# Patient Record
Sex: MALE | Race: WHITE | Employment: OTHER
[De-identification: names, ages, dates, MRNs, and addresses within clinical notes are randomized per-mention and may not be internally consistent; named-entity substitution may affect disease eponyms.]

---

## 2017-02-15 ENCOUNTER — OFFICE VISIT (OUTPATIENT)
Dept: FAMILY MEDICINE CLINIC | Facility: CLINIC | Age: 77
End: 2017-02-15

## 2017-02-15 VITALS — BODY MASS INDEX: 28.37 KG/M2 | DIASTOLIC BLOOD PRESSURE: 86 MMHG | WEIGHT: 215 LBS | SYSTOLIC BLOOD PRESSURE: 130 MMHG

## 2017-02-15 DIAGNOSIS — R07.81 RIB PAIN ON LEFT SIDE: Primary | ICD-10-CM

## 2017-02-15 PROCEDURE — 99213 OFFICE O/P EST LOW 20 MIN: CPT | Performed by: FAMILY MEDICINE

## 2017-02-15 RX ORDER — ISOSORBIDE MONONITRATE 30 MG/1
TABLET, EXTENDED RELEASE ORAL
Refills: 3 | COMMUNITY
Start: 2016-12-23 | End: 2017-09-21 | Stop reason: SDUPTHER

## 2017-02-15 ASSESSMENT — ENCOUNTER SYMPTOMS
EYE DISCHARGE: 0
NAUSEA: 1
VOMITING: 0
EYE REDNESS: 0
SHORTNESS OF BREATH: 0
COUGH: 0

## 2017-02-16 RX ORDER — TAMSULOSIN HYDROCHLORIDE 0.4 MG/1
0.4 CAPSULE ORAL DAILY
COMMUNITY

## 2017-02-22 ENCOUNTER — OFFICE VISIT (OUTPATIENT)
Dept: FAMILY MEDICINE CLINIC | Facility: CLINIC | Age: 77
End: 2017-02-22

## 2017-02-22 VITALS
BODY MASS INDEX: 28.76 KG/M2 | OXYGEN SATURATION: 92 % | HEIGHT: 73 IN | SYSTOLIC BLOOD PRESSURE: 136 MMHG | TEMPERATURE: 97.5 F | DIASTOLIC BLOOD PRESSURE: 80 MMHG | HEART RATE: 56 BPM | WEIGHT: 217 LBS

## 2017-02-22 DIAGNOSIS — B02.9 HERPES ZOSTER WITHOUT COMPLICATION: Primary | ICD-10-CM

## 2017-02-22 PROCEDURE — 99213 OFFICE O/P EST LOW 20 MIN: CPT | Performed by: NURSE PRACTITIONER

## 2017-02-22 RX ORDER — ACYCLOVIR 800 MG/1
800 TABLET ORAL
Qty: 35 TABLET | Refills: 0 | Status: SHIPPED | OUTPATIENT
Start: 2017-02-22 | End: 2017-03-01

## 2017-02-22 ASSESSMENT — ENCOUNTER SYMPTOMS
RHINORRHEA: 0
SHORTNESS OF BREATH: 0
COUGH: 0
SORE THROAT: 0
BACK PAIN: 1

## 2017-03-03 ENCOUNTER — TELEPHONE (OUTPATIENT)
Dept: FAMILY MEDICINE CLINIC | Facility: CLINIC | Age: 77
End: 2017-03-03

## 2017-03-03 RX ORDER — TRAZODONE HYDROCHLORIDE 150 MG/1
150 TABLET ORAL NIGHTLY
Qty: 90 TABLET | Refills: 1 | Status: SHIPPED | OUTPATIENT
Start: 2017-03-03 | End: 2017-07-06 | Stop reason: SDUPTHER

## 2017-05-20 ENCOUNTER — APPOINTMENT (OUTPATIENT)
Dept: CT IMAGING | Age: 77
End: 2017-05-20
Payer: MEDICARE

## 2017-05-20 ENCOUNTER — HOSPITAL ENCOUNTER (EMERGENCY)
Age: 77
Discharge: HOME | End: 2017-05-20
Attending: FAMILY MEDICINE
Payer: MEDICARE

## 2017-05-20 ENCOUNTER — APPOINTMENT (OUTPATIENT)
Dept: GENERAL RADIOLOGY | Age: 77
End: 2017-05-20
Payer: MEDICARE

## 2017-05-20 VITALS
WEIGHT: 213.3 LBS | TEMPERATURE: 98.1 F | SYSTOLIC BLOOD PRESSURE: 132 MMHG | RESPIRATION RATE: 20 BRPM | BODY MASS INDEX: 28.27 KG/M2 | OXYGEN SATURATION: 94 % | HEIGHT: 73 IN | DIASTOLIC BLOOD PRESSURE: 82 MMHG | HEART RATE: 82 BPM

## 2017-05-20 DIAGNOSIS — N39.0 URINARY TRACT INFECTION WITHOUT HEMATURIA, SITE UNSPECIFIED: ICD-10-CM

## 2017-05-20 DIAGNOSIS — N20.1 URETEROLITHIASIS: Primary | ICD-10-CM

## 2017-05-20 DIAGNOSIS — S22.31XA CLOSED FRACTURE OF RIB OF RIGHT SIDE, INITIAL ENCOUNTER: ICD-10-CM

## 2017-05-20 DIAGNOSIS — E87.6 HYPOKALEMIA: ICD-10-CM

## 2017-05-20 LAB
-: ABNORMAL
ABSOLUTE EOS #: 0 K/UL (ref 0–0.4)
ABSOLUTE LYMPH #: 0.4 K/UL (ref 0.9–2.5)
ABSOLUTE MONO #: 0.7 K/UL (ref 0–1)
ALBUMIN SERPL-MCNC: 4.1 G/DL (ref 3.5–5.2)
ALP BLD-CCNC: 92 U/L (ref 40–129)
ALT SERPL-CCNC: 11 U/L (ref 5–41)
AMORPHOUS: ABNORMAL
AMYLASE: 37 U/L (ref 28–100)
ANION GAP SERPL CALCULATED.3IONS-SCNC: 18 MMOL/L (ref 9–17)
AST SERPL-CCNC: 16 U/L
BACTERIA: ABNORMAL
BASOPHILS # BLD: 0 %
BASOPHILS ABSOLUTE: 0 K/UL (ref 0–0.2)
BILIRUB SERPL-MCNC: 0.8 MG/DL (ref 0.3–1.2)
BILIRUBIN URINE: NEGATIVE
BUN BLDV-MCNC: 13 MG/DL (ref 8–23)
BUN/CREAT BLD: 15 (ref 9–20)
CALCIUM SERPL-MCNC: 9.6 MG/DL (ref 8.6–10.4)
CASTS UA: ABNORMAL /LPF
CHLORIDE BLD-SCNC: 99 MMOL/L (ref 98–107)
CO2: 21 MMOL/L (ref 20–31)
COLOR: YELLOW
COMMENT UA: ABNORMAL
CREAT SERPL-MCNC: 0.87 MG/DL (ref 0.7–1.2)
CRYSTALS, UA: ABNORMAL /HPF
DIFFERENTIAL TYPE: YES
EOSINOPHILS RELATIVE PERCENT: 1 %
EPITHELIAL CELLS UA: ABNORMAL /HPF
GFR AFRICAN AMERICAN: >60 ML/MIN
GFR NON-AFRICAN AMERICAN: >60 ML/MIN
GFR SERPL CREATININE-BSD FRML MDRD: ABNORMAL ML/MIN/{1.73_M2}
GFR SERPL CREATININE-BSD FRML MDRD: ABNORMAL ML/MIN/{1.73_M2}
GLUCOSE BLD-MCNC: 140 MG/DL (ref 70–99)
GLUCOSE URINE: NEGATIVE
HCT VFR BLD CALC: 38.6 % (ref 41–53)
HEMOGLOBIN: 13 G/DL (ref 13.5–17.5)
KETONES, URINE: NEGATIVE
LEUKOCYTE ESTERASE, URINE: ABNORMAL
LIPASE: 12 U/L (ref 13–60)
LYMPHOCYTES # BLD: 6 %
MCH RBC QN AUTO: 32.1 PG (ref 26–34)
MCHC RBC AUTO-ENTMCNC: 33.7 G/DL (ref 31–37)
MCV RBC AUTO: 95.2 FL (ref 80–100)
MONOCYTES # BLD: 9 %
MUCUS: ABNORMAL
NITRITE, URINE: POSITIVE
OTHER OBSERVATIONS UA: ABNORMAL
PDW BLD-RTO: 14.9 % (ref 12.1–15.2)
PH UA: 6 (ref 5–8)
PLATELET # BLD: 243 K/UL (ref 140–450)
PLATELET ESTIMATE: ABNORMAL
PMV BLD AUTO: ABNORMAL FL (ref 6–12)
POTASSIUM SERPL-SCNC: 3.3 MMOL/L (ref 3.7–5.3)
PROTEIN UA: ABNORMAL
RBC # BLD: 4.06 M/UL (ref 4.5–5.9)
RBC # BLD: ABNORMAL 10*6/UL
RBC UA: ABNORMAL /HPF (ref 0–2)
RENAL EPITHELIAL, UA: ABNORMAL /HPF
SEG NEUTROPHILS: 84 %
SEGMENTED NEUTROPHILS ABSOLUTE COUNT: 6.4 K/UL (ref 2.1–6.5)
SODIUM BLD-SCNC: 138 MMOL/L (ref 135–144)
SPECIFIC GRAVITY UA: 1.02 (ref 1–1.03)
TOTAL PROTEIN: 7.1 G/DL (ref 6.4–8.3)
TRICHOMONAS: ABNORMAL
TURBIDITY: ABNORMAL
URINE HGB: ABNORMAL
UROBILINOGEN, URINE: NORMAL
WBC # BLD: 7.6 K/UL (ref 3.5–11)
WBC # BLD: ABNORMAL 10*3/UL
WBC UA: ABNORMAL /HPF
YEAST: ABNORMAL

## 2017-05-20 PROCEDURE — 99285 EMERGENCY DEPT VISIT HI MDM: CPT

## 2017-05-20 PROCEDURE — 94664 DEMO&/EVAL PT USE INHALER: CPT

## 2017-05-20 PROCEDURE — 87086 URINE CULTURE/COLONY COUNT: CPT

## 2017-05-20 PROCEDURE — 81001 URINALYSIS AUTO W/SCOPE: CPT

## 2017-05-20 PROCEDURE — 71101 X-RAY EXAM UNILAT RIBS/CHEST: CPT

## 2017-05-20 PROCEDURE — 80053 COMPREHEN METABOLIC PANEL: CPT

## 2017-05-20 PROCEDURE — 6370000000 HC RX 637 (ALT 250 FOR IP): Performed by: FAMILY MEDICINE

## 2017-05-20 PROCEDURE — 82150 ASSAY OF AMYLASE: CPT

## 2017-05-20 PROCEDURE — 85025 COMPLETE CBC W/AUTO DIFF WBC: CPT

## 2017-05-20 PROCEDURE — 87077 CULTURE AEROBIC IDENTIFY: CPT

## 2017-05-20 PROCEDURE — 83690 ASSAY OF LIPASE: CPT

## 2017-05-20 PROCEDURE — 87186 SC STD MICRODIL/AGAR DIL: CPT

## 2017-05-20 PROCEDURE — 74176 CT ABD & PELVIS W/O CONTRAST: CPT

## 2017-05-20 PROCEDURE — 80048 BASIC METABOLIC PNL TOTAL CA: CPT

## 2017-05-20 RX ORDER — CIPROFLOXACIN 500 MG/1
500 TABLET, FILM COATED ORAL ONCE
Status: COMPLETED | OUTPATIENT
Start: 2017-05-20 | End: 2017-05-20

## 2017-05-20 RX ORDER — CIPROFLOXACIN 500 MG/1
500 TABLET, FILM COATED ORAL 2 TIMES DAILY
Qty: 20 TABLET | Refills: 0 | Status: SHIPPED | OUTPATIENT
Start: 2017-05-20 | End: 2017-05-30

## 2017-05-20 RX ORDER — POTASSIUM CHLORIDE 750 MG/1
10 TABLET, FILM COATED, EXTENDED RELEASE ORAL ONCE
Status: COMPLETED | OUTPATIENT
Start: 2017-05-20 | End: 2017-05-20

## 2017-05-20 RX ORDER — IPRATROPIUM BROMIDE AND ALBUTEROL SULFATE 2.5; .5 MG/3ML; MG/3ML
1 SOLUTION RESPIRATORY (INHALATION) ONCE
Status: COMPLETED | OUTPATIENT
Start: 2017-05-20 | End: 2017-05-20

## 2017-05-20 RX ORDER — POTASSIUM CHLORIDE 750 MG/1
10 TABLET, EXTENDED RELEASE ORAL DAILY
Qty: 15 TABLET | Refills: 0 | Status: SHIPPED | OUTPATIENT
Start: 2017-05-20 | End: 2017-12-22 | Stop reason: ALTCHOICE

## 2017-05-20 RX ADMIN — CIPROFLOXACIN HYDROCHLORIDE 500 MG: 500 TABLET, FILM COATED ORAL at 18:17

## 2017-05-20 RX ADMIN — IPRATROPIUM BROMIDE AND ALBUTEROL SULFATE 1 AMPULE: .5; 3 SOLUTION RESPIRATORY (INHALATION) at 16:57

## 2017-05-20 RX ADMIN — POTASSIUM CHLORIDE 10 MEQ: 750 TABLET, FILM COATED, EXTENDED RELEASE ORAL at 18:17

## 2017-05-20 ASSESSMENT — PAIN DESCRIPTION - LOCATION: LOCATION: ABDOMEN

## 2017-05-20 ASSESSMENT — PAIN DESCRIPTION - PROGRESSION: CLINICAL_PROGRESSION: NOT CHANGED

## 2017-05-20 ASSESSMENT — PAIN DESCRIPTION - ORIENTATION: ORIENTATION: RIGHT;LEFT;MID

## 2017-05-20 ASSESSMENT — PAIN DESCRIPTION - FREQUENCY: FREQUENCY: INTERMITTENT

## 2017-05-20 ASSESSMENT — PAIN DESCRIPTION - ONSET: ONSET: GRADUAL

## 2017-05-20 ASSESSMENT — PAIN DESCRIPTION - PAIN TYPE: TYPE: ACUTE PAIN

## 2017-05-20 ASSESSMENT — PAIN SCALES - GENERAL: PAINLEVEL_OUTOF10: 2

## 2017-05-20 ASSESSMENT — PAIN DESCRIPTION - DESCRIPTORS: DESCRIPTORS: ACHING

## 2017-05-21 LAB
CULTURE: ABNORMAL
CULTURE: ABNORMAL
Lab: ABNORMAL
ORGANISM: ABNORMAL
SPECIMEN DESCRIPTION: ABNORMAL
SPECIMEN DESCRIPTION: ABNORMAL
STATUS: ABNORMAL

## 2017-05-25 ENCOUNTER — OFFICE VISIT (OUTPATIENT)
Dept: UROLOGY | Age: 77
End: 2017-05-25
Payer: MEDICARE

## 2017-05-25 ENCOUNTER — HOSPITAL ENCOUNTER (OUTPATIENT)
Age: 77
Setting detail: SPECIMEN
Discharge: HOME | End: 2017-05-25
Payer: MEDICARE

## 2017-05-25 VITALS
DIASTOLIC BLOOD PRESSURE: 82 MMHG | WEIGHT: 217 LBS | SYSTOLIC BLOOD PRESSURE: 142 MMHG | BODY MASS INDEX: 28.63 KG/M2 | TEMPERATURE: 97.8 F

## 2017-05-25 DIAGNOSIS — N13.2 URETERAL STONE WITH HYDRONEPHROSIS: Primary | ICD-10-CM

## 2017-05-25 DIAGNOSIS — N30.00 ACUTE CYSTITIS WITHOUT HEMATURIA: ICD-10-CM

## 2017-05-25 DIAGNOSIS — N13.2 URETERAL STONE WITH HYDRONEPHROSIS: ICD-10-CM

## 2017-05-25 LAB
-: ABNORMAL
AMORPHOUS: ABNORMAL
BACTERIA: ABNORMAL
BILIRUBIN URINE: NEGATIVE
CASTS UA: ABNORMAL /LPF
COLOR: ABNORMAL
COMMENT UA: ABNORMAL
CRYSTALS, UA: ABNORMAL /HPF
EPITHELIAL CELLS UA: ABNORMAL /HPF
GLUCOSE URINE: NEGATIVE
KETONES, URINE: NEGATIVE
LEUKOCYTE ESTERASE, URINE: ABNORMAL
MUCUS: ABNORMAL
NITRITE, URINE: NEGATIVE
OTHER OBSERVATIONS UA: ABNORMAL
PH UA: 6 (ref 5–8)
PROTEIN UA: ABNORMAL
RBC UA: ABNORMAL /HPF (ref 0–2)
RENAL EPITHELIAL, UA: ABNORMAL /HPF
SPECIFIC GRAVITY UA: 1.02 (ref 1–1.03)
TRICHOMONAS: ABNORMAL
TURBIDITY: CLEAR
URINE HGB: NEGATIVE
UROBILINOGEN, URINE: NORMAL
WBC UA: ABNORMAL /HPF
YEAST: ABNORMAL

## 2017-05-25 PROCEDURE — 99214 OFFICE O/P EST MOD 30 MIN: CPT | Performed by: PHYSICIAN ASSISTANT

## 2017-05-25 PROCEDURE — 87086 URINE CULTURE/COLONY COUNT: CPT

## 2017-05-25 PROCEDURE — 81001 URINALYSIS AUTO W/SCOPE: CPT

## 2017-05-25 ASSESSMENT — ENCOUNTER SYMPTOMS
DIARRHEA: 0
ABDOMINAL PAIN: 0
COLOR CHANGE: 0
NAUSEA: 0
VOMITING: 0
ABDOMINAL DISTENTION: 0
SHORTNESS OF BREATH: 0
CONSTIPATION: 0

## 2017-05-26 LAB
CULTURE: NO GROWTH
CULTURE: NORMAL
Lab: NORMAL
SPECIMEN DESCRIPTION: NORMAL
SPECIMEN DESCRIPTION: NORMAL
STATUS: NORMAL

## 2017-05-30 ENCOUNTER — TELEPHONE (OUTPATIENT)
Dept: UROLOGY | Age: 77
End: 2017-05-30

## 2017-05-30 ENCOUNTER — ANESTHESIA EVENT (OUTPATIENT)
Dept: OPERATING ROOM | Age: 77
End: 2017-05-30
Payer: MEDICARE

## 2017-05-30 ENCOUNTER — HOSPITAL ENCOUNTER (OUTPATIENT)
Dept: CT IMAGING | Age: 77
Discharge: HOME | End: 2017-05-30
Payer: MEDICARE

## 2017-05-30 DIAGNOSIS — N13.2 URETERAL STONE WITH HYDRONEPHROSIS: Primary | ICD-10-CM

## 2017-05-30 DIAGNOSIS — N13.2 URETERAL STONE WITH HYDRONEPHROSIS: ICD-10-CM

## 2017-05-30 PROCEDURE — 74176 CT ABD & PELVIS W/O CONTRAST: CPT

## 2017-05-31 ENCOUNTER — HOSPITAL ENCOUNTER (OUTPATIENT)
Age: 77
Setting detail: OBSERVATION
Discharge: ANOTHER HOSPITAL | End: 2017-06-01
Attending: EMERGENCY MEDICINE | Admitting: INTERNAL MEDICINE
Payer: MEDICARE

## 2017-05-31 ENCOUNTER — HOSPITAL ENCOUNTER (OUTPATIENT)
Age: 77
Setting detail: OUTPATIENT SURGERY
Discharge: HOME | End: 2017-05-31
Attending: UROLOGY | Admitting: UROLOGY
Payer: MEDICARE

## 2017-05-31 ENCOUNTER — HOSPITAL ENCOUNTER (EMERGENCY)
Age: 77
Discharge: HOME | End: 2017-05-31
Attending: EMERGENCY MEDICINE
Payer: MEDICARE

## 2017-05-31 ENCOUNTER — ANESTHESIA (OUTPATIENT)
Dept: OPERATING ROOM | Age: 77
End: 2017-05-31
Payer: MEDICARE

## 2017-05-31 ENCOUNTER — APPOINTMENT (OUTPATIENT)
Dept: GENERAL RADIOLOGY | Age: 77
End: 2017-05-31
Payer: MEDICARE

## 2017-05-31 ENCOUNTER — APPOINTMENT (OUTPATIENT)
Dept: GENERAL RADIOLOGY | Age: 77
End: 2017-05-31
Attending: UROLOGY
Payer: MEDICARE

## 2017-05-31 VITALS
HEART RATE: 68 BPM | DIASTOLIC BLOOD PRESSURE: 83 MMHG | OXYGEN SATURATION: 96 % | RESPIRATION RATE: 18 BRPM | TEMPERATURE: 97.7 F | HEIGHT: 73 IN | WEIGHT: 205 LBS | BODY MASS INDEX: 27.17 KG/M2 | SYSTOLIC BLOOD PRESSURE: 156 MMHG

## 2017-05-31 VITALS — OXYGEN SATURATION: 97 % | SYSTOLIC BLOOD PRESSURE: 152 MMHG | TEMPERATURE: 98.6 F | DIASTOLIC BLOOD PRESSURE: 89 MMHG

## 2017-05-31 VITALS
HEART RATE: 84 BPM | RESPIRATION RATE: 16 BRPM | SYSTOLIC BLOOD PRESSURE: 127 MMHG | WEIGHT: 205 LBS | TEMPERATURE: 97.7 F | HEIGHT: 73 IN | DIASTOLIC BLOOD PRESSURE: 88 MMHG | BODY MASS INDEX: 27.17 KG/M2 | OXYGEN SATURATION: 95 %

## 2017-05-31 DIAGNOSIS — Z98.890 HISTORY OF LITHOTRIPSY: ICD-10-CM

## 2017-05-31 DIAGNOSIS — T83.122A URETERAL STENT DISPLACEMENT, INITIAL ENCOUNTER (HCC): Primary | ICD-10-CM

## 2017-05-31 DIAGNOSIS — R07.9 CHEST PAIN, UNSPECIFIED TYPE: Primary | ICD-10-CM

## 2017-05-31 DIAGNOSIS — Z86.79 HISTORY OF CORONARY ARTERY DISEASE: ICD-10-CM

## 2017-05-31 LAB
ABSOLUTE EOS #: ABNORMAL K/UL (ref 0–0.4)
ABSOLUTE LYMPH #: 0.4 K/UL (ref 0.9–2.5)
ABSOLUTE MONO #: 0.2 K/UL (ref 0–1)
ANION GAP SERPL CALCULATED.3IONS-SCNC: 19 MMOL/L (ref 9–17)
BASOPHILS # BLD: ABNORMAL %
BASOPHILS ABSOLUTE: ABNORMAL K/UL (ref 0–0.2)
BUN BLDV-MCNC: 16 MG/DL (ref 8–23)
BUN/CREAT BLD: 18 (ref 9–20)
CALCIUM SERPL-MCNC: 9.2 MG/DL (ref 8.6–10.4)
CHLORIDE BLD-SCNC: 99 MMOL/L (ref 98–107)
CO2: 22 MMOL/L (ref 20–31)
CREAT SERPL-MCNC: 0.87 MG/DL (ref 0.7–1.2)
DIFFERENTIAL TYPE: ABNORMAL
EOSINOPHILS RELATIVE PERCENT: ABNORMAL %
GFR AFRICAN AMERICAN: >60 ML/MIN
GFR NON-AFRICAN AMERICAN: >60 ML/MIN
GFR SERPL CREATININE-BSD FRML MDRD: ABNORMAL ML/MIN/{1.73_M2}
GFR SERPL CREATININE-BSD FRML MDRD: ABNORMAL ML/MIN/{1.73_M2}
GLUCOSE BLD-MCNC: 112 MG/DL (ref 74–100)
GLUCOSE BLD-MCNC: 143 MG/DL (ref 70–99)
HCT VFR BLD CALC: 36.2 % (ref 41–53)
HEMOGLOBIN: 12.6 G/DL (ref 13.5–17.5)
INR BLD: 1
LYMPHOCYTES # BLD: 4 %
MCH RBC QN AUTO: 33.3 PG (ref 26–34)
MCHC RBC AUTO-ENTMCNC: 34.8 G/DL (ref 31–37)
MCV RBC AUTO: 95.8 FL (ref 80–100)
MONOCYTES # BLD: 2 %
MORPHOLOGY: ABNORMAL
PARTIAL THROMBOPLASTIN TIME: 22.9 SEC (ref 21–33)
PDW BLD-RTO: 13.7 % (ref 12.1–15.2)
PLATELET # BLD: 262 K/UL (ref 140–450)
PLATELET ESTIMATE: ABNORMAL
PMV BLD AUTO: ABNORMAL FL (ref 6–12)
POTASSIUM SERPL-SCNC: 3.5 MMOL/L (ref 3.7–5.3)
PROTHROMBIN TIME: 10.6 SEC (ref 9–11.6)
RBC # BLD: 3.78 M/UL (ref 4.5–5.9)
RBC # BLD: ABNORMAL 10*6/UL
SEG NEUTROPHILS: 94 %
SEGMENTED NEUTROPHILS ABSOLUTE COUNT: 9.5 K/UL (ref 2.1–6.5)
SODIUM BLD-SCNC: 140 MMOL/L (ref 135–144)
TROPONIN INTERP: ABNORMAL
TROPONIN INTERP: NORMAL
TROPONIN T: 0.04 NG/ML
TROPONIN T: <0.03 NG/ML
WBC # BLD: 10.1 K/UL (ref 3.5–11)
WBC # BLD: ABNORMAL 10*3/UL

## 2017-05-31 PROCEDURE — 84484 ASSAY OF TROPONIN QUANT: CPT

## 2017-05-31 PROCEDURE — 82365 CALCULUS SPECTROSCOPY: CPT

## 2017-05-31 PROCEDURE — 2580000003 HC RX 258: Performed by: EMERGENCY MEDICINE

## 2017-05-31 PROCEDURE — 82947 ASSAY GLUCOSE BLOOD QUANT: CPT

## 2017-05-31 PROCEDURE — 99285 EMERGENCY DEPT VISIT HI MDM: CPT

## 2017-05-31 PROCEDURE — 6370000000 HC RX 637 (ALT 250 FOR IP): Performed by: EMERGENCY MEDICINE

## 2017-05-31 PROCEDURE — 85025 COMPLETE CBC W/AUTO DIFF WBC: CPT

## 2017-05-31 PROCEDURE — 3600000004 HC SURGERY LEVEL 4 BASE: Performed by: UROLOGY

## 2017-05-31 PROCEDURE — 36415 COLL VENOUS BLD VENIPUNCTURE: CPT

## 2017-05-31 PROCEDURE — 71010 XR CHEST PORTABLE: CPT

## 2017-05-31 PROCEDURE — 99283 EMERGENCY DEPT VISIT LOW MDM: CPT

## 2017-05-31 PROCEDURE — 2580000003 HC RX 258: Performed by: UROLOGY

## 2017-05-31 PROCEDURE — G0378 HOSPITAL OBSERVATION PER HR: HCPCS

## 2017-05-31 PROCEDURE — 6370000000 HC RX 637 (ALT 250 FOR IP): Performed by: INTERNAL MEDICINE

## 2017-05-31 PROCEDURE — 85730 THROMBOPLASTIN TIME PARTIAL: CPT

## 2017-05-31 PROCEDURE — 6360000002 HC RX W HCPCS: Performed by: UROLOGY

## 2017-05-31 PROCEDURE — 7100000001 HC PACU RECOVERY - ADDTL 15 MIN: Performed by: UROLOGY

## 2017-05-31 PROCEDURE — 7100000000 HC PACU RECOVERY - FIRST 15 MIN: Performed by: UROLOGY

## 2017-05-31 PROCEDURE — 6370000000 HC RX 637 (ALT 250 FOR IP): Performed by: UROLOGY

## 2017-05-31 PROCEDURE — 74000 XR ABDOMEN LIMITED (KUB): CPT

## 2017-05-31 PROCEDURE — 93005 ELECTROCARDIOGRAM TRACING: CPT

## 2017-05-31 PROCEDURE — C2617 STENT, NON-COR, TEM W/O DEL: HCPCS | Performed by: UROLOGY

## 2017-05-31 PROCEDURE — 6360000002 HC RX W HCPCS: Performed by: EMERGENCY MEDICINE

## 2017-05-31 PROCEDURE — 3600000014 HC SURGERY LEVEL 4 ADDTL 15MIN: Performed by: UROLOGY

## 2017-05-31 PROCEDURE — 7100000010 HC PHASE II RECOVERY - FIRST 15 MIN: Performed by: UROLOGY

## 2017-05-31 PROCEDURE — 6360000002 HC RX W HCPCS: Performed by: NURSE ANESTHETIST, CERTIFIED REGISTERED

## 2017-05-31 PROCEDURE — 85610 PROTHROMBIN TIME: CPT

## 2017-05-31 PROCEDURE — 2500000003 HC RX 250 WO HCPCS: Performed by: NURSE ANESTHETIST, CERTIFIED REGISTERED

## 2017-05-31 PROCEDURE — C1769 GUIDE WIRE: HCPCS | Performed by: UROLOGY

## 2017-05-31 PROCEDURE — 3700000001 HC ADD 15 MINUTES (ANESTHESIA): Performed by: UROLOGY

## 2017-05-31 PROCEDURE — 80048 BASIC METABOLIC PNL TOTAL CA: CPT

## 2017-05-31 PROCEDURE — 7100000011 HC PHASE II RECOVERY - ADDTL 15 MIN: Performed by: UROLOGY

## 2017-05-31 PROCEDURE — 96374 THER/PROPH/DIAG INJ IV PUSH: CPT

## 2017-05-31 PROCEDURE — 3700000000 HC ANESTHESIA ATTENDED CARE: Performed by: UROLOGY

## 2017-05-31 DEVICE — URETERAL STENT
Type: IMPLANTABLE DEVICE | Site: URETER | Status: FUNCTIONAL
Brand: PERCUFLEX™ PLUS

## 2017-05-31 RX ORDER — CIPROFLOXACIN 2 MG/ML
400 INJECTION, SOLUTION INTRAVENOUS
Status: COMPLETED | OUTPATIENT
Start: 2017-05-31 | End: 2017-05-31

## 2017-05-31 RX ORDER — ONDANSETRON 2 MG/ML
4 INJECTION INTRAMUSCULAR; INTRAVENOUS EVERY 6 HOURS PRN
Status: DISCONTINUED | OUTPATIENT
Start: 2017-05-31 | End: 2017-06-01 | Stop reason: HOSPADM

## 2017-05-31 RX ORDER — ONDANSETRON 2 MG/ML
INJECTION INTRAMUSCULAR; INTRAVENOUS PRN
Status: DISCONTINUED | OUTPATIENT
Start: 2017-05-31 | End: 2017-05-31 | Stop reason: SDUPTHER

## 2017-05-31 RX ORDER — FENTANYL CITRATE 50 UG/ML
50 INJECTION, SOLUTION INTRAMUSCULAR; INTRAVENOUS EVERY 5 MIN PRN
Status: DISCONTINUED | OUTPATIENT
Start: 2017-05-31 | End: 2017-05-31 | Stop reason: HOSPADM

## 2017-05-31 RX ORDER — SODIUM CHLORIDE, SODIUM LACTATE, POTASSIUM CHLORIDE, CALCIUM CHLORIDE 600; 310; 30; 20 MG/100ML; MG/100ML; MG/100ML; MG/100ML
INJECTION, SOLUTION INTRAVENOUS CONTINUOUS
Status: DISCONTINUED | OUTPATIENT
Start: 2017-05-31 | End: 2017-05-31 | Stop reason: HOSPADM

## 2017-05-31 RX ORDER — TAMSULOSIN HYDROCHLORIDE 0.4 MG/1
0.4 CAPSULE ORAL ONCE
Status: COMPLETED | OUTPATIENT
Start: 2017-05-31 | End: 2017-05-31

## 2017-05-31 RX ORDER — LANOLIN ALCOHOL/MO/W.PET/CERES
1 CREAM (GRAM) TOPICAL DAILY
Status: DISCONTINUED | OUTPATIENT
Start: 2017-06-01 | End: 2017-06-01 | Stop reason: HOSPADM

## 2017-05-31 RX ORDER — HYDROCODONE BITARTRATE AND ACETAMINOPHEN 5; 325 MG/1; MG/1
1 TABLET ORAL PRN
Status: COMPLETED | OUTPATIENT
Start: 2017-05-31 | End: 2017-05-31

## 2017-05-31 RX ORDER — SODIUM CHLORIDE 0.9 % (FLUSH) 0.9 %
10 SYRINGE (ML) INJECTION EVERY 12 HOURS SCHEDULED
Status: DISCONTINUED | OUTPATIENT
Start: 2017-05-31 | End: 2017-06-01 | Stop reason: HOSPADM

## 2017-05-31 RX ORDER — ASPIRIN 81 MG/1
81 TABLET, CHEWABLE ORAL DAILY
Status: DISCONTINUED | OUTPATIENT
Start: 2017-06-01 | End: 2017-06-01 | Stop reason: HOSPADM

## 2017-05-31 RX ORDER — ROSUVASTATIN CALCIUM 20 MG/1
20 TABLET, COATED ORAL DAILY
Status: DISCONTINUED | OUTPATIENT
Start: 2017-06-01 | End: 2017-06-01 | Stop reason: HOSPADM

## 2017-05-31 RX ORDER — TRAZODONE HYDROCHLORIDE 50 MG/1
150 TABLET ORAL NIGHTLY
Status: DISCONTINUED | OUTPATIENT
Start: 2017-05-31 | End: 2017-06-01 | Stop reason: HOSPADM

## 2017-05-31 RX ORDER — ISOSORBIDE MONONITRATE 30 MG/1
30 TABLET, EXTENDED RELEASE ORAL DAILY
Status: DISCONTINUED | OUTPATIENT
Start: 2017-06-01 | End: 2017-06-01 | Stop reason: HOSPADM

## 2017-05-31 RX ORDER — HYDROCODONE BITARTRATE AND ACETAMINOPHEN 5; 325 MG/1; MG/1
2 TABLET ORAL PRN
Status: COMPLETED | OUTPATIENT
Start: 2017-05-31 | End: 2017-05-31

## 2017-05-31 RX ORDER — METOCLOPRAMIDE HYDROCHLORIDE 5 MG/ML
10 INJECTION INTRAMUSCULAR; INTRAVENOUS
Status: DISCONTINUED | OUTPATIENT
Start: 2017-05-31 | End: 2017-05-31 | Stop reason: HOSPADM

## 2017-05-31 RX ORDER — KETOROLAC TROMETHAMINE 30 MG/ML
15 INJECTION, SOLUTION INTRAMUSCULAR; INTRAVENOUS ONCE
Status: COMPLETED | OUTPATIENT
Start: 2017-05-31 | End: 2017-05-31

## 2017-05-31 RX ORDER — EPHEDRINE SULFATE 50 MG/ML
INJECTION, SOLUTION INTRAVENOUS PRN
Status: DISCONTINUED | OUTPATIENT
Start: 2017-05-31 | End: 2017-05-31 | Stop reason: SDUPTHER

## 2017-05-31 RX ORDER — FAMOTIDINE 20 MG/1
20 TABLET, FILM COATED ORAL DAILY
Status: DISCONTINUED | OUTPATIENT
Start: 2017-06-01 | End: 2017-06-01 | Stop reason: HOSPADM

## 2017-05-31 RX ORDER — ACETAMINOPHEN 325 MG/1
650 TABLET ORAL EVERY 4 HOURS PRN
Status: DISCONTINUED | OUTPATIENT
Start: 2017-05-31 | End: 2017-06-01 | Stop reason: HOSPADM

## 2017-05-31 RX ORDER — TAMSULOSIN HYDROCHLORIDE 0.4 MG/1
0.4 CAPSULE ORAL DAILY
Status: DISCONTINUED | OUTPATIENT
Start: 2017-06-01 | End: 2017-05-31

## 2017-05-31 RX ORDER — NITROGLYCERIN 0.4 MG/1
0.4 TABLET SUBLINGUAL ONCE
Status: COMPLETED | OUTPATIENT
Start: 2017-05-31 | End: 2017-05-31

## 2017-05-31 RX ORDER — PROMETHAZINE HYDROCHLORIDE 25 MG/ML
6.25 INJECTION, SOLUTION INTRAMUSCULAR; INTRAVENOUS
Status: DISCONTINUED | OUTPATIENT
Start: 2017-05-31 | End: 2017-05-31 | Stop reason: HOSPADM

## 2017-05-31 RX ORDER — SODIUM CHLORIDE 0.9 % (FLUSH) 0.9 %
10 SYRINGE (ML) INJECTION PRN
Status: DISCONTINUED | OUTPATIENT
Start: 2017-05-31 | End: 2017-06-01 | Stop reason: HOSPADM

## 2017-05-31 RX ORDER — TAMSULOSIN HYDROCHLORIDE 0.4 MG/1
0.4 CAPSULE ORAL DAILY
Status: DISCONTINUED | OUTPATIENT
Start: 2017-06-01 | End: 2017-06-01 | Stop reason: HOSPADM

## 2017-05-31 RX ORDER — ASPIRIN 81 MG/1
243 TABLET, CHEWABLE ORAL ONCE
Status: COMPLETED | OUTPATIENT
Start: 2017-05-31 | End: 2017-05-31

## 2017-05-31 RX ORDER — PROPOFOL 10 MG/ML
INJECTION, EMULSION INTRAVENOUS PRN
Status: DISCONTINUED | OUTPATIENT
Start: 2017-05-31 | End: 2017-05-31 | Stop reason: SDUPTHER

## 2017-05-31 RX ORDER — FENTANYL CITRATE 50 UG/ML
25 INJECTION, SOLUTION INTRAMUSCULAR; INTRAVENOUS EVERY 5 MIN PRN
Status: DISCONTINUED | OUTPATIENT
Start: 2017-05-31 | End: 2017-05-31 | Stop reason: HOSPADM

## 2017-05-31 RX ORDER — SODIUM CHLORIDE 9 MG/ML
INJECTION, SOLUTION INTRAVENOUS CONTINUOUS
Status: DISCONTINUED | OUTPATIENT
Start: 2017-05-31 | End: 2017-06-01 | Stop reason: HOSPADM

## 2017-05-31 RX ORDER — POTASSIUM CHLORIDE 750 MG/1
10 TABLET, FILM COATED, EXTENDED RELEASE ORAL DAILY
Status: DISCONTINUED | OUTPATIENT
Start: 2017-06-01 | End: 2017-06-01 | Stop reason: HOSPADM

## 2017-05-31 RX ORDER — KETOROLAC TROMETHAMINE 10 MG/1
10 TABLET, FILM COATED ORAL 3 TIMES DAILY
Qty: 20 TABLET | Refills: 0 | Status: ON HOLD | OUTPATIENT
Start: 2017-05-31 | End: 2017-06-01

## 2017-05-31 RX ORDER — ALBUTEROL SULFATE 90 UG/1
2 AEROSOL, METERED RESPIRATORY (INHALATION) 4 TIMES DAILY PRN
Status: DISCONTINUED | OUTPATIENT
Start: 2017-05-31 | End: 2017-06-01 | Stop reason: HOSPADM

## 2017-05-31 RX ORDER — PANTOPRAZOLE SODIUM 40 MG/1
40 TABLET, DELAYED RELEASE ORAL
Status: DISCONTINUED | OUTPATIENT
Start: 2017-06-01 | End: 2017-06-01 | Stop reason: HOSPADM

## 2017-05-31 RX ORDER — HYDROCODONE BITARTRATE AND ACETAMINOPHEN 5; 325 MG/1; MG/1
1 TABLET ORAL EVERY 6 HOURS PRN
Status: DISCONTINUED | OUTPATIENT
Start: 2017-05-31 | End: 2017-06-01 | Stop reason: HOSPADM

## 2017-05-31 RX ORDER — GEMFIBROZIL 600 MG/1
600 TABLET, FILM COATED ORAL
Status: DISCONTINUED | OUTPATIENT
Start: 2017-06-01 | End: 2017-06-01 | Stop reason: HOSPADM

## 2017-05-31 RX ORDER — CILOSTAZOL 50 MG/1
100 TABLET ORAL 2 TIMES DAILY
Status: DISCONTINUED | OUTPATIENT
Start: 2017-05-31 | End: 2017-06-01 | Stop reason: HOSPADM

## 2017-05-31 RX ADMIN — PROPOFOL 150 MG: 10 INJECTION, EMULSION INTRAVENOUS at 12:34

## 2017-05-31 RX ADMIN — ONDANSETRON 4 MG: 2 INJECTION INTRAMUSCULAR; INTRAVENOUS at 12:34

## 2017-05-31 RX ADMIN — HYDROCODONE BITARTRATE AND ACETAMINOPHEN 1 TABLET: 5; 325 TABLET ORAL at 14:19

## 2017-05-31 RX ADMIN — SODIUM CHLORIDE, POTASSIUM CHLORIDE, SODIUM LACTATE AND CALCIUM CHLORIDE: 600; 310; 30; 20 INJECTION, SOLUTION INTRAVENOUS at 13:05

## 2017-05-31 RX ADMIN — FENTANYL CITRATE 50 MCG: 50 INJECTION INTRAMUSCULAR; INTRAVENOUS at 12:55

## 2017-05-31 RX ADMIN — SODIUM CHLORIDE: 9 INJECTION, SOLUTION INTRAVENOUS at 20:33

## 2017-05-31 RX ADMIN — TRAZODONE HYDROCHLORIDE 150 MG: 50 TABLET ORAL at 23:39

## 2017-05-31 RX ADMIN — EPHEDRINE SULFATE 10 MG: 50 INJECTION INTRAMUSCULAR; INTRAVENOUS; SUBCUTANEOUS at 12:35

## 2017-05-31 RX ADMIN — Medication 0.4 MG: at 21:07

## 2017-05-31 RX ADMIN — EPHEDRINE SULFATE 20 MG: 50 INJECTION INTRAMUSCULAR; INTRAVENOUS; SUBCUTANEOUS at 12:48

## 2017-05-31 RX ADMIN — TAMSULOSIN HYDROCHLORIDE 0.4 MG: 0.4 CAPSULE ORAL at 23:39

## 2017-05-31 RX ADMIN — CIPROFLOXACIN 400 MG: 2 INJECTION, SOLUTION INTRAVENOUS at 12:25

## 2017-05-31 RX ADMIN — NITROGLYCERIN 1 INCH: 20 OINTMENT TOPICAL at 23:39

## 2017-05-31 RX ADMIN — FENTANYL CITRATE 50 MCG: 50 INJECTION INTRAMUSCULAR; INTRAVENOUS at 12:34

## 2017-05-31 RX ADMIN — KETOROLAC TROMETHAMINE 15 MG: 30 INJECTION, SOLUTION INTRAMUSCULAR at 22:18

## 2017-05-31 RX ADMIN — CILOSTAZOL 100 MG: 50 TABLET ORAL at 23:37

## 2017-05-31 RX ADMIN — METOPROLOL TARTRATE 25 MG: 25 TABLET, FILM COATED ORAL at 23:38

## 2017-05-31 RX ADMIN — SODIUM CHLORIDE, POTASSIUM CHLORIDE, SODIUM LACTATE AND CALCIUM CHLORIDE: 600; 310; 30; 20 INJECTION, SOLUTION INTRAVENOUS at 10:54

## 2017-05-31 RX ADMIN — EPHEDRINE SULFATE 20 MG: 50 INJECTION INTRAMUSCULAR; INTRAVENOUS; SUBCUTANEOUS at 12:40

## 2017-05-31 RX ADMIN — Medication 243 MG: at 20:32

## 2017-05-31 RX ADMIN — LIDOCAINE HYDROCHLORIDE 100 MG: 20 INJECTION, SOLUTION INTRAVENOUS at 12:34

## 2017-05-31 ASSESSMENT — PAIN DESCRIPTION - FREQUENCY: FREQUENCY: CONTINUOUS

## 2017-05-31 ASSESSMENT — PAIN SCALES - GENERAL
PAINLEVEL_OUTOF10: 6
PAINLEVEL_OUTOF10: 0
PAINLEVEL_OUTOF10: 5
PAINLEVEL_OUTOF10: 0
PAINLEVEL_OUTOF10: 3
PAINLEVEL_OUTOF10: 0

## 2017-05-31 ASSESSMENT — PAIN DESCRIPTION - LOCATION: LOCATION: BREAST

## 2017-05-31 ASSESSMENT — PAIN DESCRIPTION - DESCRIPTORS: DESCRIPTORS: CONSTANT;PATIENT UNABLE TO DESCRIBE

## 2017-05-31 ASSESSMENT — PAIN - FUNCTIONAL ASSESSMENT: PAIN_FUNCTIONAL_ASSESSMENT: 0-10

## 2017-05-31 ASSESSMENT — PAIN DESCRIPTION - PAIN TYPE: TYPE: ACUTE PAIN

## 2017-05-31 ASSESSMENT — PAIN DESCRIPTION - ORIENTATION: ORIENTATION: LEFT

## 2017-05-31 ASSESSMENT — PAIN DESCRIPTION - ONSET: ONSET: SUDDEN

## 2017-06-01 ENCOUNTER — HOSPITAL ENCOUNTER (INPATIENT)
Age: 77
LOS: 2 days | Discharge: HOME HEALTH | DRG: 281 | End: 2017-06-03
Attending: FAMILY MEDICINE | Admitting: FAMILY MEDICINE
Payer: MEDICARE

## 2017-06-01 ENCOUNTER — APPOINTMENT (OUTPATIENT)
Dept: CARDIAC CATH/INVASIVE PROCEDURES | Age: 77
DRG: 281 | End: 2017-06-01
Attending: FAMILY MEDICINE
Payer: MEDICARE

## 2017-06-01 VITALS
HEIGHT: 73 IN | SYSTOLIC BLOOD PRESSURE: 133 MMHG | WEIGHT: 205.03 LBS | RESPIRATION RATE: 16 BRPM | DIASTOLIC BLOOD PRESSURE: 75 MMHG | OXYGEN SATURATION: 95 % | HEART RATE: 63 BPM | TEMPERATURE: 97.7 F | BODY MASS INDEX: 27.17 KG/M2

## 2017-06-01 PROBLEM — I25.810 CORONARY ARTERY DISEASE INVOLVING CORONARY BYPASS GRAFT: Status: ACTIVE | Noted: 2017-06-01

## 2017-06-01 PROBLEM — R31.9 HEMATURIA: Status: ACTIVE | Noted: 2017-06-01

## 2017-06-01 PROBLEM — I21.4 NSTEMI (NON-ST ELEVATED MYOCARDIAL INFARCTION) (HCC): Status: ACTIVE | Noted: 2017-06-01

## 2017-06-01 PROBLEM — I25.700 CORONARY ARTERY DISEASE INVOLVING CORONARY BYPASS GRAFT OF NATIVE HEART WITH UNSTABLE ANGINA PECTORIS (HCC): Status: ACTIVE | Noted: 2017-06-01

## 2017-06-01 PROBLEM — I20.9 ISCHEMIC CHEST PAIN (HCC): Status: ACTIVE | Noted: 2017-05-31

## 2017-06-01 LAB
ALBUMIN SERPL-MCNC: 3.3 G/DL (ref 3.5–5.2)
ALBUMIN/GLOBULIN RATIO: ABNORMAL (ref 1–2.5)
ALP BLD-CCNC: 74 U/L (ref 40–129)
ALT SERPL-CCNC: 14 U/L (ref 5–41)
ANION GAP SERPL CALCULATED.3IONS-SCNC: 12 MMOL/L (ref 9–17)
ANION GAP SERPL CALCULATED.3IONS-SCNC: 15 MMOL/L (ref 9–17)
AST SERPL-CCNC: 24 U/L
BILIRUB SERPL-MCNC: 0.53 MG/DL (ref 0.3–1.2)
BNP INTERPRETATION: ABNORMAL
BUN BLDV-MCNC: 13 MG/DL (ref 8–23)
BUN BLDV-MCNC: 17 MG/DL (ref 8–23)
BUN/CREAT BLD: 15 (ref 9–20)
BUN/CREAT BLD: ABNORMAL (ref 9–20)
CALCIUM SERPL-MCNC: 8.6 MG/DL (ref 8.6–10.4)
CALCIUM SERPL-MCNC: 8.7 MG/DL (ref 8.6–10.4)
CHLORIDE BLD-SCNC: 102 MMOL/L (ref 98–107)
CHLORIDE BLD-SCNC: 106 MMOL/L (ref 98–107)
CO2: 21 MMOL/L (ref 20–31)
CO2: 22 MMOL/L (ref 20–31)
CREAT SERPL-MCNC: 0.75 MG/DL (ref 0.7–1.2)
CREAT SERPL-MCNC: 1.1 MG/DL (ref 0.7–1.2)
EKG ATRIAL RATE: 64 BPM
EKG ATRIAL RATE: 71 BPM
EKG ATRIAL RATE: 75 BPM
EKG P AXIS: 14 DEGREES
EKG P AXIS: 29 DEGREES
EKG P AXIS: 31 DEGREES
EKG P-R INTERVAL: 174 MS
EKG P-R INTERVAL: 180 MS
EKG P-R INTERVAL: 214 MS
EKG Q-T INTERVAL: 434 MS
EKG Q-T INTERVAL: 454 MS
EKG Q-T INTERVAL: 476 MS
EKG QRS DURATION: 88 MS
EKG QRS DURATION: 90 MS
EKG QRS DURATION: 96 MS
EKG QTC CALCULATION (BAZETT): 484 MS
EKG QTC CALCULATION (BAZETT): 491 MS
EKG QTC CALCULATION (BAZETT): 493 MS
EKG R AXIS: -15 DEGREES
EKG R AXIS: -17 DEGREES
EKG R AXIS: -9 DEGREES
EKG T AXIS: 13 DEGREES
EKG T AXIS: 16 DEGREES
EKG T AXIS: 5 DEGREES
EKG VENTRICULAR RATE: 64 BPM
EKG VENTRICULAR RATE: 71 BPM
EKG VENTRICULAR RATE: 75 BPM
GFR AFRICAN AMERICAN: >60 ML/MIN
GFR AFRICAN AMERICAN: >60 ML/MIN
GFR NON-AFRICAN AMERICAN: >60 ML/MIN
GFR NON-AFRICAN AMERICAN: >60 ML/MIN
GFR SERPL CREATININE-BSD FRML MDRD: ABNORMAL ML/MIN/{1.73_M2}
GLUCOSE BLD-MCNC: 111 MG/DL (ref 70–99)
GLUCOSE BLD-MCNC: 125 MG/DL (ref 70–99)
HCT VFR BLD CALC: 32.8 % (ref 41–53)
HCT VFR BLD CALC: 33.9 % (ref 41–53)
HEMOGLOBIN: 11.2 G/DL (ref 13.5–17.5)
HEMOGLOBIN: 11.3 G/DL (ref 13.5–17.5)
MCH RBC QN AUTO: 31.6 PG (ref 26–34)
MCH RBC QN AUTO: 32.8 PG (ref 26–34)
MCHC RBC AUTO-ENTMCNC: 33.3 G/DL (ref 31–37)
MCHC RBC AUTO-ENTMCNC: 34.1 G/DL (ref 31–37)
MCV RBC AUTO: 94.8 FL (ref 80–100)
MCV RBC AUTO: 96.2 FL (ref 80–100)
PARTIAL THROMBOPLASTIN TIME: 47.6 SEC (ref 21–33)
PDW BLD-RTO: 13.8 % (ref 12.1–15.2)
PDW BLD-RTO: 15.5 % (ref 12.5–15.4)
PLATELET # BLD: 202 K/UL (ref 140–450)
PLATELET # BLD: 221 K/UL (ref 140–450)
PMV BLD AUTO: 7.2 FL (ref 6–12)
PMV BLD AUTO: ABNORMAL FL (ref 6–12)
POTASSIUM SERPL-SCNC: 3.7 MMOL/L (ref 3.7–5.3)
POTASSIUM SERPL-SCNC: 3.9 MMOL/L (ref 3.7–5.3)
PRO-BNP: 580 PG/ML
RBC # BLD: 3.41 M/UL (ref 4.5–5.9)
RBC # BLD: 3.57 M/UL (ref 4.5–5.9)
SODIUM BLD-SCNC: 139 MMOL/L (ref 135–144)
SODIUM BLD-SCNC: 139 MMOL/L (ref 135–144)
TOTAL PROTEIN: 5.9 G/DL (ref 6.4–8.3)
TROPONIN INTERP: ABNORMAL
TROPONIN T: 0.18 NG/ML
TROPONIN T: 0.2 NG/ML
TROPONIN T: 0.24 NG/ML
WBC # BLD: 7.2 K/UL (ref 3.5–11)
WBC # BLD: 8.7 K/UL (ref 3.5–11)

## 2017-06-01 PROCEDURE — 2580000003 HC RX 258: Performed by: EMERGENCY MEDICINE

## 2017-06-01 PROCEDURE — 2060000000 HC ICU INTERMEDIATE R&B

## 2017-06-01 PROCEDURE — G0378 HOSPITAL OBSERVATION PER HR: HCPCS

## 2017-06-01 PROCEDURE — 93005 ELECTROCARDIOGRAM TRACING: CPT

## 2017-06-01 PROCEDURE — 6370000000 HC RX 637 (ALT 250 FOR IP): Performed by: INTERNAL MEDICINE

## 2017-06-01 PROCEDURE — C1760 CLOSURE DEV, VASC: HCPCS

## 2017-06-01 PROCEDURE — 84484 ASSAY OF TROPONIN QUANT: CPT

## 2017-06-01 PROCEDURE — B215YZZ FLUOROSCOPY OF LEFT HEART USING OTHER CONTRAST: ICD-10-PCS | Performed by: INTERNAL MEDICINE

## 2017-06-01 PROCEDURE — 36415 COLL VENOUS BLD VENIPUNCTURE: CPT

## 2017-06-01 PROCEDURE — B210YZZ FLUOROSCOPY OF SINGLE CORONARY ARTERY USING OTHER CONTRAST: ICD-10-PCS | Performed by: INTERNAL MEDICINE

## 2017-06-01 PROCEDURE — 2580000003 HC RX 258: Performed by: INTERNAL MEDICINE

## 2017-06-01 PROCEDURE — 80048 BASIC METABOLIC PNL TOTAL CA: CPT

## 2017-06-01 PROCEDURE — C1725 CATH, TRANSLUMIN NON-LASER: HCPCS

## 2017-06-01 PROCEDURE — 85027 COMPLETE CBC AUTOMATED: CPT

## 2017-06-01 PROCEDURE — C1894 INTRO/SHEATH, NON-LASER: HCPCS

## 2017-06-01 PROCEDURE — 6360000002 HC RX W HCPCS: Performed by: INTERNAL MEDICINE

## 2017-06-01 PROCEDURE — 80053 COMPREHEN METABOLIC PANEL: CPT

## 2017-06-01 PROCEDURE — 99223 1ST HOSP IP/OBS HIGH 75: CPT | Performed by: INTERNAL MEDICINE

## 2017-06-01 PROCEDURE — 4A023N7 MEASUREMENT OF CARDIAC SAMPLING AND PRESSURE, LEFT HEART, PERCUTANEOUS APPROACH: ICD-10-PCS | Performed by: INTERNAL MEDICINE

## 2017-06-01 PROCEDURE — 93459 L HRT ART/GRFT ANGIO: CPT | Performed by: INTERNAL MEDICINE

## 2017-06-01 PROCEDURE — C1769 GUIDE WIRE: HCPCS

## 2017-06-01 PROCEDURE — 6370000000 HC RX 637 (ALT 250 FOR IP): Performed by: FAMILY MEDICINE

## 2017-06-01 PROCEDURE — 83880 ASSAY OF NATRIURETIC PEPTIDE: CPT

## 2017-06-01 PROCEDURE — 6360000002 HC RX W HCPCS

## 2017-06-01 PROCEDURE — 94760 N-INVAS EAR/PLS OXIMETRY 1: CPT

## 2017-06-01 PROCEDURE — 85730 THROMBOPLASTIN TIME PARTIAL: CPT

## 2017-06-01 RX ORDER — HEPARIN SODIUM 1000 [USP'U]/ML
2000 INJECTION, SOLUTION INTRAVENOUS; SUBCUTANEOUS PRN
Status: DISCONTINUED | OUTPATIENT
Start: 2017-06-01 | End: 2017-06-01 | Stop reason: HOSPADM

## 2017-06-01 RX ORDER — ALBUTEROL SULFATE 90 UG/1
2 AEROSOL, METERED RESPIRATORY (INHALATION) 4 TIMES DAILY PRN
Status: DISCONTINUED | OUTPATIENT
Start: 2017-06-01 | End: 2017-06-03 | Stop reason: HOSPADM

## 2017-06-01 RX ORDER — PANTOPRAZOLE SODIUM 40 MG/1
40 TABLET, DELAYED RELEASE ORAL
Status: DISCONTINUED | OUTPATIENT
Start: 2017-06-01 | End: 2017-06-03 | Stop reason: HOSPADM

## 2017-06-01 RX ORDER — HEPARIN SODIUM 10000 [USP'U]/100ML
1000 INJECTION, SOLUTION INTRAVENOUS CONTINUOUS
Status: DISCONTINUED | OUTPATIENT
Start: 2017-06-01 | End: 2017-06-03 | Stop reason: HOSPADM

## 2017-06-01 RX ORDER — SODIUM CHLORIDE 0.9 % (FLUSH) 0.9 %
10 SYRINGE (ML) INJECTION EVERY 12 HOURS SCHEDULED
Status: DISCONTINUED | OUTPATIENT
Start: 2017-06-01 | End: 2017-06-01 | Stop reason: SDUPTHER

## 2017-06-01 RX ORDER — SODIUM CHLORIDE 0.9 % (FLUSH) 0.9 %
10 SYRINGE (ML) INJECTION PRN
Status: DISCONTINUED | OUTPATIENT
Start: 2017-06-01 | End: 2017-06-03 | Stop reason: HOSPADM

## 2017-06-01 RX ORDER — POTASSIUM CHLORIDE 20 MEQ/1
10 TABLET, EXTENDED RELEASE ORAL DAILY
Status: DISCONTINUED | OUTPATIENT
Start: 2017-06-01 | End: 2017-06-03 | Stop reason: HOSPADM

## 2017-06-01 RX ORDER — NITROGLYCERIN 0.4 MG/1
TABLET SUBLINGUAL
Status: DISPENSED
Start: 2017-06-01 | End: 2017-06-02

## 2017-06-01 RX ORDER — ACETAMINOPHEN 325 MG/1
650 TABLET ORAL EVERY 4 HOURS PRN
Status: DISCONTINUED | OUTPATIENT
Start: 2017-06-01 | End: 2017-06-03 | Stop reason: HOSPADM

## 2017-06-01 RX ORDER — HEPARIN SODIUM 1000 [USP'U]/ML
4000 INJECTION, SOLUTION INTRAVENOUS; SUBCUTANEOUS ONCE
Status: COMPLETED | OUTPATIENT
Start: 2017-06-01 | End: 2017-06-01

## 2017-06-01 RX ORDER — ASPIRIN 81 MG/1
81 TABLET, CHEWABLE ORAL DAILY
Status: DISCONTINUED | OUTPATIENT
Start: 2017-06-02 | End: 2017-06-03 | Stop reason: HOSPADM

## 2017-06-01 RX ORDER — HEPARIN SODIUM 1000 [USP'U]/ML
2000 INJECTION, SOLUTION INTRAVENOUS; SUBCUTANEOUS PRN
Status: DISCONTINUED | OUTPATIENT
Start: 2017-06-01 | End: 2017-06-03 | Stop reason: HOSPADM

## 2017-06-01 RX ORDER — SODIUM CHLORIDE 0.9 % (FLUSH) 0.9 %
10 SYRINGE (ML) INJECTION EVERY 12 HOURS SCHEDULED
Status: DISCONTINUED | OUTPATIENT
Start: 2017-06-01 | End: 2017-06-03 | Stop reason: HOSPADM

## 2017-06-01 RX ORDER — CLOPIDOGREL BISULFATE 75 MG/1
75 TABLET ORAL DAILY
Status: DISCONTINUED | OUTPATIENT
Start: 2017-06-01 | End: 2017-06-01 | Stop reason: HOSPADM

## 2017-06-01 RX ORDER — MORPHINE SULFATE 2 MG/ML
2 INJECTION, SOLUTION INTRAMUSCULAR; INTRAVENOUS EVERY 4 HOURS PRN
Status: DISCONTINUED | OUTPATIENT
Start: 2017-06-01 | End: 2017-06-01 | Stop reason: HOSPADM

## 2017-06-01 RX ORDER — ISOSORBIDE MONONITRATE 30 MG/1
30 TABLET, EXTENDED RELEASE ORAL DAILY
Status: DISCONTINUED | OUTPATIENT
Start: 2017-06-01 | End: 2017-06-03 | Stop reason: HOSPADM

## 2017-06-01 RX ORDER — FOLIC ACID 1 MG/1
1 TABLET ORAL DAILY
Status: DISCONTINUED | OUTPATIENT
Start: 2017-06-01 | End: 2017-06-03 | Stop reason: HOSPADM

## 2017-06-01 RX ORDER — HEPARIN SODIUM 1000 [USP'U]/ML
4000 INJECTION, SOLUTION INTRAVENOUS; SUBCUTANEOUS ONCE
Status: DISCONTINUED | OUTPATIENT
Start: 2017-06-01 | End: 2017-06-03 | Stop reason: HOSPADM

## 2017-06-01 RX ORDER — SODIUM CHLORIDE 0.9 % (FLUSH) 0.9 %
10 SYRINGE (ML) INJECTION PRN
Status: DISCONTINUED | OUTPATIENT
Start: 2017-06-01 | End: 2017-06-01 | Stop reason: SDUPTHER

## 2017-06-01 RX ORDER — HEPARIN SODIUM 1000 [USP'U]/ML
4000 INJECTION, SOLUTION INTRAVENOUS; SUBCUTANEOUS PRN
Status: DISCONTINUED | OUTPATIENT
Start: 2017-06-01 | End: 2017-06-01 | Stop reason: HOSPADM

## 2017-06-01 RX ORDER — GEMFIBROZIL 600 MG/1
600 TABLET, FILM COATED ORAL
Status: DISCONTINUED | OUTPATIENT
Start: 2017-06-01 | End: 2017-06-03 | Stop reason: HOSPADM

## 2017-06-01 RX ORDER — CLOPIDOGREL BISULFATE 75 MG/1
75 TABLET ORAL DAILY
Status: DISCONTINUED | OUTPATIENT
Start: 2017-06-02 | End: 2017-06-03 | Stop reason: HOSPADM

## 2017-06-01 RX ORDER — HEPARIN SODIUM 1000 [USP'U]/ML
4000 INJECTION, SOLUTION INTRAVENOUS; SUBCUTANEOUS PRN
Status: DISCONTINUED | OUTPATIENT
Start: 2017-06-01 | End: 2017-06-03 | Stop reason: HOSPADM

## 2017-06-01 RX ORDER — CARVEDILOL 3.12 MG/1
3.12 TABLET ORAL 2 TIMES DAILY WITH MEALS
Status: DISCONTINUED | OUTPATIENT
Start: 2017-06-01 | End: 2017-06-03 | Stop reason: HOSPADM

## 2017-06-01 RX ORDER — SODIUM CHLORIDE 9 MG/ML
INJECTION, SOLUTION INTRAVENOUS CONTINUOUS
Status: DISCONTINUED | OUTPATIENT
Start: 2017-06-01 | End: 2017-06-03 | Stop reason: HOSPADM

## 2017-06-01 RX ORDER — ATORVASTATIN CALCIUM 20 MG/1
20 TABLET, FILM COATED ORAL NIGHTLY
Status: DISCONTINUED | OUTPATIENT
Start: 2017-06-01 | End: 2017-06-03 | Stop reason: HOSPADM

## 2017-06-01 RX ORDER — ATORVASTATIN CALCIUM 80 MG/1
80 TABLET, FILM COATED ORAL NIGHTLY
Status: DISCONTINUED | OUTPATIENT
Start: 2017-06-01 | End: 2017-06-01 | Stop reason: SDUPTHER

## 2017-06-01 RX ORDER — HYDROCODONE BITARTRATE AND ACETAMINOPHEN 5; 325 MG/1; MG/1
1 TABLET ORAL EVERY 6 HOURS PRN
Status: DISCONTINUED | OUTPATIENT
Start: 2017-06-01 | End: 2017-06-03 | Stop reason: HOSPADM

## 2017-06-01 RX ORDER — NITROGLYCERIN 0.4 MG/1
0.4 TABLET SUBLINGUAL EVERY 5 MIN PRN
Status: DISCONTINUED | OUTPATIENT
Start: 2017-06-01 | End: 2017-06-01 | Stop reason: SDUPTHER

## 2017-06-01 RX ORDER — ONDANSETRON 2 MG/ML
4 INJECTION INTRAMUSCULAR; INTRAVENOUS EVERY 6 HOURS PRN
Status: DISCONTINUED | OUTPATIENT
Start: 2017-06-01 | End: 2017-06-03 | Stop reason: HOSPADM

## 2017-06-01 RX ORDER — ACETAMINOPHEN 325 MG/1
650 TABLET ORAL EVERY 4 HOURS PRN
Status: DISCONTINUED | OUTPATIENT
Start: 2017-06-01 | End: 2017-06-01 | Stop reason: SDUPTHER

## 2017-06-01 RX ORDER — HEPARIN SODIUM 10000 [USP'U]/100ML
1000 INJECTION, SOLUTION INTRAVENOUS CONTINUOUS
Status: DISCONTINUED | OUTPATIENT
Start: 2017-06-01 | End: 2017-06-01 | Stop reason: HOSPADM

## 2017-06-01 RX ORDER — NITROGLYCERIN 0.4 MG/1
0.4 TABLET SUBLINGUAL EVERY 5 MIN PRN
Status: DISCONTINUED | OUTPATIENT
Start: 2017-06-01 | End: 2017-06-03 | Stop reason: HOSPADM

## 2017-06-01 RX ORDER — HEPARIN SODIUM 10000 [USP'U]/100ML
12 INJECTION, SOLUTION INTRAVENOUS CONTINUOUS
Status: DISCONTINUED | OUTPATIENT
Start: 2017-06-01 | End: 2017-06-01 | Stop reason: SDUPTHER

## 2017-06-01 RX ORDER — TAMSULOSIN HYDROCHLORIDE 0.4 MG/1
0.4 CAPSULE ORAL DAILY
Status: DISCONTINUED | OUTPATIENT
Start: 2017-06-01 | End: 2017-06-03 | Stop reason: HOSPADM

## 2017-06-01 RX ORDER — CILOSTAZOL 100 MG/1
100 TABLET ORAL 2 TIMES DAILY
Status: DISCONTINUED | OUTPATIENT
Start: 2017-06-01 | End: 2017-06-03 | Stop reason: HOSPADM

## 2017-06-01 RX ORDER — ATORVASTATIN CALCIUM 20 MG/1
20 TABLET, FILM COATED ORAL DAILY
COMMUNITY
End: 2017-06-19 | Stop reason: ALTCHOICE

## 2017-06-01 RX ADMIN — HEPARIN SODIUM 4000 UNITS: 1000 INJECTION, SOLUTION INTRAVENOUS; SUBCUTANEOUS at 08:24

## 2017-06-01 RX ADMIN — ISOSORBIDE MONONITRATE 30 MG: 30 TABLET ORAL at 18:21

## 2017-06-01 RX ADMIN — SODIUM CHLORIDE: 9 INJECTION, SOLUTION INTRAVENOUS at 04:08

## 2017-06-01 RX ADMIN — FOLIC ACID 1 MG: 1 TABLET ORAL at 18:21

## 2017-06-01 RX ADMIN — HYDROCODONE BITARTRATE AND ACETAMINOPHEN 1 TABLET: 5; 325 TABLET ORAL at 19:09

## 2017-06-01 RX ADMIN — TAMSULOSIN HYDROCHLORIDE 0.4 MG: 0.4 CAPSULE ORAL at 18:21

## 2017-06-01 RX ADMIN — SERTRALINE 50 MG: 50 TABLET, FILM COATED ORAL at 18:21

## 2017-06-01 RX ADMIN — CLOPIDOGREL BISULFATE 75 MG: 75 TABLET ORAL at 08:25

## 2017-06-01 RX ADMIN — CILOSTAZOL 100 MG: 100 TABLET ORAL at 20:21

## 2017-06-01 RX ADMIN — POTASSIUM CHLORIDE 10 MEQ: 1500 TABLET, EXTENDED RELEASE ORAL at 18:21

## 2017-06-01 RX ADMIN — CARVEDILOL 3.12 MG: 3.12 TABLET, FILM COATED ORAL at 18:22

## 2017-06-01 RX ADMIN — SODIUM CHLORIDE: 9 INJECTION, SOLUTION INTRAVENOUS at 22:57

## 2017-06-01 RX ADMIN — ATORVASTATIN CALCIUM 20 MG: 20 TABLET, FILM COATED ORAL at 20:21

## 2017-06-01 RX ADMIN — TRAZODONE HYDROCHLORIDE 150 MG: 100 TABLET ORAL at 20:21

## 2017-06-01 RX ADMIN — PANTOPRAZOLE SODIUM 40 MG: 40 TABLET, DELAYED RELEASE ORAL at 18:21

## 2017-06-01 RX ADMIN — HEPARIN SODIUM AND DEXTROSE 1000 UNITS/HR: 10000; 5 INJECTION INTRAVENOUS at 08:23

## 2017-06-01 RX ADMIN — SODIUM CHLORIDE: 9 INJECTION, SOLUTION INTRAVENOUS at 18:23

## 2017-06-01 RX ADMIN — NITROGLYCERIN 1 INCH: 20 OINTMENT TOPICAL at 06:52

## 2017-06-01 RX ADMIN — Medication 10 ML: at 22:57

## 2017-06-01 ASSESSMENT — PAIN SCALES - GENERAL
PAINLEVEL_OUTOF10: 3
PAINLEVEL_OUTOF10: 0

## 2017-06-02 LAB
ANION GAP SERPL CALCULATED.3IONS-SCNC: 15 MMOL/L (ref 9–17)
BUN BLDV-MCNC: 14 MG/DL (ref 8–23)
BUN/CREAT BLD: ABNORMAL (ref 9–20)
CALCIUM SERPL-MCNC: 8.5 MG/DL (ref 8.6–10.4)
CHLORIDE BLD-SCNC: 103 MMOL/L (ref 98–107)
CO2: 22 MMOL/L (ref 20–31)
CREAT SERPL-MCNC: 0.84 MG/DL (ref 0.7–1.2)
EKG ATRIAL RATE: 66 BPM
EKG ATRIAL RATE: 66 BPM
EKG P AXIS: 23 DEGREES
EKG P AXIS: 32 DEGREES
EKG P-R INTERVAL: 192 MS
EKG P-R INTERVAL: 196 MS
EKG Q-T INTERVAL: 444 MS
EKG Q-T INTERVAL: 458 MS
EKG QRS DURATION: 90 MS
EKG QRS DURATION: 96 MS
EKG QTC CALCULATION (BAZETT): 465 MS
EKG QTC CALCULATION (BAZETT): 480 MS
EKG R AXIS: -11 DEGREES
EKG R AXIS: -8 DEGREES
EKG T AXIS: 3 DEGREES
EKG T AXIS: 9 DEGREES
EKG VENTRICULAR RATE: 66 BPM
EKG VENTRICULAR RATE: 66 BPM
GFR AFRICAN AMERICAN: >60 ML/MIN
GFR NON-AFRICAN AMERICAN: >60 ML/MIN
GFR SERPL CREATININE-BSD FRML MDRD: ABNORMAL ML/MIN/{1.73_M2}
GFR SERPL CREATININE-BSD FRML MDRD: ABNORMAL ML/MIN/{1.73_M2}
GLUCOSE BLD-MCNC: 140 MG/DL (ref 70–99)
LV EF: 55 %
LVEF MODALITY: NORMAL
POTASSIUM SERPL-SCNC: 3.9 MMOL/L (ref 3.7–5.3)
SODIUM BLD-SCNC: 140 MMOL/L (ref 135–144)

## 2017-06-02 PROCEDURE — 6370000000 HC RX 637 (ALT 250 FOR IP): Performed by: INTERNAL MEDICINE

## 2017-06-02 PROCEDURE — 99232 SBSQ HOSP IP/OBS MODERATE 35: CPT | Performed by: INTERNAL MEDICINE

## 2017-06-02 PROCEDURE — 6370000000 HC RX 637 (ALT 250 FOR IP): Performed by: FAMILY MEDICINE

## 2017-06-02 PROCEDURE — 36415 COLL VENOUS BLD VENIPUNCTURE: CPT

## 2017-06-02 PROCEDURE — 93306 TTE W/DOPPLER COMPLETE: CPT

## 2017-06-02 PROCEDURE — 2580000003 HC RX 258: Performed by: INTERNAL MEDICINE

## 2017-06-02 PROCEDURE — 80048 BASIC METABOLIC PNL TOTAL CA: CPT

## 2017-06-02 PROCEDURE — 2060000000 HC ICU INTERMEDIATE R&B

## 2017-06-02 RX ORDER — NITROGLYCERIN 400 UG/1
1 SPRAY ORAL EVERY 5 MIN PRN
Qty: 1 BOTTLE | Refills: 3 | Status: SHIPPED | OUTPATIENT
Start: 2017-06-02 | End: 2021-06-03

## 2017-06-02 RX ADMIN — CLOPIDOGREL 75 MG: 75 TABLET, FILM COATED ORAL at 09:27

## 2017-06-02 RX ADMIN — CILOSTAZOL 100 MG: 100 TABLET ORAL at 09:26

## 2017-06-02 RX ADMIN — CILOSTAZOL 100 MG: 100 TABLET ORAL at 20:41

## 2017-06-02 RX ADMIN — TAMSULOSIN HYDROCHLORIDE 0.4 MG: 0.4 CAPSULE ORAL at 09:26

## 2017-06-02 RX ADMIN — GEMFIBROZIL 600 MG: 600 TABLET ORAL at 18:28

## 2017-06-02 RX ADMIN — SODIUM CHLORIDE: 9 INJECTION, SOLUTION INTRAVENOUS at 12:02

## 2017-06-02 RX ADMIN — POTASSIUM CHLORIDE 10 MEQ: 1500 TABLET, EXTENDED RELEASE ORAL at 09:27

## 2017-06-02 RX ADMIN — PANTOPRAZOLE SODIUM 40 MG: 40 TABLET, DELAYED RELEASE ORAL at 06:53

## 2017-06-02 RX ADMIN — ATORVASTATIN CALCIUM 20 MG: 20 TABLET, FILM COATED ORAL at 20:41

## 2017-06-02 RX ADMIN — CARVEDILOL 3.12 MG: 3.12 TABLET, FILM COATED ORAL at 09:27

## 2017-06-02 RX ADMIN — ASPIRIN 81 MG: 81 TABLET, CHEWABLE ORAL at 09:26

## 2017-06-02 RX ADMIN — CARVEDILOL 3.12 MG: 3.12 TABLET, FILM COATED ORAL at 18:29

## 2017-06-02 RX ADMIN — GEMFIBROZIL 600 MG: 600 TABLET ORAL at 06:37

## 2017-06-02 RX ADMIN — ISOSORBIDE MONONITRATE 30 MG: 30 TABLET ORAL at 09:27

## 2017-06-02 RX ADMIN — SERTRALINE 50 MG: 50 TABLET, FILM COATED ORAL at 09:27

## 2017-06-02 RX ADMIN — FOLIC ACID 1 MG: 1 TABLET ORAL at 09:27

## 2017-06-02 RX ADMIN — TRAZODONE HYDROCHLORIDE 150 MG: 100 TABLET ORAL at 20:41

## 2017-06-03 VITALS
RESPIRATION RATE: 26 BRPM | SYSTOLIC BLOOD PRESSURE: 142 MMHG | TEMPERATURE: 98.6 F | BODY MASS INDEX: 27.17 KG/M2 | HEIGHT: 73 IN | HEART RATE: 80 BPM | OXYGEN SATURATION: 96 % | WEIGHT: 205.03 LBS | DIASTOLIC BLOOD PRESSURE: 69 MMHG

## 2017-06-03 LAB
ANION GAP SERPL CALCULATED.3IONS-SCNC: 14 MMOL/L (ref 9–17)
BUN BLDV-MCNC: 9 MG/DL (ref 8–23)
BUN/CREAT BLD: ABNORMAL (ref 9–20)
CALCIUM SERPL-MCNC: 8.3 MG/DL (ref 8.6–10.4)
CHLORIDE BLD-SCNC: 105 MMOL/L (ref 98–107)
CO2: 22 MMOL/L (ref 20–31)
CREAT SERPL-MCNC: 0.67 MG/DL (ref 0.7–1.2)
GFR AFRICAN AMERICAN: >60 ML/MIN
GFR NON-AFRICAN AMERICAN: >60 ML/MIN
GFR SERPL CREATININE-BSD FRML MDRD: ABNORMAL ML/MIN/{1.73_M2}
GFR SERPL CREATININE-BSD FRML MDRD: ABNORMAL ML/MIN/{1.73_M2}
GLUCOSE BLD-MCNC: 132 MG/DL (ref 70–99)
PLATELET # BLD: 207 K/UL (ref 140–450)
POTASSIUM SERPL-SCNC: 3.7 MMOL/L (ref 3.7–5.3)
SODIUM BLD-SCNC: 141 MMOL/L (ref 135–144)

## 2017-06-03 PROCEDURE — 99232 SBSQ HOSP IP/OBS MODERATE 35: CPT | Performed by: NURSE PRACTITIONER

## 2017-06-03 PROCEDURE — 80048 BASIC METABOLIC PNL TOTAL CA: CPT

## 2017-06-03 PROCEDURE — 6370000000 HC RX 637 (ALT 250 FOR IP): Performed by: FAMILY MEDICINE

## 2017-06-03 PROCEDURE — 6370000000 HC RX 637 (ALT 250 FOR IP): Performed by: INTERNAL MEDICINE

## 2017-06-03 PROCEDURE — 85049 AUTOMATED PLATELET COUNT: CPT

## 2017-06-03 PROCEDURE — 2580000003 HC RX 258: Performed by: INTERNAL MEDICINE

## 2017-06-03 PROCEDURE — 36415 COLL VENOUS BLD VENIPUNCTURE: CPT

## 2017-06-03 RX ORDER — CARVEDILOL 3.12 MG/1
3.12 TABLET ORAL 2 TIMES DAILY WITH MEALS
Qty: 60 TABLET | Refills: 1 | Status: SHIPPED | OUTPATIENT
Start: 2017-06-03 | End: 2017-07-26 | Stop reason: SDUPTHER

## 2017-06-03 RX ORDER — CLOPIDOGREL BISULFATE 75 MG/1
75 TABLET ORAL DAILY
Qty: 30 TABLET | Refills: 3 | Status: SHIPPED | OUTPATIENT
Start: 2017-06-03 | End: 2017-09-19 | Stop reason: SDUPTHER

## 2017-06-03 RX ORDER — ASPIRIN 81 MG/1
81 TABLET, CHEWABLE ORAL DAILY
Qty: 30 TABLET | Refills: 3 | COMMUNITY
Start: 2017-06-03 | End: 2017-12-22 | Stop reason: ALTCHOICE

## 2017-06-03 RX ADMIN — Medication 10 ML: at 08:58

## 2017-06-03 RX ADMIN — POTASSIUM CHLORIDE 10 MEQ: 1500 TABLET, EXTENDED RELEASE ORAL at 08:58

## 2017-06-03 RX ADMIN — CILOSTAZOL 100 MG: 100 TABLET ORAL at 08:58

## 2017-06-03 RX ADMIN — CARVEDILOL 3.12 MG: 3.12 TABLET, FILM COATED ORAL at 08:58

## 2017-06-03 RX ADMIN — FOLIC ACID 1 MG: 1 TABLET ORAL at 08:58

## 2017-06-03 RX ADMIN — ASPIRIN 81 MG: 81 TABLET, CHEWABLE ORAL at 08:58

## 2017-06-03 RX ADMIN — GEMFIBROZIL 600 MG: 600 TABLET ORAL at 05:35

## 2017-06-03 RX ADMIN — PANTOPRAZOLE SODIUM 40 MG: 40 TABLET, DELAYED RELEASE ORAL at 05:35

## 2017-06-03 RX ADMIN — SERTRALINE 50 MG: 50 TABLET, FILM COATED ORAL at 08:58

## 2017-06-03 RX ADMIN — ISOSORBIDE MONONITRATE 30 MG: 30 TABLET ORAL at 08:58

## 2017-06-03 RX ADMIN — TAMSULOSIN HYDROCHLORIDE 0.4 MG: 0.4 CAPSULE ORAL at 08:57

## 2017-06-03 RX ADMIN — CLOPIDOGREL 75 MG: 75 TABLET, FILM COATED ORAL at 08:58

## 2017-06-04 LAB
STONE COMPOSITION: NORMAL
STONE DESCRIPTION: NORMAL
STONE MASS: 33 MG
STONE NUMBER: 2
STONE SIZE: NORMAL MM

## 2017-06-05 ENCOUNTER — TELEPHONE (OUTPATIENT)
Dept: UROLOGY | Age: 77
End: 2017-06-05

## 2017-06-05 DIAGNOSIS — N20.1 LEFT URETERAL CALCULUS: Primary | ICD-10-CM

## 2017-06-06 ENCOUNTER — TELEPHONE (OUTPATIENT)
Dept: FAMILY MEDICINE CLINIC | Age: 77
End: 2017-06-06

## 2017-06-07 LAB
EKG ATRIAL RATE: 66 BPM
EKG P AXIS: 32 DEGREES
EKG P-R INTERVAL: 192 MS
EKG Q-T INTERVAL: 444 MS
EKG QRS DURATION: 90 MS
EKG QTC CALCULATION (BAZETT): 465 MS
EKG R AXIS: -11 DEGREES
EKG T AXIS: 9 DEGREES
EKG VENTRICULAR RATE: 66 BPM

## 2017-06-13 ENCOUNTER — TELEPHONE (OUTPATIENT)
Dept: CARDIOLOGY CLINIC | Age: 77
End: 2017-06-13

## 2017-06-13 ENCOUNTER — OFFICE VISIT (OUTPATIENT)
Dept: FAMILY MEDICINE CLINIC | Age: 77
End: 2017-06-13
Payer: MEDICARE

## 2017-06-13 VITALS
WEIGHT: 204 LBS | TEMPERATURE: 98.2 F | DIASTOLIC BLOOD PRESSURE: 82 MMHG | SYSTOLIC BLOOD PRESSURE: 118 MMHG | OXYGEN SATURATION: 97 % | HEIGHT: 73 IN | HEART RATE: 82 BPM | BODY MASS INDEX: 27.04 KG/M2

## 2017-06-13 DIAGNOSIS — T83.122D: ICD-10-CM

## 2017-06-13 DIAGNOSIS — E78.00 PURE HYPERCHOLESTEROLEMIA: Primary | ICD-10-CM

## 2017-06-13 DIAGNOSIS — E55.9 UNSPECIFIED VITAMIN D DEFICIENCY: ICD-10-CM

## 2017-06-13 DIAGNOSIS — I21.4 NSTEMI (NON-ST ELEVATED MYOCARDIAL INFARCTION) (HCC): Primary | ICD-10-CM

## 2017-06-13 DIAGNOSIS — I25.700 CORONARY ARTERY DISEASE INVOLVING CORONARY BYPASS GRAFT OF NATIVE HEART WITH UNSTABLE ANGINA PECTORIS (HCC): ICD-10-CM

## 2017-06-13 DIAGNOSIS — J44.9 CHRONIC OBSTRUCTIVE PULMONARY DISEASE, UNSPECIFIED COPD TYPE (HCC): ICD-10-CM

## 2017-06-13 DIAGNOSIS — E11.8 DM TYPE 2, CONTROLLED, WITH COMPLICATION (HCC): ICD-10-CM

## 2017-06-13 DIAGNOSIS — K22.710 BARRETT'S ESOPHAGUS WITH LOW GRADE DYSPLASIA: ICD-10-CM

## 2017-06-13 DIAGNOSIS — I25.10 ATHEROSCLEROSIS OF NATIVE CORONARY ARTERY OF NATIVE HEART WITHOUT ANGINA PECTORIS: ICD-10-CM

## 2017-06-13 DIAGNOSIS — G25.89 OTHER EXTRAPYRAMIDAL DISEASE AND ABNORMAL MOVEMENT DISORDER: ICD-10-CM

## 2017-06-13 PROCEDURE — 99495 TRANSJ CARE MGMT MOD F2F 14D: CPT | Performed by: NURSE PRACTITIONER

## 2017-06-13 ASSESSMENT — PATIENT HEALTH QUESTIONNAIRE - PHQ9
SUM OF ALL RESPONSES TO PHQ9 QUESTIONS 1 & 2: 0
1. LITTLE INTEREST OR PLEASURE IN DOING THINGS: 0
SUM OF ALL RESPONSES TO PHQ QUESTIONS 1-9: 0
2. FEELING DOWN, DEPRESSED OR HOPELESS: 0

## 2017-06-14 ENCOUNTER — HOSPITAL ENCOUNTER (OUTPATIENT)
Age: 77
Discharge: HOME | End: 2017-06-14
Payer: MEDICARE

## 2017-06-14 DIAGNOSIS — I25.10 ATHEROSCLEROSIS OF NATIVE CORONARY ARTERY OF NATIVE HEART WITHOUT ANGINA PECTORIS: ICD-10-CM

## 2017-06-14 DIAGNOSIS — K22.710 BARRETT'S ESOPHAGUS WITH LOW GRADE DYSPLASIA: ICD-10-CM

## 2017-06-14 DIAGNOSIS — E55.9 UNSPECIFIED VITAMIN D DEFICIENCY: ICD-10-CM

## 2017-06-14 DIAGNOSIS — I25.700 CORONARY ARTERY DISEASE INVOLVING CORONARY BYPASS GRAFT OF NATIVE HEART WITH UNSTABLE ANGINA PECTORIS (HCC): ICD-10-CM

## 2017-06-14 DIAGNOSIS — E11.8 DM TYPE 2, CONTROLLED, WITH COMPLICATION (HCC): ICD-10-CM

## 2017-06-14 DIAGNOSIS — J44.9 CHRONIC OBSTRUCTIVE PULMONARY DISEASE, UNSPECIFIED COPD TYPE (HCC): ICD-10-CM

## 2017-06-14 DIAGNOSIS — G25.89 OTHER EXTRAPYRAMIDAL DISEASE AND ABNORMAL MOVEMENT DISORDER: ICD-10-CM

## 2017-06-14 DIAGNOSIS — E78.00 PURE HYPERCHOLESTEROLEMIA: ICD-10-CM

## 2017-06-14 LAB
ABSOLUTE EOS #: 0.1 K/UL (ref 0–0.4)
ABSOLUTE LYMPH #: 0.7 K/UL (ref 0.9–2.5)
ABSOLUTE MONO #: 0.3 K/UL (ref 0–1)
BASOPHILS # BLD: 0 %
BASOPHILS ABSOLUTE: 0 K/UL (ref 0–0.2)
CHOLESTEROL/HDL RATIO: 3.2
CHOLESTEROL: 178 MG/DL
DIFFERENTIAL TYPE: YES
EOSINOPHILS RELATIVE PERCENT: 3 %
HCT VFR BLD CALC: 37.9 % (ref 41–53)
HDLC SERPL-MCNC: 55 MG/DL
HEMOGLOBIN: 12.7 G/DL (ref 13.5–17.5)
LDL CHOLESTEROL: 103 MG/DL (ref 0–130)
LYMPHOCYTES # BLD: 18 %
MCH RBC QN AUTO: 31.4 PG (ref 26–34)
MCHC RBC AUTO-ENTMCNC: 33.4 G/DL (ref 31–37)
MCV RBC AUTO: 94 FL (ref 80–100)
MONOCYTES # BLD: 8 %
PATIENT FASTING?: YES
PDW BLD-RTO: 14.5 % (ref 12.1–15.2)
PLATELET # BLD: 307 K/UL (ref 140–450)
PLATELET ESTIMATE: ABNORMAL
PMV BLD AUTO: ABNORMAL FL (ref 6–12)
RBC # BLD: 4.04 M/UL (ref 4.5–5.9)
RBC # BLD: ABNORMAL 10*6/UL
SEG NEUTROPHILS: 71 %
SEGMENTED NEUTROPHILS ABSOLUTE COUNT: 2.9 K/UL (ref 2.1–6.5)
TRIGL SERPL-MCNC: 98 MG/DL
TSH SERPL DL<=0.05 MIU/L-ACNC: 1.12 MIU/L (ref 0.3–5)
VITAMIN D 25-HYDROXY: 26.3 NG/ML (ref 30–100)
VLDLC SERPL CALC-MCNC: NORMAL MG/DL (ref 1–30)
WBC # BLD: 4.1 K/UL (ref 3.5–11)
WBC # BLD: ABNORMAL 10*3/UL

## 2017-06-14 PROCEDURE — 82306 VITAMIN D 25 HYDROXY: CPT

## 2017-06-14 PROCEDURE — 85025 COMPLETE CBC W/AUTO DIFF WBC: CPT

## 2017-06-14 PROCEDURE — 36415 COLL VENOUS BLD VENIPUNCTURE: CPT

## 2017-06-14 PROCEDURE — 84443 ASSAY THYROID STIM HORMONE: CPT

## 2017-06-14 PROCEDURE — 80061 LIPID PANEL: CPT

## 2017-06-19 ENCOUNTER — OFFICE VISIT (OUTPATIENT)
Dept: CARDIOLOGY CLINIC | Age: 77
End: 2017-06-19
Payer: MEDICARE

## 2017-06-19 VITALS
SYSTOLIC BLOOD PRESSURE: 120 MMHG | WEIGHT: 210 LBS | HEART RATE: 82 BPM | OXYGEN SATURATION: 94 % | BODY MASS INDEX: 27.71 KG/M2 | DIASTOLIC BLOOD PRESSURE: 60 MMHG

## 2017-06-19 DIAGNOSIS — I21.4 NSTEMI (NON-ST ELEVATED MYOCARDIAL INFARCTION) (HCC): Primary | ICD-10-CM

## 2017-06-19 DIAGNOSIS — I25.700 CORONARY ARTERY DISEASE INVOLVING CORONARY BYPASS GRAFT OF NATIVE HEART WITH UNSTABLE ANGINA PECTORIS (HCC): ICD-10-CM

## 2017-06-19 DIAGNOSIS — E55.9 VITAMIN D DEFICIENCY DISEASE: ICD-10-CM

## 2017-06-19 DIAGNOSIS — I25.10 ATHEROSCLEROSIS OF NATIVE CORONARY ARTERY OF NATIVE HEART WITHOUT ANGINA PECTORIS: ICD-10-CM

## 2017-06-19 DIAGNOSIS — E11.8 DM TYPE 2, CONTROLLED, WITH COMPLICATION (HCC): ICD-10-CM

## 2017-06-19 PROCEDURE — 99214 OFFICE O/P EST MOD 30 MIN: CPT | Performed by: INTERNAL MEDICINE

## 2017-06-19 RX ORDER — ROSUVASTATIN CALCIUM 40 MG/1
20 TABLET, COATED ORAL DAILY
COMMUNITY
End: 2021-05-30

## 2017-06-21 ENCOUNTER — HOSPITAL ENCOUNTER (OUTPATIENT)
Dept: CARDIAC REHAB | Age: 77
Setting detail: THERAPIES SERIES
Discharge: HOME | End: 2017-06-21
Payer: MEDICARE

## 2017-06-23 ENCOUNTER — HOSPITAL ENCOUNTER (OUTPATIENT)
Dept: CARDIAC REHAB | Age: 77
Setting detail: THERAPIES SERIES
Discharge: HOME | End: 2017-06-23
Payer: MEDICARE

## 2017-06-23 PROCEDURE — 93798 PHYS/QHP OP CAR RHAB W/ECG: CPT

## 2017-06-25 VITALS — BODY MASS INDEX: 27.92 KG/M2 | HEIGHT: 73 IN | WEIGHT: 210.7 LBS

## 2017-06-26 ENCOUNTER — HOSPITAL ENCOUNTER (OUTPATIENT)
Dept: CARDIAC REHAB | Age: 77
Setting detail: THERAPIES SERIES
Discharge: HOME | End: 2017-06-26
Payer: MEDICARE

## 2017-06-26 PROCEDURE — 93798 PHYS/QHP OP CAR RHAB W/ECG: CPT

## 2017-06-28 ENCOUNTER — HOSPITAL ENCOUNTER (OUTPATIENT)
Dept: CARDIAC REHAB | Age: 77
Setting detail: THERAPIES SERIES
Discharge: HOME | End: 2017-06-28
Payer: MEDICARE

## 2017-06-28 PROCEDURE — 93798 PHYS/QHP OP CAR RHAB W/ECG: CPT

## 2017-06-30 ENCOUNTER — HOSPITAL ENCOUNTER (OUTPATIENT)
Dept: CARDIAC REHAB | Age: 77
Setting detail: THERAPIES SERIES
Discharge: HOME | End: 2017-06-30
Payer: MEDICARE

## 2017-06-30 PROCEDURE — 93798 PHYS/QHP OP CAR RHAB W/ECG: CPT

## 2017-07-03 ENCOUNTER — HOSPITAL ENCOUNTER (OUTPATIENT)
Dept: CARDIAC REHAB | Age: 77
Setting detail: THERAPIES SERIES
Discharge: HOME | End: 2017-07-03
Payer: MEDICARE

## 2017-07-03 PROCEDURE — 93798 PHYS/QHP OP CAR RHAB W/ECG: CPT

## 2017-07-05 ENCOUNTER — HOSPITAL ENCOUNTER (OUTPATIENT)
Dept: CARDIAC REHAB | Age: 77
Setting detail: THERAPIES SERIES
Discharge: HOME | End: 2017-07-05
Payer: MEDICARE

## 2017-07-05 PROCEDURE — 93798 PHYS/QHP OP CAR RHAB W/ECG: CPT

## 2017-07-06 RX ORDER — TRAZODONE HYDROCHLORIDE 150 MG/1
TABLET ORAL
Qty: 90 TABLET | Refills: 1 | Status: SHIPPED | OUTPATIENT
Start: 2017-07-06 | End: 2018-01-18 | Stop reason: CLARIF

## 2017-07-07 ENCOUNTER — HOSPITAL ENCOUNTER (OUTPATIENT)
Dept: CARDIAC REHAB | Age: 77
Setting detail: THERAPIES SERIES
Discharge: HOME | End: 2017-07-07
Payer: MEDICARE

## 2017-07-07 PROCEDURE — 93798 PHYS/QHP OP CAR RHAB W/ECG: CPT

## 2017-07-10 ENCOUNTER — HOSPITAL ENCOUNTER (OUTPATIENT)
Dept: CARDIAC REHAB | Age: 77
Setting detail: THERAPIES SERIES
Discharge: HOME | End: 2017-07-10
Payer: MEDICARE

## 2017-07-10 PROCEDURE — 93798 PHYS/QHP OP CAR RHAB W/ECG: CPT

## 2017-07-11 ENCOUNTER — TELEPHONE (OUTPATIENT)
Dept: CARDIOLOGY CLINIC | Age: 77
End: 2017-07-11

## 2017-07-12 ENCOUNTER — HOSPITAL ENCOUNTER (OUTPATIENT)
Dept: CARDIAC REHAB | Age: 77
Setting detail: THERAPIES SERIES
Discharge: HOME | End: 2017-07-12
Payer: MEDICARE

## 2017-07-12 PROCEDURE — 93798 PHYS/QHP OP CAR RHAB W/ECG: CPT

## 2017-07-14 ENCOUNTER — HOSPITAL ENCOUNTER (OUTPATIENT)
Dept: CARDIAC REHAB | Age: 77
Setting detail: THERAPIES SERIES
Discharge: HOME | End: 2017-07-14
Payer: MEDICARE

## 2017-07-14 PROCEDURE — 93798 PHYS/QHP OP CAR RHAB W/ECG: CPT

## 2017-07-17 ENCOUNTER — HOSPITAL ENCOUNTER (OUTPATIENT)
Dept: CARDIAC REHAB | Age: 77
Setting detail: THERAPIES SERIES
Discharge: HOME | End: 2017-07-17
Payer: MEDICARE

## 2017-07-17 PROCEDURE — 93798 PHYS/QHP OP CAR RHAB W/ECG: CPT

## 2017-07-19 ENCOUNTER — HOSPITAL ENCOUNTER (OUTPATIENT)
Dept: GENERAL RADIOLOGY | Age: 77
Discharge: HOME | End: 2017-07-19
Payer: MEDICARE

## 2017-07-19 ENCOUNTER — HOSPITAL ENCOUNTER (OUTPATIENT)
Age: 77
Discharge: HOME | End: 2017-07-19
Payer: MEDICARE

## 2017-07-19 ENCOUNTER — HOSPITAL ENCOUNTER (OUTPATIENT)
Dept: CARDIAC REHAB | Age: 77
Setting detail: THERAPIES SERIES
Discharge: HOME | End: 2017-07-19
Payer: MEDICARE

## 2017-07-19 DIAGNOSIS — N20.1 LEFT URETERAL CALCULUS: ICD-10-CM

## 2017-07-19 PROCEDURE — 74000 XR ABDOMEN LIMITED (KUB): CPT

## 2017-07-19 PROCEDURE — 93798 PHYS/QHP OP CAR RHAB W/ECG: CPT

## 2017-07-20 ENCOUNTER — OFFICE VISIT (OUTPATIENT)
Dept: UROLOGY | Age: 77
End: 2017-07-20
Payer: MEDICARE

## 2017-07-20 VITALS
SYSTOLIC BLOOD PRESSURE: 130 MMHG | WEIGHT: 216 LBS | BODY MASS INDEX: 28.63 KG/M2 | HEIGHT: 73 IN | DIASTOLIC BLOOD PRESSURE: 68 MMHG

## 2017-07-20 DIAGNOSIS — N13.2 URETERAL STONE WITH HYDRONEPHROSIS: Primary | ICD-10-CM

## 2017-07-20 PROCEDURE — 99213 OFFICE O/P EST LOW 20 MIN: CPT | Performed by: NURSE PRACTITIONER

## 2017-07-20 ASSESSMENT — ENCOUNTER SYMPTOMS
NAUSEA: 0
COLOR CHANGE: 0
ABDOMINAL PAIN: 0
VOMITING: 0
CONSTIPATION: 0
WHEEZING: 0
BACK PAIN: 0
EYE PAIN: 0
SHORTNESS OF BREATH: 0
COUGH: 0
EYE REDNESS: 0

## 2017-07-21 ENCOUNTER — HOSPITAL ENCOUNTER (OUTPATIENT)
Dept: CARDIAC REHAB | Age: 77
Setting detail: THERAPIES SERIES
Discharge: HOME | End: 2017-07-21
Payer: MEDICARE

## 2017-07-21 PROCEDURE — 93798 PHYS/QHP OP CAR RHAB W/ECG: CPT

## 2017-07-24 ENCOUNTER — HOSPITAL ENCOUNTER (OUTPATIENT)
Dept: CARDIAC REHAB | Age: 77
Setting detail: THERAPIES SERIES
Discharge: HOME | End: 2017-07-24
Payer: MEDICARE

## 2017-07-24 PROCEDURE — 93798 PHYS/QHP OP CAR RHAB W/ECG: CPT

## 2017-07-26 ENCOUNTER — HOSPITAL ENCOUNTER (OUTPATIENT)
Dept: CARDIAC REHAB | Age: 77
Setting detail: THERAPIES SERIES
Discharge: HOME | End: 2017-07-26
Payer: MEDICARE

## 2017-07-26 PROCEDURE — 93798 PHYS/QHP OP CAR RHAB W/ECG: CPT

## 2017-07-26 RX ORDER — CARVEDILOL 3.12 MG/1
3.12 TABLET ORAL 2 TIMES DAILY WITH MEALS
Qty: 180 TABLET | Refills: 3 | Status: SHIPPED | OUTPATIENT
Start: 2017-07-26 | End: 2017-12-22 | Stop reason: ALTCHOICE

## 2017-07-28 ENCOUNTER — HOSPITAL ENCOUNTER (OUTPATIENT)
Dept: CARDIAC REHAB | Age: 77
Setting detail: THERAPIES SERIES
Discharge: HOME | End: 2017-07-28
Payer: MEDICARE

## 2017-07-28 ENCOUNTER — HOSPITAL ENCOUNTER (OUTPATIENT)
Age: 77
Discharge: HOME | End: 2017-07-28
Payer: MEDICARE

## 2017-07-28 DIAGNOSIS — R07.9 CHEST PAIN, UNSPECIFIED TYPE: Primary | ICD-10-CM

## 2017-07-28 PROCEDURE — 93005 ELECTROCARDIOGRAM TRACING: CPT

## 2017-07-28 PROCEDURE — 93798 PHYS/QHP OP CAR RHAB W/ECG: CPT

## 2017-07-31 ENCOUNTER — HOSPITAL ENCOUNTER (OUTPATIENT)
Dept: CARDIAC REHAB | Age: 77
Setting detail: THERAPIES SERIES
Discharge: HOME | End: 2017-07-31
Payer: MEDICARE

## 2017-07-31 PROCEDURE — 93798 PHYS/QHP OP CAR RHAB W/ECG: CPT

## 2017-08-02 ENCOUNTER — HOSPITAL ENCOUNTER (OUTPATIENT)
Dept: CARDIAC REHAB | Age: 77
Setting detail: THERAPIES SERIES
Discharge: HOME | End: 2017-08-02
Payer: MEDICARE

## 2017-08-02 PROCEDURE — 93798 PHYS/QHP OP CAR RHAB W/ECG: CPT

## 2017-08-03 LAB
EKG ATRIAL RATE: 83 BPM
EKG P AXIS: 40 DEGREES
EKG P-R INTERVAL: 180 MS
EKG Q-T INTERVAL: 390 MS
EKG QRS DURATION: 86 MS
EKG QTC CALCULATION (BAZETT): 458 MS
EKG R AXIS: -14 DEGREES
EKG T AXIS: 33 DEGREES
EKG VENTRICULAR RATE: 83 BPM

## 2017-08-04 ENCOUNTER — HOSPITAL ENCOUNTER (OUTPATIENT)
Dept: CARDIAC REHAB | Age: 77
Setting detail: THERAPIES SERIES
Discharge: HOME | End: 2017-08-04
Payer: MEDICARE

## 2017-08-04 PROCEDURE — 93798 PHYS/QHP OP CAR RHAB W/ECG: CPT

## 2017-08-07 ENCOUNTER — HOSPITAL ENCOUNTER (OUTPATIENT)
Dept: CARDIAC REHAB | Age: 77
Setting detail: THERAPIES SERIES
Discharge: HOME | End: 2017-08-07
Payer: MEDICARE

## 2017-08-09 ENCOUNTER — HOSPITAL ENCOUNTER (OUTPATIENT)
Dept: CARDIAC REHAB | Age: 77
Setting detail: THERAPIES SERIES
Discharge: HOME | End: 2017-08-09
Payer: MEDICARE

## 2017-08-09 PROCEDURE — 93798 PHYS/QHP OP CAR RHAB W/ECG: CPT

## 2017-08-11 ENCOUNTER — HOSPITAL ENCOUNTER (OUTPATIENT)
Dept: CARDIAC REHAB | Age: 77
Setting detail: THERAPIES SERIES
Discharge: HOME | End: 2017-08-11
Payer: MEDICARE

## 2017-08-11 PROCEDURE — 93798 PHYS/QHP OP CAR RHAB W/ECG: CPT

## 2017-08-14 ENCOUNTER — HOSPITAL ENCOUNTER (OUTPATIENT)
Dept: CARDIAC REHAB | Age: 77
Setting detail: THERAPIES SERIES
Discharge: HOME | End: 2017-08-14
Payer: MEDICARE

## 2017-08-14 PROCEDURE — 93798 PHYS/QHP OP CAR RHAB W/ECG: CPT

## 2017-08-16 ENCOUNTER — HOSPITAL ENCOUNTER (OUTPATIENT)
Dept: CARDIAC REHAB | Age: 77
Setting detail: THERAPIES SERIES
Discharge: HOME | End: 2017-08-16
Payer: MEDICARE

## 2017-08-16 PROCEDURE — 93798 PHYS/QHP OP CAR RHAB W/ECG: CPT

## 2017-08-18 ENCOUNTER — HOSPITAL ENCOUNTER (OUTPATIENT)
Dept: CARDIAC REHAB | Age: 77
Setting detail: THERAPIES SERIES
Discharge: HOME | End: 2017-08-18
Payer: MEDICARE

## 2017-08-18 PROCEDURE — 93798 PHYS/QHP OP CAR RHAB W/ECG: CPT

## 2017-08-21 ENCOUNTER — HOSPITAL ENCOUNTER (OUTPATIENT)
Dept: CARDIAC REHAB | Age: 77
Setting detail: THERAPIES SERIES
Discharge: HOME | End: 2017-08-21
Payer: MEDICARE

## 2017-08-21 PROCEDURE — 93798 PHYS/QHP OP CAR RHAB W/ECG: CPT

## 2017-08-23 ENCOUNTER — HOSPITAL ENCOUNTER (OUTPATIENT)
Dept: CARDIAC REHAB | Age: 77
Setting detail: THERAPIES SERIES
Discharge: HOME | End: 2017-08-23
Payer: MEDICARE

## 2017-08-23 PROCEDURE — 93798 PHYS/QHP OP CAR RHAB W/ECG: CPT

## 2017-08-23 RX ORDER — ISOSORBIDE MONONITRATE 30 MG/1
TABLET, EXTENDED RELEASE ORAL
Qty: 90 TABLET | Refills: 3 | Status: SHIPPED | OUTPATIENT
Start: 2017-08-23 | End: 2017-12-22 | Stop reason: SDUPTHER

## 2017-08-24 ENCOUNTER — TELEPHONE (OUTPATIENT)
Dept: CARDIOLOGY CLINIC | Age: 77
End: 2017-08-24

## 2017-08-25 ENCOUNTER — TELEPHONE (OUTPATIENT)
Dept: CARDIOLOGY CLINIC | Age: 77
End: 2017-08-25

## 2017-08-25 RX ORDER — CELECOXIB 200 MG/1
200 CAPSULE ORAL 2 TIMES DAILY
COMMUNITY
End: 2017-09-21 | Stop reason: ALTCHOICE

## 2017-08-28 ENCOUNTER — HOSPITAL ENCOUNTER (OUTPATIENT)
Dept: CARDIAC REHAB | Age: 77
Setting detail: THERAPIES SERIES
Discharge: HOME | End: 2017-08-28
Payer: MEDICARE

## 2017-08-28 PROCEDURE — 93798 PHYS/QHP OP CAR RHAB W/ECG: CPT

## 2017-08-30 ENCOUNTER — HOSPITAL ENCOUNTER (OUTPATIENT)
Dept: CARDIAC REHAB | Age: 77
Setting detail: THERAPIES SERIES
Discharge: HOME | End: 2017-08-30
Payer: MEDICARE

## 2017-08-30 PROCEDURE — 93798 PHYS/QHP OP CAR RHAB W/ECG: CPT

## 2017-09-01 ENCOUNTER — HOSPITAL ENCOUNTER (OUTPATIENT)
Dept: CARDIAC REHAB | Age: 77
Setting detail: THERAPIES SERIES
Discharge: HOME | End: 2017-09-01
Payer: MEDICARE

## 2017-09-01 PROCEDURE — 93798 PHYS/QHP OP CAR RHAB W/ECG: CPT

## 2017-09-05 ENCOUNTER — HOSPITAL ENCOUNTER (OUTPATIENT)
Dept: CARDIAC REHAB | Age: 77
Setting detail: THERAPIES SERIES
Discharge: HOME | End: 2017-09-05
Payer: MEDICARE

## 2017-09-06 ENCOUNTER — HOSPITAL ENCOUNTER (OUTPATIENT)
Dept: CARDIAC REHAB | Age: 77
Setting detail: THERAPIES SERIES
Discharge: HOME | End: 2017-09-06
Payer: MEDICARE

## 2017-09-06 PROCEDURE — 93798 PHYS/QHP OP CAR RHAB W/ECG: CPT

## 2017-09-07 ENCOUNTER — HOSPITAL ENCOUNTER (OUTPATIENT)
Dept: CARDIAC REHAB | Age: 77
Setting detail: THERAPIES SERIES
Discharge: HOME | End: 2017-09-07
Payer: MEDICARE

## 2017-09-07 PROCEDURE — 93798 PHYS/QHP OP CAR RHAB W/ECG: CPT

## 2017-09-08 ENCOUNTER — HOSPITAL ENCOUNTER (OUTPATIENT)
Dept: CARDIAC REHAB | Age: 77
Setting detail: THERAPIES SERIES
Discharge: HOME | End: 2017-09-08
Payer: MEDICARE

## 2017-09-08 PROCEDURE — 93798 PHYS/QHP OP CAR RHAB W/ECG: CPT

## 2017-09-11 ENCOUNTER — HOSPITAL ENCOUNTER (OUTPATIENT)
Dept: CARDIAC REHAB | Age: 77
Setting detail: THERAPIES SERIES
Discharge: HOME | End: 2017-09-11
Payer: MEDICARE

## 2017-09-11 PROCEDURE — 93798 PHYS/QHP OP CAR RHAB W/ECG: CPT

## 2017-09-12 ENCOUNTER — HOSPITAL ENCOUNTER (OUTPATIENT)
Dept: CARDIAC REHAB | Age: 77
Discharge: HOME | End: 2017-09-12

## 2017-09-14 ENCOUNTER — HOSPITAL ENCOUNTER (OUTPATIENT)
Dept: CARDIAC REHAB | Age: 77
Setting detail: THERAPIES SERIES
Discharge: HOME | End: 2017-09-14
Payer: MEDICARE

## 2017-09-14 PROCEDURE — 93798 PHYS/QHP OP CAR RHAB W/ECG: CPT

## 2017-09-18 ENCOUNTER — HOSPITAL ENCOUNTER (OUTPATIENT)
Dept: CARDIAC REHAB | Age: 77
Setting detail: THERAPIES SERIES
Discharge: HOME | End: 2017-09-18
Payer: MEDICARE

## 2017-09-18 ENCOUNTER — HOSPITAL ENCOUNTER (OUTPATIENT)
Age: 77
Discharge: HOME | End: 2017-09-18
Payer: MEDICARE

## 2017-09-18 DIAGNOSIS — I25.10 ATHEROSCLEROSIS OF NATIVE CORONARY ARTERY OF NATIVE HEART WITHOUT ANGINA PECTORIS: ICD-10-CM

## 2017-09-18 DIAGNOSIS — E55.9 VITAMIN D DEFICIENCY DISEASE: ICD-10-CM

## 2017-09-18 DIAGNOSIS — I25.700 CORONARY ARTERY DISEASE INVOLVING CORONARY BYPASS GRAFT OF NATIVE HEART WITH UNSTABLE ANGINA PECTORIS (HCC): ICD-10-CM

## 2017-09-18 DIAGNOSIS — I21.4 NSTEMI (NON-ST ELEVATED MYOCARDIAL INFARCTION) (HCC): ICD-10-CM

## 2017-09-18 DIAGNOSIS — E11.8 DM TYPE 2, CONTROLLED, WITH COMPLICATION (HCC): ICD-10-CM

## 2017-09-18 LAB
ABSOLUTE EOS #: 0.1 K/UL (ref 0–0.4)
ABSOLUTE LYMPH #: 0.6 K/UL (ref 1–4.8)
ABSOLUTE MONO #: 0.3 K/UL (ref 0–1)
ALBUMIN SERPL-MCNC: 4.1 G/DL (ref 3.5–5.2)
ALBUMIN/GLOBULIN RATIO: ABNORMAL (ref 1–2.5)
ALP BLD-CCNC: 90 U/L (ref 40–129)
ALT SERPL-CCNC: 21 U/L (ref 5–41)
ANION GAP SERPL CALCULATED.3IONS-SCNC: 12 MMOL/L (ref 9–17)
AST SERPL-CCNC: 28 U/L
BASOPHILS # BLD: 0 %
BASOPHILS ABSOLUTE: 0 K/UL (ref 0–0.2)
BILIRUB SERPL-MCNC: 0.47 MG/DL (ref 0.3–1.2)
BUN BLDV-MCNC: 15 MG/DL (ref 8–23)
BUN/CREAT BLD: 21 (ref 9–20)
CALCIUM SERPL-MCNC: 9.5 MG/DL (ref 8.6–10.4)
CHLORIDE BLD-SCNC: 104 MMOL/L (ref 98–107)
CHOLESTEROL/HDL RATIO: 2.5
CHOLESTEROL: 168 MG/DL
CO2: 24 MMOL/L (ref 20–31)
CREAT SERPL-MCNC: 0.72 MG/DL (ref 0.7–1.2)
DIFFERENTIAL TYPE: YES
EOSINOPHILS RELATIVE PERCENT: 3 %
GFR AFRICAN AMERICAN: >60 ML/MIN
GFR NON-AFRICAN AMERICAN: >60 ML/MIN
GFR SERPL CREATININE-BSD FRML MDRD: ABNORMAL ML/MIN/{1.73_M2}
GFR SERPL CREATININE-BSD FRML MDRD: ABNORMAL ML/MIN/{1.73_M2}
GLUCOSE BLD-MCNC: 148 MG/DL (ref 70–99)
HCT VFR BLD CALC: 38.5 % (ref 41–53)
HDLC SERPL-MCNC: 66 MG/DL
HEMOGLOBIN: 13.1 G/DL (ref 13.5–17.5)
LDL CHOLESTEROL: 85 MG/DL (ref 0–130)
LYMPHOCYTES # BLD: 16 %
MAGNESIUM: 2 MG/DL (ref 1.6–2.6)
MCH RBC QN AUTO: 32.5 PG (ref 26–34)
MCHC RBC AUTO-ENTMCNC: 34 G/DL (ref 31–37)
MCV RBC AUTO: 95.5 FL (ref 80–100)
MONOCYTES # BLD: 9 %
PATIENT FASTING?: YES
PDW BLD-RTO: 16.1 % (ref 12.1–15.2)
PLATELET # BLD: 282 K/UL (ref 140–450)
PLATELET ESTIMATE: ABNORMAL
PMV BLD AUTO: ABNORMAL FL (ref 6–12)
POTASSIUM SERPL-SCNC: 4.4 MMOL/L (ref 3.7–5.3)
RBC # BLD: 4.04 M/UL (ref 4.5–5.9)
RBC # BLD: ABNORMAL 10*6/UL
SEG NEUTROPHILS: 72 %
SEGMENTED NEUTROPHILS ABSOLUTE COUNT: 2.8 K/UL (ref 2.1–6.5)
SODIUM BLD-SCNC: 140 MMOL/L (ref 135–144)
TOTAL PROTEIN: 7 G/DL (ref 6.4–8.3)
TRIGL SERPL-MCNC: 86 MG/DL
TSH SERPL DL<=0.05 MIU/L-ACNC: 1.51 MIU/L (ref 0.3–5)
VITAMIN D 25-HYDROXY: 30.7 NG/ML (ref 30–100)
VLDLC SERPL CALC-MCNC: NORMAL MG/DL (ref 1–30)
WBC # BLD: 3.9 K/UL (ref 3.5–11)
WBC # BLD: ABNORMAL 10*3/UL

## 2017-09-18 PROCEDURE — 83036 HEMOGLOBIN GLYCOSYLATED A1C: CPT

## 2017-09-18 PROCEDURE — 93798 PHYS/QHP OP CAR RHAB W/ECG: CPT

## 2017-09-18 PROCEDURE — 82306 VITAMIN D 25 HYDROXY: CPT

## 2017-09-18 PROCEDURE — 83735 ASSAY OF MAGNESIUM: CPT

## 2017-09-18 PROCEDURE — 80053 COMPREHEN METABOLIC PANEL: CPT

## 2017-09-18 PROCEDURE — 93005 ELECTROCARDIOGRAM TRACING: CPT

## 2017-09-18 PROCEDURE — 80061 LIPID PANEL: CPT

## 2017-09-18 PROCEDURE — 36415 COLL VENOUS BLD VENIPUNCTURE: CPT

## 2017-09-18 PROCEDURE — 85025 COMPLETE CBC W/AUTO DIFF WBC: CPT

## 2017-09-18 PROCEDURE — 84443 ASSAY THYROID STIM HORMONE: CPT

## 2017-09-19 RX ORDER — CLOPIDOGREL BISULFATE 75 MG/1
75 TABLET ORAL DAILY
Qty: 30 TABLET | Refills: 5 | Status: SHIPPED | OUTPATIENT
Start: 2017-09-19 | End: 2018-08-29 | Stop reason: SDUPTHER

## 2017-09-21 ENCOUNTER — OFFICE VISIT (OUTPATIENT)
Dept: CARDIOLOGY CLINIC | Age: 77
End: 2017-09-21
Payer: MEDICARE

## 2017-09-21 VITALS
DIASTOLIC BLOOD PRESSURE: 70 MMHG | SYSTOLIC BLOOD PRESSURE: 110 MMHG | WEIGHT: 211 LBS | BODY MASS INDEX: 27.84 KG/M2 | HEART RATE: 88 BPM | OXYGEN SATURATION: 98 %

## 2017-09-21 DIAGNOSIS — E11.8 DM TYPE 2, CONTROLLED, WITH COMPLICATION (HCC): ICD-10-CM

## 2017-09-21 DIAGNOSIS — I21.4 NSTEMI (NON-ST ELEVATED MYOCARDIAL INFARCTION) (HCC): ICD-10-CM

## 2017-09-21 DIAGNOSIS — I25.10 ATHEROSCLEROSIS OF NATIVE CORONARY ARTERY OF NATIVE HEART WITHOUT ANGINA PECTORIS: ICD-10-CM

## 2017-09-21 DIAGNOSIS — E55.9 VITAMIN D DEFICIENCY DISEASE: ICD-10-CM

## 2017-09-21 DIAGNOSIS — E78.00 PURE HYPERCHOLESTEROLEMIA: ICD-10-CM

## 2017-09-21 DIAGNOSIS — R73.09 ELEVATED GLUCOSE: Primary | ICD-10-CM

## 2017-09-21 LAB
ESTIMATED AVERAGE GLUCOSE: 151 MG/DL
HBA1C MFR BLD: 6.9 % (ref 4.8–5.9)

## 2017-09-21 PROCEDURE — 99214 OFFICE O/P EST MOD 30 MIN: CPT | Performed by: INTERNAL MEDICINE

## 2017-09-21 RX ORDER — CELECOXIB 200 MG/1
200 CAPSULE ORAL DAILY
COMMUNITY
End: 2017-12-22 | Stop reason: ALTCHOICE

## 2017-09-26 LAB
EKG ATRIAL RATE: 74 BPM
EKG P AXIS: 34 DEGREES
EKG P-R INTERVAL: 178 MS
EKG Q-T INTERVAL: 438 MS
EKG QRS DURATION: 90 MS
EKG QTC CALCULATION (BAZETT): 486 MS
EKG R AXIS: -3 DEGREES
EKG T AXIS: 33 DEGREES
EKG VENTRICULAR RATE: 74 BPM

## 2017-10-04 RX ORDER — GEMFIBROZIL 600 MG/1
TABLET, FILM COATED ORAL
Qty: 180 TABLET | Refills: 3 | Status: SHIPPED | OUTPATIENT
Start: 2017-10-04 | End: 2017-10-24 | Stop reason: ALTCHOICE

## 2017-10-24 RX ORDER — CILOSTAZOL 50 MG/1
50 TABLET ORAL 2 TIMES DAILY
COMMUNITY
End: 2018-01-29 | Stop reason: SDUPTHER

## 2017-10-24 RX ORDER — NITROGLYCERIN 0.4 MG/1
TABLET SUBLINGUAL
Qty: 25 TABLET | Refills: 3 | Status: SHIPPED | OUTPATIENT
Start: 2017-10-24

## 2017-10-24 NOTE — PROGRESS NOTES
Subjective:      Patient ID: Nirmal Ramos is a 78 y.o. male. HPI    Review of Systems    Objective:   Physical Exam    Assessment:            Plan:    pt stopped in needing med adjustment due to [place] wanting changes pt to stop gemfibrozil, change dose platel to 75DU bid feels dose was to high. Given script ntg tabs.

## 2017-11-06 NOTE — PROGRESS NOTES
Phase II Cardiac Rehab Individualized Treatment Plan-Discharge    Patient Name: Bertin Hankins  Date of Initial Assessment:  6/21/17  ACCOUNT #:  [de-identified]  Diagnosis: myocardial infarction  Onset Date: 6/1/17  Referring Physician: Maximiliano La MD  Risk Stratification: High  Session Number:  35  EXERCISE    Stages of Change:   [] pre-contemplation   [x] Action   [] Contemplate   [] Maintainence   [] Prep   [] Relapse          Exercise Prescription:  Mode: [x] TM [] B [x] STP [x] UBE [] R  Frequency: 3 days per week  Duration: 31-60 min  Intensity: RPE 11-15  Progression: Increase 1-2 levels/week or 1-2 min/week to achieve target HR and RPE 11-13.      [] Angina with Exertion THR:    [x] Resistance Training  Weight (% of 1 RM): 60%                   Reps: 8-15    Hypertension:  [x] Yes  [] No  Resting BP: 108/62  Peak Exercise BP: 128/70  [] Med Change? NO    Intervention:  Home Exercise:  Type: Stationary cycle, Indoor and outdoor walking  Duration: 20-60 minutes  Frequency: 2-4 days per week   [x] Resistance Training    Depression Screening:  [] Have you been feeling sad. ..down in the dumps? [] Have you lost interest in your job, sports, hobbies, friends? [] Do you often feel tired? [] Do you have trouble sleeping or do you sleep too much? [] Have you been gaining or losing weight? [] Do you often feel down on yourself, that everything is your fault? [] Do you have troubled making decisions or concentrating on your work? [] Do you often feel agitated or like you can barely move? [] Do you ever feel that life isn't worth living? SCORE= 0 OF 9    *If greater than 5 symptoms listed,  notified.     Education:   [x] Education Goals met        Target Goal:   -Individual Exercise Plan  -BP<140/90 or <130/80 if DM   -Aerobic active 30 + minutes 5-7 days per week    Nutrition    Stages of Change:   [] pre-contemplation   [x] Action   [] Contemplate   [] Maintainenc   [] Prep   [] Relapse    Lipids: 9/18/17  Total Cholesterol: 168  Triglycerides: 86  HDL: 66  LDL:85   PANEL IMPROVED  [] Med Change? NO    Diabetes:  [x] Yes  [] No  FBS:  124  HbA1c: 6.9 ON 9/18  [] Med Change? NO  [x] BS in range    Weight Management:  Weight: 211.8 LB  Height: 73 IN  BMI: 28.0  Wt Goal: MAINTAIN CURRENT WEIGHT  Special Diet: AHA STEP II AND ADA GUIDELINES FOLLOWED MOSTLY PER EVALUATIOIN    Intervention:   [] Dietitian Consult       [x] Nurse/Patient Discussion     [x] Diet Class           [] Referred to Diabetes Education     Education:  [x] Education Goals met    Target Goal:  -LDL-C<100 if triglycerides are > 200  -LDL-C < 70 for high risk patients  -HbA1c < 7%  -BMI < 25   Education    Stages of Change:    [] pre-contemplation   [x] Action   [] Contemplate   [] Maintainence   [] Prep   [] Relapse    Knowledge test score: 100      Family support: [x] Yes  [] No    Tobacco use: [] Yes  [x] No    Intervention:  [] Referred to smoking cessation counselor     [] Individual education and counseling  [] Tobacco adjunct  [] Informed of education class schedule     Education:   [x] Education goals met    Target Goal:  -Complete cessation of tobacco use (if applicable)  -Continued risk factor modifications  -Recognizing signs/symptoms to report  -Proper use of meds    Psychosocial  Stages of Change:    [] pre-contemplation   [x] Action   [] Contemplate   [] Maintainence   [] Prep   [] Relapse    Psychosocial Test:  Tool Used: SF 36  Score: TO BE DETERMINED ONCE SCORING AVAILABLE  Depression screening score: 0  PHQ-9 SCORE:  0  []Medication change? NO    Education:    [x] Education Goals met    Target Goal:  -Assess presence or absence of depression using a valid screening tool. -Maximize coping skills.  -Positive support system.     Preventative Medication:   [x] Aspirin       [x] Beta Blockade      [x] Statin or other lipid lowering agent     [x] Clopidogrel   [] ACE Inhibitor   [] Other anticoagulation medications

## 2017-11-07 ENCOUNTER — HOSPITAL ENCOUNTER (OUTPATIENT)
Dept: CARDIAC REHAB | Age: 77
Discharge: HOME | End: 2017-11-07

## 2017-12-19 ENCOUNTER — HOSPITAL ENCOUNTER (OUTPATIENT)
Age: 77
Discharge: HOME | End: 2017-12-19
Payer: MEDICARE

## 2017-12-19 ENCOUNTER — HOSPITAL ENCOUNTER (OUTPATIENT)
Dept: GENERAL RADIOLOGY | Age: 77
Discharge: HOME | End: 2017-12-19
Payer: MEDICARE

## 2017-12-19 DIAGNOSIS — I25.700 CORONARY ARTERY DISEASE INVOLVING CORONARY BYPASS GRAFT OF NATIVE HEART WITH UNSTABLE ANGINA PECTORIS (HCC): ICD-10-CM

## 2017-12-19 DIAGNOSIS — I25.10 ATHEROSCLEROSIS OF NATIVE CORONARY ARTERY OF NATIVE HEART WITHOUT ANGINA PECTORIS: ICD-10-CM

## 2017-12-19 DIAGNOSIS — R73.09 ELEVATED GLUCOSE: ICD-10-CM

## 2017-12-19 DIAGNOSIS — E11.8 DM TYPE 2, CONTROLLED, WITH COMPLICATION (HCC): ICD-10-CM

## 2017-12-19 DIAGNOSIS — E55.9 VITAMIN D DEFICIENCY DISEASE: ICD-10-CM

## 2017-12-19 DIAGNOSIS — I21.4 NSTEMI (NON-ST ELEVATED MYOCARDIAL INFARCTION) (HCC): ICD-10-CM

## 2017-12-19 DIAGNOSIS — E78.00 PURE HYPERCHOLESTEROLEMIA: ICD-10-CM

## 2017-12-19 LAB
ABSOLUTE EOS #: 0.1 K/UL (ref 0–0.4)
ABSOLUTE IMMATURE GRANULOCYTE: ABNORMAL K/UL (ref 0–0.3)
ABSOLUTE LYMPH #: 0.7 K/UL (ref 1–4.8)
ABSOLUTE MONO #: 0.5 K/UL (ref 0–1)
ALBUMIN SERPL-MCNC: 4.1 G/DL (ref 3.5–5.2)
ALBUMIN/GLOBULIN RATIO: ABNORMAL (ref 1–2.5)
ALP BLD-CCNC: 93 U/L (ref 40–129)
ALT SERPL-CCNC: 18 U/L (ref 5–41)
ANION GAP SERPL CALCULATED.3IONS-SCNC: 13 MMOL/L (ref 9–17)
AST SERPL-CCNC: 19 U/L
BASOPHILS # BLD: 0 % (ref 0–2)
BASOPHILS ABSOLUTE: 0 K/UL (ref 0–0.2)
BILIRUB SERPL-MCNC: 0.55 MG/DL (ref 0.3–1.2)
BUN BLDV-MCNC: 13 MG/DL (ref 8–23)
BUN/CREAT BLD: 20 (ref 9–20)
CALCIUM SERPL-MCNC: 9.3 MG/DL (ref 8.6–10.4)
CHLORIDE BLD-SCNC: 103 MMOL/L (ref 98–107)
CHOLESTEROL/HDL RATIO: 2.8
CHOLESTEROL: 169 MG/DL
CO2: 25 MMOL/L (ref 20–31)
CREAT SERPL-MCNC: 0.65 MG/DL (ref 0.7–1.2)
DIFFERENTIAL TYPE: YES
EKG ATRIAL RATE: 65 BPM
EKG P AXIS: 38 DEGREES
EKG P-R INTERVAL: 204 MS
EKG Q-T INTERVAL: 448 MS
EKG QRS DURATION: 88 MS
EKG QTC CALCULATION (BAZETT): 465 MS
EKG R AXIS: -8 DEGREES
EKG T AXIS: 22 DEGREES
EKG VENTRICULAR RATE: 65 BPM
EOSINOPHILS RELATIVE PERCENT: 3 % (ref 0–5)
GFR AFRICAN AMERICAN: >60 ML/MIN
GFR NON-AFRICAN AMERICAN: >60 ML/MIN
GFR SERPL CREATININE-BSD FRML MDRD: ABNORMAL ML/MIN/{1.73_M2}
GFR SERPL CREATININE-BSD FRML MDRD: ABNORMAL ML/MIN/{1.73_M2}
GLUCOSE BLD-MCNC: 150 MG/DL (ref 70–99)
HCT VFR BLD CALC: 40.4 % (ref 41–53)
HDLC SERPL-MCNC: 61 MG/DL
HEMOGLOBIN: 13.3 G/DL (ref 13.5–17.5)
IMMATURE GRANULOCYTES: ABNORMAL %
LDL CHOLESTEROL: 85 MG/DL (ref 0–130)
LYMPHOCYTES # BLD: 15 % (ref 13–44)
MAGNESIUM: 2 MG/DL (ref 1.6–2.6)
MCH RBC QN AUTO: 31.8 PG (ref 26–34)
MCHC RBC AUTO-ENTMCNC: 33 G/DL (ref 31–37)
MCV RBC AUTO: 96.3 FL (ref 80–100)
MONOCYTES # BLD: 10 % (ref 5–9)
PATIENT FASTING?: YES
PDW BLD-RTO: 15 % (ref 12.1–15.2)
PLATELET # BLD: 229 K/UL (ref 140–450)
PLATELET ESTIMATE: ABNORMAL
PMV BLD AUTO: ABNORMAL FL (ref 6–12)
POTASSIUM SERPL-SCNC: 4.1 MMOL/L (ref 3.7–5.3)
RBC # BLD: 4.2 M/UL (ref 4.5–5.9)
RBC # BLD: ABNORMAL 10*6/UL
SEG NEUTROPHILS: 72 % (ref 39–75)
SEGMENTED NEUTROPHILS ABSOLUTE COUNT: 3.2 K/UL (ref 2.1–6.5)
SODIUM BLD-SCNC: 141 MMOL/L (ref 135–144)
TOTAL PROTEIN: 6.7 G/DL (ref 6.4–8.3)
TRIGL SERPL-MCNC: 113 MG/DL
TSH SERPL DL<=0.05 MIU/L-ACNC: 2.36 MIU/L (ref 0.3–5)
VITAMIN D 25-HYDROXY: 26.6 NG/ML (ref 30–100)
VLDLC SERPL CALC-MCNC: NORMAL MG/DL (ref 1–30)
WBC # BLD: 4.5 K/UL (ref 3.5–11)
WBC # BLD: ABNORMAL 10*3/UL

## 2017-12-19 PROCEDURE — 82306 VITAMIN D 25 HYDROXY: CPT

## 2017-12-19 PROCEDURE — 36415 COLL VENOUS BLD VENIPUNCTURE: CPT

## 2017-12-19 PROCEDURE — 80061 LIPID PANEL: CPT

## 2017-12-19 PROCEDURE — 80053 COMPREHEN METABOLIC PANEL: CPT

## 2017-12-19 PROCEDURE — 71020 XR CHEST STANDARD TWO VW: CPT

## 2017-12-19 PROCEDURE — 93005 ELECTROCARDIOGRAM TRACING: CPT

## 2017-12-19 PROCEDURE — 85025 COMPLETE CBC W/AUTO DIFF WBC: CPT

## 2017-12-19 PROCEDURE — 84443 ASSAY THYROID STIM HORMONE: CPT

## 2017-12-19 PROCEDURE — 83735 ASSAY OF MAGNESIUM: CPT

## 2017-12-22 ENCOUNTER — OFFICE VISIT (OUTPATIENT)
Dept: CARDIOLOGY CLINIC | Age: 77
End: 2017-12-22
Payer: MEDICARE

## 2017-12-22 VITALS
OXYGEN SATURATION: 98 % | WEIGHT: 218 LBS | SYSTOLIC BLOOD PRESSURE: 120 MMHG | HEART RATE: 91 BPM | DIASTOLIC BLOOD PRESSURE: 80 MMHG | BODY MASS INDEX: 28.76 KG/M2

## 2017-12-22 DIAGNOSIS — E78.00 PURE HYPERCHOLESTEROLEMIA: ICD-10-CM

## 2017-12-22 DIAGNOSIS — R06.02 SOB (SHORTNESS OF BREATH): Primary | ICD-10-CM

## 2017-12-22 DIAGNOSIS — I25.10 ATHEROSCLEROSIS OF NATIVE CORONARY ARTERY OF NATIVE HEART WITHOUT ANGINA PECTORIS: ICD-10-CM

## 2017-12-22 PROCEDURE — 3017F COLORECTAL CA SCREEN DOC REV: CPT | Performed by: INTERNAL MEDICINE

## 2017-12-22 PROCEDURE — G8427 DOCREV CUR MEDS BY ELIG CLIN: HCPCS | Performed by: INTERNAL MEDICINE

## 2017-12-22 PROCEDURE — G8598 ASA/ANTIPLAT THER USED: HCPCS | Performed by: INTERNAL MEDICINE

## 2017-12-22 PROCEDURE — 4040F PNEUMOC VAC/ADMIN/RCVD: CPT | Performed by: INTERNAL MEDICINE

## 2017-12-22 PROCEDURE — 1123F ACP DISCUSS/DSCN MKR DOCD: CPT | Performed by: INTERNAL MEDICINE

## 2017-12-22 PROCEDURE — 99214 OFFICE O/P EST MOD 30 MIN: CPT | Performed by: INTERNAL MEDICINE

## 2017-12-22 PROCEDURE — G8484 FLU IMMUNIZE NO ADMIN: HCPCS | Performed by: INTERNAL MEDICINE

## 2017-12-22 PROCEDURE — 1036F TOBACCO NON-USER: CPT | Performed by: INTERNAL MEDICINE

## 2017-12-22 PROCEDURE — G8417 CALC BMI ABV UP PARAM F/U: HCPCS | Performed by: INTERNAL MEDICINE

## 2017-12-22 RX ORDER — OMEPRAZOLE 20 MG/1
20 CAPSULE, DELAYED RELEASE ORAL 2 TIMES DAILY
COMMUNITY
End: 2018-04-10 | Stop reason: SDUPTHER

## 2017-12-22 RX ORDER — VENLAFAXINE 37.5 MG/1
37.5 TABLET ORAL DAILY
COMMUNITY
End: 2019-02-13 | Stop reason: ALTCHOICE

## 2017-12-22 RX ORDER — NAPROXEN 500 MG/1
500 TABLET ORAL 2 TIMES DAILY PRN
COMMUNITY
End: 2019-07-22 | Stop reason: SINTOL

## 2017-12-22 RX ORDER — ISOSORBIDE MONONITRATE 30 MG/1
TABLET, EXTENDED RELEASE ORAL
Qty: 180 TABLET | Refills: 3 | Status: SHIPPED | OUTPATIENT
Start: 2017-12-22 | End: 2019-02-28 | Stop reason: SDUPTHER

## 2017-12-22 NOTE — PROGRESS NOTES
Ov Dr. Marlo Padilla for follow up  C/o chest discomfort x one month  When bp is high will have headaches  And chest pain-daily- lasting 20 min  Not taking nitro  (+) sob but has hx of copd  C/o dizziness and lightheadedness  No syncope  No edema  C/o unsteady gait  Copd is acting up   When walking worse   occ will exercise on treadmill  Energy had dropped x 2 months  A lot of sob with activity   No lung    Bedside echo done    Will set up for echo, pft, ct chest  Will stop coreg and go back on Lopressor   25mg twice a day   Will increase Imdur 30mg to twice daily   Will restart his Effexor  Will follow up in mid [place]
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
## 2018-01-04 ENCOUNTER — HOSPITAL ENCOUNTER (OUTPATIENT)
Dept: NON INVASIVE DIAGNOSTICS | Age: 78
Discharge: HOME | End: 2018-01-04
Payer: MEDICARE

## 2018-01-04 ENCOUNTER — HOSPITAL ENCOUNTER (OUTPATIENT)
Dept: CT IMAGING | Age: 78
Discharge: HOME | End: 2018-01-04
Payer: MEDICARE

## 2018-01-04 ENCOUNTER — HOSPITAL ENCOUNTER (OUTPATIENT)
Dept: PULMONOLOGY | Age: 78
Discharge: HOME | End: 2018-01-04
Payer: MEDICARE

## 2018-01-04 VITALS — OXYGEN SATURATION: 95 %

## 2018-01-04 DIAGNOSIS — I25.10 ATHEROSCLEROSIS OF NATIVE CORONARY ARTERY OF NATIVE HEART WITHOUT ANGINA PECTORIS: ICD-10-CM

## 2018-01-04 DIAGNOSIS — E78.00 PURE HYPERCHOLESTEROLEMIA: ICD-10-CM

## 2018-01-04 DIAGNOSIS — R06.02 SOB (SHORTNESS OF BREATH): ICD-10-CM

## 2018-01-04 LAB
LV EF: 53 %
LVEF MODALITY: NORMAL

## 2018-01-04 PROCEDURE — 71275 CT ANGIOGRAPHY CHEST: CPT

## 2018-01-04 PROCEDURE — 6360000002 HC RX W HCPCS: Performed by: INTERNAL MEDICINE

## 2018-01-04 PROCEDURE — 94060 EVALUATION OF WHEEZING: CPT | Performed by: INTERNAL MEDICINE

## 2018-01-04 PROCEDURE — 94729 DIFFUSING CAPACITY: CPT

## 2018-01-04 PROCEDURE — 94726 PLETHYSMOGRAPHY LUNG VOLUMES: CPT

## 2018-01-04 PROCEDURE — 93306 TTE W/DOPPLER COMPLETE: CPT

## 2018-01-04 PROCEDURE — 94729 DIFFUSING CAPACITY: CPT | Performed by: INTERNAL MEDICINE

## 2018-01-04 PROCEDURE — 94060 EVALUATION OF WHEEZING: CPT

## 2018-01-04 PROCEDURE — 6360000004 HC RX CONTRAST MEDICATION: Performed by: INTERNAL MEDICINE

## 2018-01-04 RX ORDER — ALBUTEROL SULFATE 2.5 MG/3ML
2.5 SOLUTION RESPIRATORY (INHALATION) ONCE
Status: COMPLETED | OUTPATIENT
Start: 2018-01-04 | End: 2018-01-04

## 2018-01-04 RX ADMIN — IOVERSOL 100 ML: 741 INJECTION INTRA-ARTERIAL; INTRAVENOUS at 14:15

## 2018-01-04 RX ADMIN — ALBUTEROL SULFATE 2.5 MG: 2.5 SOLUTION RESPIRATORY (INHALATION) at 13:23

## 2018-01-18 ENCOUNTER — OFFICE VISIT (OUTPATIENT)
Dept: CARDIOLOGY CLINIC | Age: 78
End: 2018-01-18
Payer: MEDICARE

## 2018-01-18 VITALS
BODY MASS INDEX: 28.63 KG/M2 | DIASTOLIC BLOOD PRESSURE: 70 MMHG | SYSTOLIC BLOOD PRESSURE: 132 MMHG | WEIGHT: 217 LBS | HEART RATE: 82 BPM | OXYGEN SATURATION: 99 %

## 2018-01-18 DIAGNOSIS — I25.700 CORONARY ARTERY DISEASE INVOLVING CORONARY BYPASS GRAFT OF NATIVE HEART WITH UNSTABLE ANGINA PECTORIS (HCC): ICD-10-CM

## 2018-01-18 DIAGNOSIS — E11.8 DM TYPE 2, CONTROLLED, WITH COMPLICATION (HCC): ICD-10-CM

## 2018-01-18 DIAGNOSIS — E78.00 PURE HYPERCHOLESTEROLEMIA: Primary | ICD-10-CM

## 2018-01-18 DIAGNOSIS — J44.9 CHRONIC OBSTRUCTIVE PULMONARY DISEASE, UNSPECIFIED COPD TYPE (HCC): ICD-10-CM

## 2018-01-18 DIAGNOSIS — E55.9 VITAMIN D DEFICIENCY DISEASE: ICD-10-CM

## 2018-01-18 DIAGNOSIS — R06.02 SOB (SHORTNESS OF BREATH): ICD-10-CM

## 2018-01-18 PROCEDURE — G8926 SPIRO NO PERF OR DOC: HCPCS | Performed by: INTERNAL MEDICINE

## 2018-01-18 PROCEDURE — 99213 OFFICE O/P EST LOW 20 MIN: CPT | Performed by: INTERNAL MEDICINE

## 2018-01-18 PROCEDURE — G8417 CALC BMI ABV UP PARAM F/U: HCPCS | Performed by: INTERNAL MEDICINE

## 2018-01-18 PROCEDURE — G8598 ASA/ANTIPLAT THER USED: HCPCS | Performed by: INTERNAL MEDICINE

## 2018-01-18 PROCEDURE — 3023F SPIROM DOC REV: CPT | Performed by: INTERNAL MEDICINE

## 2018-01-18 PROCEDURE — G8427 DOCREV CUR MEDS BY ELIG CLIN: HCPCS | Performed by: INTERNAL MEDICINE

## 2018-01-18 PROCEDURE — 3017F COLORECTAL CA SCREEN DOC REV: CPT | Performed by: INTERNAL MEDICINE

## 2018-01-18 PROCEDURE — 3046F HEMOGLOBIN A1C LEVEL >9.0%: CPT | Performed by: INTERNAL MEDICINE

## 2018-01-18 PROCEDURE — 1123F ACP DISCUSS/DSCN MKR DOCD: CPT | Performed by: INTERNAL MEDICINE

## 2018-01-18 PROCEDURE — 4040F PNEUMOC VAC/ADMIN/RCVD: CPT | Performed by: INTERNAL MEDICINE

## 2018-01-18 PROCEDURE — G8484 FLU IMMUNIZE NO ADMIN: HCPCS | Performed by: INTERNAL MEDICINE

## 2018-01-18 PROCEDURE — 1036F TOBACCO NON-USER: CPT | Performed by: INTERNAL MEDICINE

## 2018-01-18 RX ORDER — TRAZODONE HYDROCHLORIDE 100 MG/1
100 TABLET ORAL NIGHTLY
COMMUNITY
End: 2019-02-13 | Stop reason: ALTCHOICE

## 2018-01-18 NOTE — LETTER
is planning on taking him to [facility] for the month of March and she will take a blood pressure cuff with her to monitor his pressure. His chest pain, I think, is noncardiac. I have asked him to start a walking program, especially when the weather is nicer to monitor his chest pain and his shortness of breath. I will place him on a long-acting bronchodilator as a trial to see if he has less shortness of breath. If he does feel better on the long-acting bronchodilator, then we will continue; if not, then he can stop it and use his albuterol p.r.n. Again, I was very delighted to see his improvement in his overall countenance. I look forward to seeing him again in 4 months. Thank you very much.     Sincerely,        Varsha Carrillo    D: 01/18/2018 8:50:02       T: 01/18/2018 10:55:54     [email or web address]  Job#: [id number]     Doc#: [id number]

## 2018-01-19 NOTE — PROGRESS NOTES
pressure cuff with her to monitor his pressure. His chest pain, I think, is noncardiac. I have asked him to start a walking program, especially when the weather is nicer to monitor his chest pain and his shortness of breath. I will place him on a long-acting bronchodilator as a trial to see if he has less shortness of breath. If he does feel better on the long-acting bronchodilator, then we will continue; if not, then he can stop it and use his albuterol p.r.n. Again, I was very delighted to see his improvement in his overall countenance. I look forward to seeing him again in 4 months. Thank you very much.     Sincerely,        Zara Williamson    D: 01/18/2018 8:50:02       T: 01/18/2018 10:55:54     [email or web address]  Job#: [id number]     Doc#: [id number]

## 2018-01-29 RX ORDER — CILOSTAZOL 50 MG/1
50 TABLET ORAL 2 TIMES DAILY
Qty: 180 TABLET | Refills: 3 | Status: SHIPPED | OUTPATIENT
Start: 2018-01-29 | End: 2019-03-14 | Stop reason: SDUPTHER

## 2018-02-26 NOTE — TELEPHONE ENCOUNTER
rx and letter given to pt and   He is going to hand carry rx and  [facility]

## 2018-02-26 NOTE — TELEPHONE ENCOUNTER
Wants inhaler Dr Luis Armando Roy prescribed to [place] @ [facility]. Also needs note as to why he needs inhaler. If unable to send script direct to [place], contact patient with written RX and he will take to [place].

## 2018-04-10 ENCOUNTER — OFFICE VISIT (OUTPATIENT)
Dept: FAMILY MEDICINE CLINIC | Age: 78
End: 2018-04-10
Payer: MEDICARE

## 2018-04-10 VITALS
HEART RATE: 60 BPM | WEIGHT: 217 LBS | DIASTOLIC BLOOD PRESSURE: 70 MMHG | BODY MASS INDEX: 29.39 KG/M2 | SYSTOLIC BLOOD PRESSURE: 134 MMHG | OXYGEN SATURATION: 94 % | HEIGHT: 72 IN

## 2018-04-10 DIAGNOSIS — Z00.00 ENCOUNTER FOR MEDICARE ANNUAL WELLNESS EXAM: Primary | ICD-10-CM

## 2018-04-10 PROCEDURE — G0438 PPPS, INITIAL VISIT: HCPCS | Performed by: FAMILY MEDICINE

## 2018-04-10 PROCEDURE — G8598 ASA/ANTIPLAT THER USED: HCPCS | Performed by: FAMILY MEDICINE

## 2018-04-10 PROCEDURE — 4040F PNEUMOC VAC/ADMIN/RCVD: CPT | Performed by: FAMILY MEDICINE

## 2018-04-10 ASSESSMENT — PATIENT HEALTH QUESTIONNAIRE - PHQ9
2. FEELING DOWN, DEPRESSED OR HOPELESS: 0
SUM OF ALL RESPONSES TO PHQ QUESTIONS 1-9: 0
1. LITTLE INTEREST OR PLEASURE IN DOING THINGS: 0
SUM OF ALL RESPONSES TO PHQ9 QUESTIONS 1 & 2: 0
SUM OF ALL RESPONSES TO PHQ QUESTIONS 1-9: 0

## 2018-04-10 ASSESSMENT — LIFESTYLE VARIABLES: HOW OFTEN DO YOU HAVE A DRINK CONTAINING ALCOHOL: 0

## 2018-04-10 ASSESSMENT — ANXIETY QUESTIONNAIRES: GAD7 TOTAL SCORE: 0

## 2018-05-14 ENCOUNTER — HOSPITAL ENCOUNTER (OUTPATIENT)
Age: 78
Discharge: HOME | End: 2018-05-14
Payer: MEDICARE

## 2018-05-14 DIAGNOSIS — R06.02 SOB (SHORTNESS OF BREATH): ICD-10-CM

## 2018-05-14 DIAGNOSIS — R07.9 CHEST PAIN, UNSPECIFIED TYPE: ICD-10-CM

## 2018-05-14 DIAGNOSIS — I25.700 CORONARY ARTERY DISEASE INVOLVING CORONARY BYPASS GRAFT OF NATIVE HEART WITH UNSTABLE ANGINA PECTORIS (HCC): ICD-10-CM

## 2018-05-14 DIAGNOSIS — E55.9 VITAMIN D DEFICIENCY DISEASE: ICD-10-CM

## 2018-05-14 DIAGNOSIS — J44.9 CHRONIC OBSTRUCTIVE PULMONARY DISEASE, UNSPECIFIED COPD TYPE (HCC): ICD-10-CM

## 2018-05-14 DIAGNOSIS — E78.00 PURE HYPERCHOLESTEROLEMIA: ICD-10-CM

## 2018-05-14 DIAGNOSIS — E11.8 DM TYPE 2, CONTROLLED, WITH COMPLICATION (HCC): ICD-10-CM

## 2018-05-14 LAB
ABSOLUTE EOS #: 0.2 K/UL (ref 0–0.4)
ABSOLUTE IMMATURE GRANULOCYTE: ABNORMAL K/UL (ref 0–0.3)
ABSOLUTE LYMPH #: 0.9 K/UL (ref 1–4.8)
ABSOLUTE MONO #: 0.4 K/UL (ref 0–1)
ALBUMIN SERPL-MCNC: 3.7 G/DL (ref 3.5–5.2)
ALBUMIN/GLOBULIN RATIO: ABNORMAL (ref 1–2.5)
ALP BLD-CCNC: 79 U/L (ref 40–129)
ALT SERPL-CCNC: 18 U/L (ref 5–41)
ANION GAP SERPL CALCULATED.3IONS-SCNC: 12 MMOL/L (ref 9–17)
AST SERPL-CCNC: 20 U/L
BASOPHILS # BLD: 0 % (ref 0–2)
BASOPHILS ABSOLUTE: 0 K/UL (ref 0–0.2)
BILIRUB SERPL-MCNC: 0.44 MG/DL (ref 0.3–1.2)
BUN BLDV-MCNC: 13 MG/DL (ref 8–23)
BUN/CREAT BLD: 21 (ref 9–20)
CALCIUM SERPL-MCNC: 8.7 MG/DL (ref 8.6–10.4)
CHLORIDE BLD-SCNC: 104 MMOL/L (ref 98–107)
CHOLESTEROL/HDL RATIO: 2.8
CHOLESTEROL: 146 MG/DL
CO2: 25 MMOL/L (ref 20–31)
CREAT SERPL-MCNC: 0.62 MG/DL (ref 0.7–1.2)
DIFFERENTIAL TYPE: YES
EKG ATRIAL RATE: 66 BPM
EKG P AXIS: 65 DEGREES
EKG P-R INTERVAL: 190 MS
EKG Q-T INTERVAL: 450 MS
EKG QRS DURATION: 90 MS
EKG QTC CALCULATION (BAZETT): 471 MS
EKG R AXIS: 4 DEGREES
EKG T AXIS: 37 DEGREES
EKG VENTRICULAR RATE: 66 BPM
EOSINOPHILS RELATIVE PERCENT: 4 % (ref 0–5)
GFR AFRICAN AMERICAN: >60 ML/MIN
GFR NON-AFRICAN AMERICAN: >60 ML/MIN
GFR SERPL CREATININE-BSD FRML MDRD: ABNORMAL ML/MIN/{1.73_M2}
GFR SERPL CREATININE-BSD FRML MDRD: ABNORMAL ML/MIN/{1.73_M2}
GLUCOSE BLD-MCNC: 143 MG/DL (ref 70–99)
HCT VFR BLD CALC: 39.6 % (ref 41–53)
HDLC SERPL-MCNC: 52 MG/DL
HEMOGLOBIN: 13.4 G/DL (ref 13.5–17.5)
IMMATURE GRANULOCYTES: ABNORMAL %
LDL CHOLESTEROL: 72 MG/DL (ref 0–130)
LYMPHOCYTES # BLD: 21 % (ref 13–44)
MAGNESIUM: 1.9 MG/DL (ref 1.6–2.6)
MCH RBC QN AUTO: 32.1 PG (ref 26–34)
MCHC RBC AUTO-ENTMCNC: 33.9 G/DL (ref 31–37)
MCV RBC AUTO: 94.7 FL (ref 80–100)
MONOCYTES # BLD: 10 % (ref 5–9)
NRBC AUTOMATED: ABNORMAL PER 100 WBC
PATIENT FASTING?: YES
PDW BLD-RTO: 15.2 % (ref 12.1–15.2)
PLATELET # BLD: 255 K/UL (ref 140–450)
PLATELET ESTIMATE: ABNORMAL
PMV BLD AUTO: ABNORMAL FL (ref 6–12)
POTASSIUM SERPL-SCNC: 3.8 MMOL/L (ref 3.7–5.3)
RBC # BLD: 4.18 M/UL (ref 4.5–5.9)
RBC # BLD: ABNORMAL 10*6/UL
SEG NEUTROPHILS: 65 % (ref 39–75)
SEGMENTED NEUTROPHILS ABSOLUTE COUNT: 3 K/UL (ref 2.1–6.5)
SODIUM BLD-SCNC: 141 MMOL/L (ref 135–144)
TOTAL PROTEIN: 6.6 G/DL (ref 6.4–8.3)
TRIGL SERPL-MCNC: 110 MG/DL
TSH SERPL DL<=0.05 MIU/L-ACNC: 2.07 MIU/L (ref 0.3–5)
VITAMIN D 25-HYDROXY: 26.8 NG/ML (ref 30–100)
VLDLC SERPL CALC-MCNC: NORMAL MG/DL (ref 1–30)
WBC # BLD: 4.6 K/UL (ref 3.5–11)
WBC # BLD: ABNORMAL 10*3/UL

## 2018-05-14 PROCEDURE — 85025 COMPLETE CBC W/AUTO DIFF WBC: CPT

## 2018-05-14 PROCEDURE — 80053 COMPREHEN METABOLIC PANEL: CPT

## 2018-05-14 PROCEDURE — 80061 LIPID PANEL: CPT

## 2018-05-14 PROCEDURE — 82306 VITAMIN D 25 HYDROXY: CPT

## 2018-05-14 PROCEDURE — 36415 COLL VENOUS BLD VENIPUNCTURE: CPT

## 2018-05-14 PROCEDURE — 84443 ASSAY THYROID STIM HORMONE: CPT

## 2018-05-14 PROCEDURE — 83735 ASSAY OF MAGNESIUM: CPT

## 2018-05-14 PROCEDURE — 93005 ELECTROCARDIOGRAM TRACING: CPT

## 2018-05-18 ENCOUNTER — OFFICE VISIT (OUTPATIENT)
Dept: CARDIOLOGY CLINIC | Age: 78
End: 2018-05-18
Payer: MEDICARE

## 2018-05-18 VITALS
WEIGHT: 219 LBS | HEART RATE: 78 BPM | DIASTOLIC BLOOD PRESSURE: 80 MMHG | BODY MASS INDEX: 29.7 KG/M2 | SYSTOLIC BLOOD PRESSURE: 130 MMHG | OXYGEN SATURATION: 95 %

## 2018-05-18 DIAGNOSIS — I25.700 CORONARY ARTERY DISEASE INVOLVING CORONARY BYPASS GRAFT OF NATIVE HEART WITH UNSTABLE ANGINA PECTORIS (HCC): ICD-10-CM

## 2018-05-18 DIAGNOSIS — I25.10 ATHEROSCLEROSIS OF NATIVE CORONARY ARTERY OF NATIVE HEART WITHOUT ANGINA PECTORIS: Primary | ICD-10-CM

## 2018-05-18 DIAGNOSIS — E11.8 DM TYPE 2, CONTROLLED, WITH COMPLICATION (HCC): ICD-10-CM

## 2018-05-18 DIAGNOSIS — E55.9 VITAMIN D DEFICIENCY DISEASE: ICD-10-CM

## 2018-05-18 DIAGNOSIS — R06.02 SOB (SHORTNESS OF BREATH): ICD-10-CM

## 2018-05-18 PROCEDURE — 4040F PNEUMOC VAC/ADMIN/RCVD: CPT | Performed by: INTERNAL MEDICINE

## 2018-05-18 PROCEDURE — 3017F COLORECTAL CA SCREEN DOC REV: CPT | Performed by: INTERNAL MEDICINE

## 2018-05-18 PROCEDURE — 1036F TOBACCO NON-USER: CPT | Performed by: INTERNAL MEDICINE

## 2018-05-18 PROCEDURE — G8598 ASA/ANTIPLAT THER USED: HCPCS | Performed by: INTERNAL MEDICINE

## 2018-05-18 PROCEDURE — G8427 DOCREV CUR MEDS BY ELIG CLIN: HCPCS | Performed by: INTERNAL MEDICINE

## 2018-05-18 PROCEDURE — 3046F HEMOGLOBIN A1C LEVEL >9.0%: CPT | Performed by: INTERNAL MEDICINE

## 2018-05-18 PROCEDURE — 1123F ACP DISCUSS/DSCN MKR DOCD: CPT | Performed by: INTERNAL MEDICINE

## 2018-05-18 PROCEDURE — 99214 OFFICE O/P EST MOD 30 MIN: CPT | Performed by: INTERNAL MEDICINE

## 2018-05-18 PROCEDURE — 2022F DILAT RTA XM EVC RTNOPTHY: CPT | Performed by: INTERNAL MEDICINE

## 2018-05-18 PROCEDURE — G8417 CALC BMI ABV UP PARAM F/U: HCPCS | Performed by: INTERNAL MEDICINE

## 2018-07-24 ENCOUNTER — TELEPHONE (OUTPATIENT)
Dept: UROLOGY | Age: 78
End: 2018-07-24

## 2018-07-24 DIAGNOSIS — N20.0 RENAL STONE: Primary | ICD-10-CM

## 2018-07-25 ENCOUNTER — HOSPITAL ENCOUNTER (OUTPATIENT)
Dept: GENERAL RADIOLOGY | Age: 78
Discharge: HOME | End: 2018-07-27
Payer: MEDICARE

## 2018-07-25 ENCOUNTER — HOSPITAL ENCOUNTER (OUTPATIENT)
Age: 78
Discharge: HOME | End: 2018-07-27
Payer: MEDICARE

## 2018-07-25 DIAGNOSIS — N20.0 RENAL STONE: ICD-10-CM

## 2018-07-25 PROCEDURE — 74018 RADEX ABDOMEN 1 VIEW: CPT

## 2018-07-26 ENCOUNTER — OFFICE VISIT (OUTPATIENT)
Dept: UROLOGY | Age: 78
End: 2018-07-26
Payer: MEDICARE

## 2018-07-26 ENCOUNTER — HOSPITAL ENCOUNTER (OUTPATIENT)
Age: 78
Setting detail: SPECIMEN
Discharge: HOME | End: 2018-07-26
Payer: MEDICARE

## 2018-07-26 VITALS
HEIGHT: 73 IN | SYSTOLIC BLOOD PRESSURE: 128 MMHG | WEIGHT: 210 LBS | BODY MASS INDEX: 27.83 KG/M2 | DIASTOLIC BLOOD PRESSURE: 68 MMHG

## 2018-07-26 DIAGNOSIS — R35.1 NOCTURIA: ICD-10-CM

## 2018-07-26 DIAGNOSIS — R39.14 FEELING OF INCOMPLETE BLADDER EMPTYING: ICD-10-CM

## 2018-07-26 DIAGNOSIS — N40.1 BPH WITH OBSTRUCTION/LOWER URINARY TRACT SYMPTOMS: ICD-10-CM

## 2018-07-26 DIAGNOSIS — N13.8 BPH WITH OBSTRUCTION/LOWER URINARY TRACT SYMPTOMS: ICD-10-CM

## 2018-07-26 DIAGNOSIS — N20.0 RENAL STONE: Primary | ICD-10-CM

## 2018-07-26 LAB
-: ABNORMAL
AMORPHOUS: ABNORMAL
BACTERIA: ABNORMAL
BILIRUBIN URINE: NEGATIVE
CASTS UA: ABNORMAL /LPF
COLOR: YELLOW
COMMENT UA: ABNORMAL
CRYSTALS, UA: ABNORMAL /HPF
EPITHELIAL CELLS UA: ABNORMAL /HPF
GLUCOSE URINE: NEGATIVE
KETONES, URINE: NEGATIVE
LEUKOCYTE ESTERASE, URINE: ABNORMAL
MUCUS: ABNORMAL
NITRITE, URINE: NEGATIVE
OTHER OBSERVATIONS UA: ABNORMAL
PH UA: 6 (ref 5–8)
PROTEIN UA: ABNORMAL
RBC UA: ABNORMAL /HPF (ref 0–2)
RENAL EPITHELIAL, UA: ABNORMAL /HPF
SPECIFIC GRAVITY UA: 1.02 (ref 1–1.03)
TRICHOMONAS: ABNORMAL
TURBIDITY: ABNORMAL
URINE HGB: NEGATIVE
UROBILINOGEN, URINE: NORMAL
WBC UA: ABNORMAL /HPF
YEAST: ABNORMAL

## 2018-07-26 PROCEDURE — 3017F COLORECTAL CA SCREEN DOC REV: CPT | Performed by: NURSE PRACTITIONER

## 2018-07-26 PROCEDURE — 87077 CULTURE AEROBIC IDENTIFY: CPT

## 2018-07-26 PROCEDURE — 1123F ACP DISCUSS/DSCN MKR DOCD: CPT | Performed by: NURSE PRACTITIONER

## 2018-07-26 PROCEDURE — G8427 DOCREV CUR MEDS BY ELIG CLIN: HCPCS | Performed by: NURSE PRACTITIONER

## 2018-07-26 PROCEDURE — 4040F PNEUMOC VAC/ADMIN/RCVD: CPT | Performed by: NURSE PRACTITIONER

## 2018-07-26 PROCEDURE — 51798 US URINE CAPACITY MEASURE: CPT | Performed by: NURSE PRACTITIONER

## 2018-07-26 PROCEDURE — 99214 OFFICE O/P EST MOD 30 MIN: CPT | Performed by: NURSE PRACTITIONER

## 2018-07-26 PROCEDURE — G8417 CALC BMI ABV UP PARAM F/U: HCPCS | Performed by: NURSE PRACTITIONER

## 2018-07-26 PROCEDURE — 87186 SC STD MICRODIL/AGAR DIL: CPT

## 2018-07-26 PROCEDURE — 81001 URINALYSIS AUTO W/SCOPE: CPT

## 2018-07-26 PROCEDURE — 87086 URINE CULTURE/COLONY COUNT: CPT

## 2018-07-26 PROCEDURE — 1036F TOBACCO NON-USER: CPT | Performed by: NURSE PRACTITIONER

## 2018-07-26 PROCEDURE — 1101F PT FALLS ASSESS-DOCD LE1/YR: CPT | Performed by: NURSE PRACTITIONER

## 2018-07-26 PROCEDURE — G8598 ASA/ANTIPLAT THER USED: HCPCS | Performed by: NURSE PRACTITIONER

## 2018-07-26 RX ORDER — FINASTERIDE 5 MG/1
5 TABLET, FILM COATED ORAL DAILY
Qty: 90 TABLET | Refills: 3 | Status: SHIPPED | OUTPATIENT
Start: 2018-07-26 | End: 2019-03-14 | Stop reason: SDUPTHER

## 2018-07-26 ASSESSMENT — ENCOUNTER SYMPTOMS
ABDOMINAL PAIN: 0
VOMITING: 0
EYE REDNESS: 0
CONSTIPATION: 0
NAUSEA: 0
EYE PAIN: 0
BACK PAIN: 0
SHORTNESS OF BREATH: 0
COLOR CHANGE: 0
WHEEZING: 0
COUGH: 0

## 2018-07-26 NOTE — PROGRESS NOTES
mononitrate (IMDUR) 30 MG extended release tablet one tablet twice a day 180 tablet 3    metoprolol tartrate (LOPRESSOR) 25 MG tablet Take 1 tablet by mouth 2 times daily 180 tablet 3    nitroGLYCERIN (NITROSTAT) 0.4 MG SL tablet up to max of 3 total doses. If no relief after 1 dose, call 911. 25 tablet 3    aspirin 81 MG tablet Take 81 mg by mouth daily       clopidogrel (PLAVIX) 75 MG tablet Take 1 tablet by mouth daily 30 tablet 5    rosuvastatin (CRESTOR) 40 MG tablet Take 20 mg by mouth daily      nitroGLYCERIN (NITROLINGUAL) 0.4 MG/SPRAY 0.4 mg spray Place 1 spray under the tongue every 5 minutes as needed for Chest pain 1 Bottle 3    tamsulosin (FLOMAX) 0.4 MG capsule Take 0.4 mg by mouth daily      folic acid (FOLVITE) 1 MG tablet Take 1 tablet by mouth daily Daily except on day of taking Methotrexate 90 tablet 1    ranitidine (ZANTAC) 150 MG tablet Take 150 mg by mouth nightly       Glucose Blood (BRIE BREEZE 2 TEST) DISK 1 Device by In Vitro route daily. 1 each 12    omeprazole (PRILOSEC) 20 MG capsule Take 20 mg by mouth 2 times daily.  Cholecalciferol (VITAMIN D) 2000 UNITS CAPS capsule Take by mouth 2 times daily       albuterol (PROVENTIL HFA;VENTOLIN HFA) 108 (90 BASE) MCG/ACT inhaler Inhale 2 puffs into the lungs 4 times daily as needed for Shortness of Breath       methotrexate (RHEUMATREX) 2.5 MG chemo tablet Take 2.5 mg by mouth once a week Take 4 tabs once a week on fridays      glucose blood VI test strips (BL TEST STRIP PACK) strip Tests BS daily and as needed.         Current Facility-Administered Medications on File Prior to Visit   Medication Dose Route Frequency Provider Last Rate Last Dose    triamcinolone acetonide (KENALOG-40) injection 60 mg  60 mg Intramuscular Once Maeve Conner MD           Outpatient Encounter Prescriptions as of 7/26/2018   Medication Sig Dispense Refill    finasteride (PROSCAR) 5 MG tablet Take 1 tablet by mouth daily 90 tablet 3    tiotropium (SPIRIVA HANDIHALER) 18 MCG inhalation capsule Inhale 1 capsule into the lungs daily 90 capsule 3    cilostazol (PLETAL) 50 MG tablet Take 1 tablet by mouth 2 times daily 180 tablet 3    traZODone (DESYREL) 100 MG tablet Take 100 mg by mouth nightly      naproxen (NAPROSYN) 500 MG tablet Take 500 mg by mouth 2 times daily as needed for Pain      diclofenac sodium 1 % GEL Apply 2 g topically 4 times daily as needed for Pain      venlafaxine (EFFEXOR) 37.5 MG tablet Take 37.5 mg by mouth Daily      isosorbide mononitrate (IMDUR) 30 MG extended release tablet one tablet twice a day 180 tablet 3    metoprolol tartrate (LOPRESSOR) 25 MG tablet Take 1 tablet by mouth 2 times daily 180 tablet 3    nitroGLYCERIN (NITROSTAT) 0.4 MG SL tablet up to max of 3 total doses. If no relief after 1 dose, call 911. 25 tablet 3    aspirin 81 MG tablet Take 81 mg by mouth daily       clopidogrel (PLAVIX) 75 MG tablet Take 1 tablet by mouth daily 30 tablet 5    rosuvastatin (CRESTOR) 40 MG tablet Take 20 mg by mouth daily      nitroGLYCERIN (NITROLINGUAL) 0.4 MG/SPRAY 0.4 mg spray Place 1 spray under the tongue every 5 minutes as needed for Chest pain 1 Bottle 3    tamsulosin (FLOMAX) 0.4 MG capsule Take 0.4 mg by mouth daily      folic acid (FOLVITE) 1 MG tablet Take 1 tablet by mouth daily Daily except on day of taking Methotrexate 90 tablet 1    ranitidine (ZANTAC) 150 MG tablet Take 150 mg by mouth nightly       Glucose Blood (BRIE BREEZE 2 TEST) DISK 1 Device by In Vitro route daily. 1 each 12    omeprazole (PRILOSEC) 20 MG capsule Take 20 mg by mouth 2 times daily.       Cholecalciferol (VITAMIN D) 2000 UNITS CAPS capsule Take by mouth 2 times daily       albuterol (PROVENTIL HFA;VENTOLIN HFA) 108 (90 BASE) MCG/ACT inhaler Inhale 2 puffs into the lungs 4 times daily as needed for Shortness of Breath       methotrexate (RHEUMATREX) 2.5 MG chemo tablet Take 2.5 mg by mouth once a week Take 4 tabs once a week on fridays      glucose blood VI test strips (BL TEST STRIP PACK) strip Tests BS daily and as needed. Facility-Administered Encounter Medications as of 7/26/2018   Medication Dose Route Frequency Provider Last Rate Last Dose    triamcinolone acetonide (KENALOG-40) injection 60 mg  60 mg Intramuscular Once Carlos Enrique Cartagena MD         Patient has no known allergies. History   Smoking Status    Former Smoker    Types: Cigarettes    Quit date: 9/14/1980   Smokeless Tobacco    Never Used       History   Alcohol Use No       Vitals:    07/26/18 1320   BP: 128/68       Constitutional: Patient in no acute distress; Neuro: alert and oriented to person place and time. Psych: Mood and affect normal.  Skin: Normal  Lungs: Respiratory effort normal  Cardiovascular:  Normal peripheral pulses  Abdomen: Soft, non-tender, non-distended with no CVA, flank pain, hepatosplenomegaly or hernia. Kidneys normal.  Bladder non-tender and not distended. Lymphatics: no palpable lymphadenopathy  Penis normal  Urethral meatus normal  Scrotal exam normal  Testicles normal bilaterally       No results found for: PSA  Lab Results   Component Value Date    BUN 13 05/14/2018     Lab Results   Component Value Date    CREATININE 0.62 (L) 05/14/2018       No results found for this visit on 07/26/18. Review of Systems   Constitutional: Negative for appetite change, chills and fever. Eyes: Negative for pain, redness and visual disturbance. Respiratory: Negative for cough, shortness of breath and wheezing. Cardiovascular: Negative for chest pain and leg swelling. Gastrointestinal: Negative for abdominal pain, constipation, nausea and vomiting. Genitourinary: Positive for difficulty urinating and enuresis (nocturia). Negative for decreased urine volume, discharge, dysuria, flank pain, frequency, hematuria, penile pain, scrotal swelling, testicular pain and urgency.    Musculoskeletal: Negative for back pain, joint

## 2018-07-27 LAB
CULTURE: ABNORMAL
Lab: ABNORMAL
ORGANISM: ABNORMAL
SPECIMEN DESCRIPTION: ABNORMAL
STATUS: ABNORMAL

## 2018-07-30 ENCOUNTER — TELEPHONE (OUTPATIENT)
Dept: UROLOGY | Age: 78
End: 2018-07-30

## 2018-07-30 RX ORDER — CEPHALEXIN 500 MG/1
500 CAPSULE ORAL 3 TIMES DAILY
Qty: 30 CAPSULE | Refills: 0 | Status: SHIPPED | OUTPATIENT
Start: 2018-07-30 | End: 2018-08-09

## 2018-07-31 NOTE — TELEPHONE ENCOUNTER
Patient made aware of positive urine culture results and to start new antibiotics. Patient voiced understanding.

## 2018-07-31 NOTE — TELEPHONE ENCOUNTER
Urine culture is positive for infection. I sent in culture specific Keflex. Take this antibiotic until gone. Take this with food and eat yogurt once per day to prevent GI upset. If you develop nausea, vomiting, or fevers call the office or go to the ER. If your urinary symptoms do not improve once completing the antibiotics call our office.

## 2018-08-29 RX ORDER — CLOPIDOGREL BISULFATE 75 MG/1
75 TABLET ORAL DAILY
Qty: 30 TABLET | Refills: 5 | Status: SHIPPED | OUTPATIENT
Start: 2018-08-29 | End: 2019-02-28 | Stop reason: SDUPTHER

## 2018-08-29 NOTE — TELEPHONE ENCOUNTER
plavix 75 mg    DM-indio    Health Maintenance   Topic Date Due    DTaP/Tdap/Td vaccine (1 - Tdap) 09/19/1961    Shingles Vaccine (1 of 2 - 2 Dose Series) 09/19/1992    Diabetic retinal exam  07/01/2016    Diabetic foot exam  12/09/2016    Pneumococcal low/med risk (2 of 2 - PPSV23) 12/09/2016    Flu vaccine (1) 09/01/2018    A1C test (Diabetic or Prediabetic)  09/18/2018    Lipid screen  05/14/2019    Colon cancer screen colonoscopy  03/05/2025    AAA screen  Completed             (applicable per patient's age: Cancer Screenings, Depression Screening, Fall Risk Screening, Immunizations)    Hemoglobin A1C (%)   Date Value   09/18/2017 6.9 (H)   12/20/2016 6.5   06/24/2016 7.0 (H)     LDL Cholesterol (mg/dL)   Date Value   05/14/2018 72     LDL Calculated (mg/dL)   Date Value   05/06/2016 78     AST (U/L)   Date Value   05/14/2018 20     ALT (U/L)   Date Value   05/14/2018 18     BUN (mg/dL)   Date Value   05/14/2018 13      (goal A1C is < 7)   (goal LDL is <100) need 30-50% reduction from baseline     BP Readings from Last 3 Encounters:   07/26/18 128/68   05/18/18 130/80   04/10/18 134/70    (goal /80)      All Future Testing planned in [facility]:  Lab Frequency Next Occurrence   Hemoglobin A1C Once 09/21/2018   EKG 12 Lead Once 10/21/2018   EKG 12 Lead Once 01/18/2019   CBC Auto Differential Once 01/18/2019   Comprehensive Metabolic Panel Once 14/29/2049   Vitamin D 25 Hydroxy Once 01/18/2019   Lipid Panel Once 01/18/2019   TSH with Reflex Once 01/18/2019   Magnesium Once 01/18/2019   EKG 12 Lead Once 01/18/2019   Hemoglobin A1C Once 01/18/2019   XR ABDOMEN (KUB) (SINGLE AP VIEW) Once 07/26/2019       Next Visit Date:  Future Appointments  Date Time Provider Esvin Mejia   11/16/2018 10:00 AM MD Alvarez Monteiro [facility]   2/7/2019 1:30 PM Jovita Quiroga MD [facility]            Patient Active Problem List:     Pure hypercholesterolemia     Coronary atherosclerosis of native

## 2018-11-13 ENCOUNTER — HOSPITAL ENCOUNTER (OUTPATIENT)
Age: 78
Discharge: HOME | End: 2018-11-13
Payer: MEDICARE

## 2018-11-13 DIAGNOSIS — R06.02 SOB (SHORTNESS OF BREATH): ICD-10-CM

## 2018-11-13 DIAGNOSIS — E78.00 PURE HYPERCHOLESTEROLEMIA: ICD-10-CM

## 2018-11-13 DIAGNOSIS — J44.9 CHRONIC OBSTRUCTIVE PULMONARY DISEASE, UNSPECIFIED COPD TYPE (HCC): ICD-10-CM

## 2018-11-13 DIAGNOSIS — E55.9 VITAMIN D DEFICIENCY DISEASE: ICD-10-CM

## 2018-11-13 DIAGNOSIS — I25.700 CORONARY ARTERY DISEASE INVOLVING CORONARY BYPASS GRAFT OF NATIVE HEART WITH UNSTABLE ANGINA PECTORIS (HCC): ICD-10-CM

## 2018-11-13 DIAGNOSIS — E11.8 DM TYPE 2, CONTROLLED, WITH COMPLICATION (HCC): ICD-10-CM

## 2018-11-13 DIAGNOSIS — I25.10 ATHEROSCLEROSIS OF NATIVE CORONARY ARTERY OF NATIVE HEART WITHOUT ANGINA PECTORIS: ICD-10-CM

## 2018-11-13 LAB
ABSOLUTE EOS #: 0.2 K/UL (ref 0–0.4)
ABSOLUTE IMMATURE GRANULOCYTE: ABNORMAL K/UL (ref 0–0.3)
ABSOLUTE LYMPH #: 0.8 K/UL (ref 1–4.8)
ABSOLUTE MONO #: 0.5 K/UL (ref 0–1)
ALBUMIN SERPL-MCNC: 4 G/DL (ref 3.5–5.2)
ALBUMIN/GLOBULIN RATIO: ABNORMAL (ref 1–2.5)
ALP BLD-CCNC: 81 U/L (ref 40–129)
ALT SERPL-CCNC: 22 U/L (ref 5–41)
ANION GAP SERPL CALCULATED.3IONS-SCNC: 10 MMOL/L (ref 9–17)
AST SERPL-CCNC: 21 U/L
BASOPHILS # BLD: 0 % (ref 0–2)
BASOPHILS ABSOLUTE: 0 K/UL (ref 0–0.2)
BILIRUB SERPL-MCNC: 0.35 MG/DL (ref 0.3–1.2)
BUN BLDV-MCNC: 14 MG/DL (ref 8–23)
BUN/CREAT BLD: 18 (ref 9–20)
CALCIUM SERPL-MCNC: 8.9 MG/DL (ref 8.6–10.4)
CHLORIDE BLD-SCNC: 103 MMOL/L (ref 98–107)
CHOLESTEROL/HDL RATIO: 2.3
CHOLESTEROL: 164 MG/DL
CO2: 27 MMOL/L (ref 20–31)
CREAT SERPL-MCNC: 0.76 MG/DL (ref 0.7–1.2)
DIFFERENTIAL TYPE: YES
EKG ATRIAL RATE: 57 BPM
EKG P AXIS: 25 DEGREES
EKG P-R INTERVAL: 204 MS
EKG Q-T INTERVAL: 476 MS
EKG QRS DURATION: 94 MS
EKG QTC CALCULATION (BAZETT): 463 MS
EKG R AXIS: -8 DEGREES
EKG T AXIS: 33 DEGREES
EKG VENTRICULAR RATE: 57 BPM
EOSINOPHILS RELATIVE PERCENT: 3 % (ref 0–5)
ESTIMATED AVERAGE GLUCOSE: 140 MG/DL
GFR AFRICAN AMERICAN: >60 ML/MIN
GFR NON-AFRICAN AMERICAN: >60 ML/MIN
GFR SERPL CREATININE-BSD FRML MDRD: ABNORMAL ML/MIN/{1.73_M2}
GFR SERPL CREATININE-BSD FRML MDRD: ABNORMAL ML/MIN/{1.73_M2}
GLUCOSE BLD-MCNC: 163 MG/DL (ref 70–99)
HBA1C MFR BLD: 6.5 % (ref 4.8–5.9)
HCT VFR BLD CALC: 39.7 % (ref 41–53)
HDLC SERPL-MCNC: 70 MG/DL
HEMOGLOBIN: 13.3 G/DL (ref 13.5–17.5)
IMMATURE GRANULOCYTES: ABNORMAL %
LDL CHOLESTEROL: 73 MG/DL (ref 0–130)
LYMPHOCYTES # BLD: 14 % (ref 13–44)
MAGNESIUM: 2 MG/DL (ref 1.6–2.6)
MCH RBC QN AUTO: 32.1 PG (ref 26–34)
MCHC RBC AUTO-ENTMCNC: 33.5 G/DL (ref 31–37)
MCV RBC AUTO: 95.7 FL (ref 80–100)
MONOCYTES # BLD: 9 % (ref 5–9)
NRBC AUTOMATED: ABNORMAL PER 100 WBC
PATIENT FASTING?: YES
PDW BLD-RTO: 15.6 % (ref 12.1–15.2)
PLATELET # BLD: 208 K/UL (ref 140–450)
PLATELET ESTIMATE: ABNORMAL
PMV BLD AUTO: ABNORMAL FL (ref 6–12)
POTASSIUM SERPL-SCNC: 4.4 MMOL/L (ref 3.7–5.3)
RBC # BLD: 4.15 M/UL (ref 4.5–5.9)
RBC # BLD: ABNORMAL 10*6/UL
SEG NEUTROPHILS: 74 % (ref 39–75)
SEGMENTED NEUTROPHILS ABSOLUTE COUNT: 4.3 K/UL (ref 2.1–6.5)
SODIUM BLD-SCNC: 140 MMOL/L (ref 135–144)
TOTAL PROTEIN: 6.9 G/DL (ref 6.4–8.3)
TRIGL SERPL-MCNC: 103 MG/DL
TSH SERPL DL<=0.05 MIU/L-ACNC: 3.29 MIU/L (ref 0.3–5)
VITAMIN D 25-HYDROXY: 26.2 NG/ML (ref 30–100)
VLDLC SERPL CALC-MCNC: NORMAL MG/DL (ref 1–30)
WBC # BLD: 5.8 K/UL (ref 3.5–11)
WBC # BLD: ABNORMAL 10*3/UL

## 2018-11-13 PROCEDURE — 84443 ASSAY THYROID STIM HORMONE: CPT

## 2018-11-13 PROCEDURE — 93005 ELECTROCARDIOGRAM TRACING: CPT

## 2018-11-13 PROCEDURE — 80061 LIPID PANEL: CPT

## 2018-11-13 PROCEDURE — 82306 VITAMIN D 25 HYDROXY: CPT

## 2018-11-13 PROCEDURE — 83735 ASSAY OF MAGNESIUM: CPT

## 2018-11-13 PROCEDURE — 36415 COLL VENOUS BLD VENIPUNCTURE: CPT

## 2018-11-13 PROCEDURE — 83036 HEMOGLOBIN GLYCOSYLATED A1C: CPT

## 2018-11-13 PROCEDURE — 85025 COMPLETE CBC W/AUTO DIFF WBC: CPT

## 2018-11-13 PROCEDURE — 80053 COMPREHEN METABOLIC PANEL: CPT

## 2018-11-16 ENCOUNTER — OFFICE VISIT (OUTPATIENT)
Dept: CARDIOLOGY CLINIC | Age: 78
End: 2018-11-16
Payer: MEDICARE

## 2018-11-16 VITALS — DIASTOLIC BLOOD PRESSURE: 70 MMHG | SYSTOLIC BLOOD PRESSURE: 120 MMHG

## 2018-11-16 DIAGNOSIS — E11.8 DM TYPE 2, CONTROLLED, WITH COMPLICATION (HCC): ICD-10-CM

## 2018-11-16 DIAGNOSIS — E78.00 PURE HYPERCHOLESTEROLEMIA: Primary | ICD-10-CM

## 2018-11-16 DIAGNOSIS — I25.700 CORONARY ARTERY DISEASE INVOLVING CORONARY BYPASS GRAFT OF NATIVE HEART WITH UNSTABLE ANGINA PECTORIS (HCC): ICD-10-CM

## 2018-11-16 PROCEDURE — G8417 CALC BMI ABV UP PARAM F/U: HCPCS | Performed by: INTERNAL MEDICINE

## 2018-11-16 PROCEDURE — 4040F PNEUMOC VAC/ADMIN/RCVD: CPT | Performed by: INTERNAL MEDICINE

## 2018-11-16 PROCEDURE — G8427 DOCREV CUR MEDS BY ELIG CLIN: HCPCS | Performed by: INTERNAL MEDICINE

## 2018-11-16 PROCEDURE — 99214 OFFICE O/P EST MOD 30 MIN: CPT | Performed by: INTERNAL MEDICINE

## 2018-11-16 PROCEDURE — 1123F ACP DISCUSS/DSCN MKR DOCD: CPT | Performed by: INTERNAL MEDICINE

## 2018-11-16 PROCEDURE — G8484 FLU IMMUNIZE NO ADMIN: HCPCS | Performed by: INTERNAL MEDICINE

## 2018-11-16 PROCEDURE — 1101F PT FALLS ASSESS-DOCD LE1/YR: CPT | Performed by: INTERNAL MEDICINE

## 2018-11-16 PROCEDURE — 1036F TOBACCO NON-USER: CPT | Performed by: INTERNAL MEDICINE

## 2018-11-16 PROCEDURE — G8598 ASA/ANTIPLAT THER USED: HCPCS | Performed by: INTERNAL MEDICINE

## 2018-11-16 RX ORDER — PROPRANOLOL HYDROCHLORIDE 20 MG/1
20 TABLET ORAL 2 TIMES DAILY
Qty: 60 TABLET | Refills: 11 | Status: SHIPPED | OUTPATIENT
Start: 2018-11-16 | End: 2018-12-07 | Stop reason: SDUPTHER

## 2018-11-16 RX ORDER — PROPRANOLOL HYDROCHLORIDE 20 MG/1
20 TABLET ORAL 2 TIMES DAILY
COMMUNITY
End: 2018-11-16 | Stop reason: SDUPTHER

## 2018-11-26 NOTE — PROGRESS NOTES
.  4.  Three-vessel bypass surgery in .  5.  Colonoscopy in . 6.  Cholecystectomy. 7.  Hernia repair. 8.  Hyperlipidemia. 9.  Borderline diabetes. 10.  COPD. 11.  Rheumatoid arthritis. 12.  Non-ST-elevation myocardial infarction on 2017, as stated previously. 13.  Depression, which is slightly worse on Zoloft than it was on Effexor. FAMILY HISTORY:  Father  of MI at 39. Brother had an MI at 39. SOCIAL HISTORY:  He is 70years old. Wife has severe Alzheimer's and he is taking care of her at home. She is very contentious and recognizes him about every other day. His daughter, Gilberto Dodd, is his only child and lives across the road. She worries about her father and would like him to put her mother in a nursing home. Celsa's 's name is Rosalind Closs and they have one son, Deonna Hong, who is 25years old. He does not smoke or drink alcohol. He was walking, he has curtailed this. His wife continues to deteriorate. He is not sure how much longer he would be able to take care of her at home, but would still like to try. He feels pressured by both his wife and by Gilberto Dodd, who wants his wife in a nursing home. REVIEW OF SYSTEMS:  Cardiac as above. Other systems reviewed including constitutional, eyes, ears, nose and throat, cardiovascular, respiratory, GI, , musculoskeletal, integumentary, neurologic, psychiatric, endocrine, hematologic and allergic/immunologic and are negative except for what is described above. He has developed marked tremor in his right hand both at rest and with intention. It does get worse when he attempts to do meticulous activities with his right hand. His left hand and head is not involved. He was told that it is essential tremor. He has had no weight loss or weight gain. No change in bowel habits, no blood in stools. No fevers, sweats or chills.     PHYSICAL EXAMINATION:   VITAL SIGNS:  His blood pressure was 120/70, with a heart rate of 70 and

## 2018-12-07 ENCOUNTER — OFFICE VISIT (OUTPATIENT)
Dept: CARDIOLOGY CLINIC | Age: 78
End: 2018-12-07
Payer: MEDICARE

## 2018-12-07 VITALS
BODY MASS INDEX: 27.84 KG/M2 | SYSTOLIC BLOOD PRESSURE: 140 MMHG | WEIGHT: 211 LBS | HEART RATE: 81 BPM | OXYGEN SATURATION: 95 % | DIASTOLIC BLOOD PRESSURE: 80 MMHG

## 2018-12-07 DIAGNOSIS — I10 HYPERTENSION, UNSPECIFIED TYPE: Primary | ICD-10-CM

## 2018-12-07 PROCEDURE — 1036F TOBACCO NON-USER: CPT | Performed by: INTERNAL MEDICINE

## 2018-12-07 PROCEDURE — 4040F PNEUMOC VAC/ADMIN/RCVD: CPT | Performed by: INTERNAL MEDICINE

## 2018-12-07 PROCEDURE — G8417 CALC BMI ABV UP PARAM F/U: HCPCS | Performed by: INTERNAL MEDICINE

## 2018-12-07 PROCEDURE — G8598 ASA/ANTIPLAT THER USED: HCPCS | Performed by: INTERNAL MEDICINE

## 2018-12-07 PROCEDURE — G8428 CUR MEDS NOT DOCUMENT: HCPCS | Performed by: INTERNAL MEDICINE

## 2018-12-07 PROCEDURE — G8484 FLU IMMUNIZE NO ADMIN: HCPCS | Performed by: INTERNAL MEDICINE

## 2018-12-07 PROCEDURE — 1101F PT FALLS ASSESS-DOCD LE1/YR: CPT | Performed by: INTERNAL MEDICINE

## 2018-12-07 PROCEDURE — 1123F ACP DISCUSS/DSCN MKR DOCD: CPT | Performed by: INTERNAL MEDICINE

## 2018-12-07 PROCEDURE — 99212 OFFICE O/P EST SF 10 MIN: CPT | Performed by: INTERNAL MEDICINE

## 2018-12-07 RX ORDER — PROPRANOLOL HYDROCHLORIDE 40 MG/1
40 TABLET ORAL 2 TIMES DAILY
Qty: 60 TABLET | Refills: 11 | Status: SHIPPED | OUTPATIENT
Start: 2018-12-07 | End: 2019-10-17 | Stop reason: SDUPTHER

## 2018-12-21 ENCOUNTER — OFFICE VISIT (OUTPATIENT)
Dept: CARDIOLOGY CLINIC | Age: 78
End: 2018-12-21
Payer: MEDICARE

## 2018-12-21 VITALS
BODY MASS INDEX: 28.23 KG/M2 | WEIGHT: 214 LBS | DIASTOLIC BLOOD PRESSURE: 80 MMHG | HEART RATE: 72 BPM | SYSTOLIC BLOOD PRESSURE: 130 MMHG | OXYGEN SATURATION: 96 %

## 2018-12-21 DIAGNOSIS — I10 HYPERTENSION, UNSPECIFIED TYPE: Primary | ICD-10-CM

## 2018-12-21 PROCEDURE — 99212 OFFICE O/P EST SF 10 MIN: CPT | Performed by: INTERNAL MEDICINE

## 2018-12-21 PROCEDURE — 1101F PT FALLS ASSESS-DOCD LE1/YR: CPT | Performed by: INTERNAL MEDICINE

## 2018-12-21 PROCEDURE — G8484 FLU IMMUNIZE NO ADMIN: HCPCS | Performed by: INTERNAL MEDICINE

## 2018-12-21 PROCEDURE — 1036F TOBACCO NON-USER: CPT | Performed by: INTERNAL MEDICINE

## 2018-12-21 PROCEDURE — G8417 CALC BMI ABV UP PARAM F/U: HCPCS | Performed by: INTERNAL MEDICINE

## 2018-12-21 PROCEDURE — G8428 CUR MEDS NOT DOCUMENT: HCPCS | Performed by: INTERNAL MEDICINE

## 2018-12-21 PROCEDURE — 1123F ACP DISCUSS/DSCN MKR DOCD: CPT | Performed by: INTERNAL MEDICINE

## 2018-12-21 PROCEDURE — G8598 ASA/ANTIPLAT THER USED: HCPCS | Performed by: INTERNAL MEDICINE

## 2018-12-21 PROCEDURE — 4040F PNEUMOC VAC/ADMIN/RCVD: CPT | Performed by: INTERNAL MEDICINE

## 2018-12-27 NOTE — PROGRESS NOTES
Cardiology    Doing well with acceptable bp's at home. No change in medication  See in March.     Nikolas Adams MD

## 2019-01-03 ENCOUNTER — HOSPITAL ENCOUNTER (EMERGENCY)
Age: 79
Discharge: HOME | End: 2019-01-03
Attending: EMERGENCY MEDICINE
Payer: MEDICARE

## 2019-01-03 ENCOUNTER — APPOINTMENT (OUTPATIENT)
Dept: GENERAL RADIOLOGY | Age: 79
End: 2019-01-03
Payer: MEDICARE

## 2019-01-03 VITALS
SYSTOLIC BLOOD PRESSURE: 103 MMHG | BODY MASS INDEX: 28.27 KG/M2 | WEIGHT: 213.3 LBS | HEIGHT: 73 IN | DIASTOLIC BLOOD PRESSURE: 63 MMHG | TEMPERATURE: 97.8 F | OXYGEN SATURATION: 97 % | HEART RATE: 59 BPM | RESPIRATION RATE: 20 BRPM

## 2019-01-03 DIAGNOSIS — J44.1 COPD EXACERBATION (HCC): Primary | ICD-10-CM

## 2019-01-03 PROCEDURE — 71046 X-RAY EXAM CHEST 2 VIEWS: CPT

## 2019-01-03 PROCEDURE — 99283 EMERGENCY DEPT VISIT LOW MDM: CPT

## 2019-01-03 RX ORDER — PREDNISONE 20 MG/1
40 TABLET ORAL DAILY
Qty: 10 TABLET | Refills: 0 | Status: SHIPPED | OUTPATIENT
Start: 2019-01-03 | End: 2019-01-08

## 2019-01-03 RX ORDER — DOXYCYCLINE 100 MG/1
100 CAPSULE ORAL 2 TIMES DAILY
Qty: 20 CAPSULE | Refills: 0 | Status: SHIPPED | OUTPATIENT
Start: 2019-01-03 | End: 2019-02-02 | Stop reason: ALTCHOICE

## 2019-01-03 ASSESSMENT — PAIN SCALES - GENERAL: PAINLEVEL_OUTOF10: 5

## 2019-01-03 ASSESSMENT — PAIN DESCRIPTION - LOCATION: LOCATION: HEAD

## 2019-02-02 ENCOUNTER — APPOINTMENT (OUTPATIENT)
Dept: CT IMAGING | Age: 79
DRG: 690 | End: 2019-02-02
Payer: MEDICARE

## 2019-02-02 ENCOUNTER — HOSPITAL ENCOUNTER (INPATIENT)
Age: 79
LOS: 2 days | Discharge: HOME | DRG: 690 | End: 2019-02-04
Attending: INTERNAL MEDICINE | Admitting: INTERNAL MEDICINE
Payer: MEDICARE

## 2019-02-02 DIAGNOSIS — N43.3 HYDROCELE, ACQUIRED: ICD-10-CM

## 2019-02-02 DIAGNOSIS — R73.9 HYPERGLYCEMIA: ICD-10-CM

## 2019-02-02 DIAGNOSIS — R31.9 URINARY TRACT INFECTION WITH HEMATURIA, SITE UNSPECIFIED: Primary | ICD-10-CM

## 2019-02-02 DIAGNOSIS — N13.8 BPH WITH OBSTRUCTION/LOWER URINARY TRACT SYMPTOMS: ICD-10-CM

## 2019-02-02 DIAGNOSIS — N30.90 CYSTITIS: ICD-10-CM

## 2019-02-02 DIAGNOSIS — N40.1 BPH WITH OBSTRUCTION/LOWER URINARY TRACT SYMPTOMS: ICD-10-CM

## 2019-02-02 DIAGNOSIS — E87.20 LACTIC ACIDEMIA: ICD-10-CM

## 2019-02-02 DIAGNOSIS — N39.0 URINARY TRACT INFECTION WITH HEMATURIA, SITE UNSPECIFIED: Primary | ICD-10-CM

## 2019-02-02 DIAGNOSIS — E87.6 HYPOKALEMIA: ICD-10-CM

## 2019-02-02 DIAGNOSIS — N43.3 HYDROCELE, UNSPECIFIED HYDROCELE TYPE: ICD-10-CM

## 2019-02-02 LAB
-: ABNORMAL
-: ABNORMAL
ABSOLUTE EOS #: 0.1 K/UL (ref 0–0.4)
ABSOLUTE IMMATURE GRANULOCYTE: ABNORMAL K/UL (ref 0–0.3)
ABSOLUTE LYMPH #: 0.4 K/UL (ref 1–4.8)
ABSOLUTE MONO #: 0.8 K/UL (ref 0–1)
AMORPHOUS: ABNORMAL
AMORPHOUS: ABNORMAL
ANION GAP SERPL CALCULATED.3IONS-SCNC: 11 MMOL/L (ref 9–17)
BACTERIA: ABNORMAL
BACTERIA: ABNORMAL
BASOPHILS # BLD: 1 % (ref 0–2)
BASOPHILS ABSOLUTE: 0.1 K/UL (ref 0–0.2)
BILIRUBIN URINE: ABNORMAL
BILIRUBIN URINE: NEGATIVE
BNP INTERPRETATION: ABNORMAL
BUN BLDV-MCNC: 17 MG/DL (ref 8–23)
BUN/CREAT BLD: 21 (ref 9–20)
CALCIUM SERPL-MCNC: 9.5 MG/DL (ref 8.6–10.4)
CASTS UA: ABNORMAL /LPF
CASTS UA: ABNORMAL /LPF
CHLORIDE BLD-SCNC: 102 MMOL/L (ref 98–107)
CO2: 26 MMOL/L (ref 20–31)
COLOR: ABNORMAL
COLOR: YELLOW
COMMENT UA: ABNORMAL
COMMENT UA: ABNORMAL
CREAT SERPL-MCNC: 0.8 MG/DL (ref 0.7–1.2)
CRYSTALS, UA: ABNORMAL /HPF
CRYSTALS, UA: ABNORMAL /HPF
DIFFERENTIAL TYPE: YES
EOSINOPHILS RELATIVE PERCENT: 1 % (ref 0–5)
EPITHELIAL CELLS UA: ABNORMAL /HPF
EPITHELIAL CELLS UA: ABNORMAL /HPF
ESTIMATED AVERAGE GLUCOSE: 160 MG/DL
GFR AFRICAN AMERICAN: >60 ML/MIN
GFR NON-AFRICAN AMERICAN: >60 ML/MIN
GFR SERPL CREATININE-BSD FRML MDRD: ABNORMAL ML/MIN/{1.73_M2}
GFR SERPL CREATININE-BSD FRML MDRD: ABNORMAL ML/MIN/{1.73_M2}
GLUCOSE BLD-MCNC: 145 MG/DL (ref 65–99)
GLUCOSE BLD-MCNC: 178 MG/DL (ref 65–99)
GLUCOSE BLD-MCNC: 189 MG/DL (ref 70–99)
GLUCOSE URINE: NEGATIVE
GLUCOSE URINE: NEGATIVE
HBA1C MFR BLD: 7.2 % (ref 4.8–5.9)
HCT VFR BLD CALC: 38.7 % (ref 41–53)
HEMOGLOBIN: 12.9 G/DL (ref 13.5–17.5)
IMMATURE GRANULOCYTES: ABNORMAL %
KETONES, URINE: NEGATIVE
KETONES, URINE: NEGATIVE
LACTIC ACID: 2.5 MMOL/L (ref 0.5–2.2)
LEUKOCYTE ESTERASE, URINE: ABNORMAL
LEUKOCYTE ESTERASE, URINE: ABNORMAL
LYMPHOCYTES # BLD: 4 % (ref 13–44)
MCH RBC QN AUTO: 31.6 PG (ref 26–34)
MCHC RBC AUTO-ENTMCNC: 33.3 G/DL (ref 31–37)
MCV RBC AUTO: 95 FL (ref 80–100)
MONOCYTES # BLD: 7 % (ref 5–9)
MUCUS: ABNORMAL
MUCUS: ABNORMAL
NITRITE, URINE: POSITIVE
NITRITE, URINE: POSITIVE
NRBC AUTOMATED: ABNORMAL PER 100 WBC
OTHER OBSERVATIONS UA: ABNORMAL
OTHER OBSERVATIONS UA: ABNORMAL
PDW BLD-RTO: 15.3 % (ref 12.1–15.2)
PH UA: 6 (ref 5–8)
PH UA: 7 (ref 5–8)
PLATELET # BLD: 224 K/UL (ref 140–450)
PLATELET ESTIMATE: ABNORMAL
PMV BLD AUTO: ABNORMAL FL (ref 6–12)
POTASSIUM SERPL-SCNC: 3.6 MMOL/L (ref 3.7–5.3)
PRO-BNP: 322 PG/ML
PROTEIN UA: ABNORMAL
PROTEIN UA: ABNORMAL
RBC # BLD: 4.07 M/UL (ref 4.5–5.9)
RBC # BLD: ABNORMAL 10*6/UL
RBC UA: ABNORMAL /HPF (ref 0–2)
RBC UA: ABNORMAL /HPF (ref 0–2)
RENAL EPITHELIAL, UA: ABNORMAL /HPF
RENAL EPITHELIAL, UA: ABNORMAL /HPF
SEG NEUTROPHILS: 87 % (ref 39–75)
SEGMENTED NEUTROPHILS ABSOLUTE COUNT: 9.8 K/UL (ref 2.1–6.5)
SODIUM BLD-SCNC: 139 MMOL/L (ref 135–144)
SPECIFIC GRAVITY UA: 1.01 (ref 1–1.03)
SPECIFIC GRAVITY UA: 1.01 (ref 1–1.03)
TRICHOMONAS: ABNORMAL
TRICHOMONAS: ABNORMAL
TURBIDITY: ABNORMAL
TURBIDITY: ABNORMAL
URINE HGB: ABNORMAL
URINE HGB: ABNORMAL
UROBILINOGEN, URINE: ABNORMAL
UROBILINOGEN, URINE: NORMAL
WBC # BLD: 11.2 K/UL (ref 3.5–11)
WBC # BLD: ABNORMAL 10*3/UL
WBC UA: ABNORMAL /HPF
WBC UA: ABNORMAL /HPF
YEAST: ABNORMAL
YEAST: ABNORMAL

## 2019-02-02 PROCEDURE — 83605 ASSAY OF LACTIC ACID: CPT

## 2019-02-02 PROCEDURE — 87040 BLOOD CULTURE FOR BACTERIA: CPT

## 2019-02-02 PROCEDURE — 85025 COMPLETE CBC W/AUTO DIFF WBC: CPT

## 2019-02-02 PROCEDURE — 94664 DEMO&/EVAL PT USE INHALER: CPT

## 2019-02-02 PROCEDURE — 1200000000 HC SEMI PRIVATE

## 2019-02-02 PROCEDURE — 96375 TX/PRO/DX INJ NEW DRUG ADDON: CPT

## 2019-02-02 PROCEDURE — G0378 HOSPITAL OBSERVATION PER HR: HCPCS

## 2019-02-02 PROCEDURE — 96372 THER/PROPH/DIAG INJ SC/IM: CPT

## 2019-02-02 PROCEDURE — 2580000003 HC RX 258: Performed by: INTERNAL MEDICINE

## 2019-02-02 PROCEDURE — 81001 URINALYSIS AUTO W/SCOPE: CPT

## 2019-02-02 PROCEDURE — 96365 THER/PROPH/DIAG IV INF INIT: CPT

## 2019-02-02 PROCEDURE — 87086 URINE CULTURE/COLONY COUNT: CPT

## 2019-02-02 PROCEDURE — 6360000002 HC RX W HCPCS: Performed by: INTERNAL MEDICINE

## 2019-02-02 PROCEDURE — 6370000000 HC RX 637 (ALT 250 FOR IP)

## 2019-02-02 PROCEDURE — 82947 ASSAY GLUCOSE BLOOD QUANT: CPT

## 2019-02-02 PROCEDURE — 87088 URINE BACTERIA CULTURE: CPT

## 2019-02-02 PROCEDURE — 6370000000 HC RX 637 (ALT 250 FOR IP): Performed by: INTERNAL MEDICINE

## 2019-02-02 PROCEDURE — 83880 ASSAY OF NATRIURETIC PEPTIDE: CPT

## 2019-02-02 PROCEDURE — 94761 N-INVAS EAR/PLS OXIMETRY MLT: CPT

## 2019-02-02 PROCEDURE — 99284 EMERGENCY DEPT VISIT MOD MDM: CPT

## 2019-02-02 PROCEDURE — 83036 HEMOGLOBIN GLYCOSYLATED A1C: CPT

## 2019-02-02 PROCEDURE — 86403 PARTICLE AGGLUT ANTBDY SCRN: CPT

## 2019-02-02 PROCEDURE — 36415 COLL VENOUS BLD VENIPUNCTURE: CPT

## 2019-02-02 PROCEDURE — 74176 CT ABD & PELVIS W/O CONTRAST: CPT

## 2019-02-02 PROCEDURE — 87186 SC STD MICRODIL/AGAR DIL: CPT

## 2019-02-02 PROCEDURE — 80048 BASIC METABOLIC PNL TOTAL CA: CPT

## 2019-02-02 RX ORDER — SODIUM CHLORIDE 0.9 % (FLUSH) 0.9 %
10 SYRINGE (ML) INJECTION EVERY 12 HOURS SCHEDULED
Status: DISCONTINUED | OUTPATIENT
Start: 2019-02-02 | End: 2019-02-04 | Stop reason: HOSPADM

## 2019-02-02 RX ORDER — SODIUM CHLORIDE 0.9 % (FLUSH) 0.9 %
10 SYRINGE (ML) INJECTION PRN
Status: DISCONTINUED | OUTPATIENT
Start: 2019-02-02 | End: 2019-02-04 | Stop reason: HOSPADM

## 2019-02-02 RX ORDER — PROPRANOLOL HYDROCHLORIDE 10 MG/1
40 TABLET ORAL 2 TIMES DAILY
Status: DISCONTINUED | OUTPATIENT
Start: 2019-02-02 | End: 2019-02-04 | Stop reason: HOSPADM

## 2019-02-02 RX ORDER — POTASSIUM CHLORIDE 750 MG/1
10 TABLET, FILM COATED, EXTENDED RELEASE ORAL
Status: DISCONTINUED | OUTPATIENT
Start: 2019-02-02 | End: 2019-02-03

## 2019-02-02 RX ORDER — FINASTERIDE 5 MG/1
5 TABLET, FILM COATED ORAL DAILY
Status: DISCONTINUED | OUTPATIENT
Start: 2019-02-02 | End: 2019-02-04 | Stop reason: HOSPADM

## 2019-02-02 RX ORDER — ASPIRIN 81 MG/1
81 TABLET ORAL DAILY
Status: DISCONTINUED | OUTPATIENT
Start: 2019-02-02 | End: 2019-02-04 | Stop reason: HOSPADM

## 2019-02-02 RX ORDER — DEXTROSE MONOHYDRATE 25 G/50ML
12.5 INJECTION, SOLUTION INTRAVENOUS PRN
Status: DISCONTINUED | OUTPATIENT
Start: 2019-02-02 | End: 2019-02-04 | Stop reason: HOSPADM

## 2019-02-02 RX ORDER — TAMSULOSIN HYDROCHLORIDE 0.4 MG/1
0.4 CAPSULE ORAL DAILY
Status: DISCONTINUED | OUTPATIENT
Start: 2019-02-02 | End: 2019-02-04 | Stop reason: HOSPADM

## 2019-02-02 RX ORDER — POTASSIUM CHLORIDE 750 MG/1
TABLET, FILM COATED, EXTENDED RELEASE ORAL
Status: COMPLETED
Start: 2019-02-02 | End: 2019-02-02

## 2019-02-02 RX ORDER — ACETAMINOPHEN 500 MG
500 TABLET ORAL EVERY 4 HOURS PRN
Status: DISCONTINUED | OUTPATIENT
Start: 2019-02-02 | End: 2019-02-04 | Stop reason: HOSPADM

## 2019-02-02 RX ORDER — ISOSORBIDE MONONITRATE 30 MG/1
30 TABLET, EXTENDED RELEASE ORAL DAILY
Status: DISCONTINUED | OUTPATIENT
Start: 2019-02-02 | End: 2019-02-04 | Stop reason: HOSPADM

## 2019-02-02 RX ORDER — CLOPIDOGREL BISULFATE 75 MG/1
75 TABLET ORAL DAILY
Status: DISCONTINUED | OUTPATIENT
Start: 2019-02-02 | End: 2019-02-04 | Stop reason: HOSPADM

## 2019-02-02 RX ORDER — FAMOTIDINE 20 MG/1
20 TABLET, FILM COATED ORAL DAILY
Status: DISCONTINUED | OUTPATIENT
Start: 2019-02-02 | End: 2019-02-04 | Stop reason: HOSPADM

## 2019-02-02 RX ORDER — CILOSTAZOL 50 MG/1
50 TABLET ORAL 2 TIMES DAILY
Status: DISCONTINUED | OUTPATIENT
Start: 2019-02-02 | End: 2019-02-04 | Stop reason: HOSPADM

## 2019-02-02 RX ORDER — NAPROXEN 250 MG/1
500 TABLET ORAL 2 TIMES DAILY PRN
Status: DISCONTINUED | OUTPATIENT
Start: 2019-02-02 | End: 2019-02-04 | Stop reason: HOSPADM

## 2019-02-02 RX ORDER — ATORVASTATIN CALCIUM 20 MG/1
40 TABLET, FILM COATED ORAL DAILY
Status: DISCONTINUED | OUTPATIENT
Start: 2019-02-02 | End: 2019-02-04 | Stop reason: HOSPADM

## 2019-02-02 RX ORDER — NITROGLYCERIN 0.4 MG/1
0.4 TABLET SUBLINGUAL EVERY 5 MIN PRN
Status: DISCONTINUED | OUTPATIENT
Start: 2019-02-02 | End: 2019-02-04 | Stop reason: HOSPADM

## 2019-02-02 RX ORDER — HYDRALAZINE HYDROCHLORIDE 20 MG/ML
5 INJECTION INTRAMUSCULAR; INTRAVENOUS EVERY 6 HOURS PRN
Status: DISCONTINUED | OUTPATIENT
Start: 2019-02-02 | End: 2019-02-04 | Stop reason: HOSPADM

## 2019-02-02 RX ORDER — CIPROFLOXACIN 500 MG/1
500 TABLET, FILM COATED ORAL ONCE
Status: DISCONTINUED | OUTPATIENT
Start: 2019-02-02 | End: 2019-02-02

## 2019-02-02 RX ORDER — NICOTINE POLACRILEX 4 MG
15 LOZENGE BUCCAL PRN
Status: DISCONTINUED | OUTPATIENT
Start: 2019-02-02 | End: 2019-02-04 | Stop reason: HOSPADM

## 2019-02-02 RX ORDER — ALBUTEROL SULFATE 90 UG/1
2 AEROSOL, METERED RESPIRATORY (INHALATION) 4 TIMES DAILY PRN
Status: DISCONTINUED | OUTPATIENT
Start: 2019-02-02 | End: 2019-02-04 | Stop reason: HOSPADM

## 2019-02-02 RX ORDER — POTASSIUM CHLORIDE 750 MG/1
20 TABLET, FILM COATED, EXTENDED RELEASE ORAL ONCE
Status: DISCONTINUED | OUTPATIENT
Start: 2019-02-02 | End: 2019-02-04 | Stop reason: HOSPADM

## 2019-02-02 RX ORDER — ONDANSETRON 2 MG/ML
4 INJECTION INTRAMUSCULAR; INTRAVENOUS EVERY 6 HOURS PRN
Status: DISCONTINUED | OUTPATIENT
Start: 2019-02-02 | End: 2019-02-04 | Stop reason: HOSPADM

## 2019-02-02 RX ORDER — LANOLIN ALCOHOL/MO/W.PET/CERES
1 CREAM (GRAM) TOPICAL DAILY
Status: DISCONTINUED | OUTPATIENT
Start: 2019-02-02 | End: 2019-02-04 | Stop reason: HOSPADM

## 2019-02-02 RX ORDER — TRAZODONE HYDROCHLORIDE 50 MG/1
100 TABLET ORAL NIGHTLY
Status: DISCONTINUED | OUTPATIENT
Start: 2019-02-02 | End: 2019-02-04 | Stop reason: HOSPADM

## 2019-02-02 RX ORDER — DEXTROSE MONOHYDRATE 50 MG/ML
100 INJECTION, SOLUTION INTRAVENOUS PRN
Status: DISCONTINUED | OUTPATIENT
Start: 2019-02-02 | End: 2019-02-04 | Stop reason: HOSPADM

## 2019-02-02 RX ORDER — SODIUM CHLORIDE 9 MG/ML
INJECTION, SOLUTION INTRAVENOUS CONTINUOUS
Status: DISCONTINUED | OUTPATIENT
Start: 2019-02-02 | End: 2019-02-02

## 2019-02-02 RX ORDER — SODIUM CHLORIDE 9 MG/ML
INJECTION, SOLUTION INTRAVENOUS CONTINUOUS
Status: DISCONTINUED | OUTPATIENT
Start: 2019-02-02 | End: 2019-02-04 | Stop reason: HOSPADM

## 2019-02-02 RX ORDER — VENLAFAXINE 25 MG/1
37.5 TABLET ORAL DAILY
Status: DISCONTINUED | OUTPATIENT
Start: 2019-02-02 | End: 2019-02-04 | Stop reason: HOSPADM

## 2019-02-02 RX ORDER — PANTOPRAZOLE SODIUM 40 MG/1
40 TABLET, DELAYED RELEASE ORAL
Status: DISCONTINUED | OUTPATIENT
Start: 2019-02-03 | End: 2019-02-04 | Stop reason: HOSPADM

## 2019-02-02 RX ADMIN — CEFTRIAXONE SODIUM 1 G: 1 INJECTION, POWDER, FOR SOLUTION INTRAMUSCULAR; INTRAVENOUS at 16:48

## 2019-02-02 RX ADMIN — SODIUM CHLORIDE: 9 INJECTION, SOLUTION INTRAVENOUS at 13:04

## 2019-02-02 RX ADMIN — CILOSTAZOL 50 MG: 50 TABLET ORAL at 21:17

## 2019-02-02 RX ADMIN — INSULIN LISPRO 1 UNITS: 100 INJECTION, SOLUTION INTRAVENOUS; SUBCUTANEOUS at 21:21

## 2019-02-02 RX ADMIN — SODIUM CHLORIDE: 9 INJECTION, SOLUTION INTRAVENOUS at 16:47

## 2019-02-02 RX ADMIN — PIPERACILLIN SODIUM,TAZOBACTAM SODIUM 3.38 G: 3; .375 INJECTION, POWDER, FOR SOLUTION INTRAVENOUS at 13:03

## 2019-02-02 RX ADMIN — PROPRANOLOL HYDROCHLORIDE 40 MG: 10 TABLET ORAL at 21:18

## 2019-02-02 RX ADMIN — TRAZODONE HYDROCHLORIDE 100 MG: 50 TABLET ORAL at 21:17

## 2019-02-02 RX ADMIN — POTASSIUM CHLORIDE 10 MEQ: 750 TABLET, FILM COATED, EXTENDED RELEASE ORAL at 13:05

## 2019-02-02 RX ADMIN — INSULIN LISPRO 1 UNITS: 100 INJECTION, SOLUTION INTRAVENOUS; SUBCUTANEOUS at 17:09

## 2019-02-02 RX ADMIN — ALBUTEROL SULFATE 2 PUFF: 90 AEROSOL, METERED RESPIRATORY (INHALATION) at 17:50

## 2019-02-02 RX ADMIN — ENOXAPARIN SODIUM 40 MG: 40 INJECTION SUBCUTANEOUS at 16:47

## 2019-02-02 ASSESSMENT — PAIN SCALES - GENERAL
PAINLEVEL_OUTOF10: 2

## 2019-02-02 ASSESSMENT — PAIN DESCRIPTION - LOCATION
LOCATION: HEAD
LOCATION: GROIN
LOCATION: HEAD

## 2019-02-02 ASSESSMENT — PAIN DESCRIPTION - ORIENTATION: ORIENTATION: LEFT

## 2019-02-02 ASSESSMENT — PAIN DESCRIPTION - PAIN TYPE
TYPE: ACUTE PAIN

## 2019-02-03 LAB
ABSOLUTE EOS #: 0.1 K/UL (ref 0–0.4)
ABSOLUTE IMMATURE GRANULOCYTE: ABNORMAL K/UL (ref 0–0.3)
ABSOLUTE LYMPH #: 0.6 K/UL (ref 1–4.8)
ABSOLUTE MONO #: 1.1 K/UL (ref 0–1)
ALBUMIN SERPL-MCNC: 3.1 G/DL (ref 3.5–5.2)
ALBUMIN/GLOBULIN RATIO: ABNORMAL (ref 1–2.5)
ALP BLD-CCNC: 63 U/L (ref 40–129)
ALT SERPL-CCNC: 19 U/L (ref 5–41)
ANION GAP SERPL CALCULATED.3IONS-SCNC: 10 MMOL/L (ref 9–17)
AST SERPL-CCNC: 21 U/L
BASOPHILS # BLD: 0 % (ref 0–2)
BASOPHILS ABSOLUTE: 0 K/UL (ref 0–0.2)
BILIRUB SERPL-MCNC: 0.85 MG/DL (ref 0.3–1.2)
BUN BLDV-MCNC: 13 MG/DL (ref 8–23)
BUN/CREAT BLD: 21 (ref 9–20)
CALCIUM SERPL-MCNC: 9 MG/DL (ref 8.6–10.4)
CHLORIDE BLD-SCNC: 107 MMOL/L (ref 98–107)
CO2: 24 MMOL/L (ref 20–31)
CREAT SERPL-MCNC: 0.63 MG/DL (ref 0.7–1.2)
CULTURE: ABNORMAL
CULTURE: ABNORMAL
CULTURE: NO GROWTH
DIFFERENTIAL TYPE: YES
EOSINOPHILS RELATIVE PERCENT: 1 % (ref 0–5)
GFR AFRICAN AMERICAN: >60 ML/MIN
GFR NON-AFRICAN AMERICAN: >60 ML/MIN
GFR SERPL CREATININE-BSD FRML MDRD: ABNORMAL ML/MIN/{1.73_M2}
GFR SERPL CREATININE-BSD FRML MDRD: ABNORMAL ML/MIN/{1.73_M2}
GLUCOSE BLD-MCNC: 138 MG/DL (ref 65–99)
GLUCOSE BLD-MCNC: 144 MG/DL (ref 65–99)
GLUCOSE BLD-MCNC: 155 MG/DL (ref 65–99)
GLUCOSE BLD-MCNC: 162 MG/DL (ref 70–99)
GLUCOSE BLD-MCNC: 182 MG/DL (ref 65–99)
HCT VFR BLD CALC: 35.1 % (ref 41–53)
HEMOGLOBIN: 11.8 G/DL (ref 13.5–17.5)
IMMATURE GRANULOCYTES: ABNORMAL %
LACTIC ACID, WHOLE BLOOD: NORMAL MMOL/L (ref 0.7–2.1)
LACTIC ACID: 1.2 MMOL/L (ref 0.5–2.2)
LYMPHOCYTES # BLD: 4 % (ref 13–44)
Lab: ABNORMAL
Lab: NORMAL
MAGNESIUM: 1.8 MG/DL (ref 1.6–2.6)
MCH RBC QN AUTO: 31.6 PG (ref 26–34)
MCHC RBC AUTO-ENTMCNC: 33.5 G/DL (ref 31–37)
MCV RBC AUTO: 94.3 FL (ref 80–100)
MONOCYTES # BLD: 8 % (ref 5–9)
NRBC AUTOMATED: ABNORMAL PER 100 WBC
ORGANISM: ABNORMAL
PDW BLD-RTO: 15.4 % (ref 12.1–15.2)
PLATELET # BLD: 188 K/UL (ref 140–450)
PLATELET ESTIMATE: ABNORMAL
PMV BLD AUTO: ABNORMAL FL (ref 6–12)
POTASSIUM SERPL-SCNC: 3.1 MMOL/L (ref 3.7–5.3)
RBC # BLD: 3.73 M/UL (ref 4.5–5.9)
RBC # BLD: ABNORMAL 10*6/UL
SEG NEUTROPHILS: 87 % (ref 39–75)
SEGMENTED NEUTROPHILS ABSOLUTE COUNT: 12.2 K/UL (ref 2.1–6.5)
SODIUM BLD-SCNC: 141 MMOL/L (ref 135–144)
SPECIMEN DESCRIPTION: ABNORMAL
SPECIMEN DESCRIPTION: NORMAL
STATUS: ABNORMAL
STATUS: NORMAL
TOTAL PROTEIN: 6.2 G/DL (ref 6.4–8.3)
WBC # BLD: 13.9 K/UL (ref 3.5–11)
WBC # BLD: ABNORMAL 10*3/UL

## 2019-02-03 PROCEDURE — 6360000002 HC RX W HCPCS: Performed by: INTERNAL MEDICINE

## 2019-02-03 PROCEDURE — 36415 COLL VENOUS BLD VENIPUNCTURE: CPT

## 2019-02-03 PROCEDURE — 2580000003 HC RX 258: Performed by: INTERNAL MEDICINE

## 2019-02-03 PROCEDURE — 85025 COMPLETE CBC W/AUTO DIFF WBC: CPT

## 2019-02-03 PROCEDURE — 80053 COMPREHEN METABOLIC PANEL: CPT

## 2019-02-03 PROCEDURE — 94664 DEMO&/EVAL PT USE INHALER: CPT

## 2019-02-03 PROCEDURE — 1200000000 HC SEMI PRIVATE

## 2019-02-03 PROCEDURE — 96376 TX/PRO/DX INJ SAME DRUG ADON: CPT

## 2019-02-03 PROCEDURE — 6370000000 HC RX 637 (ALT 250 FOR IP): Performed by: INTERNAL MEDICINE

## 2019-02-03 PROCEDURE — 83605 ASSAY OF LACTIC ACID: CPT

## 2019-02-03 PROCEDURE — G0378 HOSPITAL OBSERVATION PER HR: HCPCS

## 2019-02-03 PROCEDURE — 82947 ASSAY GLUCOSE BLOOD QUANT: CPT

## 2019-02-03 PROCEDURE — 96372 THER/PROPH/DIAG INJ SC/IM: CPT

## 2019-02-03 PROCEDURE — 94761 N-INVAS EAR/PLS OXIMETRY MLT: CPT

## 2019-02-03 PROCEDURE — 83735 ASSAY OF MAGNESIUM: CPT

## 2019-02-03 PROCEDURE — 94640 AIRWAY INHALATION TREATMENT: CPT

## 2019-02-03 RX ORDER — NITROFURANTOIN 25; 75 MG/1; MG/1
100 CAPSULE ORAL EVERY 12 HOURS SCHEDULED
Status: DISCONTINUED | OUTPATIENT
Start: 2019-02-03 | End: 2019-02-03

## 2019-02-03 RX ORDER — AMOXICILLIN 500 MG/1
500 CAPSULE ORAL EVERY 8 HOURS SCHEDULED
Status: DISCONTINUED | OUTPATIENT
Start: 2019-02-03 | End: 2019-02-04 | Stop reason: HOSPADM

## 2019-02-03 RX ORDER — LACTOBACILLUS RHAMNOSUS GG 10B CELL
1 CAPSULE ORAL 2 TIMES DAILY WITH MEALS
Status: DISCONTINUED | OUTPATIENT
Start: 2019-02-03 | End: 2019-02-04 | Stop reason: HOSPADM

## 2019-02-03 RX ORDER — POTASSIUM CHLORIDE 750 MG/1
40 TABLET, FILM COATED, EXTENDED RELEASE ORAL ONCE
Status: COMPLETED | OUTPATIENT
Start: 2019-02-03 | End: 2019-02-03

## 2019-02-03 RX ADMIN — ASPIRIN 81 MG: 81 TABLET, COATED ORAL at 08:14

## 2019-02-03 RX ADMIN — PROPRANOLOL HYDROCHLORIDE 40 MG: 10 TABLET ORAL at 20:16

## 2019-02-03 RX ADMIN — POTASSIUM CHLORIDE 40 MEQ: 750 TABLET, FILM COATED, EXTENDED RELEASE ORAL at 10:32

## 2019-02-03 RX ADMIN — AMOXICILLIN 500 MG: 500 CAPSULE ORAL at 20:16

## 2019-02-03 RX ADMIN — VENLAFAXINE 37.5 MG: 25 TABLET ORAL at 05:32

## 2019-02-03 RX ADMIN — VITAMIN D, TAB 1000IU (100/BT) 4000 UNITS: 25 TAB at 08:13

## 2019-02-03 RX ADMIN — INSULIN LISPRO 1 UNITS: 100 INJECTION, SOLUTION INTRAVENOUS; SUBCUTANEOUS at 08:15

## 2019-02-03 RX ADMIN — ATORVASTATIN CALCIUM 40 MG: 20 TABLET, FILM COATED ORAL at 08:13

## 2019-02-03 RX ADMIN — PANTOPRAZOLE SODIUM 40 MG: 40 TABLET, DELAYED RELEASE ORAL at 05:32

## 2019-02-03 RX ADMIN — PROPRANOLOL HYDROCHLORIDE 40 MG: 10 TABLET ORAL at 08:13

## 2019-02-03 RX ADMIN — ALBUTEROL SULFATE 2 PUFF: 90 AEROSOL, METERED RESPIRATORY (INHALATION) at 21:12

## 2019-02-03 RX ADMIN — INSULIN LISPRO 1 UNITS: 100 INJECTION, SOLUTION INTRAVENOUS; SUBCUTANEOUS at 21:04

## 2019-02-03 RX ADMIN — SODIUM CHLORIDE: 9 INJECTION, SOLUTION INTRAVENOUS at 03:02

## 2019-02-03 RX ADMIN — CLOPIDOGREL BISULFATE 75 MG: 75 TABLET ORAL at 08:13

## 2019-02-03 RX ADMIN — INSULIN LISPRO 1 UNITS: 100 INJECTION, SOLUTION INTRAVENOUS; SUBCUTANEOUS at 16:52

## 2019-02-03 RX ADMIN — ENOXAPARIN SODIUM 40 MG: 40 INJECTION SUBCUTANEOUS at 08:14

## 2019-02-03 RX ADMIN — TAMSULOSIN HYDROCHLORIDE 0.4 MG: 0.4 CAPSULE ORAL at 08:14

## 2019-02-03 RX ADMIN — CEFTRIAXONE SODIUM 1 G: 1 INJECTION, POWDER, FOR SOLUTION INTRAMUSCULAR; INTRAVENOUS at 16:38

## 2019-02-03 RX ADMIN — SODIUM CHLORIDE: 9 INJECTION, SOLUTION INTRAVENOUS at 18:49

## 2019-02-03 RX ADMIN — CILOSTAZOL 50 MG: 50 TABLET ORAL at 20:16

## 2019-02-03 RX ADMIN — TRAZODONE HYDROCHLORIDE 100 MG: 50 TABLET ORAL at 20:16

## 2019-02-03 RX ADMIN — ISOSORBIDE MONONITRATE 30 MG: 30 TABLET, EXTENDED RELEASE ORAL at 08:13

## 2019-02-03 RX ADMIN — Medication 10 ML: at 08:15

## 2019-02-03 RX ADMIN — AMOXICILLIN 500 MG: 500 CAPSULE ORAL at 16:38

## 2019-02-03 RX ADMIN — CILOSTAZOL 50 MG: 50 TABLET ORAL at 08:20

## 2019-02-03 RX ADMIN — NITROFURANTOIN MONOHYDRATE/MACROCRYSTALLINE 100 MG: 25; 75 CAPSULE ORAL at 10:32

## 2019-02-03 RX ADMIN — Medication 10 ML: at 20:16

## 2019-02-03 RX ADMIN — SERTRALINE HYDROCHLORIDE 50 MG: 50 TABLET ORAL at 08:20

## 2019-02-03 RX ADMIN — FAMOTIDINE 20 MG: 20 TABLET ORAL at 08:14

## 2019-02-03 RX ADMIN — FINASTERIDE 5 MG: 5 TABLET, FILM COATED ORAL at 08:13

## 2019-02-03 RX ADMIN — TIOTROPIUM BROMIDE 18 MCG: 18 CAPSULE ORAL; RESPIRATORY (INHALATION) at 09:27

## 2019-02-03 RX ADMIN — Medication 1 CAPSULE: at 16:38

## 2019-02-03 ASSESSMENT — PAIN SCALES - GENERAL
PAINLEVEL_OUTOF10: 0

## 2019-02-04 VITALS
SYSTOLIC BLOOD PRESSURE: 145 MMHG | DIASTOLIC BLOOD PRESSURE: 88 MMHG | WEIGHT: 209.5 LBS | BODY MASS INDEX: 27.77 KG/M2 | OXYGEN SATURATION: 94 % | RESPIRATION RATE: 20 BRPM | HEART RATE: 62 BPM | TEMPERATURE: 97.8 F | HEIGHT: 73 IN

## 2019-02-04 LAB
ABSOLUTE EOS #: 0.2 K/UL (ref 0–0.4)
ABSOLUTE IMMATURE GRANULOCYTE: ABNORMAL K/UL (ref 0–0.3)
ABSOLUTE LYMPH #: 0.8 K/UL (ref 1–4.8)
ABSOLUTE MONO #: 0.7 K/UL (ref 0–1)
ANION GAP SERPL CALCULATED.3IONS-SCNC: 10 MMOL/L (ref 9–17)
BASOPHILS # BLD: 0 % (ref 0–2)
BASOPHILS ABSOLUTE: 0 K/UL (ref 0–0.2)
BUN BLDV-MCNC: 11 MG/DL (ref 8–23)
BUN/CREAT BLD: 17 (ref 9–20)
CALCIUM SERPL-MCNC: 8.8 MG/DL (ref 8.6–10.4)
CHLORIDE BLD-SCNC: 107 MMOL/L (ref 98–107)
CO2: 24 MMOL/L (ref 20–31)
CREAT SERPL-MCNC: 0.65 MG/DL (ref 0.7–1.2)
DIFFERENTIAL TYPE: YES
EOSINOPHILS RELATIVE PERCENT: 2 % (ref 0–5)
GFR AFRICAN AMERICAN: >60 ML/MIN
GFR NON-AFRICAN AMERICAN: >60 ML/MIN
GFR SERPL CREATININE-BSD FRML MDRD: ABNORMAL ML/MIN/{1.73_M2}
GFR SERPL CREATININE-BSD FRML MDRD: ABNORMAL ML/MIN/{1.73_M2}
GLUCOSE BLD-MCNC: 142 MG/DL (ref 65–99)
GLUCOSE BLD-MCNC: 160 MG/DL (ref 70–99)
HCT VFR BLD CALC: 33.2 % (ref 41–53)
HEMOGLOBIN: 11.2 G/DL (ref 13.5–17.5)
IMMATURE GRANULOCYTES: ABNORMAL %
LYMPHOCYTES # BLD: 8 % (ref 13–44)
MCH RBC QN AUTO: 31.6 PG (ref 26–34)
MCHC RBC AUTO-ENTMCNC: 33.6 G/DL (ref 31–37)
MCV RBC AUTO: 93.9 FL (ref 80–100)
MONOCYTES # BLD: 7 % (ref 5–9)
NRBC AUTOMATED: ABNORMAL PER 100 WBC
PDW BLD-RTO: 15.4 % (ref 12.1–15.2)
PLATELET # BLD: 191 K/UL (ref 140–450)
PLATELET ESTIMATE: ABNORMAL
PMV BLD AUTO: ABNORMAL FL (ref 6–12)
POTASSIUM SERPL-SCNC: 3.6 MMOL/L (ref 3.7–5.3)
RBC # BLD: 3.54 M/UL (ref 4.5–5.9)
RBC # BLD: ABNORMAL 10*6/UL
SEG NEUTROPHILS: 83 % (ref 39–75)
SEGMENTED NEUTROPHILS ABSOLUTE COUNT: 8.6 K/UL (ref 2.1–6.5)
SODIUM BLD-SCNC: 141 MMOL/L (ref 135–144)
WBC # BLD: 10.3 K/UL (ref 3.5–11)
WBC # BLD: ABNORMAL 10*3/UL

## 2019-02-04 PROCEDURE — 82947 ASSAY GLUCOSE BLOOD QUANT: CPT

## 2019-02-04 PROCEDURE — 6370000000 HC RX 637 (ALT 250 FOR IP): Performed by: INTERNAL MEDICINE

## 2019-02-04 PROCEDURE — 6360000002 HC RX W HCPCS: Performed by: INTERNAL MEDICINE

## 2019-02-04 PROCEDURE — 96372 THER/PROPH/DIAG INJ SC/IM: CPT

## 2019-02-04 PROCEDURE — 80048 BASIC METABOLIC PNL TOTAL CA: CPT

## 2019-02-04 PROCEDURE — 94761 N-INVAS EAR/PLS OXIMETRY MLT: CPT

## 2019-02-04 PROCEDURE — G0378 HOSPITAL OBSERVATION PER HR: HCPCS

## 2019-02-04 PROCEDURE — 94640 AIRWAY INHALATION TREATMENT: CPT

## 2019-02-04 PROCEDURE — 85025 COMPLETE CBC W/AUTO DIFF WBC: CPT

## 2019-02-04 PROCEDURE — 36415 COLL VENOUS BLD VENIPUNCTURE: CPT

## 2019-02-04 RX ORDER — LACTOBACILLUS RHAMNOSUS GG 10B CELL
1 CAPSULE ORAL 2 TIMES DAILY WITH MEALS
Qty: 30 CAPSULE | Refills: 0 | Status: SHIPPED | OUTPATIENT
Start: 2019-02-04 | End: 2021-05-30

## 2019-02-04 RX ORDER — POTASSIUM CHLORIDE 750 MG/1
40 TABLET, FILM COATED, EXTENDED RELEASE ORAL ONCE
Status: COMPLETED | OUTPATIENT
Start: 2019-02-04 | End: 2019-02-04

## 2019-02-04 RX ORDER — LEVOFLOXACIN 250 MG/1
250 TABLET ORAL DAILY
Qty: 5 TABLET | Refills: 0 | Status: SHIPPED | OUTPATIENT
Start: 2019-02-04 | End: 2019-02-09

## 2019-02-04 RX ADMIN — TAMSULOSIN HYDROCHLORIDE 0.4 MG: 0.4 CAPSULE ORAL at 08:13

## 2019-02-04 RX ADMIN — VENLAFAXINE 37.5 MG: 25 TABLET ORAL at 05:11

## 2019-02-04 RX ADMIN — PANTOPRAZOLE SODIUM 40 MG: 40 TABLET, DELAYED RELEASE ORAL at 05:11

## 2019-02-04 RX ADMIN — ASPIRIN 81 MG: 81 TABLET, COATED ORAL at 08:12

## 2019-02-04 RX ADMIN — ISOSORBIDE MONONITRATE 30 MG: 30 TABLET, EXTENDED RELEASE ORAL at 08:14

## 2019-02-04 RX ADMIN — ACETAMINOPHEN 1 MG: 400 TABLET ORAL at 08:26

## 2019-02-04 RX ADMIN — CLOPIDOGREL BISULFATE 75 MG: 75 TABLET ORAL at 08:14

## 2019-02-04 RX ADMIN — Medication 1 CAPSULE: at 08:12

## 2019-02-04 RX ADMIN — CILOSTAZOL 50 MG: 50 TABLET ORAL at 08:13

## 2019-02-04 RX ADMIN — TIOTROPIUM BROMIDE 18 MCG: 18 CAPSULE ORAL; RESPIRATORY (INHALATION) at 09:04

## 2019-02-04 RX ADMIN — FINASTERIDE 5 MG: 5 TABLET, FILM COATED ORAL at 08:14

## 2019-02-04 RX ADMIN — PROPRANOLOL HYDROCHLORIDE 40 MG: 10 TABLET ORAL at 08:13

## 2019-02-04 RX ADMIN — VITAMIN D, TAB 1000IU (100/BT) 4000 UNITS: 25 TAB at 08:13

## 2019-02-04 RX ADMIN — ENOXAPARIN SODIUM 40 MG: 40 INJECTION SUBCUTANEOUS at 08:15

## 2019-02-04 RX ADMIN — POTASSIUM CHLORIDE 40 MEQ: 750 TABLET, FILM COATED, EXTENDED RELEASE ORAL at 08:46

## 2019-02-04 RX ADMIN — ATORVASTATIN CALCIUM 40 MG: 20 TABLET, FILM COATED ORAL at 08:12

## 2019-02-04 RX ADMIN — INSULIN LISPRO 1 UNITS: 100 INJECTION, SOLUTION INTRAVENOUS; SUBCUTANEOUS at 08:12

## 2019-02-04 RX ADMIN — FAMOTIDINE 20 MG: 20 TABLET ORAL at 08:13

## 2019-02-04 RX ADMIN — SERTRALINE HYDROCHLORIDE 50 MG: 50 TABLET ORAL at 08:13

## 2019-02-04 RX ADMIN — AMOXICILLIN 500 MG: 500 CAPSULE ORAL at 05:11

## 2019-02-04 ASSESSMENT — PAIN SCALES - GENERAL
PAINLEVEL_OUTOF10: 0

## 2019-02-05 ENCOUNTER — TELEPHONE (OUTPATIENT)
Dept: FAMILY MEDICINE CLINIC | Age: 79
End: 2019-02-05

## 2019-02-08 LAB
CULTURE: NORMAL
CULTURE: NORMAL
Lab: NORMAL
Lab: NORMAL
SPECIMEN DESCRIPTION: NORMAL
SPECIMEN DESCRIPTION: NORMAL
STATUS: NORMAL
STATUS: NORMAL

## 2019-02-13 ENCOUNTER — HOSPITAL ENCOUNTER (OUTPATIENT)
Age: 79
Setting detail: SPECIMEN
Discharge: HOME | End: 2019-02-13
Payer: MEDICARE

## 2019-02-13 ENCOUNTER — OFFICE VISIT (OUTPATIENT)
Dept: UROLOGY | Age: 79
End: 2019-02-13
Payer: MEDICARE

## 2019-02-13 VITALS
DIASTOLIC BLOOD PRESSURE: 68 MMHG | HEIGHT: 73 IN | TEMPERATURE: 97.5 F | SYSTOLIC BLOOD PRESSURE: 142 MMHG | WEIGHT: 211 LBS | BODY MASS INDEX: 27.96 KG/M2

## 2019-02-13 DIAGNOSIS — N13.8 BPH WITH OBSTRUCTION/LOWER URINARY TRACT SYMPTOMS: ICD-10-CM

## 2019-02-13 DIAGNOSIS — R31.0 GROSS HEMATURIA: ICD-10-CM

## 2019-02-13 DIAGNOSIS — N40.1 BPH WITH OBSTRUCTION/LOWER URINARY TRACT SYMPTOMS: ICD-10-CM

## 2019-02-13 DIAGNOSIS — N45.3 EPIDIDYMOORCHITIS: Primary | ICD-10-CM

## 2019-02-13 DIAGNOSIS — R35.1 NOCTURIA: ICD-10-CM

## 2019-02-13 DIAGNOSIS — R35.0 FREQUENCY OF URINATION: ICD-10-CM

## 2019-02-13 DIAGNOSIS — N45.3 EPIDIDYMOORCHITIS: ICD-10-CM

## 2019-02-13 LAB
-: ABNORMAL
AMORPHOUS: ABNORMAL
BACTERIA: ABNORMAL
BILIRUBIN URINE: ABNORMAL
CASTS UA: ABNORMAL /LPF
COLOR: YELLOW
COMMENT UA: ABNORMAL
CRYSTALS, UA: ABNORMAL /HPF
EPITHELIAL CELLS UA: ABNORMAL /HPF (ref 0–5)
GLUCOSE URINE: NEGATIVE
KETONES, URINE: ABNORMAL
LEUKOCYTE ESTERASE, URINE: NEGATIVE
MUCUS: ABNORMAL
NITRITE, URINE: NEGATIVE
OTHER OBSERVATIONS UA: ABNORMAL
PH UA: 6 (ref 5–9)
PROTEIN UA: ABNORMAL
RBC UA: ABNORMAL /HPF (ref 0–2)
RENAL EPITHELIAL, UA: ABNORMAL /HPF
SPECIFIC GRAVITY UA: 1.02 (ref 1.01–1.02)
TRICHOMONAS: ABNORMAL
TURBIDITY: CLEAR
URINE HGB: NEGATIVE
UROBILINOGEN, URINE: NORMAL
WBC UA: ABNORMAL /HPF (ref 0–5)
YEAST: ABNORMAL

## 2019-02-13 PROCEDURE — G8598 ASA/ANTIPLAT THER USED: HCPCS | Performed by: NURSE PRACTITIONER

## 2019-02-13 PROCEDURE — G8427 DOCREV CUR MEDS BY ELIG CLIN: HCPCS | Performed by: NURSE PRACTITIONER

## 2019-02-13 PROCEDURE — 1111F DSCHRG MED/CURRENT MED MERGE: CPT | Performed by: NURSE PRACTITIONER

## 2019-02-13 PROCEDURE — G8417 CALC BMI ABV UP PARAM F/U: HCPCS | Performed by: NURSE PRACTITIONER

## 2019-02-13 PROCEDURE — 99214 OFFICE O/P EST MOD 30 MIN: CPT | Performed by: NURSE PRACTITIONER

## 2019-02-13 PROCEDURE — 1101F PT FALLS ASSESS-DOCD LE1/YR: CPT | Performed by: NURSE PRACTITIONER

## 2019-02-13 PROCEDURE — 1123F ACP DISCUSS/DSCN MKR DOCD: CPT | Performed by: NURSE PRACTITIONER

## 2019-02-13 PROCEDURE — 87086 URINE CULTURE/COLONY COUNT: CPT

## 2019-02-13 PROCEDURE — G8482 FLU IMMUNIZE ORDER/ADMIN: HCPCS | Performed by: NURSE PRACTITIONER

## 2019-02-13 PROCEDURE — 4040F PNEUMOC VAC/ADMIN/RCVD: CPT | Performed by: NURSE PRACTITIONER

## 2019-02-13 PROCEDURE — 81001 URINALYSIS AUTO W/SCOPE: CPT

## 2019-02-13 PROCEDURE — 1036F TOBACCO NON-USER: CPT | Performed by: NURSE PRACTITIONER

## 2019-02-13 RX ORDER — CIPROFLOXACIN 500 MG/1
500 TABLET, FILM COATED ORAL 2 TIMES DAILY
Qty: 20 TABLET | Refills: 0 | Status: SHIPPED | OUTPATIENT
Start: 2019-02-13 | End: 2019-02-14 | Stop reason: ALTCHOICE

## 2019-02-13 ASSESSMENT — ENCOUNTER SYMPTOMS
WHEEZING: 0
NAUSEA: 0
BACK PAIN: 0
EYE REDNESS: 0
CONSTIPATION: 0
EYE PAIN: 0
SHORTNESS OF BREATH: 0
ABDOMINAL PAIN: 0
VOMITING: 0
COUGH: 0
COLOR CHANGE: 0

## 2019-02-14 ENCOUNTER — TELEPHONE (OUTPATIENT)
Dept: UROLOGY | Age: 79
End: 2019-02-14

## 2019-02-14 ENCOUNTER — OFFICE VISIT (OUTPATIENT)
Dept: FAMILY MEDICINE CLINIC | Age: 79
End: 2019-02-14
Payer: MEDICARE

## 2019-02-14 VITALS
WEIGHT: 213 LBS | DIASTOLIC BLOOD PRESSURE: 90 MMHG | HEART RATE: 68 BPM | OXYGEN SATURATION: 96 % | SYSTOLIC BLOOD PRESSURE: 160 MMHG | BODY MASS INDEX: 28.1 KG/M2

## 2019-02-14 DIAGNOSIS — I10 ESSENTIAL HYPERTENSION: ICD-10-CM

## 2019-02-14 DIAGNOSIS — N30.00 ACUTE CYSTITIS WITHOUT HEMATURIA: Primary | ICD-10-CM

## 2019-02-14 PROBLEM — R39.14 FEELING OF INCOMPLETE BLADDER EMPTYING: Status: RESOLVED | Noted: 2018-07-26 | Resolved: 2019-02-14

## 2019-02-14 LAB
CULTURE: NORMAL
Lab: NORMAL
SPECIMEN DESCRIPTION: NORMAL

## 2019-02-14 PROCEDURE — 99495 TRANSJ CARE MGMT MOD F2F 14D: CPT | Performed by: FAMILY MEDICINE

## 2019-02-14 RX ORDER — AMLODIPINE BESYLATE 5 MG/1
5 TABLET ORAL DAILY
Qty: 30 TABLET | Refills: 1 | Status: SHIPPED | OUTPATIENT
Start: 2019-02-14 | End: 2019-03-14 | Stop reason: SDUPTHER

## 2019-02-14 ASSESSMENT — ENCOUNTER SYMPTOMS
EYE REDNESS: 0
DIARRHEA: 0
ABDOMINAL PAIN: 0
SHORTNESS OF BREATH: 0
TROUBLE SWALLOWING: 0
COUGH: 0
EYE DISCHARGE: 0
NAUSEA: 0
BLOOD IN STOOL: 0
CONSTIPATION: 0
VOMITING: 0

## 2019-02-14 ASSESSMENT — PATIENT HEALTH QUESTIONNAIRE - PHQ9
1. LITTLE INTEREST OR PLEASURE IN DOING THINGS: 0
2. FEELING DOWN, DEPRESSED OR HOPELESS: 0
SUM OF ALL RESPONSES TO PHQ9 QUESTIONS 1 & 2: 0
SUM OF ALL RESPONSES TO PHQ QUESTIONS 1-9: 0
SUM OF ALL RESPONSES TO PHQ QUESTIONS 1-9: 0

## 2019-02-19 ENCOUNTER — TELEPHONE (OUTPATIENT)
Dept: UROLOGY | Age: 79
End: 2019-02-19

## 2019-02-28 ENCOUNTER — TELEPHONE (OUTPATIENT)
Dept: FAMILY MEDICINE CLINIC | Age: 79
End: 2019-02-28

## 2019-02-28 RX ORDER — CLOPIDOGREL BISULFATE 75 MG/1
75 TABLET ORAL DAILY
Qty: 30 TABLET | Refills: 5 | Status: SHIPPED | OUTPATIENT
Start: 2019-02-28 | End: 2019-09-13 | Stop reason: SDUPTHER

## 2019-02-28 RX ORDER — ISOSORBIDE MONONITRATE 30 MG/1
30 TABLET, EXTENDED RELEASE ORAL DAILY
Qty: 30 TABLET | Refills: 5 | Status: SHIPPED | OUTPATIENT
Start: 2019-02-28 | End: 2019-08-27 | Stop reason: SDUPTHER

## 2019-03-07 ENCOUNTER — PROCEDURE VISIT (OUTPATIENT)
Dept: UROLOGY | Age: 79
End: 2019-03-07
Payer: MEDICARE

## 2019-03-07 VITALS
WEIGHT: 217 LBS | SYSTOLIC BLOOD PRESSURE: 120 MMHG | HEIGHT: 73 IN | DIASTOLIC BLOOD PRESSURE: 70 MMHG | BODY MASS INDEX: 28.76 KG/M2

## 2019-03-07 DIAGNOSIS — N50.89 SCROTAL SWELLING: ICD-10-CM

## 2019-03-07 DIAGNOSIS — R31.0 GROSS HEMATURIA: Primary | ICD-10-CM

## 2019-03-07 DIAGNOSIS — N20.0 RENAL STONE: ICD-10-CM

## 2019-03-07 PROCEDURE — G8482 FLU IMMUNIZE ORDER/ADMIN: HCPCS | Performed by: UROLOGY

## 2019-03-07 PROCEDURE — 1123F ACP DISCUSS/DSCN MKR DOCD: CPT | Performed by: UROLOGY

## 2019-03-07 PROCEDURE — 4040F PNEUMOC VAC/ADMIN/RCVD: CPT | Performed by: UROLOGY

## 2019-03-07 PROCEDURE — G8417 CALC BMI ABV UP PARAM F/U: HCPCS | Performed by: UROLOGY

## 2019-03-07 PROCEDURE — G8427 DOCREV CUR MEDS BY ELIG CLIN: HCPCS | Performed by: UROLOGY

## 2019-03-07 PROCEDURE — 99214 OFFICE O/P EST MOD 30 MIN: CPT | Performed by: UROLOGY

## 2019-03-07 PROCEDURE — 52000 CYSTOURETHROSCOPY: CPT | Performed by: UROLOGY

## 2019-03-07 PROCEDURE — 1101F PT FALLS ASSESS-DOCD LE1/YR: CPT | Performed by: UROLOGY

## 2019-03-07 PROCEDURE — 1036F TOBACCO NON-USER: CPT | Performed by: UROLOGY

## 2019-03-07 PROCEDURE — G8598 ASA/ANTIPLAT THER USED: HCPCS | Performed by: UROLOGY

## 2019-03-07 RX ORDER — CALCIUM CARBONATE 500(1250)
500 TABLET ORAL 2 TIMES DAILY
COMMUNITY
End: 2021-06-03

## 2019-03-07 ASSESSMENT — ENCOUNTER SYMPTOMS
EYE REDNESS: 0
WHEEZING: 0
BACK PAIN: 0
NAUSEA: 0
COUGH: 0
COLOR CHANGE: 0
SHORTNESS OF BREATH: 0
ABDOMINAL PAIN: 0
EYE PAIN: 0
VOMITING: 0

## 2019-03-08 ENCOUNTER — HOSPITAL ENCOUNTER (OUTPATIENT)
Dept: ULTRASOUND IMAGING | Age: 79
Discharge: HOME | End: 2019-03-10
Payer: MEDICARE

## 2019-03-08 DIAGNOSIS — N50.89 SCROTAL SWELLING: ICD-10-CM

## 2019-03-08 PROCEDURE — 93976 VASCULAR STUDY: CPT

## 2019-03-14 ENCOUNTER — OFFICE VISIT (OUTPATIENT)
Dept: FAMILY MEDICINE CLINIC | Age: 79
End: 2019-03-14
Payer: MEDICARE

## 2019-03-14 VITALS
WEIGHT: 212 LBS | OXYGEN SATURATION: 98 % | HEART RATE: 60 BPM | DIASTOLIC BLOOD PRESSURE: 72 MMHG | BODY MASS INDEX: 27.97 KG/M2 | SYSTOLIC BLOOD PRESSURE: 118 MMHG

## 2019-03-14 DIAGNOSIS — I10 ESSENTIAL HYPERTENSION: Primary | ICD-10-CM

## 2019-03-14 PROCEDURE — 99213 OFFICE O/P EST LOW 20 MIN: CPT | Performed by: FAMILY MEDICINE

## 2019-03-14 PROCEDURE — G8417 CALC BMI ABV UP PARAM F/U: HCPCS | Performed by: FAMILY MEDICINE

## 2019-03-14 PROCEDURE — 1036F TOBACCO NON-USER: CPT | Performed by: FAMILY MEDICINE

## 2019-03-14 PROCEDURE — 1123F ACP DISCUSS/DSCN MKR DOCD: CPT | Performed by: FAMILY MEDICINE

## 2019-03-14 PROCEDURE — G8598 ASA/ANTIPLAT THER USED: HCPCS | Performed by: FAMILY MEDICINE

## 2019-03-14 PROCEDURE — 1101F PT FALLS ASSESS-DOCD LE1/YR: CPT | Performed by: FAMILY MEDICINE

## 2019-03-14 PROCEDURE — 4040F PNEUMOC VAC/ADMIN/RCVD: CPT | Performed by: FAMILY MEDICINE

## 2019-03-14 PROCEDURE — G8427 DOCREV CUR MEDS BY ELIG CLIN: HCPCS | Performed by: FAMILY MEDICINE

## 2019-03-14 PROCEDURE — G8482 FLU IMMUNIZE ORDER/ADMIN: HCPCS | Performed by: FAMILY MEDICINE

## 2019-03-14 RX ORDER — CILOSTAZOL 50 MG/1
50 TABLET ORAL 2 TIMES DAILY
Qty: 180 TABLET | Refills: 3 | Status: SHIPPED | OUTPATIENT
Start: 2019-03-14 | End: 2020-03-24 | Stop reason: SDUPTHER

## 2019-03-14 RX ORDER — FINASTERIDE 5 MG/1
5 TABLET, FILM COATED ORAL DAILY
Qty: 90 TABLET | Refills: 3 | Status: SHIPPED | OUTPATIENT
Start: 2019-03-14 | End: 2020-04-14 | Stop reason: SDUPTHER

## 2019-03-14 RX ORDER — AMLODIPINE BESYLATE 5 MG/1
5 TABLET ORAL DAILY
Qty: 90 TABLET | Refills: 1 | Status: SHIPPED | OUTPATIENT
Start: 2019-03-14 | End: 2019-03-15 | Stop reason: ALTCHOICE

## 2019-03-14 ASSESSMENT — ENCOUNTER SYMPTOMS
EYE REDNESS: 0
BLURRED VISION: 0
SHORTNESS OF BREATH: 0
EYE DISCHARGE: 0
DIARRHEA: 0
COUGH: 0
VOMITING: 0

## 2019-03-15 ENCOUNTER — HOSPITAL ENCOUNTER (OUTPATIENT)
Age: 79
Discharge: HOME | End: 2019-03-15
Payer: MEDICARE

## 2019-03-15 ENCOUNTER — OFFICE VISIT (OUTPATIENT)
Dept: CARDIOLOGY CLINIC | Age: 79
End: 2019-03-15
Payer: MEDICARE

## 2019-03-15 VITALS
OXYGEN SATURATION: 100 % | SYSTOLIC BLOOD PRESSURE: 88 MMHG | HEART RATE: 68 BPM | BODY MASS INDEX: 27.84 KG/M2 | WEIGHT: 211 LBS | DIASTOLIC BLOOD PRESSURE: 60 MMHG

## 2019-03-15 DIAGNOSIS — E11.8 DM TYPE 2, CONTROLLED, WITH COMPLICATION (HCC): ICD-10-CM

## 2019-03-15 DIAGNOSIS — I25.700 CORONARY ARTERY DISEASE INVOLVING CORONARY BYPASS GRAFT OF NATIVE HEART WITH UNSTABLE ANGINA PECTORIS (HCC): ICD-10-CM

## 2019-03-15 DIAGNOSIS — I10 HYPERTENSION, UNSPECIFIED TYPE: Primary | ICD-10-CM

## 2019-03-15 DIAGNOSIS — E55.9 VITAMIN D DEFICIENCY DISEASE: ICD-10-CM

## 2019-03-15 DIAGNOSIS — I10 HYPERTENSION, UNSPECIFIED TYPE: ICD-10-CM

## 2019-03-15 LAB
ABSOLUTE EOS #: 0.2 K/UL (ref 0–0.4)
ABSOLUTE IMMATURE GRANULOCYTE: ABNORMAL K/UL (ref 0–0.3)
ABSOLUTE LYMPH #: 0.8 K/UL (ref 1–4.8)
ABSOLUTE MONO #: 0.6 K/UL (ref 0–1)
ALBUMIN SERPL-MCNC: 4.3 G/DL (ref 3.5–5.2)
ALBUMIN/GLOBULIN RATIO: ABNORMAL (ref 1–2.5)
ALP BLD-CCNC: 62 U/L (ref 40–129)
ALT SERPL-CCNC: 33 U/L (ref 5–41)
ANION GAP SERPL CALCULATED.3IONS-SCNC: 16 MMOL/L (ref 9–17)
AST SERPL-CCNC: 45 U/L
BASOPHILS # BLD: 0 % (ref 0–2)
BASOPHILS ABSOLUTE: 0 K/UL (ref 0–0.2)
BILIRUB SERPL-MCNC: 0.5 MG/DL (ref 0.3–1.2)
BUN BLDV-MCNC: 16 MG/DL (ref 8–23)
BUN/CREAT BLD: 16 (ref 9–20)
CALCIUM SERPL-MCNC: 9.1 MG/DL (ref 8.6–10.4)
CHLORIDE BLD-SCNC: 106 MMOL/L (ref 98–107)
CO2: 21 MMOL/L (ref 20–31)
CREAT SERPL-MCNC: 0.98 MG/DL (ref 0.7–1.2)
DIFFERENTIAL TYPE: YES
EOSINOPHILS RELATIVE PERCENT: 4 % (ref 0–5)
GFR AFRICAN AMERICAN: >60 ML/MIN
GFR NON-AFRICAN AMERICAN: >60 ML/MIN
GFR SERPL CREATININE-BSD FRML MDRD: ABNORMAL ML/MIN/{1.73_M2}
GFR SERPL CREATININE-BSD FRML MDRD: ABNORMAL ML/MIN/{1.73_M2}
GLUCOSE BLD-MCNC: 210 MG/DL (ref 70–99)
HCT VFR BLD CALC: 39.2 % (ref 41–53)
HEMOGLOBIN: 13.1 G/DL (ref 13.5–17.5)
IMMATURE GRANULOCYTES: ABNORMAL %
LYMPHOCYTES # BLD: 15 % (ref 13–44)
MCH RBC QN AUTO: 31.8 PG (ref 26–34)
MCHC RBC AUTO-ENTMCNC: 33.5 G/DL (ref 31–37)
MCV RBC AUTO: 94.8 FL (ref 80–100)
MONOCYTES # BLD: 11 % (ref 5–9)
NRBC AUTOMATED: ABNORMAL PER 100 WBC
PDW BLD-RTO: 15.7 % (ref 12.1–15.2)
PLATELET # BLD: 250 K/UL (ref 140–450)
PLATELET ESTIMATE: ABNORMAL
PMV BLD AUTO: ABNORMAL FL (ref 6–12)
POTASSIUM SERPL-SCNC: 3.8 MMOL/L (ref 3.7–5.3)
RBC # BLD: 4.14 M/UL (ref 4.5–5.9)
RBC # BLD: ABNORMAL 10*6/UL
SEG NEUTROPHILS: 70 % (ref 39–75)
SEGMENTED NEUTROPHILS ABSOLUTE COUNT: 3.9 K/UL (ref 2.1–6.5)
SODIUM BLD-SCNC: 143 MMOL/L (ref 135–144)
TOTAL PROTEIN: 7.1 G/DL (ref 6.4–8.3)
TSH SERPL DL<=0.05 MIU/L-ACNC: 2.32 MIU/L (ref 0.3–5)
WBC # BLD: 5.6 K/UL (ref 3.5–11)
WBC # BLD: ABNORMAL 10*3/UL

## 2019-03-15 PROCEDURE — 36415 COLL VENOUS BLD VENIPUNCTURE: CPT

## 2019-03-15 PROCEDURE — 1101F PT FALLS ASSESS-DOCD LE1/YR: CPT | Performed by: INTERNAL MEDICINE

## 2019-03-15 PROCEDURE — 80053 COMPREHEN METABOLIC PANEL: CPT

## 2019-03-15 PROCEDURE — 1036F TOBACCO NON-USER: CPT | Performed by: INTERNAL MEDICINE

## 2019-03-15 PROCEDURE — G8598 ASA/ANTIPLAT THER USED: HCPCS | Performed by: INTERNAL MEDICINE

## 2019-03-15 PROCEDURE — G8482 FLU IMMUNIZE ORDER/ADMIN: HCPCS | Performed by: INTERNAL MEDICINE

## 2019-03-15 PROCEDURE — 85025 COMPLETE CBC W/AUTO DIFF WBC: CPT

## 2019-03-15 PROCEDURE — 99213 OFFICE O/P EST LOW 20 MIN: CPT | Performed by: INTERNAL MEDICINE

## 2019-03-15 PROCEDURE — 84443 ASSAY THYROID STIM HORMONE: CPT

## 2019-03-15 PROCEDURE — G8417 CALC BMI ABV UP PARAM F/U: HCPCS | Performed by: INTERNAL MEDICINE

## 2019-03-15 PROCEDURE — 1123F ACP DISCUSS/DSCN MKR DOCD: CPT | Performed by: INTERNAL MEDICINE

## 2019-03-15 PROCEDURE — 4040F PNEUMOC VAC/ADMIN/RCVD: CPT | Performed by: INTERNAL MEDICINE

## 2019-03-15 PROCEDURE — G8427 DOCREV CUR MEDS BY ELIG CLIN: HCPCS | Performed by: INTERNAL MEDICINE

## 2019-03-29 ENCOUNTER — OFFICE VISIT (OUTPATIENT)
Dept: CARDIOLOGY CLINIC | Age: 79
End: 2019-03-29
Payer: MEDICARE

## 2019-03-29 VITALS
HEART RATE: 64 BPM | SYSTOLIC BLOOD PRESSURE: 134 MMHG | DIASTOLIC BLOOD PRESSURE: 70 MMHG | OXYGEN SATURATION: 96 % | BODY MASS INDEX: 28.37 KG/M2 | WEIGHT: 215 LBS

## 2019-03-29 DIAGNOSIS — I10 HYPERTENSION, UNSPECIFIED TYPE: Primary | ICD-10-CM

## 2019-03-29 PROCEDURE — G8417 CALC BMI ABV UP PARAM F/U: HCPCS | Performed by: INTERNAL MEDICINE

## 2019-03-29 PROCEDURE — G8427 DOCREV CUR MEDS BY ELIG CLIN: HCPCS | Performed by: INTERNAL MEDICINE

## 2019-03-29 PROCEDURE — 99211 OFF/OP EST MAY X REQ PHY/QHP: CPT | Performed by: INTERNAL MEDICINE

## 2019-03-29 NOTE — PROGRESS NOTES
Pt here for bp check  Dizziness gone  bp in better range   Couple times went up  Too high . Brought wrist cuff today. 130/75 wrist   Has appt in September  No changes made   To stay off the norvasc.

## 2019-07-22 NOTE — TELEPHONE ENCOUNTER
Madalyn [place] [phone]    [facility]    Drug interactions-    Naproxen interacts with methotrexate, plavix, cilostazole      Omeprazole interacts with plavix, cilostazole. He has been on these medications for a long time. Must be reported to a provider because they populated a interaction. Ok to continue meds or make a change? Please advise.

## 2019-08-21 ENCOUNTER — OFFICE VISIT (OUTPATIENT)
Dept: FAMILY MEDICINE CLINIC | Age: 79
End: 2019-08-21
Payer: MEDICARE

## 2019-08-21 VITALS
OXYGEN SATURATION: 97 % | BODY MASS INDEX: 27.83 KG/M2 | HEART RATE: 44 BPM | SYSTOLIC BLOOD PRESSURE: 94 MMHG | WEIGHT: 210 LBS | HEIGHT: 73 IN | DIASTOLIC BLOOD PRESSURE: 58 MMHG

## 2019-08-21 DIAGNOSIS — E78.00 PURE HYPERCHOLESTEROLEMIA: ICD-10-CM

## 2019-08-21 DIAGNOSIS — N40.0 BENIGN PROSTATIC HYPERPLASIA WITHOUT LOWER URINARY TRACT SYMPTOMS: ICD-10-CM

## 2019-08-21 DIAGNOSIS — I25.10 ATHEROSCLEROSIS OF NATIVE CORONARY ARTERY OF NATIVE HEART WITHOUT ANGINA PECTORIS: ICD-10-CM

## 2019-08-21 DIAGNOSIS — L98.9 SKIN LESION OF SCALP: ICD-10-CM

## 2019-08-21 DIAGNOSIS — L29.9 ITCHING: ICD-10-CM

## 2019-08-21 DIAGNOSIS — F34.1 DYSTHYMIA: ICD-10-CM

## 2019-08-21 DIAGNOSIS — I10 ESSENTIAL HYPERTENSION: Primary | ICD-10-CM

## 2019-08-21 DIAGNOSIS — J43.8 OTHER EMPHYSEMA (HCC): ICD-10-CM

## 2019-08-21 DIAGNOSIS — K21.9 GASTROESOPHAGEAL REFLUX DISEASE WITHOUT ESOPHAGITIS: ICD-10-CM

## 2019-08-21 PROCEDURE — G8417 CALC BMI ABV UP PARAM F/U: HCPCS | Performed by: FAMILY MEDICINE

## 2019-08-21 PROCEDURE — 99214 OFFICE O/P EST MOD 30 MIN: CPT | Performed by: FAMILY MEDICINE

## 2019-08-21 PROCEDURE — 1123F ACP DISCUSS/DSCN MKR DOCD: CPT | Performed by: FAMILY MEDICINE

## 2019-08-21 PROCEDURE — 4040F PNEUMOC VAC/ADMIN/RCVD: CPT | Performed by: FAMILY MEDICINE

## 2019-08-21 PROCEDURE — G8926 SPIRO NO PERF OR DOC: HCPCS | Performed by: FAMILY MEDICINE

## 2019-08-21 PROCEDURE — G8598 ASA/ANTIPLAT THER USED: HCPCS | Performed by: FAMILY MEDICINE

## 2019-08-21 PROCEDURE — G8427 DOCREV CUR MEDS BY ELIG CLIN: HCPCS | Performed by: FAMILY MEDICINE

## 2019-08-21 PROCEDURE — 1036F TOBACCO NON-USER: CPT | Performed by: FAMILY MEDICINE

## 2019-08-21 PROCEDURE — 3023F SPIROM DOC REV: CPT | Performed by: FAMILY MEDICINE

## 2019-08-21 ASSESSMENT — PATIENT HEALTH QUESTIONNAIRE - PHQ9
1. LITTLE INTEREST OR PLEASURE IN DOING THINGS: 0
2. FEELING DOWN, DEPRESSED OR HOPELESS: 0
SUM OF ALL RESPONSES TO PHQ QUESTIONS 1-9: 0
SUM OF ALL RESPONSES TO PHQ9 QUESTIONS 1 & 2: 0
SUM OF ALL RESPONSES TO PHQ QUESTIONS 1-9: 0

## 2019-08-21 ASSESSMENT — ENCOUNTER SYMPTOMS
CONSTIPATION: 0
HOARSE VOICE: 0
TROUBLE SWALLOWING: 0
DIFFICULTY BREATHING: 0
BLURRED VISION: 0
EYE DISCHARGE: 0
VOMITING: 0
CHOKING: 0
EYE REDNESS: 0
HEARTBURN: 0
NAUSEA: 0
BLOOD IN STOOL: 0
RHINORRHEA: 0
SORE THROAT: 0
COUGH: 0
HEMOPTYSIS: 0
SHORTNESS OF BREATH: 0
ABDOMINAL PAIN: 0
CHEST TIGHTNESS: 0
DIARRHEA: 0

## 2019-08-21 ASSESSMENT — COPD QUESTIONNAIRES: COPD: 1

## 2019-08-21 NOTE — PATIENT INSTRUCTIONS
SURVEY:    You may be receiving a survey from [employer] regarding your visit today. Please complete the survey to enable us to provide the highest quality of care to you and your family. If you cannot score us a very good on any question, please call the office to discuss how we could of made your experience a very good one. Thank you. DATE: MEDICATIONS TAKEN TODAY? BLOOD PRESSURE READING: HEART RATE/PULSE: BLOOD SUGAR #/FASTING?  COMMENTS

## 2019-08-21 NOTE — PROGRESS NOTES
are equal, round, and reactive to light. Conjunctivae are normal. Right eye exhibits no discharge. Left eye exhibits no discharge. Neck: Neck supple. No thyromegaly present. Cardiovascular: Normal rate, regular rhythm and normal heart sounds. No murmur heard. Pulmonary/Chest: Effort normal and breath sounds normal. No respiratory distress. Abdominal: Soft. Bowel sounds are normal. He exhibits no distension. There is no tenderness. Musculoskeletal: He exhibits no edema. Lymphadenopathy:     He has no cervical adenopathy. Neurological: He is alert and oriented to person, place, and time. Skin: Lesion noted. No bruising, no burn, no ecchymosis, no petechiae and no rash noted. No erythema. A - Rt base of the skull with approx 0.5cm circular prink dry lesion at the hairline. Psychiatric: He has a normal mood and affect. Vitals reviewed.       Vitals:  BP (!) 94/58 (Site: Left Upper Arm, Position: Sitting, Cuff Size: Large Adult)   Pulse (!) 44   Ht 6' 1\" (1.854 m)   Wt 210 lb (95.3 kg)   SpO2 97%   BMI 27.71 kg/m²       Data:     Lab Results   Component Value Date     03/15/2019    K 3.8 03/15/2019     03/15/2019    CO2 21 03/15/2019    BUN 16 03/15/2019    CREATININE 0.98 03/15/2019    GLUCOSE 210 03/15/2019    GLUCOSE 132 02/14/2012    PROT 7.1 03/15/2019    LABALBU 4.3 03/15/2019    LABALBU 4.4 02/14/2012    BILITOT 0.50 03/15/2019    ALKPHOS 62 03/15/2019    AST 45 03/15/2019    ALT 33 03/15/2019     Lab Results   Component Value Date    WBC 5.6 03/15/2019    RBC 4.14 03/15/2019    RBC 4.73 02/14/2012    HGB 13.1 03/15/2019    HCT 39.2 03/15/2019    MCV 94.8 03/15/2019    MCH 31.8 03/15/2019    MCHC 33.5 03/15/2019    RDW 15.7 03/15/2019     03/15/2019     02/14/2012    MPV NOT REPORTED 03/15/2019     Lab Results   Component Value Date    TSH 2.32 03/15/2019     Lab Results   Component Value Date    CHOL 164 11/13/2018    CHOL 136 05/06/2016    HDL 70 LDLCHOLESTEROL 73 11/13/2018    (goal LDL reduction with dx if diabetes is 50% LDL reduction)    PHQ Scores 8/21/2019 2/14/2019 4/10/2018 4/10/2018 6/13/2017 12/9/2015   PHQ2 Score 0 0 0 0 0 0   PHQ9 Score 0 0 0 0 0 0     Interpretation of Total Score Depression Severity: 1-4 = Minimal depression, 5-9 = Mild depression, 10-14 = Moderate depression, 15-19 = Moderately severe depression, 20-27 = Severe depression        Return in about 6 months (around 2/21/2020) for HTN, Hyperlipidemia, COPD, gerd, CAD.       Electronically signed by Adelita Watson MD on 8/21/2019 at 10:43 AM

## 2019-08-22 ENCOUNTER — TELEPHONE (OUTPATIENT)
Dept: FAMILY MEDICINE CLINIC | Age: 79
End: 2019-08-22

## 2019-08-27 RX ORDER — ISOSORBIDE MONONITRATE 30 MG/1
30 TABLET, EXTENDED RELEASE ORAL DAILY
Qty: 30 TABLET | Refills: 11 | Status: SHIPPED | OUTPATIENT
Start: 2019-08-27 | End: 2020-09-29 | Stop reason: SDUPTHER

## 2019-09-03 ENCOUNTER — HOSPITAL ENCOUNTER (OUTPATIENT)
Age: 79
Discharge: HOME | End: 2019-09-03
Payer: MEDICARE

## 2019-09-03 DIAGNOSIS — I25.700 CORONARY ARTERY DISEASE INVOLVING CORONARY BYPASS GRAFT OF NATIVE HEART WITH UNSTABLE ANGINA PECTORIS (HCC): ICD-10-CM

## 2019-09-03 DIAGNOSIS — I10 HYPERTENSION, UNSPECIFIED TYPE: ICD-10-CM

## 2019-09-03 DIAGNOSIS — E11.8 DM TYPE 2, CONTROLLED, WITH COMPLICATION (HCC): ICD-10-CM

## 2019-09-03 DIAGNOSIS — I10 HYPERTENSION, UNSPECIFIED TYPE: Primary | ICD-10-CM

## 2019-09-03 DIAGNOSIS — E55.9 VITAMIN D DEFICIENCY DISEASE: ICD-10-CM

## 2019-09-03 LAB
ABSOLUTE EOS #: 0.2 K/UL (ref 0–0.4)
ABSOLUTE IMMATURE GRANULOCYTE: ABNORMAL K/UL (ref 0–0.3)
ABSOLUTE LYMPH #: 0.7 K/UL (ref 1–4.8)
ABSOLUTE MONO #: 0.4 K/UL (ref 0–1)
ALBUMIN SERPL-MCNC: 4.1 G/DL (ref 3.5–5.2)
ALBUMIN/GLOBULIN RATIO: ABNORMAL (ref 1–2.5)
ALP BLD-CCNC: 64 U/L (ref 40–129)
ALT SERPL-CCNC: 21 U/L (ref 5–41)
ANION GAP SERPL CALCULATED.3IONS-SCNC: 12 MMOL/L (ref 9–17)
AST SERPL-CCNC: 27 U/L
BASOPHILS # BLD: 0 % (ref 0–2)
BASOPHILS ABSOLUTE: 0 K/UL (ref 0–0.2)
BILIRUB SERPL-MCNC: 0.58 MG/DL (ref 0.3–1.2)
BUN BLDV-MCNC: 14 MG/DL (ref 8–23)
BUN/CREAT BLD: 20 (ref 9–20)
CALCIUM SERPL-MCNC: 10 MG/DL (ref 8.6–10.4)
CHLORIDE BLD-SCNC: 100 MMOL/L (ref 98–107)
CHOLESTEROL/HDL RATIO: 2.3
CHOLESTEROL: 164 MG/DL
CO2: 26 MMOL/L (ref 20–31)
CREAT SERPL-MCNC: 0.69 MG/DL (ref 0.7–1.2)
DIFFERENTIAL TYPE: YES
EOSINOPHILS RELATIVE PERCENT: 4 % (ref 0–5)
ESTIMATED AVERAGE GLUCOSE: 163 MG/DL
GFR AFRICAN AMERICAN: >60 ML/MIN
GFR NON-AFRICAN AMERICAN: >60 ML/MIN
GFR SERPL CREATININE-BSD FRML MDRD: ABNORMAL ML/MIN/{1.73_M2}
GFR SERPL CREATININE-BSD FRML MDRD: ABNORMAL ML/MIN/{1.73_M2}
GLUCOSE BLD-MCNC: 185 MG/DL (ref 70–99)
HBA1C MFR BLD: 7.3 % (ref 4.8–5.9)
HCT VFR BLD CALC: 38.1 % (ref 41–53)
HDLC SERPL-MCNC: 72 MG/DL
HEMOGLOBIN: 13 G/DL (ref 13.5–17.5)
IMMATURE GRANULOCYTES: ABNORMAL %
LDL CHOLESTEROL: 64 MG/DL (ref 0–130)
LYMPHOCYTES # BLD: 14 % (ref 13–44)
MAGNESIUM: 2.1 MG/DL (ref 1.6–2.6)
MCH RBC QN AUTO: 32.8 PG (ref 26–34)
MCHC RBC AUTO-ENTMCNC: 34.2 G/DL (ref 31–37)
MCV RBC AUTO: 96.2 FL (ref 80–100)
MONOCYTES # BLD: 8 % (ref 5–9)
NRBC AUTOMATED: ABNORMAL PER 100 WBC
PATIENT FASTING?: YES
PDW BLD-RTO: 15.3 % (ref 12.1–15.2)
PLATELET # BLD: 237 K/UL (ref 140–450)
PLATELET ESTIMATE: ABNORMAL
PMV BLD AUTO: ABNORMAL FL (ref 6–12)
POTASSIUM SERPL-SCNC: 4.3 MMOL/L (ref 3.7–5.3)
RBC # BLD: 3.97 M/UL (ref 4.5–5.9)
RBC # BLD: ABNORMAL 10*6/UL
SEG NEUTROPHILS: 74 % (ref 39–75)
SEGMENTED NEUTROPHILS ABSOLUTE COUNT: 3.9 K/UL (ref 2.1–6.5)
SODIUM BLD-SCNC: 138 MMOL/L (ref 135–144)
TOTAL PROTEIN: 7.5 G/DL (ref 6.4–8.3)
TRIGL SERPL-MCNC: 141 MG/DL
TSH SERPL DL<=0.05 MIU/L-ACNC: 2.72 MIU/L (ref 0.3–5)
VITAMIN D 25-HYDROXY: 41.7 NG/ML (ref 30–100)
VLDLC SERPL CALC-MCNC: NORMAL MG/DL (ref 1–30)
WBC # BLD: 5.2 K/UL (ref 3.5–11)
WBC # BLD: ABNORMAL 10*3/UL

## 2019-09-03 PROCEDURE — 84443 ASSAY THYROID STIM HORMONE: CPT

## 2019-09-03 PROCEDURE — 85025 COMPLETE CBC W/AUTO DIFF WBC: CPT

## 2019-09-03 PROCEDURE — 83036 HEMOGLOBIN GLYCOSYLATED A1C: CPT

## 2019-09-03 PROCEDURE — 80053 COMPREHEN METABOLIC PANEL: CPT

## 2019-09-03 PROCEDURE — 82306 VITAMIN D 25 HYDROXY: CPT

## 2019-09-03 PROCEDURE — 36415 COLL VENOUS BLD VENIPUNCTURE: CPT

## 2019-09-03 PROCEDURE — 93005 ELECTROCARDIOGRAM TRACING: CPT

## 2019-09-03 PROCEDURE — 83735 ASSAY OF MAGNESIUM: CPT

## 2019-09-03 PROCEDURE — 80061 LIPID PANEL: CPT

## 2019-09-05 LAB
EKG ATRIAL RATE: 57 BPM
EKG P AXIS: 10 DEGREES
EKG P-R INTERVAL: 220 MS
EKG Q-T INTERVAL: 466 MS
EKG QRS DURATION: 88 MS
EKG QTC CALCULATION (BAZETT): 453 MS
EKG R AXIS: -6 DEGREES
EKG T AXIS: 38 DEGREES
EKG VENTRICULAR RATE: 57 BPM

## 2019-09-05 PROCEDURE — 93010 ELECTROCARDIOGRAM REPORT: CPT | Performed by: INTERNAL MEDICINE

## 2019-09-12 ENCOUNTER — OFFICE VISIT (OUTPATIENT)
Dept: CARDIOLOGY CLINIC | Age: 79
End: 2019-09-12
Payer: MEDICARE

## 2019-09-12 VITALS
WEIGHT: 213 LBS | DIASTOLIC BLOOD PRESSURE: 60 MMHG | HEART RATE: 60 BPM | BODY MASS INDEX: 28.1 KG/M2 | SYSTOLIC BLOOD PRESSURE: 104 MMHG | OXYGEN SATURATION: 96 %

## 2019-09-12 DIAGNOSIS — I10 HYPERTENSION, UNSPECIFIED TYPE: ICD-10-CM

## 2019-09-12 DIAGNOSIS — E55.9 VITAMIN D DEFICIENCY DISEASE: ICD-10-CM

## 2019-09-12 DIAGNOSIS — E78.00 PURE HYPERCHOLESTEROLEMIA: ICD-10-CM

## 2019-09-12 DIAGNOSIS — I25.700 CORONARY ARTERY DISEASE INVOLVING CORONARY BYPASS GRAFT OF NATIVE HEART WITH UNSTABLE ANGINA PECTORIS (HCC): Primary | ICD-10-CM

## 2019-09-12 DIAGNOSIS — E11.8 DM TYPE 2, CONTROLLED, WITH COMPLICATION (HCC): ICD-10-CM

## 2019-09-12 PROCEDURE — G8417 CALC BMI ABV UP PARAM F/U: HCPCS | Performed by: INTERNAL MEDICINE

## 2019-09-12 PROCEDURE — 99214 OFFICE O/P EST MOD 30 MIN: CPT | Performed by: INTERNAL MEDICINE

## 2019-09-12 PROCEDURE — 1036F TOBACCO NON-USER: CPT | Performed by: INTERNAL MEDICINE

## 2019-09-12 PROCEDURE — G8598 ASA/ANTIPLAT THER USED: HCPCS | Performed by: INTERNAL MEDICINE

## 2019-09-12 PROCEDURE — 1123F ACP DISCUSS/DSCN MKR DOCD: CPT | Performed by: INTERNAL MEDICINE

## 2019-09-12 PROCEDURE — G8427 DOCREV CUR MEDS BY ELIG CLIN: HCPCS | Performed by: INTERNAL MEDICINE

## 2019-09-12 PROCEDURE — 4040F PNEUMOC VAC/ADMIN/RCVD: CPT | Performed by: INTERNAL MEDICINE

## 2019-09-12 NOTE — LETTER
later for a blood pressure check and it was in an acceptable range in the 130 to 140 range. With the Norvasc, his blood pressure was 80 to 90. When I see him today, he seems markedly improved as far as depression. He is interacting and smiling. He states his home situation has improved. The [facility] has approved his home health assistance 2 days a week. They have help with somebody coming in to bathe his wife and help to clean the house, etc.  Also, his relationship with his wife has been somewhat tumultuous, with her having beginning of Alzheimer's and unable to do much activity at home. Their daughter, Olivier Orr, also lives across the street from them and there have been some issues between Olivier Orr and them concerning his wife. However, his wife seems more accepting in that her memory is failing and also the relationship with his daughter has improved. With the help through the [place] twice a week and the improvement in relationships, he states life is quite improved from where it had been 6 months ago. He has had no hospitalizations or procedures since last seeing him. He denies any PND or orthopnea. He had no pedal edema. No syncope or near syncope. He does have arthritis and he is going to the . [address] this coming Tuesday. He also has Nguyen's esophagus and he is having EGD in 1 month by Dr. Clifford Henriquez. He would need to hold his Plavix, aspirin, and cilostazol for 7 days prior to the procedure. I am not sure if this is in [place] or at [facility] in [place]. He has had no chest pain or chest discomfort. No PND or orthopnea. No palpitations. His blood pressure has been good at home. CARDIAC RISK FACTORS:  Known CAD:  Positive. Bypass Surgery:  Positive. Hyperlipidemia:  Positive. Hypertension:  Positive. Other Family Members:  Positive. Smoking:  Negative. Peripheral Vascular Disease:  Positive. MEDICATIONS AT HOME:  He is currently on albuterol inhaler 2 puffs q.i.d.

## 2019-09-13 RX ORDER — CLOPIDOGREL BISULFATE 75 MG/1
75 TABLET ORAL DAILY
Qty: 90 TABLET | Refills: 3 | Status: SHIPPED | OUTPATIENT
Start: 2019-09-13 | End: 2019-10-11 | Stop reason: SDUPTHER

## 2019-09-13 NOTE — TELEPHONE ENCOUNTER
Patient is asking for a refill on Plavix 75 mg    Drug [address] Maintenance   Topic Date Due    DTaP/Tdap/Td vaccine (1 - Tdap) 09/19/1961    Shingles Vaccine (1 of 2) 09/19/1992    Annual Wellness Visit (AWV)  05/29/2019    Flu vaccine (1) 09/01/2019    Pneumococcal 65+ years Vaccine  Completed             (applicable per patient's age: Cancer Screenings, Depression Screening, Fall Risk Screening, Immunizations)    Hemoglobin A1C (%)   Date Value   09/03/2019 7.3 (H)   02/02/2019 7.2 (H)   11/13/2018 6.5 (H)     LDL Cholesterol (mg/dL)   Date Value   09/03/2019 64     LDL Calculated (mg/dL)   Date Value   05/06/2016 78     AST (U/L)   Date Value   09/03/2019 27     ALT (U/L)   Date Value   09/03/2019 21     BUN (mg/dL)   Date Value   09/03/2019 14      (goal A1C is < 7)   (goal LDL is <100) need 30-50% reduction from baseline     BP Readings from Last 3 Encounters:   09/12/19 104/60   08/21/19 (!) 94/58   03/29/19 134/70    (goal /80)      All Future Testing planned in [facility]:  Lab Frequency Next Occurrence   XR ABDOMEN (KUB) (SINGLE AP VIEW) Once 03/12/2020   CBC Auto Differential Once 09/12/2020   Comprehensive Metabolic Panel Once 89/50/0670   Vitamin D 25 Hydroxy Once 09/12/2020   Lipid Panel Once 09/12/2020   TSH with Reflex Once 09/12/2020   Magnesium Once 09/12/2020   EKG 12 Lead Once 09/12/2020   XR CHEST STANDARD (2 VW) Once 09/12/2020   Hemoglobin A1C Once 09/12/2019       Next Visit Date:  Future Appointments   Date Time Provider Esvin Jackie   9/18/2019  8:00 AM Nevelyn Kanner, MD [facility] [place] [facility]   2/20/2020 10:40 AM Nevelyn Kanner, MD [facility]   3/12/2020  1:30 PM John Rahman MD [facility]   3/13/2020 10:00 AM MD Kelsey Means [facility]            Patient Active Problem List:     Pure hypercholesterolemia     Coronary atherosclerosis of native coronary artery     Generalized osteoarthrosis, involving multiple sites     Disturbance of

## 2019-09-18 ENCOUNTER — OFFICE VISIT (OUTPATIENT)
Dept: FAMILY MEDICINE CLINIC | Age: 79
End: 2019-09-18
Payer: MEDICARE

## 2019-09-18 ENCOUNTER — HOSPITAL ENCOUNTER (OUTPATIENT)
Age: 79
Setting detail: SPECIMEN
Discharge: HOME | End: 2019-09-18
Payer: MEDICARE

## 2019-09-18 DIAGNOSIS — L98.9 NON-HEALING SKIN LESION: Primary | ICD-10-CM

## 2019-09-18 DIAGNOSIS — L29.9 ITCHING: ICD-10-CM

## 2019-09-18 PROCEDURE — 88305 TISSUE EXAM BY PATHOLOGIST: CPT

## 2019-09-18 PROCEDURE — 11401 EXC TR-EXT B9+MARG 0.6-1 CM: CPT | Performed by: FAMILY MEDICINE

## 2019-09-23 LAB — DERMATOLOGY PATHOLOGY REPORT: NORMAL

## 2019-09-23 NOTE — PROGRESS NOTES
Constitutional: He appears well-developed and well-nourished. Skin: No rash noted. No erythema. A - Rt base of scalp lesion cleaned with betadine and then used a 15 blade to excise after numbed with 1/2 mL lidocaine with epi. Closed with 4 5-0 prolene sutrues and pt tolerated procedure with minimal blood loss. Vitals reviewed. Vitals: There were no vitals taken for this visit. Data:     Lab Results   Component Value Date     09/03/2019    K 4.3 09/03/2019     09/03/2019    CO2 26 09/03/2019    BUN 14 09/03/2019    CREATININE 0.69 09/03/2019    GLUCOSE 185 09/03/2019    GLUCOSE 132 02/14/2012    PROT 7.5 09/03/2019    LABALBU 4.1 09/03/2019    LABALBU 4.4 02/14/2012    BILITOT 0.58 09/03/2019    ALKPHOS 64 09/03/2019    AST 27 09/03/2019    ALT 21 09/03/2019     Lab Results   Component Value Date    WBC 5.2 09/03/2019    RBC 3.97 09/03/2019    RBC 4.73 02/14/2012    HGB 13.0 09/03/2019    HCT 38.1 09/03/2019    MCV 96.2 09/03/2019    MCH 32.8 09/03/2019    MCHC 34.2 09/03/2019    RDW 15.3 09/03/2019     09/03/2019     02/14/2012    MPV NOT REPORTED 09/03/2019     Lab Results   Component Value Date    TSH 2.72 09/03/2019     Lab Results   Component Value Date    CHOL 164 09/03/2019    CHOL 136 05/06/2016    HDL 72 09/03/2019    LABA1C 7.3 09/03/2019          Assessment/Plan:        1. Non-healing skin lesion  Lesion removed and keep the wound clean and antibxs ointment BID and sutures out in 1wk  - Dermatology Pathology; Future    2. Itching  See #1        Return in about 1 week (around 9/25/2019).       Electronically signed by Marlon Clemons MD on 9/22/2019 at 10:54 PM

## 2019-09-24 NOTE — PROGRESS NOTES
range.    IMPRESSION:  1. Cleared for EGD and withholding his Plavix, aspirin, and Pletal for 7 days prior to procedure. 2.  Coronary artery disease. 3.  Bypass surgery in 1993, with a LIMA to the LAD and 3 vein grafts. 4.  Repeat open heart surgery in 1995, with his vein grafts being occluded, with 3 new grafts placed including the right internal mammary artery to the right coronary artery and a vein graft to the circumflex, and a vein graft to the diagonal.  5.  Catheterization by myself on 07/14/2010, showing patent LIMA to the LAD, patent right internal mammary artery to the right coronary artery, patent vein graft to the circumflex, with occluded vein graft to diagonal, EF of 55%. 6.  Subendocardial myocardial infarction on 05/31/2017, with a catheterization on 06/01/2017 at [facility]. [facility], showing that he had an occluded vein graft to the OM and an occluded native OM, which were the new findings. The other grafts were patent. His EF was 50%. Medical therapy recommended. 7.  Hyperlipidemia, very well controlled. 8.  History of peripheral vascular disease, controlled with Pletal and Plavix. 9.  Non-insulin-dependent diabetes, hemoglobin A1c of 7.3.  10.  History of depression, which has resolved. 11.  History of intention tremor. PLAN:  1. Continue same medications. 2.  See in 6 months for repeat evaluation unless he begins developing anginal-type symptoms, in which case I would see him earlier and adjust medical regimen. DISCUSSION: Mr. Barbie Paige is doing much better both emotionally and physically. His depression has improved due to in part improvement in his home situation and also from his antidepressant medications. He has no chest pain or chest discomfort to indicate any unstable anginal-type symptoms. His blood pressure is in a good range and he is asymptomatic at this time. He is cleared for his EGD and for withholding his Plavix, Pletal and aspirin.     I made no change in

## 2019-09-26 ENCOUNTER — OFFICE VISIT (OUTPATIENT)
Dept: FAMILY MEDICINE CLINIC | Age: 79
End: 2019-09-26

## 2019-09-26 DIAGNOSIS — Z48.02 VISIT FOR SUTURE REMOVAL: Primary | ICD-10-CM

## 2019-09-26 PROCEDURE — 99024 POSTOP FOLLOW-UP VISIT: CPT | Performed by: FAMILY MEDICINE

## 2019-09-26 NOTE — PROGRESS NOTES
OTHER SURGICAL HISTORY      Rt heel spur    OTHER SURGICAL HISTORY      Rt knee scope    UPPER GASTROINTESTINAL ENDOSCOPY  2014      Family History   Problem Relation Age of Onset    Heart Disease Mother     Heart Disease Father     Diabetes Other         Fhx of    Heart Disease Other         Fhx of    Cancer Sister     Heart Disease Brother          Prior to Admission medications    Medication Sig Start Date End Date Taking? Authorizing Provider   clopidogrel (PLAVIX) 75 MG tablet Take 1 tablet by mouth daily 9/13/19  Yes Marcelle Grimes MD   isosorbide mononitrate (IMDUR) 30 MG extended release tablet Take 1 tablet by mouth daily 8/27/19  Yes Marcelle Grimes MD   finasteride (PROSCAR) 5 MG tablet Take 1 tablet by mouth daily 3/14/19  Yes Jyoti Wang MD   cilostazol (PLETAL) 50 MG tablet Take 1 tablet by mouth 2 times daily 3/14/19  Yes yJoti Wang MD   calcium carbonate (OSCAL) 500 MG TABS tablet Take 500 mg by mouth 2 times daily   Yes Historical Provider, MD   lactobacillus (CULTURELLE) capsule Take 1 capsule by mouth 2 times daily (with meals) 2/4/19  Yes Aki Simmons MD   propranolol (INDERAL) 40 MG tablet Take 1 tablet by mouth 2 times daily 12/7/18  Yes Marcelle Grimes MD   sertraline (ZOLOFT) 50 MG tablet Take 50 mg by mouth daily    Yes Historical Provider, MD   tiotropium (SPIRIVA HANDIHALER) 18 MCG inhalation capsule Inhale 1 capsule into the lungs daily 2/26/18  Yes Marcelle Grimes MD   diclofenac sodium 1 % GEL Apply 2 g topically 4 times daily as needed for Pain   Yes Historical Provider, MD   nitroGLYCERIN (NITROSTAT) 0.4 MG SL tablet up to max of 3 total doses.  If no relief after 1 dose, call 911. 10/24/17  Yes Marcelle Grimes MD   aspirin 81 MG tablet Take 81 mg by mouth daily    Yes Historical Provider, MD   rosuvastatin (CRESTOR) 40 MG tablet Take 20 mg by mouth daily   Yes Historical Provider, MD   nitroGLYCERIN (NITROLINGUAL) 0.4 MG/SPRAY 0.4 mg spray Place 1 spray under

## 2019-10-11 RX ORDER — CLOPIDOGREL BISULFATE 75 MG/1
75 TABLET ORAL DAILY
Qty: 90 TABLET | Refills: 3 | Status: SHIPPED | OUTPATIENT
Start: 2019-10-11 | End: 2019-11-01 | Stop reason: SDUPTHER

## 2019-10-17 RX ORDER — PROPRANOLOL HYDROCHLORIDE 40 MG/1
40 TABLET ORAL 2 TIMES DAILY
Qty: 180 TABLET | Refills: 11 | Status: SHIPPED | OUTPATIENT
Start: 2019-10-17 | End: 2019-12-17 | Stop reason: SDUPTHER

## 2019-11-01 RX ORDER — CLOPIDOGREL BISULFATE 75 MG/1
75 TABLET ORAL DAILY
Qty: 90 TABLET | Refills: 3 | Status: SHIPPED | OUTPATIENT
Start: 2019-11-01 | End: 2021-01-20 | Stop reason: SDUPTHER

## 2019-12-17 RX ORDER — PROPRANOLOL HYDROCHLORIDE 40 MG/1
40 TABLET ORAL 2 TIMES DAILY
Qty: 180 TABLET | Refills: 11 | Status: SHIPPED | OUTPATIENT
Start: 2019-12-17 | End: 2019-12-23 | Stop reason: SDUPTHER

## 2019-12-23 RX ORDER — PROPRANOLOL HYDROCHLORIDE 40 MG/1
40 TABLET ORAL 2 TIMES DAILY
Qty: 180 TABLET | Refills: 3 | Status: SHIPPED | OUTPATIENT
Start: 2019-12-23 | End: 2021-04-07 | Stop reason: SDUPTHER

## 2019-12-28 ENCOUNTER — APPOINTMENT (OUTPATIENT)
Dept: GENERAL RADIOLOGY | Age: 79
End: 2019-12-28
Payer: MEDICARE

## 2019-12-28 ENCOUNTER — HOSPITAL ENCOUNTER (EMERGENCY)
Age: 79
Discharge: HOME | End: 2019-12-28
Attending: EMERGENCY MEDICINE
Payer: MEDICARE

## 2019-12-28 VITALS
HEIGHT: 73 IN | BODY MASS INDEX: 27.61 KG/M2 | WEIGHT: 208.3 LBS | SYSTOLIC BLOOD PRESSURE: 131 MMHG | HEART RATE: 70 BPM | OXYGEN SATURATION: 99 % | RESPIRATION RATE: 21 BRPM | TEMPERATURE: 98.1 F | DIASTOLIC BLOOD PRESSURE: 76 MMHG

## 2019-12-28 DIAGNOSIS — N39.0 URINARY TRACT INFECTION WITHOUT HEMATURIA, SITE UNSPECIFIED: ICD-10-CM

## 2019-12-28 DIAGNOSIS — R53.1 GENERALIZED WEAKNESS: Primary | ICD-10-CM

## 2019-12-28 LAB
-: ABNORMAL
ABSOLUTE EOS #: 0.1 K/UL (ref 0–0.4)
ABSOLUTE IMMATURE GRANULOCYTE: ABNORMAL K/UL (ref 0–0.3)
ABSOLUTE LYMPH #: 0.5 K/UL (ref 1–4.8)
ABSOLUTE MONO #: 0.5 K/UL (ref 0–1)
ALBUMIN SERPL-MCNC: 4.2 G/DL (ref 3.5–5.2)
ALBUMIN/GLOBULIN RATIO: ABNORMAL (ref 1–2.5)
ALP BLD-CCNC: 71 U/L (ref 40–129)
ALT SERPL-CCNC: 16 U/L (ref 5–41)
AMORPHOUS: ABNORMAL
ANION GAP SERPL CALCULATED.3IONS-SCNC: 17 MMOL/L (ref 9–17)
AST SERPL-CCNC: 20 U/L
BACTERIA: ABNORMAL
BASOPHILS # BLD: 0 % (ref 0–2)
BASOPHILS ABSOLUTE: 0 K/UL (ref 0–0.2)
BILIRUB SERPL-MCNC: 0.67 MG/DL (ref 0.3–1.2)
BILIRUBIN DIRECT: 0.25 MG/DL
BILIRUBIN URINE: ABNORMAL
BILIRUBIN, INDIRECT: 0.42 MG/DL (ref 0–1)
BNP INTERPRETATION: ABNORMAL
BUN BLDV-MCNC: 13 MG/DL (ref 8–23)
CALCIUM SERPL-MCNC: 9.7 MG/DL (ref 8.6–10.4)
CASTS UA: ABNORMAL /LPF
CHLORIDE BLD-SCNC: 100 MMOL/L (ref 98–107)
CO2: 21 MMOL/L (ref 20–31)
COLOR: ABNORMAL
COMMENT UA: ABNORMAL
CREAT SERPL-MCNC: 0.77 MG/DL (ref 0.7–1.2)
CRYSTALS, UA: ABNORMAL /HPF
DIFFERENTIAL TYPE: YES
EKG ATRIAL RATE: 87 BPM
EKG P AXIS: 27 DEGREES
EKG P-R INTERVAL: 168 MS
EKG Q-T INTERVAL: 412 MS
EKG QRS DURATION: 86 MS
EKG QTC CALCULATION (BAZETT): 495 MS
EKG R AXIS: -13 DEGREES
EKG T AXIS: 30 DEGREES
EKG VENTRICULAR RATE: 87 BPM
EOSINOPHILS RELATIVE PERCENT: 2 % (ref 0–5)
EPITHELIAL CELLS UA: ABNORMAL /HPF
GFR AFRICAN AMERICAN: >60 ML/MIN
GFR NON-AFRICAN AMERICAN: >60 ML/MIN
GFR SERPL CREATININE-BSD FRML MDRD: ABNORMAL ML/MIN/{1.73_M2}
GFR SERPL CREATININE-BSD FRML MDRD: ABNORMAL ML/MIN/{1.73_M2}
GLUCOSE BLD-MCNC: 186 MG/DL (ref 70–99)
GLUCOSE URINE: NEGATIVE
HCT VFR BLD CALC: 38.8 % (ref 41–53)
HEMOGLOBIN: 12.9 G/DL (ref 13.5–17.5)
IMMATURE GRANULOCYTES: ABNORMAL %
KETONES, URINE: ABNORMAL
LEUKOCYTE ESTERASE, URINE: ABNORMAL
LYMPHOCYTES # BLD: 7 % (ref 13–44)
MCH RBC QN AUTO: 32 PG (ref 26–34)
MCHC RBC AUTO-ENTMCNC: 33.3 G/DL (ref 31–37)
MCV RBC AUTO: 96.2 FL (ref 80–100)
MONOCYTES # BLD: 8 % (ref 5–9)
MUCUS: ABNORMAL
NITRITE, URINE: NEGATIVE
NRBC AUTOMATED: ABNORMAL PER 100 WBC
OTHER OBSERVATIONS UA: ABNORMAL
PDW BLD-RTO: 14.8 % (ref 12.1–15.2)
PH UA: 6 (ref 5–8)
PLATELET # BLD: 201 K/UL (ref 140–450)
PLATELET ESTIMATE: ABNORMAL
PMV BLD AUTO: ABNORMAL FL (ref 6–12)
POTASSIUM SERPL-SCNC: 3.9 MMOL/L (ref 3.7–5.3)
PRO-BNP: 328 PG/ML
PROTEIN UA: ABNORMAL
RBC # BLD: 4.03 M/UL (ref 4.5–5.9)
RBC # BLD: ABNORMAL 10*6/UL
RBC UA: ABNORMAL /HPF (ref 0–2)
RENAL EPITHELIAL, UA: ABNORMAL /HPF
SEG NEUTROPHILS: 83 % (ref 39–75)
SEGMENTED NEUTROPHILS ABSOLUTE COUNT: 5.4 K/UL (ref 2.1–6.5)
SODIUM BLD-SCNC: 138 MMOL/L (ref 135–144)
SPECIFIC GRAVITY UA: 1.02 (ref 1–1.03)
TOTAL PROTEIN: 7.4 G/DL (ref 6.4–8.3)
TRICHOMONAS: ABNORMAL
TROPONIN INTERP: NORMAL
TROPONIN INTERP: NORMAL
TROPONIN T: <0.03 NG/ML
TROPONIN T: <0.03 NG/ML
TROPONIN, HIGH SENSITIVITY: NORMAL NG/L (ref 0–22)
TROPONIN, HIGH SENSITIVITY: NORMAL NG/L (ref 0–22)
TURBIDITY: ABNORMAL
URINE HGB: ABNORMAL
UROBILINOGEN, URINE: ABNORMAL
WBC # BLD: 6.4 K/UL (ref 3.5–11)
WBC # BLD: ABNORMAL 10*3/UL
WBC UA: ABNORMAL /HPF
YEAST: ABNORMAL

## 2019-12-28 PROCEDURE — 99285 EMERGENCY DEPT VISIT HI MDM: CPT

## 2019-12-28 PROCEDURE — 87086 URINE CULTURE/COLONY COUNT: CPT

## 2019-12-28 PROCEDURE — 84484 ASSAY OF TROPONIN QUANT: CPT

## 2019-12-28 PROCEDURE — 82248 BILIRUBIN DIRECT: CPT

## 2019-12-28 PROCEDURE — 93005 ELECTROCARDIOGRAM TRACING: CPT | Performed by: EMERGENCY MEDICINE

## 2019-12-28 PROCEDURE — 36415 COLL VENOUS BLD VENIPUNCTURE: CPT

## 2019-12-28 PROCEDURE — 87077 CULTURE AEROBIC IDENTIFY: CPT

## 2019-12-28 PROCEDURE — 87186 SC STD MICRODIL/AGAR DIL: CPT

## 2019-12-28 PROCEDURE — 71046 X-RAY EXAM CHEST 2 VIEWS: CPT

## 2019-12-28 PROCEDURE — 83880 ASSAY OF NATRIURETIC PEPTIDE: CPT

## 2019-12-28 PROCEDURE — 81001 URINALYSIS AUTO W/SCOPE: CPT

## 2019-12-28 PROCEDURE — 80053 COMPREHEN METABOLIC PANEL: CPT

## 2019-12-28 PROCEDURE — 93010 ELECTROCARDIOGRAM REPORT: CPT | Performed by: INTERNAL MEDICINE

## 2019-12-28 PROCEDURE — 85025 COMPLETE CBC W/AUTO DIFF WBC: CPT

## 2019-12-28 RX ORDER — CEPHALEXIN 500 MG/1
500 CAPSULE ORAL 3 TIMES DAILY
Qty: 21 CAPSULE | Refills: 0 | Status: SHIPPED | OUTPATIENT
Start: 2019-12-28 | End: 2020-02-20 | Stop reason: ALTCHOICE

## 2019-12-28 ASSESSMENT — PAIN SCALES - GENERAL: PAINLEVEL_OUTOF10: 1

## 2019-12-28 ASSESSMENT — PAIN DESCRIPTION - DESCRIPTORS: DESCRIPTORS: ACHING

## 2019-12-28 ASSESSMENT — PAIN DESCRIPTION - LOCATION: LOCATION: HEAD

## 2019-12-29 LAB
CULTURE: ABNORMAL
Lab: ABNORMAL
SPECIMEN DESCRIPTION: ABNORMAL

## 2020-02-20 ENCOUNTER — OFFICE VISIT (OUTPATIENT)
Dept: FAMILY MEDICINE CLINIC | Age: 80
End: 2020-02-20
Payer: MEDICARE

## 2020-02-20 VITALS
DIASTOLIC BLOOD PRESSURE: 80 MMHG | SYSTOLIC BLOOD PRESSURE: 120 MMHG | OXYGEN SATURATION: 96 % | BODY MASS INDEX: 28.1 KG/M2 | WEIGHT: 213 LBS | HEART RATE: 56 BPM

## 2020-02-20 PROCEDURE — 4040F PNEUMOC VAC/ADMIN/RCVD: CPT | Performed by: FAMILY MEDICINE

## 2020-02-20 PROCEDURE — G8482 FLU IMMUNIZE ORDER/ADMIN: HCPCS | Performed by: FAMILY MEDICINE

## 2020-02-20 PROCEDURE — 1123F ACP DISCUSS/DSCN MKR DOCD: CPT | Performed by: FAMILY MEDICINE

## 2020-02-20 PROCEDURE — G8926 SPIRO NO PERF OR DOC: HCPCS | Performed by: FAMILY MEDICINE

## 2020-02-20 PROCEDURE — 3023F SPIROM DOC REV: CPT | Performed by: FAMILY MEDICINE

## 2020-02-20 PROCEDURE — G8427 DOCREV CUR MEDS BY ELIG CLIN: HCPCS | Performed by: FAMILY MEDICINE

## 2020-02-20 PROCEDURE — G8417 CALC BMI ABV UP PARAM F/U: HCPCS | Performed by: FAMILY MEDICINE

## 2020-02-20 PROCEDURE — 99214 OFFICE O/P EST MOD 30 MIN: CPT | Performed by: FAMILY MEDICINE

## 2020-02-20 PROCEDURE — 1036F TOBACCO NON-USER: CPT | Performed by: FAMILY MEDICINE

## 2020-02-20 ASSESSMENT — ENCOUNTER SYMPTOMS
SORE THROAT: 0
RHINORRHEA: 0
EYE DISCHARGE: 0
COUGH: 0
SHORTNESS OF BREATH: 0
TROUBLE SWALLOWING: 0
BLOOD IN STOOL: 0
ABDOMINAL PAIN: 0
DIARRHEA: 0
NAUSEA: 0
BLURRED VISION: 0
FACIAL SWELLING: 0
CONSTIPATION: 0
VOMITING: 0
EYE REDNESS: 0

## 2020-02-20 ASSESSMENT — PATIENT HEALTH QUESTIONNAIRE - PHQ9
2. FEELING DOWN, DEPRESSED OR HOPELESS: 0
1. LITTLE INTEREST OR PLEASURE IN DOING THINGS: 0
SUM OF ALL RESPONSES TO PHQ QUESTIONS 1-9: 0
SUM OF ALL RESPONSES TO PHQ QUESTIONS 1-9: 0
SUM OF ALL RESPONSES TO PHQ9 QUESTIONS 1 & 2: 0

## 2020-02-20 ASSESSMENT — COPD QUESTIONNAIRES: COPD: 1

## 2020-02-20 NOTE — PROGRESS NOTES
Hyperlipidemia     Liver disease     Movement disorder     essential tremors    [facility]     [facility] Pneumonia     Rheumatic fever     Rheumatoid arthritis ([facility]     Seizures ([facility] [address] [facility]     jacksonian epilepsy    Type II or unspecified type diabetes mellitus without mention of complication, not stated as uncontrolled       Reviewed all health maintenance requirements and ordered appropriate tests  Health Maintenance Due   Topic Date Due    DTaP/Tdap/Td vaccine (1 - Tdap) 09/19/1953    Hepatitis B vaccine (1 of 3 - Risk 3-dose series) 09/19/1961    Shingles Vaccine (1 of 2) 09/19/1992    Annual Wellness Visit (AWV)  05/29/2019       Past Surgical History:     Past Surgical History:   Procedure Laterality Date    ARTERIAL BYPASS Vincent Jaqueline [address]    Cardiac bypass x2    CARDIAC SURGERY      CHOLECYSTECTOMY      COLONOSCOPY  2014    WNL    CORONARY ARTERY BYPASS GRAFT  [id number]    CYSTOSCOPY Left     lithrotripsy with stent     CYSTOSCOPY Left 5/31/2017    CYSTOSCOPY URETEROSCOPY Vearl Brim performed by Markus Rodas MD at [address] / REMOVAL Alois Bran / Beuford Dach Left 5/31/2017    CYSTOSCOPY STENT INSERTION performed by Marksu Rodas MD at [address] [place], COLON, DIAGNOSTIC     [address] [facility]      x2   [facility]    rt   [address] [facility]    cadaver graft rt thigh    LITHOTRIPSY      OTHER SURGICAL HISTORY      Rt leg artery transplant    OTHER SURGICAL HISTORY      Rt heel spur    OTHER SURGICAL HISTORY      Rt knee scope    UPPER GASTROINTESTINAL ENDOSCOPY  2014        Medications:       Prior to Admission medications    Medication Sig Start Date End Date Taking?  Authorizing Provider   propranolol (INDERAL) 40 MG tablet Take 1 tablet by mouth 2 times daily 12/23/19  Yes Michelle Silva MD   clopidogrel (PLAVIX) 75 MG tablet Take 1 tablet by mouth daily 11/1/19  Yes Michelle Silva MD   finasteride (PROSCAR) 5 MG tablet Take 1 tablet by mouth daily 3/14/19  Yes Kennedi Dempsey MD   cilostazol (PLETAL) 50 MG tablet Take 1 tablet by mouth 2 times daily 3/14/19  Yes Kennedi Dempsey MD   calcium carbonate (OSCAL) 500 MG TABS tablet Take 500 mg by mouth 2 times daily   Yes Historical Provider, MD   lactobacillus (CULTURELLE) capsule Take 1 capsule by mouth 2 times daily (with meals) 2/4/19  Yes Navarro Dominguez MD   sertraline (ZOLOFT) 50 MG tablet Take 50 mg by mouth daily    Yes Historical Provider, MD   tiotropium (Jeanell Prima) 18 MCG inhalation capsule Inhale 1 capsule into the lungs daily 2/26/18  Yes Jimmy Russell MD   diclofenac sodium 1 % GEL Apply 2 g topically 4 times daily as needed for Pain   Yes Historical Provider, MD   aspirin 81 MG tablet Take 81 mg by mouth daily    Yes Historical Provider, MD   rosuvastatin (CRESTOR) 40 MG tablet Take 20 mg by mouth daily   Yes Historical Provider, MD   tamsulosin (FLOMAX) 0.4 MG capsule Take 0.4 mg by mouth daily   Yes Historical Provider, MD   folic acid (FOLVITE) 1 MG tablet Take 1 tablet by mouth daily Daily except on day of taking Methotrexate 9/8/15  Yes Kennedi Dempsey MD   ranitidine (ZANTAC) 150 MG tablet Take 150 mg by mouth nightly    Yes Historical Provider, MD   omeprazole (PRILOSEC) 20 MG capsule Take 20 mg by mouth 2 times daily.    Yes Historical Provider, MD   Cholecalciferol (VITAMIN D) 2000 UNITS CAPS capsule Take 4,000 Units by mouth daily    Yes Historical Provider, MD   albuterol (PROVENTIL HFA;VENTOLIN HFA) 108 (90 BASE) MCG/ACT inhaler Inhale 2 puffs into the lungs 4 times daily as needed for Shortness of Breath    Yes Historical Provider, MD   methotrexate (RHEUMATREX) 2.5 MG chemo tablet Take 2.5 mg by mouth once a week Take 4 tabs once a week on fridays   Yes Historical Provider, MD   isosorbide mononitrate (IMDUR) 30 MG extended release tablet Take 1 tablet by mouth daily 8/27/19   Jimmy Russell MD   nitroGLYCERIN (NITROSTAT) 0.4 MG SL tablet up to max of 3 total doses. If no relief after 1 dose, call 911. 10/24/17   Ayana Ruiz MD   nitroGLYCERIN (NITROLINGUAL) 0.4 MG/SPRAY 0.4 mg spray Place 1 spray under the tongue every 5 minutes as needed for Chest pain 6/2/17   DAVID Barnhart - CNP   glucose blood VI test strips (BL TEST STRIP PACK) strip Tests BS daily and as needed. Historical Provider, MD        Allergies:       Patient has no known allergies. Social History:     Tobacco:    reports that he quit smoking about 39 years ago. His smoking use included cigarettes. He has a 100.00 pack-year smoking history. He has never used smokeless tobacco.  Alcohol:      reports no history of alcohol use. Drug Use:  reports no history of drug use. Family History:     Family History   Problem Relation Age of Onset    Heart Disease Mother     Heart Disease Father     Diabetes Other         Fhx of    Heart Disease Other         Fhx of    Cancer Sister     Heart Disease Brother        Review of Systems:       Review of Systems   Constitutional: Negative for chills, fatigue and fever. HENT: Negative for congestion, facial swelling, rhinorrhea, sore throat and trouble swallowing. Eyes: Negative for blurred vision, discharge, redness and visual disturbance. Respiratory: Negative for cough and shortness of breath. Cardiovascular: Negative for chest pain, palpitations and leg swelling. Gastrointestinal: Negative for abdominal pain, blood in stool, constipation, diarrhea, melena, nausea and vomiting. Genitourinary: Negative for dysuria and hematuria. Musculoskeletal: Negative for joint swelling and neck stiffness. Skin: Negative for pallor and rash. Neurological: Negative for dizziness, tremors, light-headedness and headaches. Psychiatric/Behavioral: Negative for confusion and sleep disturbance. Physical Exam:     Physical Exam  Vitals signs reviewed. Constitutional:       Appearance: He is well-developed.    HENT:      Head: Normocephalic and atraumatic. Nose: No congestion. Mouth/Throat:      Pharynx: No oropharyngeal exudate or posterior oropharyngeal erythema. Eyes:      Conjunctiva/sclera: Conjunctivae normal.      Pupils: Pupils are equal, round, and reactive to light. Neck:      Musculoskeletal: Neck supple. Thyroid: No thyromegaly. Vascular: No carotid bruit. Cardiovascular:      Rate and Rhythm: Normal rate and regular rhythm. Pulses:           Posterior tibial pulses are 2+ on the right side and 2+ on the left side. Heart sounds: No murmur. Pulmonary:      Effort: Pulmonary effort is normal.      Breath sounds: Normal breath sounds. Abdominal:      General: Bowel sounds are normal. There is no distension. Palpations: Abdomen is soft. Tenderness: There is no abdominal tenderness. Lymphadenopathy:      Cervical: No cervical adenopathy. Skin:     Findings: No rash. Neurological:      Mental Status: He is alert and oriented to person, place, and time.    Psychiatric:         Mood and Affect: Mood normal.         Behavior: Behavior normal.         Vitals:  /80   Pulse 56   Wt 213 lb (96.6 kg)   SpO2 96%   BMI 28.10 kg/m²       Data:     Lab Results   Component Value Date     12/28/2019    K 3.9 12/28/2019     12/28/2019    CO2 21 12/28/2019    BUN 13 12/28/2019    CREATININE 0.77 12/28/2019    GLUCOSE 186 12/28/2019    GLUCOSE 132 02/14/2012    PROT 7.4 12/28/2019    LABALBU 4.2 12/28/2019    LABALBU 4.4 02/14/2012    BILITOT 0.67 12/28/2019    ALKPHOS 71 12/28/2019    AST 20 12/28/2019    ALT 16 12/28/2019     Lab Results   Component Value Date    WBC 6.4 12/28/2019    RBC 4.03 12/28/2019    RBC 4.73 02/14/2012    HGB 12.9 12/28/2019    HCT 38.8 12/28/2019    MCV 96.2 12/28/2019    MCH 32.0 12/28/2019    MCHC 33.3 12/28/2019    RDW 14.8 12/28/2019     12/28/2019     02/14/2012    MPV NOT REPORTED 12/28/2019     Lab Results   Component Value

## 2020-03-10 ENCOUNTER — TELEPHONE (OUTPATIENT)
Dept: UROLOGY | Age: 80
End: 2020-03-10

## 2020-03-11 ENCOUNTER — HOSPITAL ENCOUNTER (OUTPATIENT)
Dept: GENERAL RADIOLOGY | Age: 80
Discharge: HOME | End: 2020-03-13
Payer: MEDICARE

## 2020-03-11 ENCOUNTER — HOSPITAL ENCOUNTER (OUTPATIENT)
Age: 80
Discharge: HOME | End: 2020-03-13
Payer: MEDICARE

## 2020-03-11 ENCOUNTER — HOSPITAL ENCOUNTER (OUTPATIENT)
Age: 80
Discharge: HOME | End: 2020-03-11
Payer: MEDICARE

## 2020-03-11 LAB
ABSOLUTE EOS #: 0.2 K/UL (ref 0–0.4)
ABSOLUTE IMMATURE GRANULOCYTE: ABNORMAL K/UL (ref 0–0.3)
ABSOLUTE LYMPH #: 0.8 K/UL (ref 1–4.8)
ABSOLUTE MONO #: 0.5 K/UL (ref 0–1)
ALBUMIN SERPL-MCNC: 4 G/DL (ref 3.5–5.2)
ALBUMIN/GLOBULIN RATIO: ABNORMAL (ref 1–2.5)
ALP BLD-CCNC: 65 U/L (ref 40–129)
ALT SERPL-CCNC: 23 U/L (ref 5–41)
ANION GAP SERPL CALCULATED.3IONS-SCNC: 13 MMOL/L (ref 9–17)
AST SERPL-CCNC: 33 U/L
BASOPHILS # BLD: 0 % (ref 0–2)
BASOPHILS ABSOLUTE: 0 K/UL (ref 0–0.2)
BILIRUB SERPL-MCNC: 0.42 MG/DL (ref 0.3–1.2)
BUN BLDV-MCNC: 14 MG/DL (ref 8–23)
BUN/CREAT BLD: 20 (ref 9–20)
CALCIUM SERPL-MCNC: 9.9 MG/DL (ref 8.6–10.4)
CHLORIDE BLD-SCNC: 103 MMOL/L (ref 98–107)
CHOLESTEROL/HDL RATIO: 2.3
CHOLESTEROL: 160 MG/DL
CO2: 24 MMOL/L (ref 20–31)
CREAT SERPL-MCNC: 0.71 MG/DL (ref 0.7–1.2)
DIFFERENTIAL TYPE: YES
EOSINOPHILS RELATIVE PERCENT: 3 % (ref 0–5)
ESTIMATED AVERAGE GLUCOSE: 177 MG/DL
GFR AFRICAN AMERICAN: >60 ML/MIN
GFR NON-AFRICAN AMERICAN: >60 ML/MIN
GFR SERPL CREATININE-BSD FRML MDRD: ABNORMAL ML/MIN/{1.73_M2}
GFR SERPL CREATININE-BSD FRML MDRD: ABNORMAL ML/MIN/{1.73_M2}
GLUCOSE BLD-MCNC: 186 MG/DL (ref 70–99)
HBA1C MFR BLD: 7.8 % (ref 4.8–5.9)
HCT VFR BLD CALC: 38.8 % (ref 41–53)
HDLC SERPL-MCNC: 71 MG/DL
HEMOGLOBIN: 12.9 G/DL (ref 13.5–17.5)
IMMATURE GRANULOCYTES: ABNORMAL %
LDL CHOLESTEROL: 62 MG/DL (ref 0–130)
LYMPHOCYTES # BLD: 13 % (ref 13–44)
MAGNESIUM: 1.9 MG/DL (ref 1.6–2.6)
MCH RBC QN AUTO: 31.5 PG (ref 26–34)
MCHC RBC AUTO-ENTMCNC: 33.3 G/DL (ref 31–37)
MCV RBC AUTO: 94.8 FL (ref 80–100)
MONOCYTES # BLD: 9 % (ref 5–9)
NRBC AUTOMATED: ABNORMAL PER 100 WBC
PATIENT FASTING?: YES
PDW BLD-RTO: 15.5 % (ref 12.1–15.2)
PLATELET # BLD: 223 K/UL (ref 140–450)
PLATELET ESTIMATE: ABNORMAL
PMV BLD AUTO: ABNORMAL FL (ref 6–12)
POTASSIUM SERPL-SCNC: 4.4 MMOL/L (ref 3.7–5.3)
RBC # BLD: 4.09 M/UL (ref 4.5–5.9)
RBC # BLD: ABNORMAL 10*6/UL
SEG NEUTROPHILS: 75 % (ref 39–75)
SEGMENTED NEUTROPHILS ABSOLUTE COUNT: 4.3 K/UL (ref 2.1–6.5)
SODIUM BLD-SCNC: 140 MMOL/L (ref 135–144)
TOTAL PROTEIN: 7.2 G/DL (ref 6.4–8.3)
TRIGL SERPL-MCNC: 137 MG/DL
TSH SERPL DL<=0.05 MIU/L-ACNC: 1.99 MIU/L (ref 0.3–5)
VITAMIN D 25-HYDROXY: 33.3 NG/ML (ref 30–100)
VLDLC SERPL CALC-MCNC: NORMAL MG/DL (ref 1–30)
WBC # BLD: 5.8 K/UL (ref 3.5–11)
WBC # BLD: ABNORMAL 10*3/UL

## 2020-03-11 PROCEDURE — 83735 ASSAY OF MAGNESIUM: CPT

## 2020-03-11 PROCEDURE — 84443 ASSAY THYROID STIM HORMONE: CPT

## 2020-03-11 PROCEDURE — 85025 COMPLETE CBC W/AUTO DIFF WBC: CPT

## 2020-03-11 PROCEDURE — 93005 ELECTROCARDIOGRAM TRACING: CPT

## 2020-03-11 PROCEDURE — 82306 VITAMIN D 25 HYDROXY: CPT

## 2020-03-11 PROCEDURE — 71046 X-RAY EXAM CHEST 2 VIEWS: CPT

## 2020-03-11 PROCEDURE — 80053 COMPREHEN METABOLIC PANEL: CPT

## 2020-03-11 PROCEDURE — 80061 LIPID PANEL: CPT

## 2020-03-11 PROCEDURE — 83036 HEMOGLOBIN GLYCOSYLATED A1C: CPT

## 2020-03-11 PROCEDURE — 36415 COLL VENOUS BLD VENIPUNCTURE: CPT

## 2020-03-11 PROCEDURE — 74018 RADEX ABDOMEN 1 VIEW: CPT

## 2020-03-12 LAB
EKG ATRIAL RATE: 50 BPM
EKG P AXIS: 64 DEGREES
EKG P-R INTERVAL: 220 MS
EKG Q-T INTERVAL: 476 MS
EKG QRS DURATION: 84 MS
EKG QTC CALCULATION (BAZETT): 433 MS
EKG R AXIS: 0 DEGREES
EKG T AXIS: 49 DEGREES
EKG VENTRICULAR RATE: 50 BPM

## 2020-03-12 PROCEDURE — 93010 ELECTROCARDIOGRAM REPORT: CPT | Performed by: INTERNAL MEDICINE

## 2020-03-24 RX ORDER — CILOSTAZOL 50 MG/1
50 TABLET ORAL 2 TIMES DAILY
Qty: 180 TABLET | Refills: 3 | Status: SHIPPED | OUTPATIENT
Start: 2020-03-24 | End: 2021-04-07 | Stop reason: SDUPTHER

## 2020-04-14 RX ORDER — FINASTERIDE 5 MG/1
5 TABLET, FILM COATED ORAL DAILY
Qty: 90 TABLET | Refills: 1 | Status: SHIPPED | OUTPATIENT
Start: 2020-04-14 | End: 2020-09-29

## 2020-04-25 ENCOUNTER — APPOINTMENT (OUTPATIENT)
Dept: GENERAL RADIOLOGY | Age: 80
End: 2020-04-25
Payer: MEDICARE

## 2020-04-25 ENCOUNTER — HOSPITAL ENCOUNTER (EMERGENCY)
Age: 80
Discharge: HOME | End: 2020-04-25
Attending: FAMILY MEDICINE
Payer: MEDICARE

## 2020-04-25 VITALS
HEIGHT: 73 IN | BODY MASS INDEX: 28.22 KG/M2 | WEIGHT: 212.9 LBS | DIASTOLIC BLOOD PRESSURE: 84 MMHG | RESPIRATION RATE: 18 BRPM | HEART RATE: 58 BPM | SYSTOLIC BLOOD PRESSURE: 150 MMHG | OXYGEN SATURATION: 93 % | TEMPERATURE: 97.5 F

## 2020-04-25 LAB
ABSOLUTE EOS #: 0.1 K/UL (ref 0–0.4)
ABSOLUTE IMMATURE GRANULOCYTE: ABNORMAL K/UL (ref 0–0.3)
ABSOLUTE LYMPH #: 0.6 K/UL (ref 1–4.8)
ABSOLUTE MONO #: 0.4 K/UL (ref 0–1)
ALBUMIN SERPL-MCNC: 4.2 G/DL (ref 3.5–5.2)
ALBUMIN/GLOBULIN RATIO: ABNORMAL (ref 1–2.5)
ALP BLD-CCNC: 68 U/L (ref 40–129)
ALT SERPL-CCNC: 21 U/L (ref 5–41)
ANION GAP SERPL CALCULATED.3IONS-SCNC: 11 MMOL/L (ref 9–17)
AST SERPL-CCNC: 27 U/L
BASOPHILS # BLD: 0 % (ref 0–2)
BASOPHILS ABSOLUTE: 0 K/UL (ref 0–0.2)
BILIRUB SERPL-MCNC: 0.44 MG/DL (ref 0.3–1.2)
BUN BLDV-MCNC: 15 MG/DL (ref 8–23)
BUN/CREAT BLD: 19 (ref 9–20)
CALCIUM SERPL-MCNC: 10 MG/DL (ref 8.6–10.4)
CHLORIDE BLD-SCNC: 104 MMOL/L (ref 98–107)
CO2: 25 MMOL/L (ref 20–31)
CREAT SERPL-MCNC: 0.78 MG/DL (ref 0.7–1.2)
DIFFERENTIAL TYPE: YES
EOSINOPHILS RELATIVE PERCENT: 3 % (ref 0–5)
GFR AFRICAN AMERICAN: >60 ML/MIN
GFR NON-AFRICAN AMERICAN: >60 ML/MIN
GFR SERPL CREATININE-BSD FRML MDRD: ABNORMAL ML/MIN/{1.73_M2}
GFR SERPL CREATININE-BSD FRML MDRD: ABNORMAL ML/MIN/{1.73_M2}
GLUCOSE BLD-MCNC: 241 MG/DL (ref 70–99)
HCT VFR BLD CALC: 37.6 % (ref 41–53)
HEMOGLOBIN: 12.7 G/DL (ref 13.5–17.5)
IMMATURE GRANULOCYTES: ABNORMAL %
INR BLD: 1
LYMPHOCYTES # BLD: 13 % (ref 13–44)
MCH RBC QN AUTO: 32.3 PG (ref 26–34)
MCHC RBC AUTO-ENTMCNC: 33.7 G/DL (ref 31–37)
MCV RBC AUTO: 95.9 FL (ref 80–100)
MONOCYTES # BLD: 10 % (ref 5–9)
NRBC AUTOMATED: ABNORMAL PER 100 WBC
PARTIAL THROMBOPLASTIN TIME: 24.2 SEC (ref 21–33)
PDW BLD-RTO: 15.4 % (ref 12.1–15.2)
PLATELET # BLD: 198 K/UL (ref 140–450)
PLATELET ESTIMATE: ABNORMAL
PMV BLD AUTO: ABNORMAL FL (ref 6–12)
POTASSIUM SERPL-SCNC: 4.2 MMOL/L (ref 3.7–5.3)
PROTHROMBIN TIME: 9.7 SEC (ref 9–11.6)
RBC # BLD: 3.92 M/UL (ref 4.5–5.9)
RBC # BLD: ABNORMAL 10*6/UL
SEG NEUTROPHILS: 74 % (ref 39–75)
SEGMENTED NEUTROPHILS ABSOLUTE COUNT: 3.3 K/UL (ref 2.1–6.5)
SODIUM BLD-SCNC: 140 MMOL/L (ref 135–144)
TOTAL PROTEIN: 7.5 G/DL (ref 6.4–8.3)
WBC # BLD: 4.4 K/UL (ref 3.5–11)
WBC # BLD: ABNORMAL 10*3/UL

## 2020-04-25 PROCEDURE — 99283 EMERGENCY DEPT VISIT LOW MDM: CPT

## 2020-04-25 PROCEDURE — 73564 X-RAY EXAM KNEE 4 OR MORE: CPT

## 2020-04-25 PROCEDURE — 85730 THROMBOPLASTIN TIME PARTIAL: CPT

## 2020-04-25 PROCEDURE — 85025 COMPLETE CBC W/AUTO DIFF WBC: CPT

## 2020-04-25 PROCEDURE — 36415 COLL VENOUS BLD VENIPUNCTURE: CPT

## 2020-04-25 PROCEDURE — 85610 PROTHROMBIN TIME: CPT

## 2020-04-25 PROCEDURE — 73552 X-RAY EXAM OF FEMUR 2/>: CPT

## 2020-04-25 PROCEDURE — 80053 COMPREHEN METABOLIC PANEL: CPT

## 2020-04-25 ASSESSMENT — PAIN DESCRIPTION - ORIENTATION: ORIENTATION: RIGHT

## 2020-04-25 ASSESSMENT — PAIN DESCRIPTION - FREQUENCY: FREQUENCY: CONTINUOUS

## 2020-04-25 ASSESSMENT — PAIN SCALES - GENERAL: PAINLEVEL_OUTOF10: 5

## 2020-04-25 ASSESSMENT — PAIN DESCRIPTION - LOCATION: LOCATION: LEG;KNEE

## 2020-04-27 ENCOUNTER — CARE COORDINATION (OUTPATIENT)
Dept: CARE COORDINATION | Age: 80
End: 2020-04-27

## 2020-04-27 NOTE — CARE COORDINATION
.Patient contacted regarding recent visit for viral symptoms. Pt is doing better and taking motrin for leg pain. Pt declined LOOP, he will follow up with PCP. This Phillip Glover contacted the patient by telephone to perform post discharge call. Verified name and  with patient as identifiers. Provided introduction to self, and reason for call due to viral symptoms of infection and/or exposure to COVID-19. Patient presented to emergency department/flu clinic with complaints of viral symptoms/exposure to COVID. Patient reports symptoms are improving. Due to no new or worsening symptoms the RN CTN/ACM was not notified for escalation. Discussed exposure protocols and quarantine with CDC Guidelines What To Do If You Are Sick    Patient was given an opportunity for questions and concerns. Stay home except to get medical care    Separate yourself from other people and animals in your home    Call ahead before visiting your doctor    Wear a facemask    Cover your coughs and sneezes    Clean your hands often    Avoid sharing personal household items    Clean all high-touch surfaces everyday    Monitor your symptoms  Seek prompt medical attention if your illness is worsening (e.g., difficulty breathing). Before seeking care, call your healthcare provider and tell them that you have, or are being evaluated for, COVID-19. Put on a facemask before you enter the facility. These steps will help the healthcare provider's office to keep other people in the office or waiting room from getting infected or exposed. Ask your healthcare provider to call the local or [facility] health department. Persons who are placed under If you have a medical emergency and need to call 911, notify the dispatch personnel that you have, or are being evaluated for COVID-19. If possible, put on a facemask before emergency medical services arrive.     The patient agrees to contact the [employer] exposure line [phone], [facility] [address]: ([phone]) and PCP office for questions related to their healthcare. Author provided contact information for future reference. Patient/family/caregiver given information for [employer] and agrees to enroll -no, pt does not have smart phone  Patient's preferred e-mail: n/a   Patient's preferred phone number: n/a  Based on Loop alert triggers, patient will be contacted by nurse care manager for worsening symptoms.

## 2020-05-11 ENCOUNTER — CARE COORDINATION (OUTPATIENT)
Dept: CARE COORDINATION | Age: 80
End: 2020-05-11

## 2020-08-24 ENCOUNTER — OFFICE VISIT (OUTPATIENT)
Dept: FAMILY MEDICINE CLINIC | Age: 80
End: 2020-08-24
Payer: MEDICARE

## 2020-08-24 VITALS
OXYGEN SATURATION: 96 % | DIASTOLIC BLOOD PRESSURE: 60 MMHG | SYSTOLIC BLOOD PRESSURE: 90 MMHG | HEART RATE: 58 BPM | BODY MASS INDEX: 27.18 KG/M2 | WEIGHT: 206 LBS

## 2020-08-24 PROCEDURE — 3023F SPIROM DOC REV: CPT | Performed by: FAMILY MEDICINE

## 2020-08-24 PROCEDURE — G8926 SPIRO NO PERF OR DOC: HCPCS | Performed by: FAMILY MEDICINE

## 2020-08-24 PROCEDURE — 1036F TOBACCO NON-USER: CPT | Performed by: FAMILY MEDICINE

## 2020-08-24 PROCEDURE — G8417 CALC BMI ABV UP PARAM F/U: HCPCS | Performed by: FAMILY MEDICINE

## 2020-08-24 PROCEDURE — 99214 OFFICE O/P EST MOD 30 MIN: CPT | Performed by: FAMILY MEDICINE

## 2020-08-24 PROCEDURE — G8427 DOCREV CUR MEDS BY ELIG CLIN: HCPCS | Performed by: FAMILY MEDICINE

## 2020-08-24 PROCEDURE — 1123F ACP DISCUSS/DSCN MKR DOCD: CPT | Performed by: FAMILY MEDICINE

## 2020-08-24 PROCEDURE — 4040F PNEUMOC VAC/ADMIN/RCVD: CPT | Performed by: FAMILY MEDICINE

## 2020-08-24 ASSESSMENT — ENCOUNTER SYMPTOMS
SHORTNESS OF BREATH: 0
CONSTIPATION: 0
NAUSEA: 0
SORE THROAT: 0
EYE DISCHARGE: 0
CHEST TIGHTNESS: 0
FACIAL SWELLING: 0
BLOOD IN STOOL: 0
DIARRHEA: 0
EYE REDNESS: 0
ABDOMINAL PAIN: 0
CHOKING: 0
COUGH: 0
TROUBLE SWALLOWING: 0
VOMITING: 0

## 2020-08-24 ASSESSMENT — COPD QUESTIONNAIRES: COPD: 1

## 2020-08-24 NOTE — PATIENT INSTRUCTIONS
SURVEY:    You may be receiving a survey from [employer] regarding your visit today. Please complete the survey to enable us to provide the highest quality of care to you and your family. If you cannot score us a very good (5 stars) on any question, please call the office to discuss how we could have made your experience a very good one. Thank you.     Clinical Care Team:  MD Irineo Flores LPN    Clerical Team:  Rachele Watkins

## 2020-08-24 NOTE — LETTER
[facility]  [address] [place] [address] Drive [address]  Phone: [phone]  Fax: Mahesh Perry MD        August 24, 2020     Patient: Josr Shaw   YOB: 1940   Date of Visit: 8/24/2020       To Whom It May Concern: It is my medical opinion that Pauline Catherine requires a disability parking placard for the following reasons:  He has limited walking ability due to an arthritic condition. Duration of need: 5 years    If you have any questions or concerns, please don't hesitate to call.     Sincerely,        Nedra Enrique MD

## 2020-08-24 NOTE — PROGRESS NOTES
HPI Notes    Name: Alexander Harrison  : 1940        Chief Complaint:     Chief Complaint   Patient presents with    Hyperlipidemia    Hypertension    COPD     stable on spriva daily    Coronary Artery Disease     Dr Cony Ryan follows Pt    Gastroesophageal Reflux    Benign Prostatic Hypertrophy     Pt see's Dr Freedom Ruiz yearly    Depression     stable on Zoloft daily as directed. History of Present Illness:     Alexander Harrison is a 70 y.o.  male who presents with Hyperlipidemia; Hypertension; COPD (stable on spriva daily); Coronary Artery Disease (Dr Cony Ryan follows Pt); Gastroesophageal Reflux; Benign Prostatic Hypertrophy (Pt see's Dr Freedom Ruiz yearly); and Depression (stable on Zoloft daily as directed.)      Hyperlipidemia   This is a chronic problem. The current episode started more than 1 year ago. The problem is controlled. Recent lipid tests were reviewed and are normal. He has no history of diabetes or hypothyroidism. Pertinent negatives include no chest pain, leg pain or shortness of breath. Current antihyperlipidemic treatment includes statins. The current treatment provides significant improvement of lipids. Risk factors for coronary artery disease include hypertension and dyslipidemia. Hypertension   This is a chronic problem. The current episode started more than 1 year ago. The problem is unchanged. The problem is controlled. Pertinent negatives include no chest pain, headaches, palpitations or shortness of breath. There are no associated agents to hypertension. Risk factors for coronary artery disease include dyslipidemia, male gender and post-menopausal state. The current treatment provides significant improvement. Hypertensive end-organ damage includes CAD/MI. COPD   There is no cough or shortness of breath. This is a chronic problem. The current episode started more than 1 year ago. The problem has been unchanged.  Pertinent negatives include no chest pain, fever, headaches, sore throat, trouble swallowing or weight loss. His past medical history is significant for COPD. Coronary Artery Disease   Presents for follow-up visit. Pertinent negatives include no chest pain, chest pressure, chest tightness, dizziness, palpitations, shortness of breath or weight gain. Risk factors include hyperlipidemia. Compliance with diet is good. Compliance with exercise is variable. Compliance with medications is good. Gastroesophageal Reflux   He reports no abdominal pain, no chest pain, no choking, no coughing, no dysphagia, no nausea or no sore throat. This is a chronic problem. The current episode started more than 1 year ago. The problem has been unchanged. Pertinent negatives include no fatigue, melena or weight loss. He has tried nothing for the symptoms. The treatment provided significant relief. Benign Prostatic Hypertrophy   This is a chronic problem. The current episode started more than 1 year ago. The problem is unchanged. Irritative symptoms include frequency. Obstructive symptoms include a weak stream. Pertinent negatives include no chills, dysuria, hematuria, nausea or vomiting. Past treatments include tamsulosin and finasteride. The treatment provided significant relief. Depression - chronic but stable on the zoloft daily. Doing well and no concerns.      Past Medical History:     Past Medical History:   Diagnosis Date    Nguyen syndrome     [facility]    Blood circulation, collateral     BPH (benign prostatic hyperplasia)     CAD (coronary artery disease)     Dr Marisa Armendariz COPD (chronic obstructive pulmonary disease) ([facility] [address]     Depression     Hx of colonoscopy     Hyperlipidemia     Liver disease     Movement disorder     essential tremors    Osteoarthritis     Specialist at [place]    Pneumonia     Rheumatic fever     Rheumatoid arthritis ([facility] [facility]     Seizures ([facility] [address]     larissa epilepsy    Type II or unspecified type diabetes mellitus without mention of complication, not stated as uncontrolled       Reviewed all health maintenance requirements and ordered appropriate tests  Health Maintenance Due   Topic Date Due    DTaP/Tdap/Td vaccine (1 - Tdap) 09/19/1961    Shingles Vaccine (1 of 2) 09/19/1992    Annual Wellness Visit (AWV)  05/29/2019       Past Surgical History:     Past Surgical History:   Procedure Laterality Date    ARTERIAL BYPASS Vincent Parsons [address]    Cardiac bypass x2    CARDIAC SURGERY      CHOLECYSTECTOMY      COLONOSCOPY  2014    WNL    CORONARY ARTERY BYPASS GRAFT  [id number]    CYSTOSCOPY Left     lithrotripsy with stent     CYSTOSCOPY Left 5/31/2017    CYSTOSCOPY URETEROSCOPY LASER-WITH HLL performed by Griselda Benoit MD at [address] / REMOVAL Yamilet Sree / Rhona Free Left 5/31/2017    CYSTOSCOPY STENT INSERTION performed by Griselda Benoit MD at [facility], COLON, DIAGNOSTIC     [address] [facility]      x2   [facility]    rt   [address] [facility]    cadaver graft rt thigh    LITHOTRIPSY      OTHER SURGICAL HISTORY      Rt leg artery transplant    OTHER SURGICAL HISTORY      Rt heel spur    OTHER SURGICAL HISTORY      Rt knee scope    UPPER GASTROINTESTINAL ENDOSCOPY  2014        Medications:       Prior to Admission medications    Medication Sig Start Date End Date Taking?  Authorizing Provider   finasteride (PROSCAR) 5 MG tablet Take 1 tablet by mouth daily 4/14/20  Yes Vadim Enciso MD   cilostazol (PLETAL) 50 MG tablet Take 1 tablet by mouth 2 times daily 3/24/20  Yes Eva Eastman MD   propranolol (INDERAL) 40 MG tablet Take 1 tablet by mouth 2 times daily 12/23/19  Yes Eva Eastman MD   clopidogrel (PLAVIX) 75 MG tablet Take 1 tablet by mouth daily 11/1/19  Yes Eva Eastman MD   isosorbide mononitrate (IMDUR) 30 MG extended release tablet Take 1 tablet by mouth daily 8/27/19  Yes Eva Eastman MD   calcium carbonate (OSCAL) 500 MG TABS tablet Take 500 mg by mouth 2 times daily   Yes Historical Provider, MD lactobacillus (CULTURELLE) capsule Take 1 capsule by mouth 2 times daily (with meals) 2/4/19  Yes Antionette Booth MD   sertraline (ZOLOFT) 50 MG tablet Take 50 mg by mouth daily    Yes Historical Provider, MD   tiotropium (Donavan Marquise) 18 MCG inhalation capsule Inhale 1 capsule into the lungs daily 2/26/18  Yes Gaudencio Henry MD   diclofenac sodium 1 % GEL Apply 2 g topically 4 times daily as needed for Pain   Yes Historical Provider, MD   aspirin 81 MG tablet Take 81 mg by mouth daily    Yes Historical Provider, MD   rosuvastatin (CRESTOR) 40 MG tablet Take 20 mg by mouth daily   Yes Historical Provider, MD   tamsulosin (FLOMAX) 0.4 MG capsule Take 0.4 mg by mouth daily   Yes Historical Provider, MD   folic acid (FOLVITE) 1 MG tablet Take 1 tablet by mouth daily Daily except on day of taking Methotrexate 9/8/15  Yes Benetta Aase, MD   omeprazole (PRILOSEC) 20 MG capsule Take 20 mg by mouth 2 times daily. Yes Historical Provider, MD   Cholecalciferol (VITAMIN D) 2000 UNITS CAPS capsule Take 4,000 Units by mouth daily    Yes Historical Provider, MD   albuterol (PROVENTIL HFA;VENTOLIN HFA) 108 (90 BASE) MCG/ACT inhaler Inhale 2 puffs into the lungs 4 times daily as needed for Shortness of Breath    Yes Historical Provider, MD   methotrexate (RHEUMATREX) 2.5 MG chemo tablet Take 2.5 mg by mouth once a week Take 4 tabs once a week on fridays   Yes Historical Provider, MD   nitroGLYCERIN (NITROSTAT) 0.4 MG SL tablet up to max of 3 total doses. If no relief after 1 dose, call 911. 10/24/17   Gaudencio Henry MD   nitroGLYCERIN (NITROLINGUAL) 0.4 MG/SPRAY 0.4 mg spray Place 1 spray under the tongue every 5 minutes as needed for Chest pain 6/2/17   DAVID Knapp - CNP   glucose blood VI test strips (BL TEST STRIP PACK) strip Tests BS daily and as needed. Historical Provider, MD        Allergies:       Patient has no known allergies.     Social History:     Tobacco:    reports that he quit Heart sounds: Normal heart sounds. No murmur. Pulmonary:      Effort: Pulmonary effort is normal.      Breath sounds: Normal breath sounds. Abdominal:      General: Bowel sounds are normal. There is no distension. Palpations: Abdomen is soft. Tenderness: There is no abdominal tenderness. Musculoskeletal:      Right lower leg: No edema. Left lower leg: No edema. Lymphadenopathy:      Cervical: No cervical adenopathy. Skin:     Findings: No erythema or rash. Neurological:      Mental Status: He is alert and oriented to person, place, and time. Psychiatric:         Mood and Affect: Mood normal.         Behavior: Behavior normal.         Vitals:  BP 90/60 (Site: Left Upper Arm)   Pulse 58   Wt 206 lb (93.4 kg)   SpO2 96%   BMI 27.18 kg/m²       Data:     Lab Results   Component Value Date     04/25/2020    K 4.2 04/25/2020     04/25/2020    CO2 25 04/25/2020    BUN 15 04/25/2020    CREATININE 0.78 04/25/2020    GLUCOSE 241 04/25/2020    GLUCOSE 132 02/14/2012    PROT 7.5 04/25/2020    LABALBU 4.2 04/25/2020    LABALBU 4.4 02/14/2012    BILITOT 0.44 04/25/2020    ALKPHOS 68 04/25/2020    AST 27 04/25/2020    ALT 21 04/25/2020     Lab Results   Component Value Date    WBC 4.4 04/25/2020    RBC 3.92 04/25/2020    RBC 4.73 02/14/2012    HGB 12.7 04/25/2020    HCT 37.6 04/25/2020    MCV 95.9 04/25/2020    MCH 32.3 04/25/2020    MCHC 33.7 04/25/2020    RDW 15.4 04/25/2020     04/25/2020     02/14/2012    MPV NOT REPORTED 04/25/2020     Lab Results   Component Value Date    TSH 1.99 03/11/2020     Lab Results   Component Value Date    CHOL 160 03/11/2020    CHOL 136 05/06/2016    HDL 71 03/11/2020    LABA1C 7.8 03/11/2020          Assessment/Plan:        1. Pure hypercholesterolemia  Stable on crestor and labs yearly    2. Essential hypertension  Stable on inderal     3. Other emphysema (HCC)  Stable on spiriva    4.  Atherosclerosis of native coronary artery of native heart without angina pectoris  Stable on pletal, plavix and imdur    5. Gastroesophageal reflux disease without esophagitis  Stable on protonix    6. Benign prostatic hyperplasia without lower urinary tract symptoms  Stable on flomax and proscar    7. Dysthymia  Stable on zoloft        Merle received counseling on the following healthy behaviors: nutrition and exercise  Reviewed prior labs and health maintenance  Continue current medications, diet and exercise. Discussed use, benefit, and side effects of prescribed medications. Barriers to medication compliance addressed. Patient given educational materials - see patient instructions  Was a self-tracking handout given in paper form or via [employer]? Yes    Requested Prescriptions      No prescriptions requested or ordered in this encounter       All patient questions answered. Patient voiced understanding. Quality Measures    Body mass index is 27.18 kg/m². Elevated. Weight control planned discussed Healthy diet and regular exercise. BP: 90/60. Blood pressure is normal. Treatment plan consists of No treatment change needed. Fall Risk 8/24/2020 8/21/2019 4/10/2018 4/10/2018 6/13/2017 6/9/2016 12/9/2015   2 or more falls in past year? no no no no no no no   Fall with injury in past year? no no no no no no no     The patient does not have a history of falls. I did not - not indicated , complete a risk assessment for falls.  A plan of care for falls No Treatment plan indicated    Lab Results   Component Value Date    LDLCALC 78 05/06/2016    LDLCHOLESTEROL 62 03/11/2020    (goal LDL reduction with dx if diabetes is 50% LDL reduction)    PHQ Scores 2/20/2020 8/21/2019 2/14/2019 4/10/2018 4/10/2018 6/13/2017 12/9/2015   PHQ2 Score 0 0 0 0 0 0 0   PHQ9 Score 0 0 0 0 0 0 0     Interpretation of Total Score Depression Severity: 1-4 = Minimal depression, 5-9 = Mild depression, 10-14 = Moderate depression, 15-19 = Moderately severe depression, 20-27 = Severe depression        Return in about 6 months (around 2/24/2021) for Hyperlipidemia, HTN, gerd, COPD, BPH.       Electronically signed by Lucas Child MD on 8/24/2020 at 12:03 PM

## 2020-08-28 ENCOUNTER — OFFICE VISIT (OUTPATIENT)
Dept: FAMILY MEDICINE CLINIC | Age: 80
End: 2020-08-28
Payer: MEDICARE

## 2020-08-28 VITALS
SYSTOLIC BLOOD PRESSURE: 110 MMHG | HEIGHT: 72 IN | BODY MASS INDEX: 28.04 KG/M2 | HEART RATE: 58 BPM | DIASTOLIC BLOOD PRESSURE: 70 MMHG | WEIGHT: 207 LBS | OXYGEN SATURATION: 99 %

## 2020-08-28 PROCEDURE — G0438 PPPS, INITIAL VISIT: HCPCS | Performed by: FAMILY MEDICINE

## 2020-08-28 PROCEDURE — 1123F ACP DISCUSS/DSCN MKR DOCD: CPT | Performed by: FAMILY MEDICINE

## 2020-08-28 PROCEDURE — 4040F PNEUMOC VAC/ADMIN/RCVD: CPT | Performed by: FAMILY MEDICINE

## 2020-08-28 ASSESSMENT — PATIENT HEALTH QUESTIONNAIRE - PHQ9
SUM OF ALL RESPONSES TO PHQ QUESTIONS 1-9: 0
SUM OF ALL RESPONSES TO PHQ QUESTIONS 1-9: 0

## 2020-08-28 NOTE — PATIENT INSTRUCTIONS
SURVEY:    You may be receiving a survey from [email or web address] regarding your visit today. Please complete the survey to enable us to provide the highest quality of care to you and your family. If you cannot score us a very good on any question, please call the office to discuss how we could of made your experience a very good one. Thank you.

## 2020-08-28 NOTE — PROGRESS NOTES
Medicare Annual Wellness Visit       Swati Pickens  YOB: 1940    Date of Service:  8/28/2020    Patient Active Problem List   Diagnosis    Pure hypercholesterolemia    Coronary atherosclerosis of native coronary artery    Generalized osteoarthrosis, involving multiple sites    Disturbance of skin sensation    Incisional hernia    Diverticulosis of large intestine    Other extrapyramidal disease and abnormal movement disorder    COPD (chronic obstructive pulmonary disease) (HCC)    Rheumatic fever    DM type 2, controlled, with complication ([facility]    Nguyen's esophagus with low grade dysplasia    Ureteral stone with hydronephrosis    Acute cystitis without hematuria    Ischemic chest pain ([facility]    NSTEMI (non-ST elevated myocardial infarction) ([facility]    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 (HCC)    Hematuria    SOB (shortness of breath)    Renal stone    Nocturia    BPH with obstruction/lower urinary tract symptoms    Cystitis       No Known Allergies  Outpatient Medications Marked as Taking for the 8/28/20 encounter (Office Visit) with Javon Melendrez MD   Medication Sig Dispense Refill    finasteride (PROSCAR) 5 MG tablet Take 1 tablet by mouth daily 90 tablet 1    cilostazol (PLETAL) 50 MG tablet Take 1 tablet by mouth 2 times daily 180 tablet 3    propranolol (INDERAL) 40 MG tablet Take 1 tablet by mouth 2 times daily 180 tablet 3    clopidogrel (PLAVIX) 75 MG tablet Take 1 tablet by mouth daily 90 tablet 3    isosorbide mononitrate (IMDUR) 30 MG extended release tablet Take 1 tablet by mouth daily 30 tablet 11    calcium carbonate (OSCAL) 500 MG TABS tablet Take 500 mg by mouth 2 times daily      lactobacillus (CULTURELLE) capsule Take 1 capsule by mouth 2 times daily (with meals) 30 capsule 0    sertraline (ZOLOFT) 50 MG tablet Take 50 mg by mouth daily       tiotropium (SPIRIVA HANDIHALER) 18 MCG inhalation capsule Inhale 1 capsule into the lungs daily 90 capsule 3    diclofenac sodium 1 % GEL Apply 2 g topically 4 times daily as needed for Pain      nitroGLYCERIN (NITROSTAT) 0.4 MG SL tablet up to max of 3 total doses. If no relief after 1 dose, call 911. 25 tablet 3    aspirin 81 MG tablet Take 81 mg by mouth daily       rosuvastatin (CRESTOR) 40 MG tablet Take 20 mg by mouth daily      nitroGLYCERIN (NITROLINGUAL) 0.4 MG/SPRAY 0.4 mg spray Place 1 spray under the tongue every 5 minutes as needed for Chest pain 1 Bottle 3    tamsulosin (FLOMAX) 0.4 MG capsule Take 0.4 mg by mouth daily      folic acid (FOLVITE) 1 MG tablet Take 1 tablet by mouth daily Daily except on day of taking Methotrexate 90 tablet 1    omeprazole (PRILOSEC) 20 MG capsule Take 20 mg by mouth 2 times daily.  Cholecalciferol (VITAMIN D) 2000 UNITS CAPS capsule Take 4,000 Units by mouth daily       albuterol (PROVENTIL HFA;VENTOLIN HFA) 108 (90 BASE) MCG/ACT inhaler Inhale 2 puffs into the lungs 4 times daily as needed for Shortness of Breath       methotrexate (RHEUMATREX) 2.5 MG chemo tablet Take 2.5 mg by mouth once a week Take 4 tabs once a week on fridays      glucose blood VI test strips (BL TEST STRIP PACK) strip Tests BS daily and as needed.           Past Medical History:   Diagnosis Date    [facility]     [facility]    Blood circulation, collateral     BPH (benign prostatic hyperplasia)     CAD (coronary artery disease)     Dr Huber Alonso COPD (chronic obstructive pulmonary disease) ([facility]     Depression     Hx of colonoscopy     Hyperlipidemia     Liver disease     Movement disorder     essential tremors    Osteoarthritis     [facility]    Pneumonia     Rheumatic fever     Rheumatoid arthritis ([address]     Seizures ([facility]     jacksonian epilepsy    Type II or unspecified type diabetes mellitus without mention of complication, not stated as uncontrolled      Past Surgical History: Minutes per session: Not on file    Stress: Not on file   Relationships    Social connections     Talks on phone: Not on file     Gets together: Not on file     Attends Buddhist service: Not on file     Active member of club or organization: Not on file     Attends meetings of clubs or organizations: Not on file     Relationship status: Not on file    Intimate partner violence     Fear of current or ex partner: Not on file     Emotionally abused: Not on file     Physically abused: Not on file     Forced sexual activity: Not on file   Other Topics Concern    Not on file   Social History Narrative    Not on file       Consultants:   Patient Care Team:  Vicky Farmer MD as PCP - Stephon Guerrero MD as PCP - [facility] Empaneled Provider  Tyrone Paredes MD as Consulting Physician (Cardiology)  [facility]    Wt Readings from Last 3 Encounters:   08/28/20 207 lb (93.9 kg)   08/24/20 206 lb (93.4 kg)   04/25/20 212 lb 14.4 oz (96.6 kg)     BP Readings from Last 3 Encounters:   08/28/20 110/70   08/24/20 90/60   04/25/20 (!) 150/84       Pertinent Physical Exam    Vitals:    08/28/20 1120   BP: 110/70   Pulse: 58   SpO2: 99%   Weight: 207 lb (93.9 kg)   Height: 6' (1.829 m)     Body mass index is 28.07 kg/m².      ROS (information related to health maintenance and screening)  10 systems reviewed and all normal    Physical Exam (Minimal exam needed for wellness visit)  General Appearance: alert and oriented to person, place and time, well-developed and well-nourished, in no acute distress  Skin: warm and dry, no rash or erythema  Head: normocephalic and atraumatic  Eyes: pupils equal, conjunctivae normal  ENT: bilateral tympanic membrane normal, bilateral external ear normal, bilateral ear canal normal and hearing grossly normal bilaterally  Neck: neck supple and non tender without mass, no thyromegaly, no cervical lymphadenopathy   Pulmonary/Chest: clear to auscultation bilaterally - no wheezes, rales or rhonchi, normal air movement  Cardiovascular: normal rate, regular rhythm, no murmurs and no carotid bruits  Abdomen: soft, non-tender, non-distended, normal bowels sounds, and no masses  Extremities: no erythema, swelling or tenderness of extremities  Musculoskeletal: normal range of motion, no joint swelling or tenderness  Neurologic: no cranial nerve deficit, gait and coordination normal and speech normal      Current Health Maintenance Status  Health Maintenance   Topic Date Due    DTaP/Tdap/Td vaccine (1 - Tdap) 09/19/1961    Shingles Vaccine (1 of 2) 09/19/1992    Annual Wellness Visit (AWV)  05/29/2019    Flu vaccine (1) 09/01/2020    Lipid screen  03/11/2021    Pneumococcal 65+ years Vaccine  Completed    Hepatitis A vaccine  Aged Out    Hib vaccine  Aged Out    Meningococcal (ACWY) vaccine  Aged [employer] AWV Workflow and Risk Assessment    Review Medicare Health Risk Assessment form completed by patient  Document vitals, height included (3 vital signs minimum)      Update Allergies and Medications    complete  Update Family and Social History (HRA #2-6)    complete  Update Health Maintenance (HRA #7-13)    complete  Update Vaccines in Historical Immunizations (HRA #9-10)    incomplete  Update Patient Care Team- in [place] ([place])     complete  Has patient seen an eye specialist within the past 2 years (HRA #7)? yes     Has patient seen a dentist within the past year (HRA #11)? yes    Medical Suppliers Used (diabetic supplies, 02, medical equipment, etc.) (HRA #14):  none    End of Life Planning (HRA # 15): Advanced Directive:  yes,   copy requested      Health Risk Assessment   Health Risk Assessment form completed by patient, reviewed and scanned into chart. HRA interpretation guide- enter risks identified through screenings into table below    1. Behavioral Risks:    Tobacco:  If patient responded \"yes\" to HRA question #6, screen is positive. Result - negative  Alcohol use:   Add INTERPRETATION: 1 or 2 with normal CDT- Negative for cognitive impairment     Living situation:  If patient responded \"lives alone\" to HRA question #23, screen is positive. Result - negative  Hearing: If patient responded \"yes\" to HRA question #25, screen is positive. Result - negative  Safety:  If patient responded \"no\" to any of the HRA questions #26-31, screen is positive. Result - negative-- only one throw rug in kitchen  ADLs:  If patient responded \"yes\" to HRA questions #32 or #33, screen is positive. Result - negative  Fall risk:  Per MA note, If timed Get Up & Go test unsteady or longer than 20 seconds, or falls reported per HRA question #34, screen is positive. Complete outpatient fall risk assessment in document flow sheet Result - negative     Vision: (corrected vision)    Left eye  Right eye  Both eyes      20/    20/    20/       SAFETY RISKS IDENTIFIED:   None identified      Scan HRA form into media section of chart. Perform Vision Screening at Visit not performed      Personalized Preventive Plan   This plan is provided to the patient in written form.        Preventive plan of care for Basia Reason        8/28/2020           Preventive Measures Status       Recommendations for screening   Prostate Cancer Screen  No results found for: PSA  Screening covered annually  This test is not clinically indicated    Colon Cancer Screen  Last colonoscopy: unknown Screening covered every 10 years  This test is not clinically indicated   Diabetes Screen  Glucose (mg/dL)   Date Value   04/25/2020 241 (H)   02/14/2012 132 (H)    Screening covered annually, every 6 months if pre-diabetic  Repeat yearly   Cholesterol Screen  Lab Results   Component Value Date    CHOL 160 03/11/2020    TRIG 137 03/11/2020    HDL 71 03/11/2020    LDLCALC 78 05/06/2016    LDLCHOLESTEROL 62 03/11/2020    Screening covered every 5 years   Repeat yearly   Abdominal Aneurysm Screen  No Screening covered once between the

## 2020-09-29 RX ORDER — FINASTERIDE 5 MG/1
5 TABLET, FILM COATED ORAL DAILY
Qty: 90 TABLET | Refills: 1 | Status: SHIPPED | OUTPATIENT
Start: 2020-09-29 | End: 2020-10-12 | Stop reason: SDUPTHER

## 2020-09-29 RX ORDER — ISOSORBIDE MONONITRATE 30 MG/1
30 TABLET, EXTENDED RELEASE ORAL DAILY
Qty: 90 TABLET | Refills: 3 | Status: SHIPPED | OUTPATIENT
Start: 2020-09-29 | End: 2021-10-06 | Stop reason: ALTCHOICE

## 2020-10-12 RX ORDER — FINASTERIDE 5 MG/1
5 TABLET, FILM COATED ORAL DAILY
Qty: 90 TABLET | Refills: 1 | Status: SHIPPED | OUTPATIENT
Start: 2020-10-12

## 2021-01-20 RX ORDER — CLOPIDOGREL BISULFATE 75 MG/1
75 TABLET ORAL DAILY
Qty: 90 TABLET | Refills: 3 | Status: SHIPPED | OUTPATIENT
Start: 2021-01-20 | End: 2021-07-26 | Stop reason: ALTCHOICE

## 2021-02-17 ENCOUNTER — OFFICE VISIT (OUTPATIENT)
Dept: FAMILY MEDICINE CLINIC | Age: 81
End: 2021-02-17
Payer: MEDICARE

## 2021-02-17 VITALS
SYSTOLIC BLOOD PRESSURE: 100 MMHG | HEART RATE: 66 BPM | OXYGEN SATURATION: 98 % | WEIGHT: 205 LBS | DIASTOLIC BLOOD PRESSURE: 64 MMHG | BODY MASS INDEX: 27.77 KG/M2 | HEIGHT: 72 IN

## 2021-02-17 DIAGNOSIS — E78.00 PURE HYPERCHOLESTEROLEMIA: ICD-10-CM

## 2021-02-17 DIAGNOSIS — F34.1 DYSTHYMIA: ICD-10-CM

## 2021-02-17 DIAGNOSIS — J43.8 OTHER EMPHYSEMA (HCC): ICD-10-CM

## 2021-02-17 DIAGNOSIS — I10 ESSENTIAL HYPERTENSION: Primary | ICD-10-CM

## 2021-02-17 DIAGNOSIS — S29.019A THORACIC MYOFASCIAL STRAIN, INITIAL ENCOUNTER: ICD-10-CM

## 2021-02-17 DIAGNOSIS — I25.10 ATHEROSCLEROSIS OF NATIVE CORONARY ARTERY OF NATIVE HEART WITHOUT ANGINA PECTORIS: ICD-10-CM

## 2021-02-17 DIAGNOSIS — K21.9 GASTROESOPHAGEAL REFLUX DISEASE WITHOUT ESOPHAGITIS: ICD-10-CM

## 2021-02-17 DIAGNOSIS — N40.0 BENIGN PROSTATIC HYPERPLASIA WITHOUT LOWER URINARY TRACT SYMPTOMS: ICD-10-CM

## 2021-02-17 PROCEDURE — G8926 SPIRO NO PERF OR DOC: HCPCS | Performed by: FAMILY MEDICINE

## 2021-02-17 PROCEDURE — 3023F SPIROM DOC REV: CPT | Performed by: FAMILY MEDICINE

## 2021-02-17 PROCEDURE — G8417 CALC BMI ABV UP PARAM F/U: HCPCS | Performed by: FAMILY MEDICINE

## 2021-02-17 PROCEDURE — G8484 FLU IMMUNIZE NO ADMIN: HCPCS | Performed by: FAMILY MEDICINE

## 2021-02-17 PROCEDURE — 4040F PNEUMOC VAC/ADMIN/RCVD: CPT | Performed by: FAMILY MEDICINE

## 2021-02-17 PROCEDURE — 1036F TOBACCO NON-USER: CPT | Performed by: FAMILY MEDICINE

## 2021-02-17 PROCEDURE — 99214 OFFICE O/P EST MOD 30 MIN: CPT | Performed by: FAMILY MEDICINE

## 2021-02-17 PROCEDURE — 1123F ACP DISCUSS/DSCN MKR DOCD: CPT | Performed by: FAMILY MEDICINE

## 2021-02-17 PROCEDURE — G8427 DOCREV CUR MEDS BY ELIG CLIN: HCPCS | Performed by: FAMILY MEDICINE

## 2021-02-17 ASSESSMENT — COPD QUESTIONNAIRES: COPD: 1

## 2021-02-17 ASSESSMENT — ENCOUNTER SYMPTOMS
VOMITING: 0
BACK PAIN: 1
DIARRHEA: 0
EYE DISCHARGE: 0
BLOOD IN STOOL: 0
EYE REDNESS: 0
SHORTNESS OF BREATH: 0
NAUSEA: 0
BOWEL INCONTINENCE: 0
COUGH: 0
CONSTIPATION: 0
TROUBLE SWALLOWING: 0
FACIAL SWELLING: 0
ABDOMINAL PAIN: 0

## 2021-02-17 ASSESSMENT — PATIENT HEALTH QUESTIONNAIRE - PHQ9
2. FEELING DOWN, DEPRESSED OR HOPELESS: 0
SUM OF ALL RESPONSES TO PHQ QUESTIONS 1-9: 0
SUM OF ALL RESPONSES TO PHQ QUESTIONS 1-9: 0

## 2021-02-17 NOTE — PATIENT INSTRUCTIONS
Survey: You may be receiving a survey from [employer] regarding your visit today. You may get this in the mail, through your MyChart or in your email. Please complete the survey to enable us to provide the highest quality of care to you and your family. Please also, mention our names. If you cannot score us as very good (5 Stars) on any question, please feel free to call the office to discuss how we could have made your experience exceptional.      Thank You! MD Melani Collazo, LPN        FLBGO-32 vaccine appointments are not available through our practice. As you're eligible to receive the COVID-19 vaccine, as determined by your state's department of health, you will be able to schedule an appointment. Appointments are required to receive the COVID-19 vaccine. As vaccine supply continues to be limited, we anticipate open appointments to fill up quickly and appreciate your patience as we work through the process of providing vaccines to those in our communities. [address] COVID-19 VACCINE PROVIDERS  Please check with the contacts below for Covid-19 vaccination availability and adminstration    [facility]  [phone]    [facility]  [phone]    LIZA GENAO  [email or web address]    [facility].      Or [phone]. [facility]  [phone].  Or [employer]    [facility].    [facility]   visit [email or web address]    for location availability

## 2021-02-17 NOTE — PROGRESS NOTES
started more than 1 year ago. The problem is unchanged. Pertinent negatives include no chills, dysuria, hematuria, nausea or vomiting. Past treatments include finasteride. The treatment provided significant relief. COPD  There is no cough or shortness of breath. This is a chronic problem. The current episode started more than 1 year ago. The problem has been unchanged. Pertinent negatives include no chest pain, fever, headaches, trouble swallowing or weight loss. Relieved by: spiriva. He reports significant improvement on treatment. His past medical history is significant for COPD. Coronary Artery Disease  Presents for follow-up visit. Pertinent negatives include no chest pain, dizziness, palpitations or shortness of breath. Risk factors include hyperlipidemia. The symptoms have been stable. Compliance with diet is good. Compliance with exercise is variable. Compliance with medications is good. Back Pain  This is a new problem. The current episode started more than 1 year ago (Pt fell in January and February and fell forward and braced arms out. Pt states he walks with a wobble so unsteady. Pt now has pain Rt scapula region. ). The problem occurs every several days. The problem is unchanged. The pain is present in the thoracic spine (Pt states pain is at the lower tip of his \"shoulder blade. \" ). The quality of the pain is described as aching. The pain is mild. The symptoms are aggravated by twisting. Pertinent negatives include no abdominal pain, bladder incontinence, bowel incontinence, chest pain, dysuria, fever, headaches, numbness, paresis, paresthesias, tingling, weakness or weight loss. Dysthymia - chronic but stable on zoloft. Pt overall ok but tells me (off to the side) that he is doing everything at home and taking care of his wife. She is listening less and less and he states she really doesn't help out at home. His daughter and her family live close if needed.      Past Medical History:     Past Medical History:   Diagnosis Date    Nguyen syndrome     [facility]    Blood circulation, collateral     BPH (benign prostatic hyperplasia)     CAD (coronary artery disease)     Dr Cesar Goodrich COPD (chronic obstructive [facility] disease) ([facility]     Depression     Hx of colonoscopy     Hyperlipidemia     Liver disease     Movement disorder     essential tremors    Osteoarthritis     Specialist at [place]    Pneumonia     Rheumatic fever     Rheumatoid arthritis ([facility]     [facility]     jacksonian epilepsy    Type II or unspecified type diabetes mellitus without mention of complication, not stated as uncontrolled       Reviewed all health maintenance requirements and ordered appropriate tests  Health Maintenance Due   Topic Date Due    Hepatitis C screen  1940    DTaP/Tdap/Td vaccine (1 - Tdap) 09/19/1961    Shingles Vaccine (1 of 2) 09/19/1992    Flu vaccine (1) 09/01/2020       Past Surgical History:     Past Surgical History:   Procedure Laterality Date    ARTERIAL BYPASS [address]    Cardiac bypass x2    CARDIAC SURGERY      CHOLECYSTECTOMY      COLONOSCOPY  2014    WNL    CORONARY ARTERY BYPASS GRAFT  [id number]    CYSTOSCOPY Left     lithrotripsy with stent     CYSTOSCOPY Left 5/31/2017    CYSTOSCOPY URETEROSCOPY Patito Gum performed by Javier Galindo MD at [address] / REMOVAL Jinnie Oiler / STONE Left 5/31/2017    CYSTOSCOPY STENT INSERTION performed by Javier Galindo MD at [address] [place], COLON, [place]      x2   [facility]    rt   [facility]    cadaver graft rt thigh    LITHOTRIPSY      OTHER SURGICAL HISTORY      Rt leg artery transplant    OTHER SURGICAL HISTORY      Rt heel spur    OTHER SURGICAL HISTORY      Rt knee scope    UPPER GASTROINTESTINAL ENDOSCOPY  2014        Medications:       Prior to Admission medications    Medication Sig Start Date End Date Taking?  Authorizing Provider clopidogrel (PLAVIX) 75 MG tablet Take 1 tablet by mouth daily 1/20/21   Renaye Carrel, MD   finasteride (PROSCAR) 5 MG tablet Take 1 tablet by mouth daily 10/12/20   Zach Solis MD   isosorbide mononitrate (IMDUR) 30 MG extended release tablet Take 1 tablet by mouth daily 9/29/20   Renaye Carrel, MD   cilostazol (PLETAL) 50 MG tablet Take 1 tablet by mouth 2 times daily 3/24/20   Renaye Carrel, MD   propranolol (INDERAL) 40 MG tablet Take 1 tablet by mouth 2 times daily 12/23/19   Renaye Carrel, MD   calcium carbonate (OSCAL) 500 MG TABS tablet Take 500 mg by mouth 2 times daily    Historical Provider, MD   lactobacillus (CULTURELLE) capsule Take 1 capsule by mouth 2 times daily (with meals) 2/4/19   Arsh Ramey MD   sertraline (ZOLOFT) 50 MG tablet Take 50 mg by mouth daily     Historical Provider, MD   tiotropium (SPIRIVA HANDIHALER) 18 MCG inhalation capsule Inhale 1 capsule into the lungs daily 2/26/18   Renaye Carrel, MD   diclofenac sodium 1 % GEL Apply 2 g topically 4 times daily as needed for Pain    Historical Provider, MD   nitroGLYCERIN (NITROSTAT) 0.4 MG SL tablet up to max of 3 total doses. If no relief after 1 dose, call 911. 10/24/17   Renaye Carrel, MD   aspirin 81 MG tablet Take 81 mg by mouth daily     Historical Provider, MD   rosuvastatin (CRESTOR) 40 MG tablet Take 20 mg by mouth daily    Historical Provider, MD   nitroGLYCERIN (NITROLINGUAL) 0.4 MG/SPRAY 0.4 mg spray Place 1 spray under the tongue every 5 minutes as needed for Chest pain 6/2/17   Daryle Ames, APRN - CNP   tamsulosin (FLOMAX) 0.4 MG capsule Take 0.4 mg by mouth daily    Historical Provider, MD   folic acid (FOLVITE) 1 MG tablet Take 1 tablet by mouth daily Daily except on day of taking Methotrexate 9/8/15   Zach Solis MD   omeprazole (PRILOSEC) 20 MG capsule Take 20 mg by mouth 2 times daily.     Historical Provider, MD   Cholecalciferol (VITAMIN D) 2000 UNITS CAPS capsule Take 4,000 Units by mouth daily     Historical Provider, MD   albuterol (PROVENTIL HFA;VENTOLIN HFA) 108 (90 BASE) MCG/ACT inhaler Inhale 2 puffs into the lungs 4 times daily as needed for Shortness of Breath     Historical Provider, MD   methotrexate (RHEUMATREX) 2.5 MG chemo tablet Take 2.5 mg by mouth once a week Take 4 tabs once a week on fridays    Historical Provider, MD   glucose blood VI test strips (BL TEST STRIP PACK) strip Tests BS daily and as needed. Historical Provider, MD        Allergies:       Patient has no known allergies. Social History:     Tobacco:    reports that he quit smoking about 40 years ago. His smoking use included cigarettes. He has a 100.00 pack-year smoking history. He has never used smokeless tobacco.  Alcohol:      reports no history of alcohol use. Drug Use:  reports no history of drug use. Family History:     Family History   Problem Relation Age of Onset    Heart Disease Mother     Heart Disease Father     Diabetes Other         Fhx of    Heart Disease Other         Fhx of    Cancer Sister     Heart Disease Brother        Review of Systems:       Review of Systems   Constitutional: Negative for chills, fatigue, fever and weight loss. HENT: Negative for congestion, facial swelling and trouble swallowing. Eyes: Negative for discharge, redness and visual disturbance. Respiratory: Negative for cough and shortness of breath. Cardiovascular: Negative for chest pain and palpitations. Gastrointestinal: Negative for abdominal pain, blood in stool, bowel incontinence, constipation, diarrhea, melena, nausea and vomiting. Genitourinary: Negative for bladder incontinence, dysuria and hematuria. Musculoskeletal: Positive for back pain. Negative for joint swelling and neck pain. Upper back   Skin: Negative for rash. Neurological: Negative for dizziness, tingling, tremors, weakness, light-headedness, numbness, headaches and paresthesias.    Psychiatric/Behavioral: Negative for sleep disturbance. Physical Exam:     Physical Exam  Vitals signs reviewed. Constitutional:       General: He is not in acute distress. Appearance: He is well-developed. He is not ill-appearing. HENT:      Head: Normocephalic and atraumatic. Eyes:      General:         Right eye: No discharge. Left eye: No discharge. Conjunctiva/sclera: Conjunctivae normal.      Pupils: Pupils are equal, round, and reactive to light. Neck:      Musculoskeletal: Neck supple. Thyroid: No thyromegaly. Vascular: No carotid bruit. Cardiovascular:      Rate and Rhythm: Normal rate and regular rhythm. Heart sounds: Normal heart sounds. No murmur. Pulmonary:      Effort: Pulmonary effort is normal. No respiratory distress. Breath sounds: Normal breath sounds. No wheezing. Abdominal:      General: Bowel sounds are normal.      Palpations: Abdomen is soft. Tenderness: There is no abdominal tenderness. Musculoskeletal:      Thoracic back: He exhibits tenderness. He exhibits normal range of motion, no bony tenderness, no swelling, no edema and no deformity. Back:       Right lower leg: No edema. Left lower leg: No edema. Comments: Rt distal scapula to palpate. No mass. No rash. +5/5 bilateral UE strength and full ROM at shoulders. Lymphadenopathy:      Cervical: No cervical adenopathy. Skin:     Findings: No erythema or rash. Neurological:      Mental Status: He is alert and oriented to person, place, and time.    Psychiatric:         Mood and Affect: Mood normal.         Behavior: Behavior normal.         Vitals:  /64   Pulse 66   Ht 6' (1.829 m)   Wt 205 lb (93 kg)   SpO2 98%   BMI 27.80 kg/m²       Data:     Lab Results   Component Value Date     04/25/2020    K 4.2 04/25/2020     04/25/2020    CO2 25 04/25/2020    BUN 15 04/25/2020    CREATININE 0.78 04/25/2020    GLUCOSE 241 04/25/2020    GLUCOSE 132 02/14/2012    PROT 7.5

## 2021-04-07 RX ORDER — PROPRANOLOL HYDROCHLORIDE 40 MG/1
40 TABLET ORAL 2 TIMES DAILY
Qty: 180 TABLET | Refills: 3 | Status: SHIPPED | OUTPATIENT
Start: 2021-04-07 | End: 2021-05-30

## 2021-04-07 RX ORDER — CILOSTAZOL 50 MG/1
50 TABLET ORAL 2 TIMES DAILY
Qty: 180 TABLET | Refills: 3 | Status: SHIPPED | OUTPATIENT
Start: 2021-04-07 | End: 2022-04-28 | Stop reason: SDUPTHER

## 2021-05-30 ENCOUNTER — HOSPITAL ENCOUNTER (EMERGENCY)
Age: 81
Discharge: HOME | End: 2021-05-30
Attending: FAMILY MEDICINE
Payer: MEDICARE

## 2021-05-30 ENCOUNTER — APPOINTMENT (OUTPATIENT)
Dept: GENERAL RADIOLOGY | Age: 81
End: 2021-05-30
Payer: MEDICARE

## 2021-05-30 VITALS
RESPIRATION RATE: 20 BRPM | HEART RATE: 53 BPM | HEIGHT: 73 IN | TEMPERATURE: 98.4 F | SYSTOLIC BLOOD PRESSURE: 103 MMHG | OXYGEN SATURATION: 93 % | DIASTOLIC BLOOD PRESSURE: 59 MMHG | WEIGHT: 195 LBS | BODY MASS INDEX: 25.84 KG/M2

## 2021-05-30 DIAGNOSIS — J44.1 COPD EXACERBATION (HCC): Primary | ICD-10-CM

## 2021-05-30 LAB
ABSOLUTE EOS #: ABNORMAL K/UL (ref 0–0.4)
ABSOLUTE IMMATURE GRANULOCYTE: ABNORMAL K/UL (ref 0–0.3)
ABSOLUTE LYMPH #: 0.97 K/UL (ref 1–4.8)
ABSOLUTE MONO #: 0.63 K/UL (ref 0–1)
ANION GAP SERPL CALCULATED.3IONS-SCNC: 8 MMOL/L (ref 9–17)
BASOPHILS # BLD: ABNORMAL % (ref 0–2)
BASOPHILS ABSOLUTE: ABNORMAL K/UL (ref 0–0.2)
BNP INTERPRETATION: NORMAL
BUN BLDV-MCNC: 14 MG/DL (ref 8–23)
BUN/CREAT BLD: 20 (ref 9–20)
CALCIUM SERPL-MCNC: 9.2 MG/DL (ref 8.6–10.4)
CHLORIDE BLD-SCNC: 104 MMOL/L (ref 98–107)
CO2: 24 MMOL/L (ref 20–31)
CREAT SERPL-MCNC: 0.7 MG/DL (ref 0.7–1.2)
DIFFERENTIAL TYPE: ABNORMAL
EOSINOPHILS RELATIVE PERCENT: ABNORMAL % (ref 0–5)
GFR AFRICAN AMERICAN: >60 ML/MIN
GFR NON-AFRICAN AMERICAN: >60 ML/MIN
GFR SERPL CREATININE-BSD FRML MDRD: ABNORMAL ML/MIN/{1.73_M2}
GFR SERPL CREATININE-BSD FRML MDRD: ABNORMAL ML/MIN/{1.73_M2}
GLUCOSE BLD-MCNC: 162 MG/DL (ref 70–99)
HCT VFR BLD CALC: 38 % (ref 41–53)
HEMOGLOBIN: 12.4 G/DL (ref 13.5–17.5)
IMMATURE GRANULOCYTES: ABNORMAL %
LYMPHOCYTES # BLD: 17 % (ref 13–44)
MCH RBC QN AUTO: 31.6 PG (ref 26–34)
MCHC RBC AUTO-ENTMCNC: 32.6 G/DL (ref 31–37)
MCV RBC AUTO: 96.7 FL (ref 80–100)
MONOCYTES # BLD: 11 % (ref 5–9)
MORPHOLOGY: ABNORMAL
NRBC AUTOMATED: ABNORMAL PER 100 WBC
PDW BLD-RTO: 13.5 % (ref 12.1–15.2)
PLATELET # BLD: 206 K/UL (ref 140–450)
PLATELET ESTIMATE: ABNORMAL
PMV BLD AUTO: ABNORMAL FL
POTASSIUM SERPL-SCNC: 3.8 MMOL/L (ref 3.7–5.3)
PRO-BNP: 205 PG/ML
RBC # BLD: 3.93 M/UL (ref 4.5–5.9)
RBC # BLD: ABNORMAL 10*6/UL
SEG NEUTROPHILS: 72 % (ref 39–75)
SEGMENTED NEUTROPHILS ABSOLUTE COUNT: 4.1 K/UL (ref 2.1–6.5)
SODIUM BLD-SCNC: 136 MMOL/L (ref 135–144)
TROPONIN INTERP: NORMAL
TROPONIN T: NORMAL NG/ML
TROPONIN, HIGH SENSITIVITY: 12 NG/L (ref 0–22)
WBC # BLD: 5.7 K/UL (ref 3.5–11)
WBC # BLD: ABNORMAL 10*3/UL

## 2021-05-30 PROCEDURE — 84484 ASSAY OF TROPONIN QUANT: CPT

## 2021-05-30 PROCEDURE — 83880 ASSAY OF NATRIURETIC PEPTIDE: CPT

## 2021-05-30 PROCEDURE — 36415 COLL VENOUS BLD VENIPUNCTURE: CPT

## 2021-05-30 PROCEDURE — 99284 EMERGENCY DEPT VISIT MOD MDM: CPT

## 2021-05-30 PROCEDURE — 94664 DEMO&/EVAL PT USE INHALER: CPT

## 2021-05-30 PROCEDURE — 96374 THER/PROPH/DIAG INJ IV PUSH: CPT

## 2021-05-30 PROCEDURE — 71045 X-RAY EXAM CHEST 1 VIEW: CPT

## 2021-05-30 PROCEDURE — 93005 ELECTROCARDIOGRAM TRACING: CPT | Performed by: FAMILY MEDICINE

## 2021-05-30 PROCEDURE — 94640 AIRWAY INHALATION TREATMENT: CPT

## 2021-05-30 PROCEDURE — 85025 COMPLETE CBC W/AUTO DIFF WBC: CPT

## 2021-05-30 PROCEDURE — 6360000002 HC RX W HCPCS: Performed by: FAMILY MEDICINE

## 2021-05-30 PROCEDURE — 80048 BASIC METABOLIC PNL TOTAL CA: CPT

## 2021-05-30 PROCEDURE — 6370000000 HC RX 637 (ALT 250 FOR IP): Performed by: FAMILY MEDICINE

## 2021-05-30 RX ORDER — GEMFIBROZIL 600 MG/1
600 TABLET, FILM COATED ORAL
COMMUNITY
End: 2021-06-03

## 2021-05-30 RX ORDER — IPRATROPIUM BROMIDE AND ALBUTEROL SULFATE 2.5; .5 MG/3ML; MG/3ML
1 SOLUTION RESPIRATORY (INHALATION) ONCE
Status: COMPLETED | OUTPATIENT
Start: 2021-05-30 | End: 2021-05-30

## 2021-05-30 RX ORDER — PREDNISONE 20 MG/1
40 TABLET ORAL DAILY
Qty: 10 TABLET | Refills: 0 | Status: SHIPPED | OUTPATIENT
Start: 2021-05-30 | End: 2021-06-03 | Stop reason: ALTCHOICE

## 2021-05-30 RX ORDER — METHYLPREDNISOLONE SODIUM SUCCINATE 125 MG/2ML
125 INJECTION, POWDER, LYOPHILIZED, FOR SOLUTION INTRAMUSCULAR; INTRAVENOUS ONCE
Status: COMPLETED | OUTPATIENT
Start: 2021-05-30 | End: 2021-05-30

## 2021-05-30 RX ADMIN — IPRATROPIUM BROMIDE AND ALBUTEROL SULFATE 1 AMPULE: .5; 3 SOLUTION RESPIRATORY (INHALATION) at 12:28

## 2021-05-30 RX ADMIN — METHYLPREDNISOLONE SODIUM SUCCINATE 125 MG: 125 INJECTION, POWDER, FOR SOLUTION INTRAMUSCULAR; INTRAVENOUS at 12:24

## 2021-05-30 RX ADMIN — IPRATROPIUM BROMIDE AND ALBUTEROL SULFATE 1 AMPULE: .5; 3 SOLUTION RESPIRATORY (INHALATION) at 12:54

## 2021-05-30 ASSESSMENT — ENCOUNTER SYMPTOMS: SHORTNESS OF BREATH: 1

## 2021-05-30 NOTE — ED NOTES
Pt up in the kruse and walk approx 145 feet and Spo2 is 96-99% with ambulation.      Mariela Mena RN  05/30/21 0017

## 2021-06-01 LAB
EKG ATRIAL RATE: 47 BPM
EKG P AXIS: 50 DEGREES
EKG P-R INTERVAL: 210 MS
EKG Q-T INTERVAL: 474 MS
EKG QRS DURATION: 86 MS
EKG QTC CALCULATION (BAZETT): 419 MS
EKG R AXIS: -6 DEGREES
EKG T AXIS: 51 DEGREES
EKG VENTRICULAR RATE: 47 BPM

## 2021-06-01 PROCEDURE — 93010 ELECTROCARDIOGRAM REPORT: CPT | Performed by: INTERNAL MEDICINE

## 2021-06-03 ENCOUNTER — TELEPHONE (OUTPATIENT)
Dept: CARDIOLOGY CLINIC | Age: 81
End: 2021-06-03

## 2021-06-03 RX ORDER — NAPROXEN 500 MG/1
500 TABLET ORAL DAILY PRN
COMMUNITY

## 2021-06-03 RX ORDER — TRAZODONE HYDROCHLORIDE 100 MG/1
100 TABLET ORAL NIGHTLY
COMMUNITY

## 2021-06-03 RX ORDER — FAMOTIDINE 20 MG/1
20 TABLET, FILM COATED ORAL DAILY
COMMUNITY

## 2021-06-03 RX ORDER — ROSUVASTATIN CALCIUM 40 MG/1
20 TABLET, COATED ORAL EVERY EVENING
COMMUNITY

## 2021-06-03 RX ORDER — PROPRANOLOL HYDROCHLORIDE 40 MG/1
20 TABLET ORAL 2 TIMES DAILY
Status: ON HOLD | COMMUNITY
End: 2021-07-22 | Stop reason: HOSPADM

## 2021-06-03 RX ORDER — ALENDRONATE SODIUM 70 MG/1
70 TABLET ORAL
COMMUNITY

## 2021-06-03 NOTE — TELEPHONE ENCOUNTER
Patient's daughter Bernadine Anderson stopped by with patient's medication list.  Was in ED over weekend. His HR has been running in the 40's. She said his medication list in computer that prints out on discharge is not correct. This list is what patient tells daughter Bernadine Anderson he is taking. She does not think he is taking correct cardiac meds. Please review and advise angela Anderson.  Med list scanned in chart

## 2021-06-03 NOTE — TELEPHONE ENCOUNTER
Daniel Saul [phone] notified to DECREASE PROPRANOLOL 40 MG BID DOWN TO 20MG BID  (MAY CUT IN HALF AND TAKE HALF BID)   PER DR MCCANN  MED LIST HAS BEEN UPDATED

## 2021-06-09 ENCOUNTER — HOSPITAL ENCOUNTER (EMERGENCY)
Age: 81
Discharge: HOME | End: 2021-06-09
Attending: EMERGENCY MEDICINE
Payer: MEDICARE

## 2021-06-09 ENCOUNTER — APPOINTMENT (OUTPATIENT)
Dept: GENERAL RADIOLOGY | Age: 81
End: 2021-06-09
Payer: MEDICARE

## 2021-06-09 VITALS
TEMPERATURE: 97.8 F | HEART RATE: 78 BPM | SYSTOLIC BLOOD PRESSURE: 133 MMHG | OXYGEN SATURATION: 95 % | BODY MASS INDEX: 26.41 KG/M2 | DIASTOLIC BLOOD PRESSURE: 70 MMHG | RESPIRATION RATE: 20 BRPM | HEIGHT: 72 IN | WEIGHT: 195 LBS

## 2021-06-09 DIAGNOSIS — J44.1 COPD EXACERBATION (HCC): Primary | ICD-10-CM

## 2021-06-09 LAB
ABSOLUTE EOS #: 0.2 K/UL (ref 0–0.4)
ABSOLUTE IMMATURE GRANULOCYTE: ABNORMAL K/UL (ref 0–0.3)
ABSOLUTE LYMPH #: 0.7 K/UL (ref 1–4.8)
ABSOLUTE MONO #: 0.7 K/UL (ref 0–1)
ALBUMIN SERPL-MCNC: 3.6 G/DL (ref 3.5–5.2)
ALBUMIN/GLOBULIN RATIO: ABNORMAL (ref 1–2.5)
ALP BLD-CCNC: 93 U/L (ref 40–129)
ALT SERPL-CCNC: 23 U/L (ref 5–41)
ANION GAP SERPL CALCULATED.3IONS-SCNC: 13 MMOL/L (ref 9–17)
AST SERPL-CCNC: 20 U/L
BASOPHILS # BLD: 0 % (ref 0–2)
BASOPHILS ABSOLUTE: 0 K/UL (ref 0–0.2)
BILIRUB SERPL-MCNC: 0.42 MG/DL (ref 0.3–1.2)
BNP INTERPRETATION: NORMAL
BUN BLDV-MCNC: 14 MG/DL (ref 8–23)
BUN/CREAT BLD: 14 (ref 9–20)
CALCIUM SERPL-MCNC: 8.7 MG/DL (ref 8.6–10.4)
CHLORIDE BLD-SCNC: 102 MMOL/L (ref 98–107)
CO2: 20 MMOL/L (ref 20–31)
CREAT SERPL-MCNC: 1 MG/DL (ref 0.7–1.2)
DIFFERENTIAL TYPE: YES
EOSINOPHILS RELATIVE PERCENT: 4 % (ref 0–5)
GFR AFRICAN AMERICAN: >60 ML/MIN
GFR NON-AFRICAN AMERICAN: >60 ML/MIN
GFR SERPL CREATININE-BSD FRML MDRD: ABNORMAL ML/MIN/{1.73_M2}
GFR SERPL CREATININE-BSD FRML MDRD: ABNORMAL ML/MIN/{1.73_M2}
GLUCOSE BLD-MCNC: 332 MG/DL (ref 70–99)
HCT VFR BLD CALC: 41.3 % (ref 41–53)
HEMOGLOBIN: 13.8 G/DL (ref 13.5–17.5)
IMMATURE GRANULOCYTES: ABNORMAL %
LACTIC ACID, WHOLE BLOOD: ABNORMAL MMOL/L (ref 0.7–2.1)
LACTIC ACID: 3.1 MMOL/L (ref 0.5–2.2)
LYMPHOCYTES # BLD: 12 % (ref 13–44)
MCH RBC QN AUTO: 32.4 PG (ref 26–34)
MCHC RBC AUTO-ENTMCNC: 33.5 G/DL (ref 31–37)
MCV RBC AUTO: 96.6 FL (ref 80–100)
MONOCYTES # BLD: 12 % (ref 5–9)
NRBC AUTOMATED: ABNORMAL PER 100 WBC
PDW BLD-RTO: 15.5 % (ref 12.1–15.2)
PLATELET # BLD: 190 K/UL (ref 140–450)
PLATELET ESTIMATE: ABNORMAL
PMV BLD AUTO: ABNORMAL FL (ref 6–12)
POTASSIUM SERPL-SCNC: 3.9 MMOL/L (ref 3.7–5.3)
PRO-BNP: 74 PG/ML
RBC # BLD: 4.27 M/UL (ref 4.5–5.9)
RBC # BLD: ABNORMAL 10*6/UL
SARS-COV-2, RAPID: NOT DETECTED
SEG NEUTROPHILS: 72 % (ref 39–75)
SEGMENTED NEUTROPHILS ABSOLUTE COUNT: 4.1 K/UL (ref 2.1–6.5)
SODIUM BLD-SCNC: 135 MMOL/L (ref 135–144)
SPECIMEN DESCRIPTION: NORMAL
TOTAL PROTEIN: 6.9 G/DL (ref 6.4–8.3)
TROPONIN INTERP: NORMAL
TROPONIN T: NORMAL NG/ML
TROPONIN, HIGH SENSITIVITY: 14 NG/L (ref 0–22)
WBC # BLD: 5.7 K/UL (ref 3.5–11)
WBC # BLD: ABNORMAL 10*3/UL

## 2021-06-09 PROCEDURE — 96375 TX/PRO/DX INJ NEW DRUG ADDON: CPT

## 2021-06-09 PROCEDURE — 99283 EMERGENCY DEPT VISIT LOW MDM: CPT

## 2021-06-09 PROCEDURE — 96365 THER/PROPH/DIAG IV INF INIT: CPT

## 2021-06-09 PROCEDURE — 94664 DEMO&/EVAL PT USE INHALER: CPT

## 2021-06-09 PROCEDURE — 36415 COLL VENOUS BLD VENIPUNCTURE: CPT

## 2021-06-09 PROCEDURE — 87635 SARS-COV-2 COVID-19 AMP PRB: CPT

## 2021-06-09 PROCEDURE — 85025 COMPLETE CBC W/AUTO DIFF WBC: CPT

## 2021-06-09 PROCEDURE — 71046 X-RAY EXAM CHEST 2 VIEWS: CPT

## 2021-06-09 PROCEDURE — 6360000002 HC RX W HCPCS: Performed by: EMERGENCY MEDICINE

## 2021-06-09 PROCEDURE — 83880 ASSAY OF NATRIURETIC PEPTIDE: CPT

## 2021-06-09 PROCEDURE — 84484 ASSAY OF TROPONIN QUANT: CPT

## 2021-06-09 PROCEDURE — 83605 ASSAY OF LACTIC ACID: CPT

## 2021-06-09 PROCEDURE — 2580000003 HC RX 258: Performed by: EMERGENCY MEDICINE

## 2021-06-09 PROCEDURE — 87040 BLOOD CULTURE FOR BACTERIA: CPT

## 2021-06-09 PROCEDURE — 80053 COMPREHEN METABOLIC PANEL: CPT

## 2021-06-09 PROCEDURE — 6370000000 HC RX 637 (ALT 250 FOR IP): Performed by: EMERGENCY MEDICINE

## 2021-06-09 RX ORDER — DOXYCYCLINE 100 MG/1
100 CAPSULE ORAL 2 TIMES DAILY
Qty: 20 CAPSULE | Refills: 0 | Status: ON HOLD | OUTPATIENT
Start: 2021-06-09 | End: 2021-07-22 | Stop reason: HOSPADM

## 2021-06-09 RX ORDER — IPRATROPIUM BROMIDE AND ALBUTEROL SULFATE 2.5; .5 MG/3ML; MG/3ML
1 SOLUTION RESPIRATORY (INHALATION) ONCE
Status: COMPLETED | OUTPATIENT
Start: 2021-06-09 | End: 2021-06-09

## 2021-06-09 RX ORDER — PREDNISONE 20 MG/1
TABLET ORAL
Qty: 5 TABLET | Refills: 0 | Status: ON HOLD | OUTPATIENT
Start: 2021-06-09 | End: 2021-07-22 | Stop reason: HOSPADM

## 2021-06-09 RX ORDER — METHYLPREDNISOLONE SODIUM SUCCINATE 40 MG/ML
40 INJECTION, POWDER, LYOPHILIZED, FOR SOLUTION INTRAMUSCULAR; INTRAVENOUS ONCE
Status: COMPLETED | OUTPATIENT
Start: 2021-06-09 | End: 2021-06-09

## 2021-06-09 RX ORDER — BENZONATATE 100 MG/1
100-200 CAPSULE ORAL 3 TIMES DAILY PRN
Qty: 20 CAPSULE | Refills: 0 | Status: SHIPPED | OUTPATIENT
Start: 2021-06-09 | End: 2021-06-16

## 2021-06-09 RX ADMIN — METHYLPREDNISOLONE SODIUM SUCCINATE 40 MG: 40 INJECTION, POWDER, FOR SOLUTION INTRAMUSCULAR; INTRAVENOUS at 19:06

## 2021-06-09 RX ADMIN — IPRATROPIUM BROMIDE AND ALBUTEROL SULFATE 1 AMPULE: .5; 3 SOLUTION RESPIRATORY (INHALATION) at 18:33

## 2021-06-09 RX ADMIN — CEFTRIAXONE SODIUM 1000 MG: 1 INJECTION, POWDER, FOR SOLUTION INTRAMUSCULAR; INTRAVENOUS at 19:05

## 2021-06-09 ASSESSMENT — PAIN DESCRIPTION - PAIN TYPE: TYPE: ACUTE PAIN

## 2021-06-09 ASSESSMENT — PAIN DESCRIPTION - DESCRIPTORS: DESCRIPTORS: ACHING

## 2021-06-09 ASSESSMENT — PAIN DESCRIPTION - LOCATION: LOCATION: RIB CAGE

## 2021-06-09 ASSESSMENT — PAIN DESCRIPTION - ORIENTATION: ORIENTATION: LEFT;RIGHT

## 2021-06-09 ASSESSMENT — PAIN SCALES - GENERAL: PAINLEVEL_OUTOF10: 4

## 2021-06-09 NOTE — ED PROVIDER NOTES
[address] COMPLAINT    Chief Complaint   Patient presents with    Shortness of Breath     Increased shortness of breath over past week. Pt has productive cough, no fever       HPI    Bro Martinez is a 68 y.o. male who presentsto ED from home. By car. With complaint of shortness of breath and cough. Onset x1 week. Patient was seen in the emergency room and was treated for bronchitis with prednisone. Patient continues to have cough. Productive cough. Patient has intermittent bouts of cough with shortness of breath. Patient has history of COPD.   Patient is not a smoker      PAST MEDICAL HISTORY    Past Medical History:   Diagnosis Date    Nguyen syndrome     [facility]    Blood circulation, collateral     BPH (benign prostatic hyperplasia)     CAD (coronary artery disease)     Dr Mouna Chen COPD (chronic obstructive pulmonary disease) ([facility] [address] [facility]     Depression     Hx of colonoscopy     Hyperlipidemia     Liver disease     Movement disorder     essential tremors    Osteoarthritis     Specialist at [place]    Pneumonia     Rheumatic fever     Rheumatoid arthritis ([facility]     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    Type II or unspecified type diabetes mellitus without mention of complication, not stated as uncontrolled        SURGICAL HISTORY    Past Surgical History:   Procedure Laterality Date    ARTERIAL BYPASS Vincent Jaqueline 1723    Cardiac bypass x2    CARDIAC SURGERY      CHOLECYSTECTOMY      COLONOSCOPY  2014    WNL    CORONARY ARTERY BYPASS GRAFT  [phone]    CYSTOSCOPY Left     lithrotripsy with stent     CYSTOSCOPY Left 5/31/2017    CYSTOSCOPY URETEROSCOPY LASER-WITH HLL performed by Kem Truong MD at [address] [place] [address] / [address] [place] [address] Rd / STONE Left 5/31/2017    CYSTOSCOPY STENT INSERTION performed by Kem Truong MD at [facility]. [address], COLON, [place]      x2   [facility]    rt    LEG SURGERY  1960    cadaver graft rt thigh    LITHOTRIPSY      OTHER SURGICAL HISTORY      Rt leg artery transplant    OTHER SURGICAL HISTORY      Rt heel spur    OTHER SURGICAL HISTORY      Rt knee scope    UPPER GASTROINTESTINAL ENDOSCOPY  2014       CURRENT MEDICATIONS    Current Outpatient Rx   Medication Sig Dispense Refill    propranolol (INDERAL) 40 MG tablet Take 20 mg by mouth 2 times daily      Multiple Vitamins-Minerals (PRESERVISION AREDS PO) Take by mouth      naproxen (NAPROSYN) 500 MG tablet Take 500 mg by mouth 2 times daily (with meals)      famotidine (PEPCID) 20 MG tablet Take 20 mg by mouth daily      traZODone (DESYREL) 100 MG tablet Take 100 mg by mouth nightly      rosuvastatin (CRESTOR) 40 MG tablet Take 20 mg by mouth every evening      VITAMIN D PO Take by mouth      cilostazol (PLETAL) 50 MG tablet Take 1 tablet by mouth 2 times daily 180 tablet 3    clopidogrel (PLAVIX) 75 MG tablet Take 1 tablet by mouth daily 90 tablet 3    finasteride (PROSCAR) 5 MG tablet Take 1 tablet by mouth daily 90 tablet 1    isosorbide mononitrate (IMDUR) 30 MG extended release tablet Take 1 tablet by mouth daily 90 tablet 3    aspirin 81 MG tablet Take 81 mg by mouth daily       tamsulosin (FLOMAX) 0.4 MG capsule Take 0.4 mg by mouth daily      folic acid (FOLVITE) 1 MG tablet Take 1 tablet by mouth daily Daily except on day of taking Methotrexate 90 tablet 1    omeprazole (PRILOSEC) 20 MG capsule Take 20 mg by mouth 2 times daily.  albuterol (PROVENTIL HFA;VENTOLIN HFA) 108 (90 BASE) MCG/ACT inhaler Inhale 2 puffs into the lungs 4 times daily as needed for Shortness of Breath       METHOTREXATE PO Take 10 mg by mouth once a week      alendronate (FOSAMAX) 70 MG tablet Take 70 mg by mouth every 7 days      nitroGLYCERIN (NITROSTAT) 0.4 MG SL tablet up to max of 3 total doses.  If no relief after 1 dose, call 593. 25 tablet 3    glucose blood VI test strips (BL TEST STRIP PACK) strip or weakness   Eyes:  Denies photophobia or discharge   HENT:  Denies sore throat or ear pain   Respiratory: Positive for cough and shortness of breath  Cardiovascular:  Denies chest pain, palpitations or swelling   GI:  Denies abdominal pain, nausea, vomiting, or diarrhea   Musculoskeletal:  Denies back pain   Skin:  Denies rash   Neurologic:  Denies headache, focal weakness or sensory changes   Endocrine:  Denies polyuria or polydypsia   Lymphatic:  Denies swollen glands   Psychiatric:  Denies depression, suicidal ideation or homicidal ideation   All systems negative except as marked. PHYSICAL EXAM    VITAL SIGNS: /70   Pulse 78   Temp 97.8 °F (36.6 °C) (Oral)   Resp 20   Ht 6' (1.829 m)   Wt 195 lb (88.5 kg)   SpO2 95%   BMI 26.45 kg/m²    Constitutional: Elderly male in no acute distress HENT:  Normocephalic, Atraumatic, Bilateral external ears normal, Oropharynx moist, No oral exudates, Nose normal. Neck- Normal range of motion, No tenderness, Supple, No stridor. Eyes:  PERRL, EOMI, Conjunctiva normal, No discharge. Respiratory: Nonlabored respirations. Bilateral rhonchi. Cardiovascular:  Normal heart rate, Normal rhythm, No murmurs, No rubs, No gallops. GI:  Bowel sounds normal, Soft, No tenderness, No masses, No pulsatile masses. : External genitalia appear normal, No masses or lesions. No discharge. No CVA tenderness. Musculoskeletal:  Intact distal pulses, No edema, No tenderness, No cyanosis, No clubbing. Good range of motion in all major joints. No tenderness to palpation or major deformities noted. Back- No tenderness. Integument:  Warm, Dry, No erythema, No rash. Lymphatic:  No lymphadenopathy noted. Neurologic:  Alert & oriented x 3, Normal motor function, Normal sensory function, No focal deficits noted. Psychiatric:  Affect normal, Judgment normal, Mood normal.     EKG        RADIOLOGY    XR CHEST (2 VW)   Final Result      No acute cardiopulmonary process. PROCEDURES        Labs  Labs Reviewed   COMPREHENSIVE METABOLIC PANEL - Abnormal; Notable for the following components:       Result Value    Glucose 332 (*)     All other components within normal limits   CBC WITH AUTO DIFFERENTIAL - Abnormal; Notable for the following components:    RBC 4.27 (*)     RDW 15.5 (*)     Lymphocytes 12 (*)     Monocytes 12 (*)     Absolute Lymph # 0.70 (*)     All other components within normal limits   LACTIC ACID, PLASMA - Abnormal; Notable for the following components:    Lactic Acid 3.1 (*)     All other components within normal limits   COVID-19, RAPID   CULTURE, BLOOD 1   BRAIN NATRIURETIC PEPTIDE   TROPONIN             Summation      Patient Course: Patient is given DuoNeb nebulized treatment. Patient is given Rocephin and Solu-Medrol in the ED. Patient will be sent home on doxycycline and prednisone    ED Medications administered this visit:    Medications   cefTRIAXone (ROCEPHIN) 1,000 mg in sterile water 10 mL IV syringe (has no administration in time range)   methylPREDNISolone sodium (SOLU-MEDROL) injection 40 mg (has no administration in time range)   ipratropium-albuterol (DUONEB) nebulizer solution 1 ampule (1 ampule Inhalation Given 6/9/21 5483)       New Prescriptions from this visit:    New Prescriptions    No medications on file       Follow-up:  No follow-up provider specified. Final Impression:   1.  COPD exacerbation ([facility] [address]               (Please note that portions of this note were completed with a voice recognition program.  Efforts were made to edit the dictations but occasionally words are mis-transcribed.)        Ashely Contreras MD  06/09/21 8336

## 2021-06-15 LAB
CULTURE: NORMAL
Lab: NORMAL
SPECIMEN DESCRIPTION: NORMAL

## 2021-07-20 ENCOUNTER — APPOINTMENT (OUTPATIENT)
Dept: CT IMAGING | Age: 81
DRG: 310 | End: 2021-07-20
Payer: MEDICARE

## 2021-07-20 ENCOUNTER — HOSPITAL ENCOUNTER (INPATIENT)
Age: 81
LOS: 1 days | Discharge: HOME | DRG: 310 | End: 2021-07-22
Attending: INTERNAL MEDICINE | Admitting: INTERNAL MEDICINE
Payer: MEDICARE

## 2021-07-20 DIAGNOSIS — R00.1 BRADYCARDIA: ICD-10-CM

## 2021-07-20 DIAGNOSIS — R55 SYNCOPE AND COLLAPSE: Primary | ICD-10-CM

## 2021-07-20 DIAGNOSIS — I95.1 ORTHOSTATIC HYPOTENSION: ICD-10-CM

## 2021-07-20 LAB
ABSOLUTE EOS #: 0.2 K/UL (ref 0–0.4)
ABSOLUTE IMMATURE GRANULOCYTE: ABNORMAL K/UL (ref 0–0.3)
ABSOLUTE LYMPH #: 1 K/UL (ref 1–4.8)
ABSOLUTE MONO #: 0.6 K/UL (ref 0–1)
ALBUMIN SERPL-MCNC: 3.7 G/DL (ref 3.5–5.2)
ALBUMIN/GLOBULIN RATIO: ABNORMAL (ref 1–2.5)
ALP BLD-CCNC: 67 U/L (ref 40–129)
ALT SERPL-CCNC: 16 U/L (ref 5–41)
ANION GAP SERPL CALCULATED.3IONS-SCNC: 11 MMOL/L (ref 9–17)
AST SERPL-CCNC: 20 U/L
BASOPHILS # BLD: 0 % (ref 0–2)
BASOPHILS ABSOLUTE: 0 K/UL (ref 0–0.2)
BILIRUB SERPL-MCNC: 0.52 MG/DL (ref 0.3–1.2)
BILIRUBIN URINE: NEGATIVE
BUN BLDV-MCNC: 21 MG/DL (ref 8–23)
BUN/CREAT BLD: ABNORMAL (ref 9–20)
CALCIUM SERPL-MCNC: 9.2 MG/DL (ref 8.6–10.4)
CHLORIDE BLD-SCNC: 100 MMOL/L (ref 98–107)
CHP ED QC CHECK: NORMAL
CO2: 23 MMOL/L (ref 20–31)
COLOR: YELLOW
COMMENT UA: ABNORMAL
CREAT SERPL-MCNC: 0.79 MG/DL (ref 0.7–1.2)
DIFFERENTIAL TYPE: YES
EOSINOPHILS RELATIVE PERCENT: 2 % (ref 0–5)
GFR AFRICAN AMERICAN: >60 ML/MIN
GFR NON-AFRICAN AMERICAN: >60 ML/MIN
GFR SERPL CREATININE-BSD FRML MDRD: ABNORMAL ML/MIN/{1.73_M2}
GFR SERPL CREATININE-BSD FRML MDRD: ABNORMAL ML/MIN/{1.73_M2}
GLUCOSE BLD-MCNC: 118 MG/DL
GLUCOSE BLD-MCNC: 118 MG/DL (ref 65–99)
GLUCOSE BLD-MCNC: 135 MG/DL (ref 70–99)
GLUCOSE URINE: ABNORMAL
HCT VFR BLD CALC: 38.6 % (ref 41–53)
HEMOGLOBIN: 13.2 G/DL (ref 13.5–17.5)
IMMATURE GRANULOCYTES: ABNORMAL %
KETONES, URINE: NEGATIVE
LEUKOCYTE ESTERASE, URINE: NEGATIVE
LYMPHOCYTES # BLD: 15 % (ref 13–44)
MCH RBC QN AUTO: 32.7 PG (ref 26–34)
MCHC RBC AUTO-ENTMCNC: 34.1 G/DL (ref 31–37)
MCV RBC AUTO: 95.7 FL (ref 80–100)
MONOCYTES # BLD: 9 % (ref 5–9)
NITRITE, URINE: NEGATIVE
NRBC AUTOMATED: ABNORMAL PER 100 WBC
PDW BLD-RTO: 15.6 % (ref 12.1–15.2)
PH UA: 6 (ref 5–8)
PLATELET # BLD: 238 K/UL (ref 140–450)
PLATELET ESTIMATE: ABNORMAL
PMV BLD AUTO: ABNORMAL FL (ref 6–12)
POTASSIUM SERPL-SCNC: 4.3 MMOL/L (ref 3.7–5.3)
PROTEIN UA: NEGATIVE
RBC # BLD: 4.03 M/UL (ref 4.5–5.9)
RBC # BLD: ABNORMAL 10*6/UL
SEG NEUTROPHILS: 74 % (ref 39–75)
SEGMENTED NEUTROPHILS ABSOLUTE COUNT: 5 K/UL (ref 2.1–6.5)
SODIUM BLD-SCNC: 134 MMOL/L (ref 135–144)
SPECIFIC GRAVITY UA: 1.01 (ref 1–1.03)
TOTAL PROTEIN: 6.7 G/DL (ref 6.4–8.3)
TROPONIN INTERP: NORMAL
TROPONIN INTERP: NORMAL
TROPONIN T: NORMAL NG/ML
TROPONIN T: NORMAL NG/ML
TROPONIN, HIGH SENSITIVITY: 16 NG/L (ref 0–22)
TROPONIN, HIGH SENSITIVITY: 18 NG/L (ref 0–22)
TURBIDITY: CLEAR
URINE HGB: NEGATIVE
UROBILINOGEN, URINE: NORMAL
WBC # BLD: 6.8 K/UL (ref 3.5–11)
WBC # BLD: ABNORMAL 10*3/UL

## 2021-07-20 PROCEDURE — G0378 HOSPITAL OBSERVATION PER HR: HCPCS

## 2021-07-20 PROCEDURE — 2580000003 HC RX 258: Performed by: INTERNAL MEDICINE

## 2021-07-20 PROCEDURE — 96361 HYDRATE IV INFUSION ADD-ON: CPT

## 2021-07-20 PROCEDURE — 82947 ASSAY GLUCOSE BLOOD QUANT: CPT

## 2021-07-20 PROCEDURE — 94761 N-INVAS EAR/PLS OXIMETRY MLT: CPT

## 2021-07-20 PROCEDURE — 93005 ELECTROCARDIOGRAM TRACING: CPT | Performed by: INTERNAL MEDICINE

## 2021-07-20 PROCEDURE — 6360000002 HC RX W HCPCS: Performed by: INTERNAL MEDICINE

## 2021-07-20 PROCEDURE — 84484 ASSAY OF TROPONIN QUANT: CPT

## 2021-07-20 PROCEDURE — 70450 CT HEAD/BRAIN W/O DYE: CPT

## 2021-07-20 PROCEDURE — 80053 COMPREHEN METABOLIC PANEL: CPT

## 2021-07-20 PROCEDURE — 81003 URINALYSIS AUTO W/O SCOPE: CPT

## 2021-07-20 PROCEDURE — 99285 EMERGENCY DEPT VISIT HI MDM: CPT

## 2021-07-20 PROCEDURE — 99214 OFFICE O/P EST MOD 30 MIN: CPT | Performed by: INTERNAL MEDICINE

## 2021-07-20 PROCEDURE — 96360 HYDRATION IV INFUSION INIT: CPT

## 2021-07-20 PROCEDURE — 96372 THER/PROPH/DIAG INJ SC/IM: CPT

## 2021-07-20 PROCEDURE — 6370000000 HC RX 637 (ALT 250 FOR IP): Performed by: INTERNAL MEDICINE

## 2021-07-20 PROCEDURE — 85025 COMPLETE CBC W/AUTO DIFF WBC: CPT

## 2021-07-20 PROCEDURE — 36415 COLL VENOUS BLD VENIPUNCTURE: CPT

## 2021-07-20 RX ORDER — SODIUM CHLORIDE 9 MG/ML
25 INJECTION, SOLUTION INTRAVENOUS PRN
Status: DISCONTINUED | OUTPATIENT
Start: 2021-07-20 | End: 2021-07-22 | Stop reason: HOSPADM

## 2021-07-20 RX ORDER — TAMSULOSIN HYDROCHLORIDE 0.4 MG/1
0.4 CAPSULE ORAL DAILY
Status: DISCONTINUED | OUTPATIENT
Start: 2021-07-20 | End: 2021-07-22 | Stop reason: HOSPADM

## 2021-07-20 RX ORDER — ACETAMINOPHEN 650 MG/1
650 SUPPOSITORY RECTAL EVERY 6 HOURS PRN
Status: DISCONTINUED | OUTPATIENT
Start: 2021-07-20 | End: 2021-07-22 | Stop reason: HOSPADM

## 2021-07-20 RX ORDER — POLYETHYLENE GLYCOL 3350 17 G/17G
17 POWDER, FOR SOLUTION ORAL DAILY PRN
Status: DISCONTINUED | OUTPATIENT
Start: 2021-07-20 | End: 2021-07-22 | Stop reason: HOSPADM

## 2021-07-20 RX ORDER — ROSUVASTATIN CALCIUM 20 MG/1
20 TABLET, COATED ORAL EVERY EVENING
Status: DISCONTINUED | OUTPATIENT
Start: 2021-07-20 | End: 2021-07-22 | Stop reason: HOSPADM

## 2021-07-20 RX ORDER — ALBUTEROL SULFATE 90 UG/1
2 AEROSOL, METERED RESPIRATORY (INHALATION) 4 TIMES DAILY PRN
Status: DISCONTINUED | OUTPATIENT
Start: 2021-07-20 | End: 2021-07-22 | Stop reason: HOSPADM

## 2021-07-20 RX ORDER — FINASTERIDE 5 MG/1
5 TABLET, FILM COATED ORAL DAILY
Status: DISCONTINUED | OUTPATIENT
Start: 2021-07-20 | End: 2021-07-22 | Stop reason: HOSPADM

## 2021-07-20 RX ORDER — ISOSORBIDE MONONITRATE 30 MG/1
30 TABLET, EXTENDED RELEASE ORAL DAILY
Status: DISCONTINUED | OUTPATIENT
Start: 2021-07-20 | End: 2021-07-22 | Stop reason: HOSPADM

## 2021-07-20 RX ORDER — NITROGLYCERIN 0.4 MG/1
0.4 TABLET SUBLINGUAL EVERY 5 MIN PRN
Status: DISCONTINUED | OUTPATIENT
Start: 2021-07-20 | End: 2021-07-22 | Stop reason: HOSPADM

## 2021-07-20 RX ORDER — 0.9 % SODIUM CHLORIDE 0.9 %
1000 INTRAVENOUS SOLUTION INTRAVENOUS ONCE
Status: COMPLETED | OUTPATIENT
Start: 2021-07-20 | End: 2021-07-20

## 2021-07-20 RX ORDER — ASPIRIN 81 MG/1
81 TABLET, CHEWABLE ORAL DAILY
Status: DISCONTINUED | OUTPATIENT
Start: 2021-07-20 | End: 2021-07-22 | Stop reason: HOSPADM

## 2021-07-20 RX ORDER — FAMOTIDINE 20 MG/1
20 TABLET, FILM COATED ORAL DAILY
Status: DISCONTINUED | OUTPATIENT
Start: 2021-07-20 | End: 2021-07-22 | Stop reason: HOSPADM

## 2021-07-20 RX ORDER — TRAZODONE HYDROCHLORIDE 50 MG/1
100 TABLET ORAL NIGHTLY
Status: DISCONTINUED | OUTPATIENT
Start: 2021-07-20 | End: 2021-07-22 | Stop reason: HOSPADM

## 2021-07-20 RX ORDER — SODIUM CHLORIDE 9 MG/ML
INJECTION, SOLUTION INTRAVENOUS CONTINUOUS
Status: DISCONTINUED | OUTPATIENT
Start: 2021-07-20 | End: 2021-07-21

## 2021-07-20 RX ORDER — SODIUM CHLORIDE 0.9 % (FLUSH) 0.9 %
5-40 SYRINGE (ML) INJECTION EVERY 12 HOURS SCHEDULED
Status: DISCONTINUED | OUTPATIENT
Start: 2021-07-20 | End: 2021-07-22 | Stop reason: HOSPADM

## 2021-07-20 RX ORDER — ONDANSETRON 2 MG/ML
4 INJECTION INTRAMUSCULAR; INTRAVENOUS EVERY 6 HOURS PRN
Status: DISCONTINUED | OUTPATIENT
Start: 2021-07-20 | End: 2021-07-22 | Stop reason: HOSPADM

## 2021-07-20 RX ORDER — PANTOPRAZOLE SODIUM 40 MG/1
40 TABLET, DELAYED RELEASE ORAL
Status: DISCONTINUED | OUTPATIENT
Start: 2021-07-21 | End: 2021-07-22 | Stop reason: HOSPADM

## 2021-07-20 RX ORDER — ACETAMINOPHEN 325 MG/1
650 TABLET ORAL EVERY 6 HOURS PRN
Status: DISCONTINUED | OUTPATIENT
Start: 2021-07-20 | End: 2021-07-22 | Stop reason: HOSPADM

## 2021-07-20 RX ORDER — DOXYCYCLINE HYCLATE 100 MG
100 TABLET ORAL 2 TIMES DAILY
Status: DISCONTINUED | OUTPATIENT
Start: 2021-07-20 | End: 2021-07-21

## 2021-07-20 RX ORDER — ONDANSETRON 4 MG/1
4 TABLET, ORALLY DISINTEGRATING ORAL EVERY 8 HOURS PRN
Status: DISCONTINUED | OUTPATIENT
Start: 2021-07-20 | End: 2021-07-22 | Stop reason: HOSPADM

## 2021-07-20 RX ORDER — NAPROXEN 250 MG/1
500 TABLET ORAL 2 TIMES DAILY WITH MEALS
Status: DISCONTINUED | OUTPATIENT
Start: 2021-07-20 | End: 2021-07-22 | Stop reason: HOSPADM

## 2021-07-20 RX ORDER — SODIUM CHLORIDE 0.9 % (FLUSH) 0.9 %
5-40 SYRINGE (ML) INJECTION PRN
Status: DISCONTINUED | OUTPATIENT
Start: 2021-07-20 | End: 2021-07-22 | Stop reason: HOSPADM

## 2021-07-20 RX ORDER — CILOSTAZOL 50 MG/1
50 TABLET ORAL 2 TIMES DAILY
Status: DISCONTINUED | OUTPATIENT
Start: 2021-07-20 | End: 2021-07-22 | Stop reason: HOSPADM

## 2021-07-20 RX ORDER — FOLIC ACID 1 MG/1
1 TABLET ORAL DAILY
Status: DISCONTINUED | OUTPATIENT
Start: 2021-07-20 | End: 2021-07-22 | Stop reason: HOSPADM

## 2021-07-20 RX ORDER — CLOPIDOGREL BISULFATE 75 MG/1
75 TABLET ORAL DAILY
Status: DISCONTINUED | OUTPATIENT
Start: 2021-07-20 | End: 2021-07-22 | Stop reason: HOSPADM

## 2021-07-20 RX ADMIN — DOXYCYCLINE HYCLATE 100 MG: 100 TABLET, COATED ORAL at 20:48

## 2021-07-20 RX ADMIN — ENOXAPARIN SODIUM 40 MG: 40 INJECTION SUBCUTANEOUS at 19:51

## 2021-07-20 RX ADMIN — TAMSULOSIN HYDROCHLORIDE 0.4 MG: 0.4 CAPSULE ORAL at 20:48

## 2021-07-20 RX ADMIN — CILOSTAZOL 50 MG: 50 TABLET ORAL at 20:47

## 2021-07-20 RX ADMIN — NAPROXEN 500 MG: 250 TABLET ORAL at 19:51

## 2021-07-20 RX ADMIN — SODIUM CHLORIDE 1000 ML: 9 INJECTION, SOLUTION INTRAVENOUS at 11:44

## 2021-07-20 RX ADMIN — ROSUVASTATIN CALCIUM 20 MG: 20 TABLET, COATED ORAL at 19:51

## 2021-07-20 RX ADMIN — SODIUM CHLORIDE: 9 INJECTION, SOLUTION INTRAVENOUS at 16:27

## 2021-07-20 RX ADMIN — TRAZODONE HYDROCHLORIDE 100 MG: 50 TABLET ORAL at 20:48

## 2021-07-20 ASSESSMENT — PAIN SCALES - GENERAL
PAINLEVEL_OUTOF10: 0

## 2021-07-20 NOTE — ED NOTES
Pt denies any pain or needs at this time, and states \"I feel fine\". Pt daughter Sadiq Lacey at bedside.      Eder Celestin RN  07/20/21 5293

## 2021-07-20 NOTE — ED PROVIDER NOTES
[address] MED SURG [facility]  EMERGENCY DEPARTMENT ENCOUNTER      Pt Name: Madelin Lin  MRN: [id number]  [place] 1940  Date of evaluation: 7/20/2021  Provider: Nicolette Ibrahim MD    CHIEF COMPLAINT       Chief Complaint   Patient presents with    Loss of Consciousness     per [facility] pt passed out at home and was very dizzy- pt states he has been dizzy and weak for about a month          HISTORY OF PRESENT ILLNESS   (Location/Symptom, Timing/Onset, Context/Setting, Quality, Duration, Modifying Factors, Severity)  Note limiting factors. Madelin Lin is a 68 y.o. male who has a history of hyperlipidemia, coronary artery disease, diverticulosis, COPD, type 2 diabetes, Nguyen's esophagus, non-STEMI, BPH, rheumatoid arthritis, seizure disorder, presents to the emergency department for evaluation and management of syncope. He states that he got dizzy, felt unsteady and went to the ground. He denies any head trauma neck trauma chest trauma abdominal trauma. This occurred witnessed by his home health aide. He denied chest pain, shortness of breath, head injury, neck injury. He says that he periodically has brief dizziness but it has never been determined what has been causing it. .    This patient is being evaluated and treated during the COVID-19 pandemic. There is an area wide shortage of hospital beds. Patient received Pfizer Covid vaccine x2. HPI    Nursing Notes were reviewed. REVIEW OF SYSTEMS    (2-9 systems for level 4, 10 or more for level 5)       REVIEW OF SYSTEMS    Constitutional: Negative for fatigue and fever. Respiratory: Negative for cough, chest tightness and shortness of breath. Cardiovascular: Negative for chest pain, palpitations and leg swelling. Gastrointestinal: Negative for abdominal distention, abdominal pain, diarrhea, nausea and vomiting. Musculoskeletal: Negative for arthralgias, back pain and neck pain. Skin: Negative for color change, pallor, rash and wound. Neurological: Positive for syncope, dizziness, negative for speech difficulty, weakness, distal tingling/numbness and headaches. Hematological: Negative for adenopathy. Does not bruise/bleed easily. Except as noted above the remainder of the review of systems was reviewed and negative.        PASTMEDICAL HISTORY     Past Medical History:   Diagnosis Date    Nguyen syndrome     [facility]    Blood circulation, collateral     BPH (benign prostatic hyperplasia)     CAD (coronary artery disease)     Dr Zuri Centeno COPD (chronic obstructive pulmonary disease) ([facility] [address]     Depression     Hx of colonoscopy     Hyperlipidemia     Liver disease     Movement disorder     essential tremors    Osteoarthritis     Specialist at [place]    Pneumonia     Rheumatic fever     Rheumatoid arthritis ([facility] [facility]     Seizures ([facility] [address] [facility]     [facility]    Type II or unspecified type diabetes mellitus without mention of complication, not stated as uncontrolled          SURGICAL HISTORY       Past Surgical History:   Procedure Laterality Date    ARTERIAL BYPASS Vincent [address]23    Cardiac bypass x2    CARDIAC SURGERY      CHOLECYSTECTOMY      COLONOSCOPY  2014    WNL    CORONARY ARTERY BYPASS GRAFT  [phone]    CYSTOSCOPY Left     lithrotripsy with stent     CYSTOSCOPY Left 5/31/2017    CYSTOSCOPY URETEROSCOPY Meribeth Downy performed by Sarina Tarango MD at [address] / REMOVAL Solis Bias / STONE Left 5/31/2017    CY[facility] [address], COLON, [place]      x2   [facility]    rt   [facility]    cadaver graft rt thigh    LITHOTRIPSY      OTHER SURGICAL HISTORY      Rt leg artery transplant    OTHER SURGICAL HISTORY      Rt heel spur    OTHER SURGICAL HISTORY      Rt knee scope    UPPER GASTROINTESTINAL ENDOSCOPY  2014         CURRENT MEDICATIONS       Current Discharge Medication List CONTINUE these medications which have NOT CHANGED    Details   doxycycline monohydrate (MONODOX) 100 MG capsule Take 1 capsule by mouth 2 times daily  Qty: 20 capsule, Refills: 0      predniSONE (DELTASONE) 20 MG tablet 2 tablets daily x1 days then 1 tablet daily  Qty: 5 tablet, Refills: 0      propranolol (INDERAL) 40 MG tablet Take 20 mg by mouth 2 times daily      Multiple Vitamins-Minerals (PRESERVISION AREDS PO) Take by mouth      naproxen (NAPROSYN) 500 MG tablet Take 500 mg by mouth 2 times daily (with meals)      famotidine (PEPCID) 20 MG tablet Take 20 mg by mouth daily      METHOTREXATE PO Take 10 mg by mouth once a week      traZODone (DESYREL) 100 MG tablet Take 100 mg by mouth nightly      rosuvastatin (CRESTOR) 40 MG tablet Take 20 mg by mouth every evening      alendronate (FOSAMAX) 70 MG tablet Take 70 mg by mouth every 7 days      VITAMIN D PO Take by mouth      cilostazol (PLETAL) 50 MG tablet Take 1 tablet by mouth 2 times daily  Qty: 180 tablet, Refills: 3      clopidogrel (PLAVIX) 75 MG tablet Take 1 tablet by mouth daily  Qty: 90 tablet, Refills: 3      finasteride (PROSCAR) 5 MG tablet Take 1 tablet by mouth daily  Qty: 90 tablet, Refills: 1    Comments: This prescription was filled on 9/29/2020. Any refills authorized will be placed on file. isosorbide mononitrate (IMDUR) 30 MG extended release tablet Take 1 tablet by mouth daily  Qty: 90 tablet, Refills: 3      aspirin 81 MG tablet Take 81 mg by mouth daily       tamsulosin (FLOMAX) 0.4 MG capsule Take 0.4 mg by mouth daily      folic acid (FOLVITE) 1 MG tablet Take 1 tablet by mouth daily Daily except on day of taking Methotrexate  Qty: 90 tablet, Refills: 1      omeprazole (PRILOSEC) 20 MG capsule Take 20 mg by mouth 2 times daily.       albuterol (PROVENTIL HFA;VENTOLIN HFA) 108 (90 BASE) MCG/ACT inhaler Inhale 2 puffs into the lungs 4 times daily as needed for Shortness of Breath       nitroGLYCERIN (NITROSTAT) 0.4 MG SL tablet up to max of 3 total doses. If no relief after 1 dose, call 911. Qty: 25 tablet, Refills: 3      glucose blood VI test strips (BL TEST STRIP PACK) strip Tests BS daily and as needed. ALLERGIES     Patient has no known allergies. FAMILY HISTORY       Family History   Problem Relation Age of Onset    Heart Disease Mother     Heart Disease Father     Diabetes Other         Fhx of    Heart Disease Other         Fhx of    Cancer Sister     Heart Disease Brother           SOCIAL HISTORY       Social History     Socioeconomic History    Marital status:      Spouse name: None    Number of children: None    Years of education: None    Highest education level: None   Occupational History    Occupation: retired   Tobacco Use    Smoking status: Former Smoker     Packs/day: 5.00     Years: 20.00     Pack years: 100.00     Types: Cigarettes     Quit date: 1980     Years since quittin.8    Smokeless tobacco: Never Used   Vaping Use    Vaping Use: Never used   Substance and Sexual Activity    Alcohol use: No    Drug use: No    Sexual activity: None   Other Topics Concern    None   Social History Narrative    None     Social Determinants of Health     Financial Resource Strain:     Difficulty of Paying Living Expenses:    Food Insecurity:     Worried About Running Out of Food in the Last Year:     Ran Out of Food in the Last Year:    Transportation Needs:     Lack of Transportation (Medical):      Lack of Transportation (Non-Medical):    Physical Activity:     Days of Exercise per Week:     Minutes of Exercise per Session:    Stress:     Feeling of Stress :    Social Connections:     Frequency of Communication with Friends and Family:     Frequency of Social Gatherings with Friends and Family:     Attends Hoahaoism Services:     Active Member of Clubs or Organizations:     Attends Club or Organization Meetings:     Marital Status:    Intimate Partner Violence:     Fear of Current or Ex-Partner:     Emotionally Abused:     Physically Abused:     Sexually Abused:        SCREENINGS    [place] Coma Scale  Eye Opening: Spontaneous  Best Verbal Response: Oriented  Best Motor Response: Obeys commands  [place] Coma Scale Score: 15        PHYSICAL EXAM    (up to 7 for level 4, 8 or more for level 5)     ED Triage Vitals [07/20/21 1124]   BP Temp Temp Source Pulse Resp SpO2 Height Weight   99/69 97.8 °F (36.6 °C) Oral 63 16 96 % 6' 1\" (1.854 m) 202 lb (91.6 kg)       Physical Exam  Physical Exam   Constitutional:  Appears well, well-developed and well-nourished. No distress noted. Non toxic in appearance. Daughter Frank Vela RN at bedside. HENT:     Head: Normocephalic and atraumatic. Mouth/Throat: Oropharynx is clear and mucosa moist. No oropharyngeal exudate noted. Posterior pharynx is pink and noninjected. Eyes: Conjunctivae and EOM are normal. Pupils are equal, round, and reactive to light. No scleral icterus. There is no tearing or drainage. Neck: Normal range of motion. Neck supple. No tracheal deviation present. Cardiovascular: Normal rate, regular rhythm, normal heartsounds and intact distal pulses. Exam reveals no gallop or friction rub. No murmur heard. Pulmonary/Chest: Effort normal and breath sounds are symmetric and normal. No respiratory distress. There are no wheezes, rales or rhonchi. Abdominal: Soft. Bowel sounds are normal. No distension or no mass exhibitted. There is no tenderness, rebound, rigidity or guarding. Genitourinary:   No CVA tenderness noted on examination. Musculoskeletal: Normal range of motion. No edema, tenderness or deformity. Lymphadenopathy:  No cervical adenopathy. Neurological:   alert and oriented to person, place, and time. Normal speech, normal comprehension, normal cognition,  Reflexes are normal.  There are no cranial nerve deficits. Normal muscle tone, motor and sensory function including SILT exhibited.   Coordination Sodium 134 (*)     All other components within normal limits   GLUCOSE, WHOLE BLOOD - Abnormal; Notable for the following components:    POC Glucose 118 (*)     All other components within normal limits   POCT GLUCOSE - Normal   TROPONIN   TROPONIN   BASIC METABOLIC PANEL W/ REFLEX TO MG FOR LOW K   CBC WITH AUTO DIFFERENTIAL   TROPONIN       All other labs were within normal range or not returned as of this dictation. EMERGENCY DEPARTMENT COURSE and DIFFERENTIAL DIAGNOSIS/MDM:   Vitals:    Vitals:    07/20/21 1840 07/20/21 1947 07/21/21 0025 07/21/21 0152   BP:  124/67  113/69   Pulse:  62  60   Resp:  18 18 18   Temp:  97.9 °F (36.6 °C)  97.7 °F (36.5 °C)   TempSrc:  Oral  Oral   SpO2: 94% 94% 93% 93%   Weight:       Height:           Noted    MDM    CRITICAL CARE TIME   Total Critical Care time was 0 minutes      [facility]  ED Course as of Jul 21 0249   Tue Jul 20, 2021   7955 Kevin Morocho Consult Dr. Heaven Rodriguez    [MS]   [phone] I requested a call to Dr. Heaven Rodriguez for consultation. [MS]   7735 I spoke with Dr. Heaven Rodriguez who stated that he would consult on this patient if he was to be admitted in this institution. He states that he is appropriate for admission to this hospital.  He recommends holding Imdur to monitor how he responds without it. [MS]   [phone] Call placed to hospitalist to discuss admission. [MS]      ED Course User Index  [MS] Maxwell Urena MD       CONSULTS:  IP CONSULT TO HOSPITALIST  IP CONSULT TO CARDIOLOGY    PROCEDURES:  Unless otherwise noted below, none     Procedures      Summation    Tati Ward is a 68 y.o. male who has a history of hyperlipidemia, coronary artery disease, diverticulosis, COPD, type 2 diabetes, Nguyen's esophagus, non-STEMI, BPH, was admitted to the medical surgical service after syncopal event and finding intermittent bouts with severe bradycardia. CT of the head negative for acute findings.   Troponin normal.  No evidence of electrolyte abnormalities that would be responsible for syncope. Dr. Aimee Arreaga his cardiologist was consulted.       ED Medicationsadministered this visit:    Medications   albuterol sulfate  (90 Base) MCG/ACT inhaler 2 puff (has no administration in time range)   aspirin chewable tablet 81 mg (81 mg Oral Not Given 7/20/21 1628)   cilostazol (PLETAL) tablet 50 mg (50 mg Oral Given 7/20/21 2047)   clopidogrel (PLAVIX) tablet 75 mg (75 mg Oral Not Given 7/20/21 1629)   doxycycline hyclate (VIBRA-TABS) tablet 100 mg (100 mg Oral Given 7/20/21 2048)   famotidine (PEPCID) tablet 20 mg (20 mg Oral Not Given 7/20/21 1629)   finasteride (PROSCAR) tablet 5 mg (5 mg Oral Not Given 9/81/88 2122)   folic acid (FOLVITE) tablet 1 mg (1 mg Oral Not Given 7/20/21 1630)   isosorbide mononitrate (IMDUR) extended release tablet 30 mg (30 mg Oral Not Given 7/20/21 1630)   naproxen (NAPROSYN) tablet 500 mg (500 mg Oral Given 7/20/21 1951)   nitroGLYCERIN (NITROSTAT) SL tablet 0.4 mg (has no administration in time range)   pantoprazole (PROTONIX) tablet 40 mg (has no administration in time range)   rosuvastatin (CRESTOR) tablet 20 mg (20 mg Oral Given 7/20/21 1951)   tamsulosin (FLOMAX) capsule 0.4 mg (0.4 mg Oral Given 7/20/21 2048)   traZODone (DESYREL) tablet 100 mg (100 mg Oral Given 7/20/21 2048)   sodium chloride flush 0.9 % injection 5-40 mL (5 mLs Intravenous Not Given 7/20/21 2100)   sodium chloride flush 0.9 % injection 5-40 mL (has no administration in time range)   0.9 % sodium chloride infusion (has no administration in time range)   enoxaparin (LOVENOX) injection 40 mg (40 mg Subcutaneous Given 7/20/21 1951)   ondansetron (ZOFRAN-ODT) disintegrating tablet 4 mg (has no administration in time range)     Or   ondansetron (ZOFRAN) injection 4 mg (has no administration in time range)   polyethylene glycol (GLYCOLAX) packet 17 g (has no administration in time range)   acetaminophen (TYLENOL) tablet 650 mg (has no administration in time range)     Or acetaminophen (TYLENOL) suppository 650 mg (has no administration in time range)   0.9 % sodium chloride infusion ( Intravenous New Bag 7/20/21 1627)   0.9 % sodium chloride bolus (0 mLs Intravenous Stopped 7/20/21 1246)       New Prescriptions from this visit:    Current Discharge Medication List          Follow-up:  No follow-up provider specified. Final Impression:   1. Syncope and collapse    2. Orthostatic hypotension    3. Bradycardia               (Please note that portions of this note werecompleted with a voice recognition program.  Efforts were made to edit the dictations but occasionally words are mis-transcribed.)    FINAL IMPRESSION      1. Syncope and collapse    2. Orthostatic hypotension    3. Bradycardia          DISPOSITION/PLAN   DISPOSITION        PATIENT REFERRED TO:  No follow-up provider specified.     DISCHARGE MEDICATIONS:  Current Discharge Medication List             (Please note that portions of this note were completed with a voice recognition program.  Efforts were made to edit the dictations but occasionally words are mis-transcribed.)    Lorenzo Younger MD (electronically signed)  Attending Emergency Physician            Lorenzo Younger MD  07/21/21 2743

## 2021-07-21 ENCOUNTER — APPOINTMENT (OUTPATIENT)
Dept: NUCLEAR MEDICINE | Age: 81
DRG: 310 | End: 2021-07-21
Payer: MEDICARE

## 2021-07-21 LAB
ABSOLUTE EOS #: 0.1 K/UL (ref 0–0.4)
ABSOLUTE IMMATURE GRANULOCYTE: ABNORMAL K/UL (ref 0–0.3)
ABSOLUTE LYMPH #: 1 K/UL (ref 1–4.8)
ABSOLUTE MONO #: 0.6 K/UL (ref 0–1)
ANION GAP SERPL CALCULATED.3IONS-SCNC: 10 MMOL/L (ref 9–17)
BASOPHILS # BLD: 0 % (ref 0–2)
BASOPHILS ABSOLUTE: 0 K/UL (ref 0–0.2)
BUN BLDV-MCNC: 17 MG/DL (ref 8–23)
BUN/CREAT BLD: 22 (ref 9–20)
CALCIUM SERPL-MCNC: 8.2 MG/DL (ref 8.6–10.4)
CHLORIDE BLD-SCNC: 108 MMOL/L (ref 98–107)
CO2: 23 MMOL/L (ref 20–31)
CREAT SERPL-MCNC: 0.78 MG/DL (ref 0.7–1.2)
DIFFERENTIAL TYPE: YES
EKG ATRIAL RATE: 39 BPM
EKG ATRIAL RATE: 60 BPM
EKG P AXIS: 44 DEGREES
EKG P AXIS: 59 DEGREES
EKG P-R INTERVAL: 204 MS
EKG P-R INTERVAL: 220 MS
EKG Q-T INTERVAL: 460 MS
EKG Q-T INTERVAL: 526 MS
EKG QRS DURATION: 82 MS
EKG QRS DURATION: 88 MS
EKG QTC CALCULATION (BAZETT): 423 MS
EKG QTC CALCULATION (BAZETT): 460 MS
EKG R AXIS: 40 DEGREES
EKG R AXIS: 48 DEGREES
EKG T AXIS: 53 DEGREES
EKG T AXIS: 58 DEGREES
EKG VENTRICULAR RATE: 39 BPM
EKG VENTRICULAR RATE: 60 BPM
EOSINOPHILS RELATIVE PERCENT: 3 % (ref 0–5)
GFR AFRICAN AMERICAN: >60 ML/MIN
GFR NON-AFRICAN AMERICAN: >60 ML/MIN
GFR SERPL CREATININE-BSD FRML MDRD: ABNORMAL ML/MIN/{1.73_M2}
GFR SERPL CREATININE-BSD FRML MDRD: ABNORMAL ML/MIN/{1.73_M2}
GLUCOSE BLD-MCNC: 126 MG/DL (ref 70–99)
HCT VFR BLD CALC: 33.7 % (ref 41–53)
HEMOGLOBIN: 11.6 G/DL (ref 13.5–17.5)
IMMATURE GRANULOCYTES: ABNORMAL %
LV EF: 50 %
LVEF MODALITY: NORMAL
LYMPHOCYTES # BLD: 23 % (ref 13–44)
MCH RBC QN AUTO: 32.6 PG (ref 26–34)
MCHC RBC AUTO-ENTMCNC: 34.3 G/DL (ref 31–37)
MCV RBC AUTO: 95.3 FL (ref 80–100)
MONOCYTES # BLD: 14 % (ref 5–9)
NRBC AUTOMATED: ABNORMAL PER 100 WBC
PDW BLD-RTO: 15.4 % (ref 12.1–15.2)
PLATELET # BLD: 171 K/UL (ref 140–450)
PLATELET ESTIMATE: ABNORMAL
PMV BLD AUTO: ABNORMAL FL (ref 6–12)
POTASSIUM SERPL-SCNC: 3.9 MMOL/L (ref 3.7–5.3)
RBC # BLD: 3.54 M/UL (ref 4.5–5.9)
RBC # BLD: ABNORMAL 10*6/UL
SEG NEUTROPHILS: 60 % (ref 39–75)
SEGMENTED NEUTROPHILS ABSOLUTE COUNT: 2.4 K/UL (ref 2.1–6.5)
SODIUM BLD-SCNC: 141 MMOL/L (ref 135–144)
TROPONIN INTERP: NORMAL
TROPONIN T: NORMAL NG/ML
TROPONIN, HIGH SENSITIVITY: 17 NG/L (ref 0–22)
WBC # BLD: 4.1 K/UL (ref 3.5–11)
WBC # BLD: ABNORMAL 10*3/UL

## 2021-07-21 PROCEDURE — 6370000000 HC RX 637 (ALT 250 FOR IP): Performed by: INTERNAL MEDICINE

## 2021-07-21 PROCEDURE — 84484 ASSAY OF TROPONIN QUANT: CPT

## 2021-07-21 PROCEDURE — 96374 THER/PROPH/DIAG INJ IV PUSH: CPT

## 2021-07-21 PROCEDURE — 94761 N-INVAS EAR/PLS OXIMETRY MLT: CPT

## 2021-07-21 PROCEDURE — 85025 COMPLETE CBC W/AUTO DIFF WBC: CPT

## 2021-07-21 PROCEDURE — 36415 COLL VENOUS BLD VENIPUNCTURE: CPT

## 2021-07-21 PROCEDURE — 6360000002 HC RX W HCPCS: Performed by: INTERNAL MEDICINE

## 2021-07-21 PROCEDURE — 80048 BASIC METABOLIC PNL TOTAL CA: CPT

## 2021-07-21 PROCEDURE — 93306 TTE W/DOPPLER COMPLETE: CPT

## 2021-07-21 PROCEDURE — 93017 CV STRESS TEST TRACING ONLY: CPT

## 2021-07-21 PROCEDURE — 96361 HYDRATE IV INFUSION ADD-ON: CPT

## 2021-07-21 PROCEDURE — 78452 HT MUSCLE IMAGE SPECT MULT: CPT

## 2021-07-21 PROCEDURE — 3430000000 HC RX DIAGNOSTIC RADIOPHARMACEUTICAL: Performed by: INTERNAL MEDICINE

## 2021-07-21 PROCEDURE — 99224 PR SBSQ OBSERVATION CARE/DAY 15 MINUTES: CPT | Performed by: INTERNAL MEDICINE

## 2021-07-21 PROCEDURE — 93010 ELECTROCARDIOGRAM REPORT: CPT | Performed by: INTERNAL MEDICINE

## 2021-07-21 PROCEDURE — 2580000003 HC RX 258: Performed by: INTERNAL MEDICINE

## 2021-07-21 PROCEDURE — A9500 TC99M SESTAMIBI: HCPCS | Performed by: INTERNAL MEDICINE

## 2021-07-21 PROCEDURE — G0378 HOSPITAL OBSERVATION PER HR: HCPCS

## 2021-07-21 PROCEDURE — 96372 THER/PROPH/DIAG INJ SC/IM: CPT

## 2021-07-21 RX ORDER — SODIUM CHLORIDE 9 MG/ML
500 INJECTION, SOLUTION INTRAVENOUS CONTINUOUS PRN
Status: CANCELLED | OUTPATIENT
Start: 2021-07-21 | End: 2021-07-21

## 2021-07-21 RX ORDER — METOPROLOL TARTRATE 5 MG/5ML
5 INJECTION INTRAVENOUS EVERY 5 MIN PRN
Status: CANCELLED | OUTPATIENT
Start: 2021-07-21 | End: 2021-07-21

## 2021-07-21 RX ORDER — ATROPINE SULFATE 0.1 MG/ML
0.5 INJECTION INTRAVENOUS EVERY 5 MIN PRN
Status: CANCELLED | OUTPATIENT
Start: 2021-07-21 | End: 2021-07-21

## 2021-07-21 RX ORDER — AMINOPHYLLINE DIHYDRATE 25 MG/ML
50 INJECTION, SOLUTION INTRAVENOUS PRN
Status: DISPENSED | OUTPATIENT
Start: 2021-07-21 | End: 2021-07-21

## 2021-07-21 RX ORDER — ALBUTEROL SULFATE 90 UG/1
2 AEROSOL, METERED RESPIRATORY (INHALATION) PRN
Status: CANCELLED | OUTPATIENT
Start: 2021-07-21 | End: 2021-07-21

## 2021-07-21 RX ORDER — SODIUM CHLORIDE 0.9 % (FLUSH) 0.9 %
5-40 SYRINGE (ML) INJECTION PRN
Status: ACTIVE | OUTPATIENT
Start: 2021-07-21 | End: 2021-07-21

## 2021-07-21 RX ORDER — NITROGLYCERIN 0.4 MG/1
0.4 TABLET SUBLINGUAL EVERY 5 MIN PRN
Status: CANCELLED | OUTPATIENT
Start: 2021-07-21 | End: 2021-07-21

## 2021-07-21 RX ADMIN — FINASTERIDE 5 MG: 5 TABLET, FILM COATED ORAL at 08:11

## 2021-07-21 RX ADMIN — PANTOPRAZOLE SODIUM 40 MG: 40 TABLET, DELAYED RELEASE ORAL at 05:12

## 2021-07-21 RX ADMIN — ROSUVASTATIN CALCIUM 20 MG: 20 TABLET, COATED ORAL at 17:15

## 2021-07-21 RX ADMIN — NAPROXEN 500 MG: 250 TABLET ORAL at 08:11

## 2021-07-21 RX ADMIN — TETRAKIS(2-METHOXYISOBUTYLISOCYANIDE)COPPER(I) TETRAFLUOROBORATE 10 MILLICURIE: 1 INJECTION, POWDER, LYOPHILIZED, FOR SOLUTION INTRAVENOUS at 11:09

## 2021-07-21 RX ADMIN — FAMOTIDINE 20 MG: 20 TABLET, FILM COATED ORAL at 08:11

## 2021-07-21 RX ADMIN — FOLIC ACID 1 MG: 1 TABLET ORAL at 08:11

## 2021-07-21 RX ADMIN — REGADENOSON 0.4 MG: 0.08 INJECTION, SOLUTION INTRAVENOUS at 12:30

## 2021-07-21 RX ADMIN — SODIUM CHLORIDE, PRESERVATIVE FREE 10 ML: 5 INJECTION INTRAVENOUS at 08:12

## 2021-07-21 RX ADMIN — ENOXAPARIN SODIUM 40 MG: 40 INJECTION SUBCUTANEOUS at 08:11

## 2021-07-21 RX ADMIN — SODIUM CHLORIDE: 9 INJECTION, SOLUTION INTRAVENOUS at 05:12

## 2021-07-21 RX ADMIN — TAMSULOSIN HYDROCHLORIDE 0.4 MG: 0.4 CAPSULE ORAL at 19:29

## 2021-07-21 RX ADMIN — SODIUM CHLORIDE, PRESERVATIVE FREE 10 ML: 5 INJECTION INTRAVENOUS at 19:29

## 2021-07-21 RX ADMIN — TRAZODONE HYDROCHLORIDE 100 MG: 50 TABLET ORAL at 19:29

## 2021-07-21 RX ADMIN — ISOSORBIDE MONONITRATE 30 MG: 30 TABLET, EXTENDED RELEASE ORAL at 08:11

## 2021-07-21 RX ADMIN — ASPIRIN 81 MG: 81 TABLET, CHEWABLE ORAL at 08:11

## 2021-07-21 RX ADMIN — CLOPIDOGREL BISULFATE 75 MG: 75 TABLET ORAL at 08:11

## 2021-07-21 RX ADMIN — TETRAKIS(2-METHOXYISOBUTYLISOCYANIDE)COPPER(I) TETRAFLUOROBORATE 30 MILLICURIE: 1 INJECTION, POWDER, LYOPHILIZED, FOR SOLUTION INTRAVENOUS at 11:09

## 2021-07-21 RX ADMIN — NAPROXEN 500 MG: 250 TABLET ORAL at 17:15

## 2021-07-21 RX ADMIN — SODIUM CHLORIDE, PRESERVATIVE FREE 10 ML: 5 INJECTION INTRAVENOUS at 12:30

## 2021-07-21 RX ADMIN — CILOSTAZOL 50 MG: 50 TABLET ORAL at 19:30

## 2021-07-21 RX ADMIN — CILOSTAZOL 50 MG: 50 TABLET ORAL at 08:11

## 2021-07-21 ASSESSMENT — PAIN SCALES - GENERAL
PAINLEVEL_OUTOF10: 0
PAINLEVEL_OUTOF10: 3
PAINLEVEL_OUTOF10: 0

## 2021-07-21 NOTE — PROGRESS NOTES
Cardiology    He feels good today. Had brief HR of 39 and 41 last night. Asymptomatic. VSS   Exam unchanged    Since he has had no pauses, will do Lexiscan CST today, along with echo.     Reanna Torres MD

## 2021-07-21 NOTE — PROGRESS NOTES
Comprehensive Nutrition Assessment    Type and Reason for Visit:  Initial    Nutrition Recommendations/Plan: continue current diet. F/u need for CC diet modification. Nutrition Assessment:  Altered nutrition related lab r/t endocrine dysfunction aeb A1c 7.8. Glucose 126. Pt receiving nursing care at this time. Malnutrition Assessment:  Malnutrition Status:  Insufficient data    Context:  Acute Illness     Findings of the 6 clinical characteristics of malnutrition:  Energy Intake:  Unable to assess  Weight Loss:  Unable to assess     Body Fat Loss:  Unable to assess     Muscle Mass Loss:  Unable to assess    Fluid Accumulation:  No significant fluid accumulation     Strength:  Not Performed    Nutrition Related Findings:  unable to assess      Wounds:  None       Current Nutrition Therapies:    ADULT DIET; Regular; Low Fat/Low Chol/High Fiber/SHANITA  Diet NPO    Anthropometric Measures:  · Height: 6' 1\" (185.4 cm)  · Current Body Weight: 197 lb (89.4 kg)   · Admission Body Weight: 197 lb (89.4 kg)    · Usual Body Weight: 202 lb (91.6 kg)     · Ideal Body Weight: 184 lbs; % Ideal Body Weight 107.1 %   · BMI: 26  · BMI Categories: Overweight (BMI 25.0-29. 9)       Nutrition Diagnosis:   · Altered nutrition-related lab values related to endocrine dysfuntion as evidenced by lab values (A1c 7.8%)      Nutrition Interventions:   Food and/or Nutrient Delivery:  Continue Current Diet  Nutrition Education/Counseling:  No recommendation at this time   Coordination of Nutrition Care:  Continue to monitor while inpatient    Goals:  PO > 75% of meals        Recent Labs     07/20/21  1141 07/20/21  1148 07/21/21  0240   *  --  141   K 4.3  --  3.9     --  108*   CO2 23  --  23   BUN 21  --  17   CREATININE 0.79  --  0.78   GLUCOSE 135* 118 126*   ALT 16  --   --    ALKPHOS 67  --   --    GFR       NOT REPORTED  --        NOT REPORTED      Lab Results   Component Value Date    LABALBU 3.7 07/20/2021 LABALBU 4.4 02/14/2012      Recent Labs     07/20/21  1147   POCGLU 118*     Lab Results   Component Value Date    TRIG 137 03/11/2020    HDL 71 03/11/2020    LDLCALC 78 05/06/2016     Lab Results   Component Value Date    LABA1C 7.8 03/11/2020   Nutrition Monitoring and Evaluation:   Behavioral-Environmental Outcomes:  None Identified   Food/Nutrient Intake Outcomes:  Food and Nutrient Intake  Physical Signs/Symptoms Outcomes:  Biochemical Data, Weight     Discharge Planning:     Too soon to determine     Electronically signed by Staci Brand RD, LD on 7/21/21 at 11:35 AM EDT    Contact: [phone]

## 2021-07-21 NOTE — PLAN OF CARE
Problem: Skin Integrity:  Goal: Will show no infection signs and symptoms  Description: Will show no infection signs and symptoms  Outcome: Ongoing  Goal: Absence of new skin breakdown  Description: Absence of new skin breakdown  Outcome: Ongoing  Goal: Risk for impaired skin integrity will decrease  Description: Risk for impaired skin integrity will decrease  Outcome: Ongoing     Problem: Falls - Risk of:  Goal: Will remain free from falls  Description: Will remain free from falls  Outcome: Ongoing  Goal: Absence of physical injury  Description: Absence of physical injury  Outcome: Ongoing     Problem: Cardiac:  Goal: Ability to maintain an adequate cardiac output will improve  Description: Ability to maintain an adequate cardiac output will improve  Outcome: Ongoing     Problem: Tissue Perfusion:  Goal: Adequacy of tissue perfusion will improve  Description: Adequacy of tissue perfusion will improve  Outcome: Ongoing

## 2021-07-21 NOTE — PROGRESS NOTES
Home medications and allergies reviewed/updated. Patient verbalized understanding of test/procedure.

## 2021-07-21 NOTE — CONSULTS
COPD.  10.  Rheumatoid arthritis, on methotrexate. 11.  Nguyen's esophagus. 12.  CAD as stated previously. 13.  History of depression, which has been well controlled. 14.  Glucose intolerance. 15.  Peripheral vascular disease, controlled with Pletal and Plavix. FAMILY HISTORY:  Father had an MI at 39. Brother had an MI at 39. SOCIAL HISTORY:  He is 68years old. His wife had Alzheimer's and  passed away within the last year. Daughter, Miguel Angel Snyder, lives across the  street. He has had home health care, which helped markedly. He has a  history of depression. Does not smoke or drink alcohol. REVIEW OF SYSTEMS:  Cardiac as above. Other systems reviewed including  constitutional, eyes, ears, nose and throat, cardiovascular,  respiratory, GI, , musculoskeletal, integumentary, neurologic,  endocrine, hematologic and allergic/immunologic are negative except for  what is described above. No weight loss or weight gain. No change in  bowel habits. No blood in stools. No fevers, sweats or chills. He has  had dizzy episodes. PHYSICAL EXAMINATION:  VITAL SIGNS:  His blood pressure was 113/69, with a heart rate of 60  with heart rates occasionally dropping in the 30s in the emergency room,  which is still a sinus rhythm. Weight 197 pounds. O2 sat was 93%. GENERAL:  He is a pleasant 45-year-old gentleman. Awake, alert and  appropriate. Denied pain. Oriented to person, place and time. Miguel Angel Snyder  was with him. HEENT:  The pupils are equally round and intact. Mucous membranes were  dry. NECK:  No JVD. Good carotid pulses. No carotid bruits. No  lymphadenopathy or thyromegaly. CARDIOVASCULAR EXAM:  S1 and S2 were normal.  No S3 or S4. He had a  grade 3/6 systolic ejection murmur. No diastolic murmur. PMI was  normal.  No lift, thrust, or pericardial friction rub. LUNGS:  Clear to auscultation and percussion. ABDOMEN:  Soft and nontender. Good bowel sounds. EXTREMITIES:  Good femoral pulses. Good pedal pulses. No pedal edema. Skin was warm and dry. No calf tenderness. Nail beds pink. Good cap  refill. PULSES:  Bilateral symmetrical radial, brachial and carotid pulses. No  carotid bruits. Good femoral and pedal pulses. NEUROLOGIC EXAM:  Within normal limits. PSYCHIATRIC EXAM:  Within normal limits. LABORATORY DATA:  His sodium was 134, potassium 4.3, BUN 21, creatinine  0.79. GFR greater than 60. Glucose 135. His ALT was 16, AST was 20. White count 6.8, hemoglobin 13.2 with a platelet count 978,576. His EKG showed, one which showed a heart rate of 60. The next EKG  showed a heart rate of 39 with sinus bradycardia and PACs, but no acute  changes. Troponin was negative. Chest x-ray was unremarkable. A CT scan of the head was negative for a trauma or a neurologic event. IMPRESSION:  1. Syncopal episode today, possibly secondary to sick sinus syndrome. 2.  Sick sinus syndrome in the emergency room with heart rates in the  30s, alternating with heart rates in the 60s. 3.  No acute changes to indicate acute coronary syndrome. 4.  Severe CAD with bypass surgery in 1993 with a LIMA to the LAD and 3  vein grafts. 5.  Repeat catheterization in 1995 that showed occluded vein grafts with  patent LIMA to the LAD. 6.  Open heart surgery in 1995 with 3 new grafts placed including a  right internal mammary artery to the right coronary artery, a vein graft  to the circumflex, and a vein graft to the diagonal.  7.  Catheterization on 07/14/2010 showing patent LIMA to the LAD, patent  right internal mammary artery to the right coronary artery, and patent  vein graft to the circumflex with occluded vein graft to the diagonal.  8.  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 Allan[facility], showing he had occluded  vein graft to the OM and occluded native OM, which was a new finding. Other grafts were patent, EF 50%.   9. Hyperlipidemia, well controlled. 10.  Peripheral vascular disease. 11.  Non-insulin-dependent diabetes. 12.  History of depression. 13.  Marked fatigue in the last 6 weeks. PLAN:  1.  Echocardiogram.  2.  Hold Inderal to watch his rhythm. 3.  We will do a Lexiscan Cardiolite stress test in 2 days so as not to  have bradycardia occur with the [facility] [place]. DISCUSSION:  The patient has had marked fatigue in the last 4 to 6  weeks. He has also had intermittent episodes of lightheadedness and  dizziness. He does have sick sinus syndrome documented in the emergency room with  heart rates in the mid 30s, alternating with 60s. We will hold his Inderal and see the response, which may take several  days. We will repeat an echocardiogram.  He does have marked fatigue, which  may be in part due to depression, but we will do a Lexiscan Cardiolite  stress test again to rule out significant ischemia. I am hoping we will not need to do a pacemaker. He will most certainly  need to be on some beta-blocker at a lower dose. Thank you very much for allowing me the privilege of seeing the patient. If you have any questions on my thoughts, please do not hesitate to  contact me.         Hyla Frankel    D: 07/21/2021 5:08:52       T: 07/21/2021 7:10:27     [id number]  Job#: [id number]     Doc#: [id number]    CC:  Danii Hammer

## 2021-07-21 NOTE — PROGRESS NOTES
Testing complete - patient tolerated well. Patient reports shortness of breath has subsided. Denies and pain/discomfort. Lunch tray ordered.

## 2021-07-21 NOTE — PROGRESS NOTES
Cardiology - Seen in ER    1. Syncope  2. SSS with HR's 30's alternating with 60's  3. Marked fatigue in last 4-6 weeks  4. CAD s/p CABG 1995    EKG:  Marked sinus bradycardia with HR 39 with NSST-T changes    He has had marked fatigue for 4-6 weeks noted by Mariel More and friends. Intermittent episodes of dizziness. Today he had sudden syncopal episode witnessed by an aid. Unconscious for several minutes. No chest pain. Some sob, unable to work 5-10 minutes before sitting down to rest.    No pnd or orthopnea. No pedal edema. Cannot feel palpitations. Will do echo in AM.  Hold Inderal and watch rhythm. May take several days to washout. Will do Easton Cony on Thursday as Easton Cony might cause further pauses tomorrow.     Thanks, Jose Alberto Venegas MD

## 2021-07-21 NOTE — H&P
Hospital Medicine  History and Physical    Patient:  Dmitry Julien  MRN: [id number]    CHIEF COMPLAINT:  Passed out    History Obtained From:  patient  PCP: Kat Lorenzo MD    HISTORY OF PRESENT ILLNESS:   The patient is a 68 y.o. male who presents to the ER after passing out. According to Pearl, for the past several weeks he has felt light headed anytime he has been up walking around. He denies experiencing this at rest or laying down. This has resulted in him walking around holding on to things to prevent him from falling. During this time he denies any change in medication and states he has been eating and drinking well. He has some issues with COPD exacerbation in June but states this has been a lot better. Offermobi states yesterday he took his home health aid to the kitchen to show her how to use a new light he installed. When he went to walk back into the living room he got light headed and went out. Offermobi feels he was only out for a few seconds. He denies any chest pain, SOB, or palpitations before passing out. Offermobi denies a previous history of passing out. He was brought to the ER for further evaluation. In the ER his heart rate was found to be down into the 30's. His CMP was normal other than a sodium of 134 and glucose of 118. CBC showed a WBC of 6.8 with a Hgb of 13.2 and Plt ct of 238. Troponin was negative. CT of the head showed no acute changes. ECG showed marked sinus bradycardia with PAC's. It was felt Pearl should be observed on cardiac monitor while holding Inderal with serial enzymes and further evaluation by Cardiology.       Past Medical History:        Diagnosis Date    Nguyen syndrome     [facility]    Blood circulation, collateral     BPH (benign prostatic hyperplasia)     CAD (coronary artery disease)     Dr Anabel Ruiz COPD (chronic obstructive pulmonary disease) ([facility] [address]     Depression     Hx of colonoscopy     Hyperlipidemia     Liver disease     Movement famotidine (PEPCID) 20 MG tablet Take 20 mg by mouth daily   Yes Historical Provider, MD   METHOTREXATE PO Take 10 mg by mouth once a week   Yes Historical Provider, MD   traZODone (DESYREL) 100 MG tablet Take 100 mg by mouth nightly   Yes Historical Provider, MD   rosuvastatin (CRESTOR) 40 MG tablet Take 20 mg by mouth every evening   Yes Historical Provider, MD   alendronate (FOSAMAX) 70 MG tablet Take 70 mg by mouth every 7 days   Yes Historical Provider, MD   VITAMIN D PO Take by mouth   Yes Historical Provider, MD   cilostazol (PLETAL) 50 MG tablet Take 1 tablet by mouth 2 times daily 4/7/21  Yes Arnol Rosales MD   clopidogrel (PLAVIX) 75 MG tablet Take 1 tablet by mouth daily 1/20/21  Yes Arnol Rosales MD   finasteride (PROSCAR) 5 MG tablet Take 1 tablet by mouth daily 10/12/20  Yes Lyudmila Charlton MD   isosorbide mononitrate (IMDUR) 30 MG extended release tablet Take 1 tablet by mouth daily 9/29/20  Yes Arnol Rosales MD   aspirin 81 MG tablet Take 81 mg by mouth daily    Yes Historical Provider, MD   tamsulosin (FLOMAX) 0.4 MG capsule Take 0.4 mg by mouth daily   Yes Historical Provider, MD   folic acid (FOLVITE) 1 MG tablet Take 1 tablet by mouth daily Daily except on day of taking Methotrexate 9/8/15  Yes Lyudmila Charlton MD   omeprazole (PRILOSEC) 20 MG capsule Take 20 mg by mouth 2 times daily. Yes Historical Provider, MD   albuterol (PROVENTIL HFA;VENTOLIN HFA) 108 (90 BASE) MCG/ACT inhaler Inhale 2 puffs into the lungs 4 times daily as needed for Shortness of Breath    Yes Historical Provider, MD   nitroGLYCERIN (NITROSTAT) 0.4 MG SL tablet up to max of 3 total doses. If no relief after 1 dose, call 911. 10/24/17   Arnol Rosales MD   glucose blood VI test strips (BL TEST STRIP PACK) strip Tests BS daily and as needed. Historical Provider, MD       Allergies:  Patient has no known allergies. Social History:   TOBACCO:   reports that he quit smoking about 40 years ago.  His smoking use included cigarettes. He has a 100.00 pack-year smoking history. He has never used smokeless tobacco.  ETOH:   reports no history of alcohol use. OCCUPATION:      Family History:       Problem Relation Age of Onset    Heart Disease Mother     Heart Disease Father     Diabetes Other         Fhx of    Heart Disease Other         Fhx of    Cancer Sister     Heart Disease Brother        REVIEW OF SYSTEMS:  Constitutional:  negative for  fevers, chills and malaise  HEENT:  negative for  ear drainage, earaches, nasal congestion and sore throat  Neck:  No lumps or masses  Cardiac:  positive for  syncope, light headed. negative for  chest pain, dyspnea, palpitations  Respiratory:  negative for  dry cough, cough with sputum and dyspnea  Gastrointestinal:  negative for nausea, vomiting, change in bowel habits and abdominal pain  :  negative for frequency, dysuria and hesitancy  Musculoskeletal:  positive for  arthralgias  negative for  myalgias, joint swelling and muscle weakness  Neuro:  negative      Physical Exam:    Vitals: /69   Pulse 60   Temp 97.7 °F (36.5 °C) (Oral)   Resp 18   Ht 6' 1\" (1.854 m)   Wt 197 lb (89.4 kg)   SpO2 93%   BMI 25.99 kg/m²   General appearance: alert, appears stated age and cooperative  Skin: Skin color, texture, turgor normal. No rashes or lesions  HEENT: Head: Normocephalic, no lesions, without obvious abnormality. Eye: Normal external eye, conjunctiva, lids cornea, CITLALY. Ears: Normal TM's bilaterally. Normal auditory canals and external ears. Non-tender. Nose: Normal external nose, mucus membranes and septum. Pharynx: Dental Hygiene adequate. Normal buccal mucosa. Normal pharynx. Neck: no adenopathy, no carotid bruit and supple, symmetrical, trachea midline  Lungs: clear to auscultation bilaterally  Heart: regular rate and rhythm, S1, S2 normal and II/VI systolic murmur.   Abdomen: soft, non-tender; bowel sounds normal; no masses,  no organomegaly  Extremities: extremities normal, atraumatic, no cyanosis or edema  Neurologic: Mental status: Alert, oriented, thought content appropriate    CBC:   Recent Labs     07/20/21  1141 07/21/21  0240   WBC 6.8 4.1   HGB 13.2* 11.6*    171     BMP:    Recent Labs     07/20/21  1141 07/20/21  1148 07/21/21  0240   *  --  141   K 4.3  --  3.9     --  108*   CO2 23  --  23   BUN 21  --  17   CREATININE 0.79  --  0.78   GLUCOSE 135* 118 126*     Hepatic:   Recent Labs     07/20/21  1141   AST 20   ALT 16   BILITOT 0.52   ALKPHOS 67     Troponin: No results for input(s): TROPONINI in the last 72 hours. BNP: No results for input(s): BNP in the last 72 hours. Lipids: No results for input(s): CHOL, HDL in the last 72 hours. Invalid input(s): LDLCALCU  ABGs: No results found for: PHART, PO2ART, ACG6XOE  INR: No results for input(s): INR in the last 72 hours. -----------------------------------------------------------------      Assessment and Plan   1. Syncopal episode - likely secondary to hypotension for severe bradycardia. Serial troponin negative. 2.  Sick sinus syndrome - heart rate into the 30's. Inderal placed on hold. Continue to monitor. 3.  Fatigue / light headed for the past several weeks - likely secondary to heart rate. 4.  CAD - S/P CABG in 1995. On Plavix and Imdur (Inderal on hold). 5.  Hyperlipidemia - on Crestor. 6.  GERD - controlled on Pepcid / Prilosec. 7.  RA - on MTX  8. BPH - stable on Proscar / Flomax. 9.  COPD - currently stable on albuterol PRN. 10.  Osteoporosis - on Fosamax weekly. 11.  Mild anemia - Hgb dropped to 11.6 this am but this is likely dilutional.      Plan:  1. Observation admission on cardiac monitor. 2.  Serial troponin levels. 3.  Consult to Cardiology. 4.  Hold Inderal.  5.  ECHO / Stress testing. 6.  IV Fluid hydration with Orthostatic BP's. 7.  Continue home medication. 8.  Lovenox for DVT prophylaxis. 9.  Full code.       Estimated length of stay 1 day.      The beneficiary may reasonably be expected to be discharged or transferred to a hospital within 96 hours after admission. 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    COPD (chronic obstructive pulmonary disease) ([facility] [address] J44.9    Rheumatic fever I00    DM type 2, controlled, with complication [facility] Y48.3    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    Ureteral stone with hydronephrosis N13.2    Acute cystitis without hematuria N30.00    Ischemic chest pain (HCC) I20.9    NSTEMI (non-ST elevated myocardial infarction) ([facility] [address] I21.4    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 (HCC) I25.700    Hematuria R31.9    SOB (shortness of breath) R06.02    Renal stone N20.0    Nocturia R35.1    BPH with obstruction/lower urinary tract symptoms N40.1, N13.8    Cystitis N30.90    Syncope and collapse R55       DVT prophylaxis:   [] Lovenox   [] SCDs   [] SQ Heparin   [] Encourage ambulation, low risk for DVT, no chemical or mechanical    prophylaxis necessary      [] Already on Anticoagulation      Documentation of the Current Medications in the Medical Record    (x)  I have utilized all available immediate resources to obtain, update, or review the patient's current medications. (Satisfies MIPS Performance)  If Yes, Stop Here  ( )  The patient is not eligible for medication reconciliation; the patient is in an emergent medical situation where delaying treatment would jeopardize the patient's health.   (MIPS Performance exception / exclusion)  ( )  I did not confirm, update or review the patient's current list of medications today. (Does not satisfy MIPS Performance)      Advanced Care Plan    (x)  I confirmed that the patient's Advanced Care Plan is present, code status documented, or surrogate decision maker is listed in the patient's medical record. ( )  The patient's advanced care plan is not present because:  (select)   ( ) I confirmed today that the patient does not wish or was not able to name a surrogate decision maker or provide an [address] ( ) Hospice care is currently being provided or has been provided this calender year. ( )  I did not confirm today the presence of an [address] or surrogate decision maker documented within the patient's medical record. (Does not satisfy MIPS performance).             Kathy Pal MD, MD  Admitting Hospitalist

## 2021-07-21 NOTE — PROGRESS NOTES
Met with Patient this a.m. to discuss discharge plans. Patient is a 68year old , white male, admitted with a diagnosis of Syncope and Collapse. He is alert and oriented, very pleasant and cooperative with this assessment. States that he wishes to be discharged home following this hospitalization. Daughter lives next door and provides assistance as needed per Patient report. [place] [facility]. He lives alone since his wife now resides in a local nursing home. Daughter and her family live close and are available to assist Patient if/as needed. Patient uses a cane, rolator, electric scooter, shower chair and grab bars at home for assistance. [facility] provides private caregiver twice a week for 2 hours at a time and she does personal care and light housekeeping. Patient can drive but often relies on family for his transportation needs. PCP is Dr. Sharmaine Tidwell. Patient gets \"a lot\" of his medications through the [place] and denies needing further financial assistance with the cost of his other prescriptions. Discussed options for discharge and Patient wishes to return home when deemed medically stable. He requested information on Personal Emergency [employer] Systems and same was provided for his and daughter's review. Patient is a 'Full Code' status and does not have his medical directives currently on file. No unmet needs or concerns voiced by Patient at this time. LSW to monitor and assist with any/all discharge needs as they arise.     Magda. Meng Mendez [address], [facility]  7/21/2021

## 2021-07-22 ENCOUNTER — HOSPITAL ENCOUNTER (OUTPATIENT)
Dept: NON INVASIVE DIAGNOSTICS | Age: 81
Discharge: HOME | DRG: 310 | End: 2021-07-22
Payer: MEDICARE

## 2021-07-22 ENCOUNTER — TELEPHONE (OUTPATIENT)
Dept: FAMILY MEDICINE CLINIC | Age: 81
End: 2021-07-22

## 2021-07-22 VITALS
DIASTOLIC BLOOD PRESSURE: 78 MMHG | BODY MASS INDEX: 26.21 KG/M2 | WEIGHT: 197.8 LBS | SYSTOLIC BLOOD PRESSURE: 155 MMHG | HEART RATE: 62 BPM | HEIGHT: 73 IN | TEMPERATURE: 98 F | RESPIRATION RATE: 16 BRPM | OXYGEN SATURATION: 94 %

## 2021-07-22 DIAGNOSIS — R00.1 BRADYCARDIA: ICD-10-CM

## 2021-07-22 DIAGNOSIS — R55 SYNCOPE AND COLLAPSE: ICD-10-CM

## 2021-07-22 PROCEDURE — 2580000003 HC RX 258: Performed by: INTERNAL MEDICINE

## 2021-07-22 PROCEDURE — 96372 THER/PROPH/DIAG INJ SC/IM: CPT

## 2021-07-22 PROCEDURE — 94761 N-INVAS EAR/PLS OXIMETRY MLT: CPT

## 2021-07-22 PROCEDURE — 93660 TILT TABLE EVALUATION: CPT

## 2021-07-22 PROCEDURE — 1200000000 HC SEMI PRIVATE

## 2021-07-22 PROCEDURE — 96361 HYDRATE IV INFUSION ADD-ON: CPT

## 2021-07-22 PROCEDURE — G0378 HOSPITAL OBSERVATION PER HR: HCPCS

## 2021-07-22 PROCEDURE — 6360000002 HC RX W HCPCS: Performed by: INTERNAL MEDICINE

## 2021-07-22 PROCEDURE — 93270 REMOTE 30 DAY ECG REV/REPORT: CPT

## 2021-07-22 PROCEDURE — 6370000000 HC RX 637 (ALT 250 FOR IP): Performed by: INTERNAL MEDICINE

## 2021-07-22 RX ORDER — SODIUM CHLORIDE 9 MG/ML
INJECTION, SOLUTION INTRAVENOUS CONTINUOUS
Status: DISCONTINUED | OUTPATIENT
Start: 2021-07-22 | End: 2021-07-22 | Stop reason: HOSPADM

## 2021-07-22 RX ADMIN — SODIUM CHLORIDE: 9 INJECTION, SOLUTION INTRAVENOUS at 07:55

## 2021-07-22 RX ADMIN — CLOPIDOGREL BISULFATE 75 MG: 75 TABLET ORAL at 08:49

## 2021-07-22 RX ADMIN — CILOSTAZOL 50 MG: 50 TABLET ORAL at 08:49

## 2021-07-22 RX ADMIN — FAMOTIDINE 20 MG: 20 TABLET, FILM COATED ORAL at 08:49

## 2021-07-22 RX ADMIN — FOLIC ACID 1 MG: 1 TABLET ORAL at 08:49

## 2021-07-22 RX ADMIN — ISOSORBIDE MONONITRATE 30 MG: 30 TABLET, EXTENDED RELEASE ORAL at 08:49

## 2021-07-22 RX ADMIN — ENOXAPARIN SODIUM 40 MG: 40 INJECTION SUBCUTANEOUS at 08:49

## 2021-07-22 RX ADMIN — NAPROXEN 500 MG: 250 TABLET ORAL at 08:49

## 2021-07-22 RX ADMIN — ASPIRIN 81 MG: 81 TABLET, CHEWABLE ORAL at 08:49

## 2021-07-22 RX ADMIN — FINASTERIDE 5 MG: 5 TABLET, FILM COATED ORAL at 08:49

## 2021-07-22 ASSESSMENT — PAIN SCALES - GENERAL
PAINLEVEL_OUTOF10: 0
PAINLEVEL_OUTOF10: 0

## 2021-07-22 NOTE — PROGRESS NOTES
Cardiology    He has HR's in 30's briefly at night, with no bradycardia during the day. Not an indication as of yet for PPM.  Will do tilt table today, and if ok will discharge with a 30 day event recorder. Will also place in cardiac rehab for his symptoms of angina and his abnormal CST. Will not start beta blocker yet.     Jena Avila MD

## 2021-07-22 NOTE — PROCEDURES
[place] [address]                              CARDIAC STRESS TEST    PATIENT NAME: Patricia Morgan                      :        1940  MED REC NO:   [id number]                              ROOM:       7675  ACCOUNT NO:   [de-identified]                           ADMIT DATE: 2021  PROVIDER:     Willie Alamo      DATE OF STUDY:  2021    Cardiovascular Diagnostics Department    Ordering Provider:  Abel Lorenzo MD    Primary Care Provider:  Judy Frankel, MD    Interpreting Physician:  Willie Lind. Olivia Lockhart MD    MYOCARDIAL PERFUSION STRESS IMAGING    The stress ECG results are reported separately. NUCLEAR IMAGING RESULTS:  The overall quality of the study is fair. No  significant attenuation artifact was seen. There is no evidence of  abnormal lung uptake. Additionally, the right ventricle appears normal.  The left ventricular cavity is noted to be normal in size on stress  images. There is no evidence of transient ischemic dilatation (TID) of  the left ventricle. Gated SPECT imaging reveals normal myocardial thickening and wall motion  with a calculated left ventricular ejection fraction (EF) of 72%. The rest images demonstrated a small perfusion abnormality of mild  intensity in the lateral region(s) which is most likely due to artifact. On stress imaging, a moderate perfusion abnormality of mild/moderate  intensity was noted in the lateral and inferior region(s). IMPRESSION:  1. Abnormal myocardial perfusion study. There is a moderate perfusion  defect of mild/moderate intensity in the lateral and inferior region(s)  during stress imaging, which is most consistent with ischemia. 2.  Global left ventricular systolic function was normal with an EF of  72% without regional wall motion abnormalities.     Overall these results are most consistent with an intermediate risk for  significant coronary artery disease. Additional testing including cardiac catheterization may be indicated. The results of this test were discussed with Dr. Bro Apt on 07/21/2021  at [id number]. RICH COBOS    D: 07/22/2021 9:39:12       T: 07/22/2021 9:40:34     [email or web address]  Job#: [id number]     Doc#: Unknown    CC:  Adolph Tolbert

## 2021-07-22 NOTE — PROCEDURES
[address]                                TILT TABLE TEST    PATIENT NAME: Lazarus Clara                      :        1940  MED REC NO:   [id number]                              ROOM:       3847  ACCOUNT NO:   [de-identified]                           ADMIT DATE: 2021  PROVIDER:     Charlie Clancy    DATE OF STUDY:  2021    NAME OF TEST:  Tilt table. INDICATION:  Syncope. We kept him supine for 5 minutes. His blood pressure was 130 to 140  over 70 to 80. Heart rate was 60 to 65. We then upright tilted to 60 degrees for 20 minutes. His blood pressure  did initially drop to 112 and back up to 129. Then, it gradually  decreased to a low blood pressure at 92/84. Then, recovered to the 110  to 115 range. This was a mildly abnormal tilt table with autonomic dysfunction with  blood pressures gradually decreasing to a low of 92, generally staying  in the 100 range. He was asymptomatic and I would start no specific  treatment. He should, however, remain well hydrated and may need a  support hose.         Dalia Burleson    D: 2021 9:50:54       T: 2021 11:33:21     [email or web address]  Job#: [id number]     Doc#: [id number]    CC:  Claudia Watkins

## 2021-07-22 NOTE — PROGRESS NOTES
Allergies and home medications reviewed/updated with patient. Patient verbalized understanding of test/procedure.

## 2021-07-22 NOTE — PROGRESS NOTES
The patient was educated on the use of an event monitor. The patient's comprehension was medium. The patient was able to verbalize recall. The patient was instructed on how and when to return the monitor. Instructions also given to daughter who is an RN.

## 2021-07-22 NOTE — DISCHARGE SUMMARY
Hospitalist Discharge Summary    Tatianna Slater  :  1940  MRN:  [id number]    Admit date:  2021  Discharge date:  21    Admitting Physician:  Jose Gaviria MD    Discharge Diagnoses:   Syncopal episode. Sick sinus syndrome. Fatigue. CAD. Admission Condition:  fair      Discharged Condition:  good    Hospital Course: The patient is a 68 y.o. male who presents to the ER after passing out. According to Pearl, for the past several weeks he has felt light headed anytime he has been up walking around. He denies experiencing this at rest or laying down. This has resulted in him walking around holding on to things to prevent him from falling. During this time he denies any change in medication and states he has been eating and drinking well. He has some issues with COPD exacerbation in  but states this has been a lot better. Deborah Rubio states yesterday he took his home health aid to the kitchen to show her how to use a new light he installed. When he went to walk back into the living room he got light headed and went out. Deborah Rubio feels he was only out for a few seconds. He denies any chest pain, SOB, or palpitations before passing out. Deborah Rubio denies a previous history of passing out. He was brought to the ER for further evaluation. In the ER his heart rate was found to be down into the 30's. His CMP was normal other than a sodium of 134 and glucose of 118. CBC showed a WBC of 6.8 with a Hgb of 13.2 and Plt ct of 238. Troponin was negative. CT of the head showed no acute changes. ECG showed marked sinus bradycardia with PAC's. Deborah Rubio was admitted to the hospital on cardiac monitoring and IV Normal saline. Serial troponin levels were negative. Dr. Shantanu Anderson in Cardiology was consulted and recommended Inderal to be discontinued secondary to severe bradycardia into the 30's.   With holding Inderal the HR would stay in the 50 to 60's at rest but still would dip into the 30's while sleeping. A cardiac stress showed lateral ischemia but Dr. Maria C Avalos felt this was unchanged from his previous stress test.  An ECHO showed an EF of 50%. Maria C Huynh was walked with no further light headedness. A tilt table study was performed prior to discharge. Dr. Maria C Avalos felt  He could be discharged on a 30 day event monitor with close out patient follow up. He will be set up for Cardiac rehab as an out patient. Discharge Exam:    Vitals: /70   Pulse 63   Temp 98 °F (36.7 °C) (Oral)   Resp 16   Ht 6' 1\" (1.854 m)   Wt 197 lb 12.8 oz (89.7 kg)   SpO2 94%   BMI 26.10 kg/m²   General appearance: alert and cooperative with exam  HEENT: Head: Normocephalic, no lesions, without obvious abnormality. Eye: Normal external eye, conjunctiva, lids cornea, CITLALY. Nose: Normal external nose, mucus membranes and septum. Neck: no adenopathy, no carotid bruit and supple, symmetrical, trachea midline  Lungs: clear to auscultation bilaterally  Heart: regular rate and rhythm, S1, S2 normal and II/VI systolic murmur.   Abdomen: soft, non-tender; bowel sounds normal; no masses,  no organomegaly  Extremities: extremities normal, atraumatic, no cyanosis or edema  Neurologic: Mental status: Alert, oriented, thought content appropriate    Discharge Medications:       [facility] Medication Instructions NICOLETTE:[id number]    Printed on:07/22/21 0701   Medication Information                      albuterol (PROVENTIL HFA;VENTOLIN HFA) 108 (90 BASE) MCG/ACT inhaler  Inhale 2 puffs into the lungs 4 times daily as needed for Shortness of Breath              alendronate (FOSAMAX) 70 MG tablet  Take 70 mg by mouth every 7 days             aspirin 81 MG tablet  Take 81 mg by mouth daily              cilostazol (PLETAL) 50 MG tablet  Take 1 tablet by mouth 2 times daily             clopidogrel (PLAVIX) 75 MG tablet  Take 1 tablet by mouth daily             famotidine (PEPCID) 20 MG tablet  Take 20 mg by mouth daily finasteride (PROSCAR) 5 MG tablet  Take 1 tablet by mouth daily             folic acid (FOLVITE) 1 MG tablet  Take 1 tablet by mouth daily Daily except on day of taking Methotrexate             isosorbide mononitrate (IMDUR) 30 MG extended release tablet  Take 1 tablet by mouth daily             METHOTREXATE PO  Take 10 mg by mouth once a week             Multiple Vitamins-Minerals (PRESERVISION AREDS PO)  Take by mouth             naproxen (NAPROSYN) 500 MG tablet  Take 500 mg by mouth 2 times daily (with meals)             nitroGLYCERIN (NITROSTAT) 0.4 MG SL tablet  up to max of 3 total doses. If no relief after 1 dose, call 911. omeprazole (PRILOSEC) 20 MG capsule  Take 20 mg by mouth 2 times daily. rosuvastatin (CRESTOR) 40 MG tablet  Take 20 mg by mouth every evening             tamsulosin (FLOMAX) 0.4 MG capsule  Take 0.4 mg by mouth daily             traZODone (DESYREL) 100 MG tablet  Take 100 mg by mouth nightly             VITAMIN D PO  Take by mouth                 Consults:  Dr. Amanda Alfaro (Cardiology)    Significant Diagnostic Studies:  Labs, ECHO, ECG, Stress test, Tilt table study    Treatments: Inderal held, IV normal saline. Disposition:   Home. Discharge Instructions:  Resume previous home medication but stop Inderal.  30 day event monitor to be placed at discharge. Follow up with Dr. Amanda Alfaro to be set up for Cardiac rehab. Activity:  As tolerated. Diet:  Cardiac    Follow up with Gillian Kwon MD in 1 week. Discharge time =  25 minutes.      Signed:  Ranulfo Rivera MD  7/22/2021, 7:01 AM

## 2021-07-22 NOTE — TELEPHONE ENCOUNTER
[facility] Transitions Initial Follow Up Call    Outreach made within 2 business days of discharge: Yes    Patient: Suni Casey Patient : 1940   MRN: [id number]  Reason for Admission: There are no discharge diagnoses documented for the most recent discharge. Discharge Date: 21       Spoke with: Margaret Goetz    Discharge department/facility: [facility] Interactive Patient Contact:  Was patient able to fill all prescriptions: No new medications started. Was patient instructed to bring all medications to the follow-up visit: Yes  Is patient taking all medications as directed in the discharge summary?  Yes  Does patient understand their discharge instructions: Yes  Does patient have questions or concerns that need addressed prior to 7-14 day follow up office visit: no    Scheduled appointment with PCP within 7-14 days    Follow Up  Future Appointments   Date Time Provider Esvin Mejia   2021 11:00 AM Matilde Moreno MD [facility] MED [facility]   2021 10:20 AM Matilde Moreno MD [facility] MED [facility]   2021 11:00 AM MD Patricia Whitt 68

## 2021-07-22 NOTE — PLAN OF CARE
Problem: Falls - Risk of:  Goal: Will remain free from falls  Description: Will remain free from falls  Outcome: Met This Shift  Goal: Absence of physical injury  Description: Absence of physical injury  Outcome: Met This Shift     Problem:  Activity Intolerance:  Goal: Ability to tolerate increased activity will improve  Description: Ability to tolerate increased activity will improve  Outcome: Ongoing

## 2021-07-22 NOTE — PROGRESS NOTES
TILT TABLE PROCEDURE    NAME: Madelin Lin MRN: [id number] : 1940   DIAGNOSIS: Syncope  ATTENDING PHYSICIAN:  Dr. Aleksandar Marie:  Dr. Lachelle Conde     IV INSERTED:   existing       LOCATION: right wrist SIZE:  22 gauge    TIME OUT PERFORMED: yes  PRE-PROCEDURE TEACHING COMPLETED: yes  CONSENT SIGNED: yes  POST PROCEDURE TEACHING: yes  STAFF PRESENT:  DO Sawyer  BASELINE VITALS:  Time BLOOD PRESSURE HEART RATE O2SAT% Comments   0753 141/85 60 94    0754 145/79 65 94    0755 135/78 60 94    0756 140/79 62 94    0757 130/74 63 94      PROCEDURE START TIME:     TIME BLOOD PRESSURE HEART RATE O2 SAT % COMMENTS   0800    Table tilted 60 degrees   0801 112/66 72 94    0802 119/76 84 95    0804 129/69 79 94    0805 115/68 81 94    0807 118/73 79 94    0808 112/65 80 94    0809 105/69 80 94    0810 111/71 84 93    0811 105/68 81 93 Patient tolerating well   0813 112/68 88 94    0814 114/69 83 93    0815 122/66 86 94    0817 113/65 86 94    0818 92/58 87 94    0819 112/69 90 94    0821 109/66 88 94    0822 115/69 89 94    0823 105/69 86 94    0824 105/53 83 95    0826 112/60 89 94    0827 106/69 88 94    0828 105/61 83 95    0829 101/59 91 94    0831 93/63 63 95    0832 89/61 92 94    0833 114/57 91 94    0833    Patient back to supine   0834 133/74 63 94               Procedure Comments:

## 2021-07-22 NOTE — PROGRESS NOTES
Hospitalist Progress Note  7/22/2021 5:46 AM  Subjective:   Admit Date: 7/20/2021  PCP: Kat Lorenzo MD    Interval History: Jonathon Abreu has no complaints this am.  He denies chest pain or SOB. Nursing reports heart rate into the 30's while sleeping. HR in the 50 to 60's while awake. No light headedness or near syncope. Bowels are moving and he denies any trouble urinating. Diet: Diet NPO  Medications:   Scheduled Meds:   aspirin  81 mg Oral Daily    cilostazol  50 mg Oral BID    clopidogrel  75 mg Oral Daily    famotidine  20 mg Oral Daily    finasteride  5 mg Oral Daily    folic acid  1 mg Oral Daily    isosorbide mononitrate  30 mg Oral Daily    naproxen  500 mg Oral BID WC    pantoprazole  40 mg Oral QAM AC    rosuvastatin  20 mg Oral QPM    tamsulosin  0.4 mg Oral Daily    traZODone  100 mg Oral Nightly    sodium chloride flush  5-40 mL Intravenous 2 times per day    enoxaparin  40 mg Subcutaneous Daily     Continuous Infusions:   sodium chloride         Patient's current medications documented, reviewed, and updated. CBC:   Recent Labs     07/20/21  1141 07/21/21  0240   WBC 6.8 4.1   HGB 13.2* 11.6*    171     BMP:    Recent Labs     07/20/21  1141 07/20/21  1148 07/21/21  0240   *  --  141   K 4.3  --  3.9     --  108*   CO2 23  --  23   BUN 21  --  17   CREATININE 0.79  --  0.78   GLUCOSE 135* 118 126*     Hepatic:   Recent Labs     07/20/21  1141   AST 20   ALT 16   BILITOT 0.52   ALKPHOS 67     Troponin: No results for input(s): TROPONINI in the last 72 hours. BNP: No results for input(s): BNP in the last 72 hours. Lipids: No results for input(s): CHOL, HDL in the last 72 hours. Invalid input(s): LDLCALCU  INR: No results for input(s): INR in the last 72 hours.       Objective:   Vitals: /70   Pulse 63   Temp 98 °F (36.7 °C) (Oral)   Resp 16   Ht 6' 1\" (1.854 m)   Wt 197 lb 12.8 oz (89.7 kg)   SpO2 94%   BMI 26.10 kg/m²   General appearance: alert and cooperative with exam  HEENT: Head: Normocephalic, no lesions, without obvious abnormality. Eye: Normal external eye, conjunctiva, lids cornea, CITLALY. Nose: Normal external nose, mucus membranes and septum. Neck: no adenopathy, no carotid bruit and supple, symmetrical, trachea midline  Lungs: clear to auscultation bilaterally  Heart: regular rate and rhythm, S1, S2 normal and II/VI systolic murmur. Abdomen: soft, non-tender; bowel sounds normal; no masses,  no organomegaly  Extremities: extremities normal, atraumatic, no cyanosis or edema  Neurologic: Mental status: Alert, oriented, thought content appropriate    Assessment and Plan:   1. Syncopal episode - likely secondary to hypotension for severe bradycardia. Serial troponin negative. 2.  Sick sinus syndrome - heart rate into the 30's. Inderal placed on hold. Heart rate continues to drop into the 30's at night. Will discuss with cardiology. 3.  Fatigue / light headed for the past several weeks - likely secondary to heart rate. 4.  CAD - S/P CABG in 1995. On Plavix and Imdur (Inderal on hold). Cardiac stress with lateral wall ischemia but unchanged from previous. ECHO showed an EF 50%. Dr. Jackelyn Llamas recommending full medical therapy. 5.  Hyperlipidemia - on Crestor. 6.  GERD - controlled on Pepcid / Prilosec. 7.  RA - on MTX  8. BPH - stable on Proscar / Flomax. 9.  COPD - currently stable on albuterol PRN. 10.  Osteoporosis - on Fosamax weekly. 11.  Mild anemia - Hgb dropped to 11.6 but felt to be dilutional.    Plan: Will discuss with Dr. Jackelyn Llamas this am to see if he feel a pacemaker is needed vs continued monitoring.         DVT prophylaxis:   [x] Lovenox   [] SCDs   [] SQ Heparin   [] Encourage ambulation, low risk for DVT, no chemical or mechanical    prophylaxis necessary      [] Already on Anticoagulation    Patient Active Problem List:     Pure hypercholesterolemia     Coronary atherosclerosis of native coronary artery Generalized osteoarthrosis, involving multiple sites     Disturbance of skin sensation     Incisional hernia     Diverticulosis of large intestine     Other extrapyramidal disease and abnormal movement disorder     COPD (chronic obstructive pulmonary disease) (HCC)     Rheumatic fever     DM type 2, controlled, with complication (HCC)     Nguyen's esophagus with low grade dysplasia     Ureteral stone with hydronephrosis     Acute cystitis without hematuria     Ischemic chest pain (HCC)     NSTEMI (non-ST elevated myocardial infarction) ([facility] [address]     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 (HCC)     Hematuria     SOB (shortness of breath)     Renal stone     Nocturia     BPH with obstruction/lower urinary tract symptoms     Cystitis     Syncope and collapse      Thompson Reed MD, MD  [facility] Hospitalist

## 2021-07-23 ENCOUNTER — CARE COORDINATION (OUTPATIENT)
Dept: CASE MANAGEMENT | Age: 81
End: 2021-07-23

## 2021-07-23 ENCOUNTER — TELEPHONE (OUTPATIENT)
Dept: CARDIOLOGY CLINIC | Age: 81
End: 2021-07-23

## 2021-07-23 DIAGNOSIS — I48.91 ATRIAL FIBRILLATION, UNSPECIFIED TYPE (HCC): Primary | ICD-10-CM

## 2021-07-23 DIAGNOSIS — R55 SYNCOPE AND COLLAPSE: Primary | ICD-10-CM

## 2021-07-23 DIAGNOSIS — I20.8 STABLE ANGINA (HCC): Primary | ICD-10-CM

## 2021-07-23 PROCEDURE — 1111F DSCHRG MED/CURRENT MED MERGE: CPT | Performed by: FAMILY MEDICINE

## 2021-07-23 NOTE — CARE COORDINATION
Kristal [facility] Transitions Initial Follow Up Call    Call within 2 business days of discharge: Yes    Patient: Salomon Reynolds Patient : 1940   MRN: [id number]  Reason for Admission: syncope and collapse   Discharge Date: 21 RARS: Readmission Risk Score: 13      Last Discharge [facility]       Complaint Diagnosis Description Type Department Provider    21 Loss of Consciousness Syncope and collapse . .. ED to Hosp-Admission (Discharged) (ADMITTED) Albertina Lim MD; Flo Villareal MD           Spoke with: Leta Robledo    Patient called and left message for writer     Call back to pt who states he is doing fine. Denies CP, palpitations. States he does get SOB with exertion but better with rest. States his SpO@ was 94-97% today   States Dr Melisa Kolb office called him today telling him he had some A fib on his monitor (30- day cardiac monitor in place from hospital) and he will now take Xarelto and stop the aspirin  States he doesn't use inhaler often   Denies fever/ chills  Writer advised he rise slowly from laying or sitting- v/u    Transitions of Care Initial Call    Was this an external facility discharge? No Discharge Facility: [facility]    Challenges to be reviewed by the provider   Additional needs identified to be addressed with provider: No  none             Method of communication with provider : none      Advance Care Planning:   Does patient have an Advance Directive: not on file; education provided. Was this a readmission? No  Patient stated reason for admission: dizziness, passed out  Patients top risk factors for readmission: lack of knowledge about disease and medical condition-new A fib diagnosis     Care Transition Nurse (CTN) contacted the patient by telephone to perform post hospital discharge assessment. Verified name and  with patient as identifiers. Provided introduction to self, and explanation of the CTN role.      CTN reviewed discharge instructions, medical action plan of care documents  Assessment and support for treatment adherence and medication management-[id number] complete    Care Transitions 24 Hour Call    Do you have any ongoing symptoms?: No  Do you have a copy of your discharge instructions?: Yes  Do you have all of your prescriptions and are they filled?: Yes  Have you been contacted by a [address]?: No  Have you scheduled your follow up appointment?: Yes  How are you going to get to your appointment?: Car - drive self  Were you discharged with any Home Care or Post Acute Services: No  Do you feel like you have everything you need to keep you well at home?: Yes  Care Transitions Interventions         Follow Up  Future Appointments   Date Time Provider Esvin Mejia   8/4/2021 11:00 AM Batool Lamar MD [facility] MED [facility]   8/25/2021 10:20 AM Batool Lamar MD [facility] MED [facility]   9/8/2021 11:00 AM MD Celia Beverly [facility]       Elliot Larson RN

## 2021-07-23 NOTE — TELEPHONE ENCOUNTER
New onset of Afib per Ceci. Patient called to start Xarelto 15 mg 1 daily and to stop Aspirin 81mg per Dr. Roman Schwarz. Patient will come into office to  samples. Lot [id number]  Exp 4/22  28 tablets given    Patient also advised per Dr. Roman Schwarz he will need to get an EKG on Monday. Patient is coming today to  samples and will be the EKG on Monday.

## 2021-07-26 ENCOUNTER — HOSPITAL ENCOUNTER (OUTPATIENT)
Age: 81
Discharge: HOME | End: 2021-07-26
Payer: MEDICARE

## 2021-07-26 ENCOUNTER — APPOINTMENT (OUTPATIENT)
Dept: GENERAL RADIOLOGY | Age: 81
End: 2021-07-26
Payer: MEDICARE

## 2021-07-26 ENCOUNTER — OFFICE VISIT (OUTPATIENT)
Dept: CARDIOLOGY CLINIC | Age: 81
End: 2021-07-26
Payer: MEDICARE

## 2021-07-26 ENCOUNTER — HOSPITAL ENCOUNTER (EMERGENCY)
Age: 81
Discharge: HOME | End: 2021-07-26
Attending: EMERGENCY MEDICINE
Payer: MEDICARE

## 2021-07-26 VITALS
WEIGHT: 201 LBS | OXYGEN SATURATION: 96 % | DIASTOLIC BLOOD PRESSURE: 50 MMHG | HEART RATE: 84 BPM | SYSTOLIC BLOOD PRESSURE: 98 MMHG | BODY MASS INDEX: 26.52 KG/M2

## 2021-07-26 VITALS
HEIGHT: 73 IN | SYSTOLIC BLOOD PRESSURE: 105 MMHG | TEMPERATURE: 97.7 F | HEART RATE: 71 BPM | OXYGEN SATURATION: 94 % | BODY MASS INDEX: 26.66 KG/M2 | RESPIRATION RATE: 20 BRPM | DIASTOLIC BLOOD PRESSURE: 69 MMHG | WEIGHT: 201.2 LBS

## 2021-07-26 DIAGNOSIS — Z86.79 HISTORY OF CORONARY ARTERY DISEASE: ICD-10-CM

## 2021-07-26 DIAGNOSIS — R06.02 SHORTNESS OF BREATH: Primary | ICD-10-CM

## 2021-07-26 DIAGNOSIS — Z87.09 HISTORY OF COPD: ICD-10-CM

## 2021-07-26 DIAGNOSIS — R06.02 SOB (SHORTNESS OF BREATH): Primary | ICD-10-CM

## 2021-07-26 DIAGNOSIS — I48.91 ATRIAL FIBRILLATION, UNSPECIFIED TYPE (HCC): ICD-10-CM

## 2021-07-26 LAB
ABSOLUTE EOS #: 0.1 K/UL (ref 0–0.4)
ABSOLUTE IMMATURE GRANULOCYTE: ABNORMAL K/UL (ref 0–0.3)
ABSOLUTE LYMPH #: 0.9 K/UL (ref 1–4.8)
ABSOLUTE MONO #: 0.7 K/UL (ref 0–1)
ALBUMIN SERPL-MCNC: 4 G/DL (ref 3.5–5.2)
ALBUMIN/GLOBULIN RATIO: ABNORMAL (ref 1–2.5)
ALP BLD-CCNC: 73 U/L (ref 40–129)
ALT SERPL-CCNC: 29 U/L (ref 5–41)
ANION GAP SERPL CALCULATED.3IONS-SCNC: 14 MMOL/L (ref 9–17)
AST SERPL-CCNC: 29 U/L
BASOPHILS # BLD: 0 % (ref 0–2)
BASOPHILS ABSOLUTE: 0 K/UL (ref 0–0.2)
BILIRUB SERPL-MCNC: 0.39 MG/DL (ref 0.3–1.2)
BNP INTERPRETATION: NORMAL
BUN BLDV-MCNC: 15 MG/DL (ref 8–23)
BUN/CREAT BLD: ABNORMAL (ref 9–20)
CALCIUM SERPL-MCNC: 9.7 MG/DL (ref 8.6–10.4)
CHLORIDE BLD-SCNC: 103 MMOL/L (ref 98–107)
CO2: 22 MMOL/L (ref 20–31)
CREAT SERPL-MCNC: 0.77 MG/DL (ref 0.7–1.2)
D-DIMER QUANTITATIVE: 0.58 MG/L FEU (ref 0–0.59)
DIFFERENTIAL TYPE: YES
EOSINOPHILS RELATIVE PERCENT: 2 % (ref 0–5)
GFR AFRICAN AMERICAN: >60 ML/MIN
GFR NON-AFRICAN AMERICAN: >60 ML/MIN
GFR SERPL CREATININE-BSD FRML MDRD: ABNORMAL ML/MIN/{1.73_M2}
GFR SERPL CREATININE-BSD FRML MDRD: ABNORMAL ML/MIN/{1.73_M2}
GLUCOSE BLD-MCNC: 151 MG/DL (ref 70–99)
HCT VFR BLD CALC: 39.8 % (ref 41–53)
HEMOGLOBIN: 13.4 G/DL (ref 13.5–17.5)
IMMATURE GRANULOCYTES: ABNORMAL %
LYMPHOCYTES # BLD: 13 % (ref 13–44)
MCH RBC QN AUTO: 32.8 PG (ref 26–34)
MCHC RBC AUTO-ENTMCNC: 33.8 G/DL (ref 31–37)
MCV RBC AUTO: 97 FL (ref 80–100)
MONOCYTES # BLD: 10 % (ref 5–9)
NRBC AUTOMATED: ABNORMAL PER 100 WBC
PDW BLD-RTO: 16.6 % (ref 12.1–15.2)
PLATELET # BLD: 218 K/UL (ref 140–450)
PLATELET ESTIMATE: ABNORMAL
PMV BLD AUTO: ABNORMAL FL (ref 6–12)
POTASSIUM SERPL-SCNC: 4 MMOL/L (ref 3.7–5.3)
PRO-BNP: 155 PG/ML
RBC # BLD: 4.1 M/UL (ref 4.5–5.9)
RBC # BLD: ABNORMAL 10*6/UL
SEG NEUTROPHILS: 75 % (ref 39–75)
SEGMENTED NEUTROPHILS ABSOLUTE COUNT: 5.2 K/UL (ref 2.1–6.5)
SODIUM BLD-SCNC: 139 MMOL/L (ref 135–144)
TOTAL PROTEIN: 6.9 G/DL (ref 6.4–8.3)
TROPONIN INTERP: NORMAL
TROPONIN T: NORMAL NG/ML
TROPONIN, HIGH SENSITIVITY: 17 NG/L (ref 0–22)
WBC # BLD: 6.8 K/UL (ref 3.5–11)
WBC # BLD: ABNORMAL 10*3/UL

## 2021-07-26 PROCEDURE — 93005 ELECTROCARDIOGRAM TRACING: CPT | Performed by: EMERGENCY MEDICINE

## 2021-07-26 PROCEDURE — 80053 COMPREHEN METABOLIC PANEL: CPT

## 2021-07-26 PROCEDURE — 85379 FIBRIN DEGRADATION QUANT: CPT

## 2021-07-26 PROCEDURE — 99283 EMERGENCY DEPT VISIT LOW MDM: CPT

## 2021-07-26 PROCEDURE — 83880 ASSAY OF NATRIURETIC PEPTIDE: CPT

## 2021-07-26 PROCEDURE — 71045 X-RAY EXAM CHEST 1 VIEW: CPT

## 2021-07-26 PROCEDURE — 1123F ACP DISCUSS/DSCN MKR DOCD: CPT | Performed by: INTERNAL MEDICINE

## 2021-07-26 PROCEDURE — G8417 CALC BMI ABV UP PARAM F/U: HCPCS | Performed by: INTERNAL MEDICINE

## 2021-07-26 PROCEDURE — 99214 OFFICE O/P EST MOD 30 MIN: CPT | Performed by: INTERNAL MEDICINE

## 2021-07-26 PROCEDURE — 84484 ASSAY OF TROPONIN QUANT: CPT

## 2021-07-26 PROCEDURE — 85025 COMPLETE CBC W/AUTO DIFF WBC: CPT

## 2021-07-26 PROCEDURE — 1111F DSCHRG MED/CURRENT MED MERGE: CPT | Performed by: INTERNAL MEDICINE

## 2021-07-26 PROCEDURE — 4040F PNEUMOC VAC/ADMIN/RCVD: CPT | Performed by: INTERNAL MEDICINE

## 2021-07-26 PROCEDURE — 93005 ELECTROCARDIOGRAM TRACING: CPT

## 2021-07-26 PROCEDURE — G8427 DOCREV CUR MEDS BY ELIG CLIN: HCPCS | Performed by: INTERNAL MEDICINE

## 2021-07-26 PROCEDURE — 1036F TOBACCO NON-USER: CPT | Performed by: INTERNAL MEDICINE

## 2021-07-26 RX ORDER — PREDNISONE 20 MG/1
20 TABLET ORAL DAILY
Qty: 5 TABLET | Refills: 0 | Status: SHIPPED | OUTPATIENT
Start: 2021-07-26 | End: 2021-07-31

## 2021-07-26 NOTE — ED PROVIDER NOTES
[address] COMPLAINT    Chief Complaint   Patient presents with    Shortness of Breath     pt presents to the ED with c/o shortness of breath - family member states he has been short of breath since discharged from hospital on thursday - shortness of breath worse today - has intermittent episodes of A-Fib. Has an event monitor on left chest.         HPI    James Cowden is a 68 y.o. male who presentsto ED from cardiologist office. By car. With complaint of shortness of breath. Patient was discharged from the hospital on Thursday, 4 days ago. Patient presents today with shortness of breath. Patient was found to have intermittent episodes of atrial fibrillation on telemetry. Patient was started on Xarelto. Patient's shortness of breath is most on exertion. Patient's metered-dose inhaler for his COPD has been changed 2 weeks ago. Patient noticed increasing shortness of breath since then.           PAST MEDICAL HISTORY    Past Medical History:   Diagnosis Date    Nguyen syndrome     [facility]    Blood circulation, collateral     BPH (benign prostatic hyperplasia)     CAD (coronary artery disease)     Dr Piyush Gutierrez COPD (chronic obstructive pulmonary disease) ([address]     Depression     Hx of colonoscopy     Hyperlipidemia     Liver disease     Movement disorder     essential tremors    Osteoarthritis     Specialist at [place]    Pneumonia     Rheumatic fever     Rheumatoid arthritis ([address]     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    Type II or unspecified type diabetes mellitus without mention of complication, not stated as uncontrolled        SURGICAL HISTORY    Past Surgical History:   Procedure Laterality Date    ARTERIAL BYPASS Vincent Parsons 1723    Cardiac bypass x2    CARDIAC SURGERY      CHOLECYSTECTOMY      COLONOSCOPY  2014    WNL    CORONARY ARTERY BYPASS GRAFT  [id number]    CYSTOSCOPY Left     lithrotripsy with stent     CYSTOSCOPY Left 5/31/2017 CYSTOSCOPY URETEROSCOPY LASER-WITH HLL performed by Sarina Tarango MD at [address] / [address] / Anil Fitzgeralds Left 5/31/2017    CYSTOSCOPY STENT INSERTION performed by Sarina Tarango MD at [address] [place], COLON, DIAGNOSTIC     [address] [facility]      x2   [facility]    rt   [facility]    cadaver graft rt thigh    LITHOTRIPSY      OTHER SURGICAL HISTORY      Rt leg artery transplant    OTHER SURGICAL HISTORY      Rt heel spur    OTHER SURGICAL HISTORY      Rt knee scope    UPPER GASTROINTESTINAL ENDOSCOPY  2014       CURRENT MEDICATIONS    Current Outpatient Rx   Medication Sig Dispense Refill    Tiotropium Bromide-Olodaterol (STIOLTO RESPIMAT IN) Inhale into the lungs      rivaroxaban (XARELTO) 20 MG TABS tablet Take 20 mg by mouth 4 bottles sample lot #[id number] exp date 9/23      naproxen (NAPROSYN) 500 MG tablet Take 500 mg by mouth 2 times daily (with meals)      famotidine (PEPCID) 20 MG tablet Take 20 mg by mouth daily      METHOTREXATE PO Take 10 mg by mouth once a week      traZODone (DESYREL) 100 MG tablet Take 100 mg by mouth nightly      rosuvastatin (CRESTOR) 40 MG tablet Take 20 mg by mouth every evening      alendronate (FOSAMAX) 70 MG tablet Take 70 mg by mouth every 7 days      VITAMIN D PO Take by mouth      cilostazol (PLETAL) 50 MG tablet Take 1 tablet by mouth 2 times daily 180 tablet 3    finasteride (PROSCAR) 5 MG tablet Take 1 tablet by mouth daily 90 tablet 1    isosorbide mononitrate (IMDUR) 30 MG extended release tablet Take 1 tablet by mouth daily 90 tablet 3    tamsulosin (FLOMAX) 0.4 MG capsule Take 0.4 mg by mouth daily      folic acid (FOLVITE) 1 MG tablet Take 1 tablet by mouth daily Daily except on day of taking Methotrexate 90 tablet 1    omeprazole (PRILOSEC) 20 MG capsule Take 20 mg by mouth 2 times daily.  nitroGLYCERIN (NITROSTAT) 0.4 MG SL tablet up to max of 3 total doses.  If no relief after 1 dose, call 911. 25 tablet 3    albuterol (PROVENTIL HFA;VENTOLIN HFA) 108 (90 BASE) MCG/ACT inhaler Inhale 2 puffs into the lungs 4 times daily as needed for Shortness of Breath          ALLERGIES    No Known Allergies    FAMILY HISTORY    Family History   Problem Relation Age of Onset    Heart Disease Mother     Heart Disease Father     Diabetes Other         Fhx of    Heart Disease Other         Fhx of    Cancer Sister     Heart Disease Brother        SOCIAL HISTORY    Social History     Socioeconomic History    Marital status:      Spouse name: Not on file    Number of children: Not on file    Years of education: Not on file    Highest education level: Not on file   Occupational History    Occupation: retired   Tobacco Use    Smoking status: Former Smoker     Packs/day: 5.00     Years: 20.00     Pack years: 100.00     Types: Cigarettes     Quit date: 1980     Years since quittin.8    Smokeless tobacco: Never Used   Vaping Use    Vaping Use: Never used   Substance and Sexual Activity    Alcohol use: No    Drug use: No    Sexual activity: Not on file   Other Topics Concern    Not on file   Social History Narrative    Not on file     Social Determinants of Health     Financial Resource Strain:     Difficulty of Paying Living Expenses:    Food Insecurity:     Worried About Running Out of Food in the Last Year:     Ran Out of Food in the Last Year:    Transportation Needs:     Lack of Transportation (Medical):      Lack of Transportation (Non-Medical):    Physical Activity:     Days of Exercise per Week:     Minutes of Exercise per Session:    Stress:     Feeling of Stress :    Social Connections:     Frequency of Communication with Friends and Family:     Frequency of Social Gatherings with Friends and Family:     Attends Jew Services:     Active Member of Clubs or Organizations:     Attends Club or Organization Meetings:     Marital Status:    Intimate Partner Violence:     Fear of Current or Ex-Partner:     Emotionally Abused:     Physically Abused:     Sexually Abused:            Review of Systems:  Constitutional:  Denies fever, chills, weight loss or weakness   Eyes:  Denies photophobia or discharge   HENT:  Denies sore throat or ear pain   Respiratory: Positive for shortness of breath with exertion. Cardiovascular:  Denies chest pain, palpitations or swelling   GI:  Denies abdominal pain, nausea, vomiting, or diarrhea   Musculoskeletal:  Denies back pain   Skin:  Denies rash   Neurologic:  Denies headache, focal weakness or sensory changes   Endocrine:  Denies polyuria or polydypsia   Lymphatic:  Denies swollen glands   Psychiatric:  Denies depression, suicidal ideation or homicidal ideation   All systems negative except as marked. PHYSICAL EXAM    VITAL SIGNS: /69   Pulse 71   Temp 97.7 °F (36.5 °C) (Oral)   Resp 20   Ht 6' 1\" (1.854 m)   Wt 201 lb 3.2 oz (91.3 kg)   SpO2 94%   BMI 26.55 kg/m²    Constitutional: Elderly male with shortness of breath with exertion. Patient is asymptomatic at rest.  HENT:  Normocephalic, Atraumatic, Bilateral external ears normal, Oropharynx moist, No oral exudates, Nose normal. Neck- Normal range of motion, No tenderness, Supple, No stridor. Eyes:  PERRL, EOMI, Conjunctiva normal, No discharge. Respiratory:  Normal breath sounds, No respiratory distress, No wheezing, No chest tenderness. Cardiovascular:  Normal heart rate, Normal rhythm, No murmurs, No rubs, No gallops. GI:  Bowel sounds normal, Soft, No tenderness, No masses, No pulsatile masses. : External genitalia appear normal, No masses or lesions. No discharge. No CVA tenderness. Musculoskeletal:  Intact distal pulses, No edema, No tenderness, No cyanosis, No clubbing. Good range of motion in all major joints. No tenderness to palpation or major deformities noted. Back- No tenderness. Integument:  Warm, Dry, No erythema, No rash. Lymphatic:  No lymphadenopathy noted. Neurologic:  Alert & oriented x 3, Normal motor function, Normal sensory function, No focal deficits noted. Psychiatric:  Affect normal, Judgment normal, Mood normal.     EKG    Sinus rhythm rate of 69 with first-degree AV block    RADIOLOGY    XR CHEST PORTABLE   Final Result      No acute change. PROCEDURES        Labs  Labs Reviewed   CBC WITH AUTO DIFFERENTIAL - Abnormal; Notable for the following components:       Result Value    RBC 4.10 (*)     Hemoglobin 13.4 (*)     Hematocrit 39.8 (*)     RDW 16.6 (*)     Monocytes 10 (*)     Absolute Lymph # 0.90 (*)     All other components within normal limits   COMPREHENSIVE METABOLIC PANEL - Abnormal; Notable for the following components:    Glucose 151 (*)     All other components within normal limits   TROPONIN   D-DIMER, QUANTITATIVE   BRAIN NATRIURETIC PEPTIDE             Summation      Patient Course: Patient was observed in ED. Patient has stable O2 saturation. Patient was asymptomatic at rest.  Patient has stable labs. The labs and the x-ray findings were discussed with the patient and his family. Patient is also discussed with Tano Waddell. Patient will be discharged home. The warning signs were discussed. Continue his home medications. Return to ED if worse. ED Medications administered this visit:  Medications - No data to display    New Prescriptions from this visit:    New Prescriptions    No medications on file       Follow-up:  No follow-up provider specified. Final Impression:   1. Shortness of breath    2. History of COPD    3.  History of coronary artery disease               (Please note that portions of this note were completed with a voice recognition program.  Efforts were made to edit the dictations but occasionally words are mis-transcribed.)          Emigdio Izaguirre MD  07/26/21 [id number]

## 2021-07-26 NOTE — PROGRESS NOTES
Ov DR Osker Boeck   Discharged on 7/22   Passed out 7/20  Passed out came   To ED bradycardia   Inderal stopped   Tachycardia in the hospital  Wearing event monitor   On 7/23 rhythm strip a fib  Dr Osker Boeck informed started   Pt on xarelto   Pt unable to feel irregular heart   Beat. Pt came in today for EKG  Sinus rhythm. C/o increase sob for past week. Hr ok at home  1 1/2 weeks ago seen [address] inhaler and started  [employer] inhaler. Bedside echo done. Resting pulse ox 96%  Walked in kruse sob walking   Pulse ox stayed 96%  Pt will be taken to ED  For ct chest, cxr and labs. Stop plavix to go on full dose  xarelto 20 mg daily. Samples   Given.      Pt sent home from ED  talked with daughter and pt will give prednisone 20 mg daily for 5 days to call with update

## 2021-07-26 NOTE — LETTER
Johanna Meng M.D. [address] [place] [address]  [phone]        2021        Ben Esquivel MD  [address] [address]    RE:   Carlotta Schwab  :  1940    Dear Dr. Arreola Been:    CHIEF COMPLAINT:  1. Paroxysmal atrial fibrillation discovered on a 30-day event recorder. 2.  History of syncope. 3.  Recent hospitalization for bradycardia. 4.  Started on Xarelto 15 mg daily three days ago for atrial fibrillation. HISTORY OF PRESENT ILLNESS:  Mr. Monica Faulkner is a pleasant 72-year-old gentleman who had a four-vessel bypass surgery at [place] in [place] in 1993. 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  His LIMA was patent to the LAD. I did a catheterization on 2010, because of chest pain, that showed patent LIMA to the LAD, patent right internal mammary artery to the right coronary artery, vein graft to the OM was patent, EF of 55%, and continue medical therapy. On 2017, he had positive troponins after a stent in his ureter. He had a catheterization by Dr. Drea Astudillo on 2017, that showed occluded vein graft to the OM with the native OM occluded, the LIMA and the right internal mammary artery grafts were patent. Medical therapy was recommended. His wife was placed in an assisted care facility several months ago, which has been bothersome to him. In the last 4 to 6 weeks, he has had marked fatigue and loss of energy noted by both [place] and his friends. He can walk only a short distance and has to stop because of fatigue. He denied any chest pain or chest discomfort. He also has episodes of dizziness.   He came to the emergency room and he had intermittent episodes of heart rates in the 30s, with subsequent heart rates changing up to 50s and 60s. He was hospitalized on 07/21/2021, and discharged on 07/22/2021. During the hospitalization, his heart rate still had episodes of dropping into the 40s but it was only at night. During the day, he had no episodes of bradycardia when he was discharged. He was placed on an event recorder. On Saturday, his event recorder documented episodes of atrial fibrillation with slight RVR with heart rates in the 130 to 140. He then converted back to sinus rhythm. We called him and gave him samples of Xarelto 15 mg daily and we had him stop in for an EKG this morning. His EKG showed sinus rhythm, however, he has had marked increase in shortness of breath. Therefore, I saw him in my office. He did walk only a short distance before stopping because of shortness of breath. The [facility] inhaler had been stopped and he was then placed on Stiolto inhaler by the [place]. Because of his marked shortness of breath, we sent him to the emergency room where he had a workup. His D-dimer was negative. His blood work was quite unremarkable, with a normal BUN and creatinine. His cardiac enzymes were negative. His white count was 6.8 and hemoglobin was 13.4. He was in sinus rhythm with mild nonspecific ST changes. During the hospitalization, he had an echocardiogram, a Lexiscan stress test and a tilt table. His echocardiogram was unchanged and showed an EF of 50% with mild-to-moderate MR and mild pulmonary hypertension. The stress test showed moderate perfusion defect in the lateral inferior region during stress consistent with ischemia with an intermediate risk of coronary artery disease. Tilt table was negative.     He was discharged on albuterol p.r.n., Fosamax 70 mg every 7 days, Pletal 50 mg b.i.d., Plavix 75 mg daily, Proscar 5 mg daily, Pepcid 20 mg daily, folic acid 1 mg daily, Imdur 30 mg daily, methotrexate 10 mg weekly, Prilosec 20 mg b.i.d., Xarelto 15 mg samples that was given to him on Friday, Crestor 40 mg daily, Flomax 0.4 mg daily, Stiolto daily, Desyrel 100 mg nightly and vitamin D. We did walk him briefly, he became very short of breath with any activity. He denied chest pain with activity. We had to sit him down quickly because of his shortness of breath. His O2 saturation remained at 91% when he walks. PHYSICAL EXAMINATION:  VITAL SIGNS:  His blood pressure was 110/70, with a heart rate of 60 and regular. Respiratory rate 18. O2 sat was 96%. GENERAL:  He is a pleasant 82-year-old gentleman, in no distress. He was oriented to person, place and time. Answered questions appropriately. SKIN:  No unusual skin changes. HEENT:  The pupils are equally round and reactive to light and accommodation. Extraocular movements were intact. Mucous membranes were dry. NECK:  No JVD. Good carotid pulses. No carotid bruits. No lymphadenopathy or thyromegaly. CARDIOVASCULAR EXAM:  S1 and S2 were normal.  No S3 or S4. Soft systolic blowing type murmur. No diastolic murmur. PMI was normal.  No lift, thrust, or pericardial friction rub. LUNGS:  Quite clear to auscultation and percussion. ABDOMEN:  Soft and nontender. Good bowel sounds. The aorta was not enlarged. No hepatomegaly, splenomegaly. EXTREMITIES:  Good femoral pulses. Good pedal pulses. No pedal edema. Skin was warm and dry. No calf tenderness. Nail beds pink. Good cap refill. PULSES:  Bilateral symmetrical radial, brachial and carotid pulses. No carotid bruits. Good femoral and pedal pulses. NEUROLOGIC EXAM:  Within normal limits. PSYCHIATRIC EXAM:  Within normal limits. EKG showed sinus rhythm with nonspecific ST changes. LABORATORY DATA:  In the emergency room showed sodium 139, potassium 4.0, BUN 15, creatinine 0.77, GFR greater than 60, calcium was 9.7, troponin 17. ALT was 29, AST was 29. Glucose 151.   White count 6.8, hemoglobin 13.4 with a platelet count of 218,000. Bedside echocardiogram showed preserved LV function. I had a difficult time seeing the right-sided chambers but they seemed to be enlarged although I could not tell if they were more enlarged than they had been during the hospitalization. Reviewing his rhythm strips, he did have episode of atrial fibrillation on Thursday after discharge and on Friday, which converted back to sinus rhythm. IMPRESSION:  1.  Marked shortness of breath with any activity, worse than when he was discharged in the hospital, with a negative workup in the emergency room for acute MI.  2.  Preserved LV function by bedside echocardiogram today. 3.  Paroxysmal atrial fibrillation on an event recorder on Thursday, with him started on samples of Xarelto 15 mg daily on Friday, which he has continued over the weekend. 4.  Recent hospitalization for near syncope, with heart rates dropping to the 30s in the emergency room, with Inderal being stopped, with no further episodes of bradycardia on his event recorder. 5.  Currently on Plavix and Pletal along with now Xarelto. 6.  A change in the inhaler by the [facility] pulmonologist.  7.  Abnormal Lexiscan Cardiolite stress test with inferior lateral wall ischemia, intermediate risk of coronary artery disease on a stress test done on 07/22/2021.  8.  Normal LV function on echocardiogram on 07/21/2021, with an EF of 50%. PLAN:  1. We will place him on full-dose Xarelto 20 mg daily. 2.  We will place him on prednisone 20 mg daily for 5 days since his cardiac workup was negative. 3.  If he is no better on prednisone, we will do a CTA of his chest to make sure there is no PE in spite of his unremarkable D-dimer. 4.  If he continues to be markedly dyspneic, we will consider repeat cardiac catheterization. DISCUSSION:  Mr. August Sommers is markedly short of breath with any activity. I have really suspected that he had had a pulmonary embolism when I saw him in my office.   He is very short of breath with any activity. It was actually a fairly significant change from when he was discharged from the hospital.    His enzymes were negative and there was no evidence of acute coronary syndrome. His D-dimer was negative. However, even though it would be very low likelihood, I still am concerned that he has an underlying pulmonary embolism. He did have a change in his inhalers and he does have significant COPD. It is possible that this was an exacerbation and I placed him on prednisone 20 mg for 5 days to see if this helps his shortness of breath. If it does not make any difference and he is still markedly dyspneic then I would still do a CTA of his chest, and if that was negative, would consider cardiac catheterization. His new development is that of a paroxysmal atrial fibrillation for which he will need to be anticoagulated. Because of his concern about the possibility of pulmonary embolism and because he has normal LV function, we will place him on full-dose Xarelto 20 mg daily. I stopped his Plavix and he will continue his Pletal.  He does have a history of significant claudication. There has been a marked change in his exercise capacity, which is even worse since his hospitalization. There was no evidence of CHF on his chest x-ray. He is to call us in 5 days to let us know if he is improved. He does have sick sinus syndrome and I suspect that we are headed for a pacemaker. He has had no further bradycardia but now with his atrial fibrillation, even though his rate was not particularly rapid, that will need to be treated. Depending on his clinical course, we will discuss either pacemaker or cardiac catheterization in the near future. Thank you very much for allowing me the privilege of seeing Mr. Opal Linares. If you have any questions on my thoughts, please do not hesitate to contact me.      Sincerely,        Cheryl Pabon    D: 07/27/2021 4:52:52     T: 07/27/2021 5:03:08 [email or web address]  Job#: [id number]   Doc#: [id number]      <>

## 2021-07-27 LAB
EKG ATRIAL RATE: 69 BPM
EKG ATRIAL RATE: 72 BPM
EKG P AXIS: 39 DEGREES
EKG P AXIS: 67 DEGREES
EKG P-R INTERVAL: 224 MS
EKG P-R INTERVAL: 228 MS
EKG Q-T INTERVAL: 436 MS
EKG Q-T INTERVAL: 448 MS
EKG QRS DURATION: 82 MS
EKG QRS DURATION: 88 MS
EKG QTC CALCULATION (BAZETT): 477 MS
EKG QTC CALCULATION (BAZETT): 480 MS
EKG R AXIS: -9 DEGREES
EKG R AXIS: 5 DEGREES
EKG T AXIS: 34 DEGREES
EKG T AXIS: 51 DEGREES
EKG VENTRICULAR RATE: 69 BPM
EKG VENTRICULAR RATE: 72 BPM

## 2021-07-27 PROCEDURE — 93010 ELECTROCARDIOGRAM REPORT: CPT | Performed by: INTERNAL MEDICINE

## 2021-07-27 NOTE — PROCEDURES
[address] [place] [address]                              CARDIAC STRESS TEST    PATIENT NAME: Sandrine Tate                      :        1940  MED REC NO:   [id number]                              ROOM:       [id number]  ACCOUNT NO:   [de-identified]                           ADMIT DATE: 2021  PROVIDER:     Connie Saini    DATE OF STUDY:  2021    LEXISCAN CARDIOLITE STRESS TEST:    INDICATION:  Chest pain. IMPRESSION:  1. We gave 0.4 mg of Lexiscan intravenously. 2.  This was followed in 20 seconds by Cardiolite infusion. 3.  There was no chest pain. 4.  There was no ST depression. 5.  It was an overall negative Lexiscan stress test.  6.  Cardiolite to follow.         Robert Cowan    D: 2021 4:58:59       T: 2021 6:00:14     [email or web address]  Job#: [id number]     Doc#: [id number]    CC:  Tigist Greene

## 2021-07-28 NOTE — PROGRESS NOTES
Connie Saini M.D. [address] [place] [address]  [phone]        2021        Tigist Greene MD  [address] [facility] [address]    RE:   Tyrone Gong  :  1940    Dear Dr. Hdez Arrow:    CHIEF COMPLAINT:  1. Paroxysmal atrial fibrillation discovered on a 30-day event recorder. 2.  History of syncope. 3.  Recent hospitalization for bradycardia. 4.  Started on Xarelto 15 mg daily three days ago for atrial fibrillation. HISTORY OF PRESENT ILLNESS:  Mr. Sally Llanes is a pleasant 72-year-old gentleman who had a four-vessel bypass surgery at [facility]. [place] [facility] [employer] in 1993. 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  His LIMA was patent to the LAD. I did a catheterization on 2010, because of chest pain, that showed patent LIMA to the LAD, patent right internal mammary artery to the right coronary artery, vein graft to the OM was patent, EF of 55%, and continue medical therapy. On 2017, he had positive troponins after a stent in his ureter. He had a catheterization by Dr. Diana Gomez on 2017, that showed occluded vein graft to the OM with the native OM occluded, the LIMA and the right internal mammary artery grafts were patent. Medical therapy was recommended. His wife was placed in an assisted care facility several months ago, which has been bothersome to him. In the last 4 to 6 weeks, he has had marked fatigue and loss of energy noted by both [place] and his friends. He can walk only a short distance and has to stop because of fatigue. He denied any chest pain or chest discomfort. He also has episodes of dizziness.   He came to the emergency room and he had intermittent episodes of heart rates in the 30s, with subsequent heart rates changing up to 50s and 60s. He was hospitalized on 07/21/2021, and discharged on 07/22/2021. During the hospitalization, his heart rate still had episodes of dropping into the 40s but it was only at night. During the day, he had no episodes of bradycardia when he was discharged. He was placed on an event recorder. On Saturday, his event recorder documented episodes of atrial fibrillation with slight RVR with heart rates in the 130 to 140. He then converted back to sinus rhythm. We called him and gave him samples of Xarelto 15 mg daily and we had him stop in for an EKG this morning. His EKG showed sinus rhythm, however, he has had marked increase in shortness of breath. Therefore, I saw him in my office. He did walk only a short distance before stopping because of shortness of breath. The Orlie Brooks inhaler had been stopped and he was then placed on Stiolto inhaler by the [place]. Because of his marked shortness of breath, we sent him to the emergency room where he had a workup. His D-dimer was negative. His blood work was quite unremarkable, with a normal BUN and creatinine. His cardiac enzymes were negative. His white count was 6.8 and hemoglobin was 13.4. He was in sinus rhythm with mild nonspecific ST changes. During the hospitalization, he had an echocardiogram, a Lexiscan stress test and a tilt table. His echocardiogram was unchanged and showed an EF of 50% with mild-to-moderate MR and mild pulmonary hypertension. The stress test showed moderate perfusion defect in the lateral inferior region during stress consistent with ischemia with an intermediate risk of coronary artery disease. Tilt table was negative.     He was discharged on albuterol p.r.n., Fosamax 70 mg every 7 days, Pletal 50 mg b.i.d., Plavix 75 mg daily, Proscar 5 mg daily, Pepcid 20 mg daily, folic acid 1 mg daily, Imdur 30 mg daily, methotrexate 10 mg weekly, Prilosec 20 mg b.i.d., Xarelto 15 mg samples that was given to him on Friday, Crestor 40 mg daily, Flomax 0.4 mg daily, Stiolto daily, Desyrel 100 mg nightly and vitamin D. We did walk him briefly, he became very short of breath with any activity. He denied chest pain with activity. We had to sit him down quickly because of his shortness of breath. His O2 saturation remained at 91% when he walks. PHYSICAL EXAMINATION:  VITAL SIGNS:  His blood pressure was 110/70, with a heart rate of 60 and regular. Respiratory rate 18. O2 sat was 96%. GENERAL:  He is a pleasant 68-year-old gentleman, in no distress. He was oriented to person, place and time. Answered questions appropriately. SKIN:  No unusual skin changes. HEENT:  The pupils are equally round and reactive to light and accommodation. Extraocular movements were intact. Mucous membranes were dry. NECK:  No JVD. Good carotid pulses. No carotid bruits. No lymphadenopathy or thyromegaly. CARDIOVASCULAR EXAM:  S1 and S2 were normal.  No S3 or S4. Soft systolic blowing type murmur. No diastolic murmur. PMI was normal.  No lift, thrust, or pericardial friction rub. LUNGS:  Quite clear to auscultation and percussion. ABDOMEN:  Soft and nontender. Good bowel sounds. The aorta was not enlarged. No hepatomegaly, splenomegaly. EXTREMITIES:  Good femoral pulses. Good pedal pulses. No pedal edema. Skin was warm and dry. No calf tenderness. Nail beds pink. Good cap refill. PULSES:  Bilateral symmetrical radial, brachial and carotid pulses. No carotid bruits. Good femoral and pedal pulses. NEUROLOGIC EXAM:  Within normal limits. PSYCHIATRIC EXAM:  Within normal limits. EKG showed sinus rhythm with nonspecific ST changes. LABORATORY DATA:  In the emergency room showed sodium 139, potassium 4.0, BUN 15, creatinine 0.77, GFR greater than 60, calcium was 9.7, troponin 17. ALT was 29, AST was 29. Glucose 151.   White count 6.8, hemoglobin 13.4 with a platelet count of 218,000. Bedside echocardiogram showed preserved LV function. I had a difficult time seeing the right-sided chambers but they seemed to be enlarged although I could not tell if they were more enlarged than they had been during the hospitalization. Reviewing his rhythm strips, he did have episode of atrial fibrillation on Thursday after discharge and on Friday, which converted back to sinus rhythm. IMPRESSION:  1.  Marked shortness of breath with any activity, worse than when he was discharged in the hospital, with a negative workup in the emergency room for acute MI.  2.  Preserved LV function by bedside echocardiogram today. 3.  Paroxysmal atrial fibrillation on an event recorder on Thursday, with him started on samples of Xarelto 15 mg daily on Friday, which he has continued over the weekend. 4.  Recent hospitalization for near syncope, with heart rates dropping to the 30s in the emergency room, with Inderal being stopped, with no further episodes of bradycardia on his event recorder. 5.  Currently on Plavix and Pletal along with now Xarelto. 6.  A change in the inhaler by the [facility] pulmonologist.  7.  Abnormal Lexiscan Cardiolite stress test with inferior lateral wall ischemia, intermediate risk of coronary artery disease on a stress test done on 07/22/2021.  8.  Normal LV function on echocardiogram on 07/21/2021, with an EF of 50%. PLAN:  1. We will place him on full-dose Xarelto 20 mg daily. 2.  We will place him on prednisone 20 mg daily for 5 days since his cardiac workup was negative. 3.  If he is no better on prednisone, we will do a CTA of his chest to make sure there is no PE in spite of his unremarkable D-dimer. 4.  If he continues to be markedly dyspneic, we will consider repeat cardiac catheterization. DISCUSSION:  Mr. Fátima Bentley is markedly short of breath with any activity. I have really suspected that he had had a pulmonary embolism when I saw him in my office.   He is very short of breath with any activity. It was actually a fairly significant change from when he was discharged from the hospital.    His enzymes were negative and there was no evidence of acute coronary syndrome. His D-dimer was negative. However, even though it would be very low likelihood, I still am concerned that he has an underlying pulmonary embolism. He did have a change in his inhalers and he does have significant COPD. It is possible that this was an exacerbation and I placed him on prednisone 20 mg for 5 days to see if this helps his shortness of breath. If it does not make any difference and he is still markedly dyspneic then I would still do a CTA of his chest, and if that was negative, would consider cardiac catheterization. His new development is that of a paroxysmal atrial fibrillation for which he will need to be anticoagulated. Because of his concern about the possibility of pulmonary embolism and because he has normal LV function, we will place him on full-dose Xarelto 20 mg daily. I stopped his Plavix and he will continue his Pletal.  He does have a history of significant claudication. There has been a marked change in his exercise capacity, which is even worse since his hospitalization. There was no evidence of CHF on his chest x-ray. He is to call us in 5 days to let us know if he is improved. He does have sick sinus syndrome and I suspect that we are headed for a pacemaker. He has had no further bradycardia but now with his atrial fibrillation, even though his rate was not particularly rapid, that will need to be treated. Depending on his clinical course, we will discuss either pacemaker or cardiac catheterization in the near future. Thank you very much for allowing me the privilege of seeing Mr. Sally Llanes. If you have any questions on my thoughts, please do not hesitate to contact me.      Sincerely,        Robert Cowan    D: 07/27/2021 4:52:52     T: 07/27/2021 5:03:08 [email or web address]  Job#: [id number]   Doc#: [id number]      <>

## 2021-07-30 ENCOUNTER — CARE COORDINATION (OUTPATIENT)
Dept: CASE MANAGEMENT | Age: 81
End: 2021-07-30

## 2021-07-30 NOTE — CARE COORDINATION
[facility] Transitions Follow Up Call/ ED follow up     2021    Patient: James Cowden  Patient : 1940   MRN: [id number]  Reason for Admission: syncope and collapse    Discharge Date: 21 RARS: Readmission Risk Score: 13         Spoke with: Pearl     Call to pt who states he is doing fantastic. States breathing is improved from trip to ER on Monday. Confirms med change of Xarelto to 20 mg daily, DC plavix, and continue pletal  States still \"a little\" short of breath but improves with rest. States oxygen levels remain above 90% and HR was 70 today  States a a little dizzy in the mornings but better than it has been   States he uses the albuterol inhaler about once a day at noon- states understanding he can use this 4 times a day as needed  Denies needs  Encouraged to call writer/ CTC or providers if questions or concerns- v/u       Care Transitions Subsequent and Final Call    Subsequent and Final Calls  Do you have any ongoing symptoms?: No  Have your medications changed?: No  Do you have any questions related to your medications?: No  Do you currently have any active services?: No  Do you have any needs or concerns that I can assist you with?: No  Identified Barriers: Lack of Education  Care Transitions Interventions  Other Interventions:            Follow Up  Future Appointments   Date Time Provider Esvin Mejia   2021 10:40 AM Lalito Lujan MD [facility]   2021 10:20 AM Lalito Lujan MD [facility]   2021 11:00 AM [facility]       Jany Eagle RN

## 2021-08-06 ENCOUNTER — CARE COORDINATION (OUTPATIENT)
Dept: CASE MANAGEMENT | Age: 81
End: 2021-08-06

## 2021-08-06 NOTE — CARE COORDINATION
[facility] 45 Transitions Follow Up Call- final call     2021    Patient: Sherry Saleh  Patient : 1940   MRN: [id number]  Reason for Admission: syncope and collapse     Discharge Date: 21 RARS: Readmission Risk Score: 13        Attempted to reach patient for final transitional call. VM left informing this is final call but can return call to [phone] or continue communication with providers.  Episode closed            Follow Up  Future Appointments   Date Time Provider Esvin Mejia   2021 10:40 AM MD Shyann Munguia [facility]   2021 10:20 AM MD Shyann Munguia [facility]   2021 11:00 AM MD Josy Brandon [facility]       Reese Montoya RN

## 2021-08-19 ENCOUNTER — OFFICE VISIT (OUTPATIENT)
Dept: FAMILY MEDICINE CLINIC | Age: 81
End: 2021-08-19
Payer: MEDICARE

## 2021-08-19 VITALS
BODY MASS INDEX: 26.78 KG/M2 | WEIGHT: 203 LBS | OXYGEN SATURATION: 94 % | SYSTOLIC BLOOD PRESSURE: 100 MMHG | DIASTOLIC BLOOD PRESSURE: 60 MMHG | HEART RATE: 88 BPM

## 2021-08-19 DIAGNOSIS — I48.0 PAROXYSMAL ATRIAL FIBRILLATION (HCC): ICD-10-CM

## 2021-08-19 DIAGNOSIS — R26.89 IMBALANCE: ICD-10-CM

## 2021-08-19 DIAGNOSIS — E78.00 PURE HYPERCHOLESTEROLEMIA: ICD-10-CM

## 2021-08-19 DIAGNOSIS — I25.10 ATHEROSCLEROSIS OF NATIVE CORONARY ARTERY OF NATIVE HEART WITHOUT ANGINA PECTORIS: ICD-10-CM

## 2021-08-19 DIAGNOSIS — I10 ESSENTIAL HYPERTENSION: Primary | ICD-10-CM

## 2021-08-19 DIAGNOSIS — K21.9 GASTROESOPHAGEAL REFLUX DISEASE WITHOUT ESOPHAGITIS: ICD-10-CM

## 2021-08-19 DIAGNOSIS — J43.8 OTHER EMPHYSEMA (HCC): ICD-10-CM

## 2021-08-19 PROBLEM — I20.9 ISCHEMIC CHEST PAIN (HCC): Status: RESOLVED | Noted: 2017-05-31 | Resolved: 2021-08-19

## 2021-08-19 PROCEDURE — 99214 OFFICE O/P EST MOD 30 MIN: CPT | Performed by: FAMILY MEDICINE

## 2021-08-19 PROCEDURE — 1111F DSCHRG MED/CURRENT MED MERGE: CPT | Performed by: FAMILY MEDICINE

## 2021-08-19 PROCEDURE — 1123F ACP DISCUSS/DSCN MKR DOCD: CPT | Performed by: FAMILY MEDICINE

## 2021-08-19 PROCEDURE — G8427 DOCREV CUR MEDS BY ELIG CLIN: HCPCS | Performed by: FAMILY MEDICINE

## 2021-08-19 PROCEDURE — G8926 SPIRO NO PERF OR DOC: HCPCS | Performed by: FAMILY MEDICINE

## 2021-08-19 PROCEDURE — 4040F PNEUMOC VAC/ADMIN/RCVD: CPT | Performed by: FAMILY MEDICINE

## 2021-08-19 PROCEDURE — 1036F TOBACCO NON-USER: CPT | Performed by: FAMILY MEDICINE

## 2021-08-19 PROCEDURE — G8417 CALC BMI ABV UP PARAM F/U: HCPCS | Performed by: FAMILY MEDICINE

## 2021-08-19 PROCEDURE — 3023F SPIROM DOC REV: CPT | Performed by: FAMILY MEDICINE

## 2021-08-19 SDOH — ECONOMIC STABILITY: FOOD INSECURITY: WITHIN THE PAST 12 MONTHS, YOU WORRIED THAT YOUR FOOD WOULD RUN OUT BEFORE YOU GOT MONEY TO BUY MORE.: NEVER TRUE

## 2021-08-19 SDOH — ECONOMIC STABILITY: FOOD INSECURITY: WITHIN THE PAST 12 MONTHS, THE FOOD YOU BOUGHT JUST DIDN'T LAST AND YOU DIDN'T HAVE MONEY TO GET MORE.: NEVER TRUE

## 2021-08-19 ASSESSMENT — COPD QUESTIONNAIRES: COPD: 1

## 2021-08-19 ASSESSMENT — ENCOUNTER SYMPTOMS
ABDOMINAL PAIN: 0
DIARRHEA: 0
NAUSEA: 0
EYE REDNESS: 0
VOMITING: 0
SHORTNESS OF BREATH: 0
TROUBLE SWALLOWING: 0
EYE DISCHARGE: 0
CONSTIPATION: 0
BLOOD IN STOOL: 0
COUGH: 0

## 2021-08-19 ASSESSMENT — SOCIAL DETERMINANTS OF HEALTH (SDOH): HOW HARD IS IT FOR YOU TO PAY FOR THE VERY BASICS LIKE FOOD, HOUSING, MEDICAL CARE, AND HEATING?: NOT HARD AT ALL

## 2021-08-19 NOTE — PROGRESS NOTES
HPI Notes    Name: Coby Dance  : 1940        Chief Complaint:     Chief Complaint   Patient presents with   [address] from Hospital     Pt presents today for HFU. Dx - syncope and Colapse.  Hypertension    Hyperlipidemia    Gastroesophageal Reflux    COPD    Coronary Artery Disease     Dr Osker Boeck follows. History of Present Illness:     Coby Dance is a 68 y.o.  male who presents with Follow-Up from Hospital (Pt presents today for HFU. Dx - syncope and Colapse. ), Hypertension, Hyperlipidemia, Gastroesophageal Reflux, COPD, and Coronary Artery Disease (Dr Osker Boeck follows.)      Hypertension  This is a chronic problem. The current episode started more than 1 year ago. The problem is unchanged. The problem is controlled. Pertinent negatives include no chest pain, headaches, neck pain, palpitations, peripheral edema or shortness of breath. Risk factors for coronary artery disease include male gender and dyslipidemia. The current treatment provides significant improvement. Hypertensive end-organ damage includes CAD/MI. Hyperlipidemia  This is a chronic problem. The current episode started more than 1 year ago. The problem is controlled. Recent lipid tests were reviewed and are normal. Pertinent negatives include no chest pain or shortness of breath. Current antihyperlipidemic treatment includes statins. The current treatment provides significant improvement of lipids. Risk factors for coronary artery disease include dyslipidemia, hypertension and male sex. Gastroesophageal Reflux  He reports no abdominal pain, no chest pain, no coughing or no nausea. This is a chronic problem. The current episode started more than 1 year ago. The problem has been unchanged. Pertinent negatives include no fatigue. He has tried a PPI for the symptoms. COPD  There is no cough or shortness of breath. This is a chronic problem. The problem has been unchanged.  Pertinent negatives include no chest pain, fever, headaches or trouble swallowing. Coronary Artery Disease  Pertinent negatives include no chest pain, dizziness, palpitations or shortness of breath. Risk factors include hyperlipidemia. Afib - since pt had syncopal episode he has had heart echo and stress test. Both fairly normal and pt has seen Dr Hitesh Sahu. So pt now wearing a 30d event monitor. So far on monitor pt had an episode of Afib. Dr Hitesh Sahu placed pt on xarelto. Pt denies blood in stool or urine. Imbalance - pt admits he is more clumsy lately. Pt states his feet get caught more --- no shuffling. Pt trying to  his feet more. Pt just gets then more off balance and stumbles so occ falls. No injury.    Past Medical History:     Past Medical History:   Diagnosis Date    Nguyen syndrome     [facility]    Blood circulation, collateral     BPH (benign prostatic hyperplasia)     CAD (coronary artery disease)     Dr Katya Szymanski COPD (chronic obstructive pulmonary disease) ([facility] [address] [facility]     Depression     Hx of colonoscopy     Hyperlipidemia     Liver disease     Movement disorder     essential tremors    Osteoarthritis     Specialist at [place]    Pneumonia     Rheumatic fever     Rheumatoid arthritis ([facility]     Seizures ([facility] [address] [facility]     [facility] epilepsy    Type II or unspecified type diabetes mellitus without mention of complication, not stated as uncontrolled       Reviewed all health maintenance requirements and ordered appropriate tests  Health Maintenance Due   Topic Date Due    Hepatitis C screen  Never done    DTaP/Tdap/Td vaccine (1 - Tdap) Never done    Shingles Vaccine (1 of 2) Never done    Lipid screen  03/11/2021    Annual Wellness Visit (AWV)  08/29/2021       Past Surgical History:     Past Surgical History:   Procedure Laterality Date    ARTERIAL BYPASS SURGRY  1997    Cardiac bypass x2    CARDIAC SURGERY      CHOLECYSTECTOMY      COLONOSCOPY  2014    WNL    CORONARY ARTERY BYPASS GRAFT  [id number],[id number]    CYSTOSCOPY Left     lithrotripsy with stent     CYSTOSCOPY Left 5/31/2017    CYSTOSCOPY URETEROSCOPY LASER-WITH HLL performed by Yael Chaudhari MD at [address] / REMOVAL Agnes Perez / Rosenda Reyes Left 5/31/2017    CYSTOSCOPY STENT INSERTION performed by Yael Chaudhari MD at [address] [facility], [place], DIAGNOSTIC     [address] [facility]      x2   [facility]    rt    LEG SURGERY  1960    cadaver graft rt thigh    LITHOTRIPSY      OTHER SURGICAL HISTORY      Rt leg artery transplant    OTHER SURGICAL HISTORY      Rt heel spur    OTHER SURGICAL HISTORY      Rt knee scope    UPPER GASTROINTESTINAL ENDOSCOPY  2014        Medications:       Prior to Admission medications    Medication Sig Start Date End Date Taking?  Authorizing Provider   Tiotropium Bromide Monohydrate (SPIRIVA RESPIMAT IN) Inhale into the lungs daily   Yes Historical Provider, MD   sertraline (ZOLOFT) 50 MG tablet Take 50 mg by mouth daily   Yes Historical Provider, MD   rivaroxaban (XARELTO) 20 MG TABS tablet Take 20 mg by mouth 4 bottles sample lot #[id number] exp date 9/23   Yes Historical Provider, MD   famotidine (PEPCID) 20 MG tablet Take 20 mg by mouth daily   Yes Historical Provider, MD   METHOTREXATE PO Take 10 mg by mouth once a week   Yes Historical Provider, MD   traZODone (DESYREL) 100 MG tablet Take 100 mg by mouth nightly   Yes Historical Provider, MD   rosuvastatin (CRESTOR) 40 MG tablet Take 20 mg by mouth every evening   Yes Historical Provider, MD   alendronate (FOSAMAX) 70 MG tablet Take 70 mg by mouth every 7 days   Yes Historical Provider, MD   VITAMIN D PO Take by mouth   Yes Historical Provider, MD   cilostazol (PLETAL) 50 MG tablet Take 1 tablet by mouth 2 times daily 4/7/21  Yes Lois Perez MD   finasteride (PROSCAR) 5 MG tablet Take 1 tablet by mouth daily 10/12/20  Yes Pro Erickson MD   isosorbide mononitrate (IMDUR) 30 MG extended release tablet Take 1 tablet by mouth daily 9/29/20  Yes Patricia Adame MD   tamsulosin (FLOMAX) 0.4 MG capsule Take 0.4 mg by mouth daily   Yes Historical Provider, MD   folic acid (FOLVITE) 1 MG tablet Take 1 tablet by mouth daily Daily except on day of taking Methotrexate 9/8/15  Yes Sierra Sweet MD   omeprazole (PRILOSEC) 20 MG capsule Take 20 mg by mouth 2 times daily. Yes Historical Provider, MD   naproxen (NAPROSYN) 500 MG tablet Take 500 mg by mouth daily as needed     Historical Provider, MD   nitroGLYCERIN (NITROSTAT) 0.4 MG SL tablet up to max of 3 total doses. If no relief after 1 dose, call 911. 10/24/17   Patricia Adame MD   albuterol (PROVENTIL HFA;VENTOLIN HFA) 108 (90 BASE) MCG/ACT inhaler Inhale 2 puffs into the lungs 4 times daily as needed for Shortness of Breath     Historical Provider, MD        Allergies:       Patient has no known allergies. Social History:     Tobacco:    reports that he quit smoking about 40 years ago. His smoking use included cigarettes. He has a 100.00 pack-year smoking history. He has never used smokeless tobacco.  Alcohol:      reports no history of alcohol use. Drug Use:  reports no history of drug use. Family History:     Family History   Problem Relation Age of Onset    Heart Disease Mother     Heart Disease Father     Diabetes Other         Fhx of    Heart Disease Other         Fhx of    Cancer Sister     Heart Disease Brother        Review of Systems:       Review of Systems   Constitutional: Negative for chills, fatigue and fever. HENT: Negative for trouble swallowing. Eyes: Negative for discharge, redness and visual disturbance. Respiratory: Negative for cough and shortness of breath. Cardiovascular: Negative for chest pain and palpitations. Gastrointestinal: Negative for abdominal pain, blood in stool, constipation, diarrhea, nausea and vomiting. Genitourinary: Negative for dysuria and hematuria. Musculoskeletal: Negative for joint swelling and neck pain. Skin: Negative for rash. Neurological: Negative for dizziness, tremors, facial asymmetry, weakness, light-headedness and headaches. Imbalance. Pt had hospital f/u for syncope with Dr Cori Hernandez   Psychiatric/Behavioral: Negative for sleep disturbance. The patient is not nervous/anxious. Physical Exam:     Physical Exam  Vitals reviewed. Constitutional:       General: He is not in acute distress. Appearance: Normal appearance. He is well-developed. He is not ill-appearing. HENT:      Head: Normocephalic and atraumatic. Eyes:      General:         Right eye: No discharge. Left eye: No discharge. Conjunctiva/sclera: Conjunctivae normal.      Pupils: Pupils are equal, round, and reactive to light. Neck:      Thyroid: No thyromegaly. Vascular: No carotid bruit. Cardiovascular:      Rate and Rhythm: Normal rate and regular rhythm. Pulses:           Dorsalis pedis pulses are 2+ on the right side and 2+ on the left side. Heart sounds: Normal heart sounds. No murmur heard. Pulmonary:      Effort: Pulmonary effort is normal.      Breath sounds: Normal breath sounds. Abdominal:      General: Bowel sounds are normal. There is no distension. Palpations: Abdomen is soft. Tenderness: There is no abdominal tenderness. Musculoskeletal:      Cervical back: Neck supple. Right lower leg: No edema. Left lower leg: No edema. Lymphadenopathy:      Cervical: No cervical adenopathy. Skin:     General: Skin is warm and dry. Findings: No rash. Neurological:      Mental Status: He is alert and oriented to person, place, and time.    Psychiatric:         Mood and Affect: Mood normal.         Behavior: Behavior normal.         Vitals:  /60   Pulse 88   Wt 203 lb (92.1 kg)   SpO2 94%   BMI 26.78 kg/m²       Data:     Lab Results   Component Value Date     07/26/2021    K 4.0 07/26/2021     07/26/2021    CO2 22 07/26/2021    BUN 15 07/26/2021    CREATININE 0.77 07/26/2021    GLUCOSE 151 07/26/2021    GLUCOSE 132 02/14/2012    PROT 6.9 07/26/2021    LABALBU 4.0 07/26/2021    LABALBU 4.4 02/14/2012    BILITOT 0.39 07/26/2021    ALKPHOS 73 07/26/2021    AST 29 07/26/2021    ALT 29 07/26/2021     Lab Results   Component Value Date    WBC 6.8 07/26/2021    RBC 4.10 07/26/2021    RBC 4.73 02/14/2012    HGB 13.4 07/26/2021    HCT 39.8 07/26/2021    MCV 97.0 07/26/2021    MCH 32.8 07/26/2021    MCHC 33.8 07/26/2021    RDW 16.6 07/26/2021     07/26/2021     02/14/2012    MPV NOT REPORTED 07/26/2021     Lab Results   Component Value Date    TSH 1.99 03/11/2020     Lab Results   Component Value Date    CHOL 160 03/11/2020    CHOL 136 05/06/2016    HDL 71 03/11/2020    LABA1C 7.8 03/11/2020          Assessment/Plan:        1. Essential hypertension  Stable on the imdur    2. Pure hypercholesterolemia  Stable on crestor  3. Gastroesophageal reflux disease without esophagitis  Stable on pepcid    4. Other emphysema (HCC)  Stable on the inhaler -- spiriva    5. Atherosclerosis of native coronary artery of native heart without angina pectoris  Stable per Cardilology on     6. Paroxysmal atrial fibrillation (HCC)  F/u with Dr Estefanía Conklin and stay on xarelto    7. Imbalance  D/w pt and his daughter to try balance PT at our [facility]. Pt will think about this and andrew back if wants referral        Pearl received counseling on the following healthy behaviors: nutrition and exercise  Reviewed prior labs and health maintenance  Continue current medications, diet and exercise. Discussed use, benefit, and side effects of prescribed medications. Barriers to medication compliance addressed. Patient given educational materials - see patient instructions  Was a self-tracking handout given in paper form or via [employer]? Yes    Requested Prescriptions      No prescriptions requested or ordered in this encounter       All patient questions answered. Patient voiced understanding. Quality Measures    Body mass index is 26.78 kg/m². Normal. Weight control planned discussed Healthy diet and regular exercise. BP: 100/60. Blood pressure is normal. Treatment plan consists of No treatment change needed. Fall Risk 8/28/2020 8/24/2020 8/21/2019 4/10/2018 4/10/2018 6/13/2017 6/9/2016   2 or more falls in past year? no no no no no no no   Fall with injury in past year? no no no no no no no     The patient does not have a history of falls. I did not - not indicated , complete a risk assessment for falls. A plan of care for falls No Treatment plan indicated    Lab Results   Component Value Date    LDLCALC 78 05/06/2016    LDLCHOLESTEROL 62 03/11/2020    (goal LDL reduction with dx if diabetes is 50% LDL reduction)    PHQ Scores 2/17/2021 8/28/2020 2/20/2020 8/21/2019 2/14/2019 4/10/2018 4/10/2018   PHQ2 Score 0 0 0 0 0 0 0   PHQ9 Score 0 0 0 0 0 0 0     Interpretation of Total Score Depression Severity: 1-4 = Minimal depression, 5-9 = Mild depression, 10-14 = Moderate depression, 15-19 = Moderately severe depression, 20-27 = Severe depression        Return in about 4 weeks (around 9/16/2021).       Electronically signed by Arun Scott MD on 8/19/2021 at 12:45 PM

## 2021-08-19 NOTE — PATIENT INSTRUCTIONS
SURVEY:    You may be receiving a survey from [employer] regarding your visit today. Please complete the survey to enable us to provide the highest quality of care to you and your family. If you cannot score us a very good (5 stars) on any question, please call the office to discuss how we could have made your experience a very good one. Thank you.     Clinical Care Team:  MD Tam Hammer LPN    Clerical Team:  Rachele Álvarez

## 2021-08-25 ENCOUNTER — TELEPHONE (OUTPATIENT)
Dept: CARDIOLOGY CLINIC | Age: 81
End: 2021-08-25

## 2021-08-25 NOTE — TELEPHONE ENCOUNTER
Received letter stating Carmen Villagomez is not covered (prior auth denied) pt notified. When talking to pt-he stated he started on 15 mg then was  switched to 20 mg however he ran out and restarted the 15 mg. Advised pt he needs to be on the 20 mg tablet qd and will give samples.  Will discuss coumadin on his appt sept 7

## 2021-08-26 NOTE — PROCEDURES
[place] [address]                                 EVENT MONITOR    PATIENT NAME: Sandrine Tate                      :        1940  MED REC NO:   [id number]                              ROOM:  ACCOUNT NO:   [de-identified]                           ADMIT DATE: 2021  PROVIDER:     Connie Saini      REASON FOR EVENT RECORDER:  Syncope with sick sinus syndrome. The patient wore an event recorder from 2021 to 2021. We  received a total of 27 rhythm strips. His heart rate ranged between 31 and 142 with an average heart rate of  75. His lowest heart rate was 31, which occurred at 2 o'clock in the  morning. His underlying rhythm was a sinus rhythm. He had blocked PACs  with a heart rate of 32 beats per minute, occurring mainly at night. He  did have sinus tachycardia at 130 beats per minute. He did have atrial  fibrillation on  with a heart rate of 113 beats per minute. DISCUSSION:  The patient has atrial fibrillation with RVR with heart  rates in the 113 range up to a maximum heart rate of 142 beats per  minute. In addition, he has sinus bradycardia with heart rates down to  the 32 range as well as blocked PACs with a resulting heart rate of 30  beats per minute. I believe that he will need a pacemaker to be able to control his atrial  fibrillation with RVR as well as to prevent him from having his  bradycardia with heart rates in the 30s. I will discuss this with him  with our next visit.         Robert Cowan    D: 2021 9:30:13       T: 2021 9:32:45     [email or web address]  Job#: [id number]     Doc#: [id number]    CC:  Tigist Greene

## 2021-09-08 ENCOUNTER — OFFICE VISIT (OUTPATIENT)
Dept: CARDIOLOGY CLINIC | Age: 81
End: 2021-09-08
Payer: MEDICARE

## 2021-09-08 VITALS
SYSTOLIC BLOOD PRESSURE: 110 MMHG | OXYGEN SATURATION: 98 % | HEART RATE: 70 BPM | BODY MASS INDEX: 26.78 KG/M2 | DIASTOLIC BLOOD PRESSURE: 60 MMHG | WEIGHT: 203 LBS

## 2021-09-08 DIAGNOSIS — R06.02 SOB (SHORTNESS OF BREATH): Primary | ICD-10-CM

## 2021-09-08 DIAGNOSIS — I48.91 ATRIAL FIBRILLATION, UNSPECIFIED TYPE (HCC): ICD-10-CM

## 2021-09-08 PROCEDURE — 4040F PNEUMOC VAC/ADMIN/RCVD: CPT | Performed by: INTERNAL MEDICINE

## 2021-09-08 PROCEDURE — 99214 OFFICE O/P EST MOD 30 MIN: CPT | Performed by: INTERNAL MEDICINE

## 2021-09-08 PROCEDURE — G8417 CALC BMI ABV UP PARAM F/U: HCPCS | Performed by: INTERNAL MEDICINE

## 2021-09-08 PROCEDURE — 1036F TOBACCO NON-USER: CPT | Performed by: INTERNAL MEDICINE

## 2021-09-08 PROCEDURE — 1111F DSCHRG MED/CURRENT MED MERGE: CPT | Performed by: INTERNAL MEDICINE

## 2021-09-08 PROCEDURE — G8427 DOCREV CUR MEDS BY ELIG CLIN: HCPCS | Performed by: INTERNAL MEDICINE

## 2021-09-08 PROCEDURE — 1123F ACP DISCUSS/DSCN MKR DOCD: CPT | Performed by: INTERNAL MEDICINE

## 2021-09-08 NOTE — LETTER
Reyes Pagan, M.D. [address] [place] [address]  [phone]        2021        Russell Levy MD  [address] [address]    RE:   Francisco Rodriguez  :  1940    Dear Dr. Rosa Mason:    CHIEF COMPLAINT:  1. Sick sinus syndrome. 2.  Recurrent atrial fibrillation with RVR. 3.  Bradycardia with heart rates in the 30s. 4.  History of syncope. HISTORY OF PRESENT ILLNESS:  I met with Mr. Kaiden Bonilla. As you know, he was hospitalized because of syncope. During the hospitalization, he was found to have atrial fibrillation with RVR along with bradycardia with heart rates in the 30s. We stopped his metoprolol and his calcium channel blocker. We did a tilt table that was negative. He was discharged on an event recorder, and I brought him back today to go over the event recorder. The event recorder showed recurrent episodes of atrial fibrillation with heart rates up to the 140s. It also showed bradycardia with heart rates in the 30s with both secondary to sinus bradycardia as well as 2:1 AV block. His inhalers were changed and his shortness of breath has markedly improved. He has had no further syncopal episodes. He becomes mildly lightheaded if he stands rapidly. He has had no chest pain or chest discomfort and he has been very active at home, although he does get short of breath with activity, which has been a long-term complaint. MEDICATIONS:  As follows, Ventolin inhaler two puffs p.r.n., Fosamax 70 mg weekly, Pletal 50 mg b.i.d., Pepcid 20 mg daily, Proscar 5 mg daily, folic acid 1 mg daily, Imdur 30 mg daily, methotrexate 10 mg weekly, Prilosec 20 mg b.i.d., Xarelto 20 mg daily, Crestor 40 mg half a tablet daily, Zoloft 50 mg daily, Flomax 0.4 mg daily, Spiriva daily, Desyrel 100 mg daily, vitamin D daily.     PHYSICAL EXAMINATION:  VITAL SIGNS:  His blood pressure was 110/60, with a heart rate of 70 and regular. Respiratory rate 18. He weighed 203 pounds. O2 sat was 98%. His exam was unremarkable. Again, his event recorder showed recurrent episodes of atrial fibrillation with heart rates up to 140s as well as bradycardia with heart rates in the 30s and 2:1 block with a heart rate of 30. IMPRESSION:  1. Sick sinus syndrome. 2.  Paroxysmal atrial fibrillation with RVR with heart rates in the 140 range. 3.  Bradycardia with heart rates in the 30s. 4.  History of syncope. 5.  Not on any beta-blocker or calcium channel blocker at this time in spite of his known coronary artery disease. PLAN:  1. We will proceed with a DDD permanent pacemaker. 2.  We will implement Lopressor to try to control his ventricular response with atrial fibrillation. DISCUSSION:  Mr. Opal Linares is asymptomatic. However, his atrial fibrillation and RVR is not being treated. Also, he is not on any beta-blocker in spite of his coronary artery disease. I think the best option would be to proceed with a cardiac catheterization to define his anatomy. I discussed with him and Darlin Meigs, and they are willing to proceed. We will do so on 09/27. Thank you very much for allowing me the privilege of seeing Mr. Opal Linares. If you have any questions on my thoughts, please do not hesitate to contact me.      Sincerely,        Cheryl Pabon    D: 09/08/2021 11:35:08     T: 09/08/2021 11:38:25     [email or web address]  Job#: [id number]   Doc#: [id number]

## 2021-09-08 NOTE — PROGRESS NOTES
Ov DR Shantanu Anderson follow up post   Discharge   Was in  7/20 syncopal episodes  Wore event monitor  No more syncopal episode   occ dizziness if gets up fast   bp and hr in good range at home  Was in ED on 7/26 sob  Improved with change in inhalers. No chest pain. C/o being tired   Discussed pacemaker   Pt agrees to go ahead and   Put it in. Ok to start driving. Will do pacemaker insert on 9/29 at 730  Pt will hold xarelto for 5 days prior.

## 2021-09-10 ENCOUNTER — HOSPITAL ENCOUNTER (OUTPATIENT)
Age: 81
Discharge: HOME | End: 2021-09-10
Payer: MEDICARE

## 2021-09-10 DIAGNOSIS — R06.02 SOB (SHORTNESS OF BREATH): ICD-10-CM

## 2021-09-10 DIAGNOSIS — I48.91 ATRIAL FIBRILLATION, UNSPECIFIED TYPE (HCC): ICD-10-CM

## 2021-09-10 LAB
ABSOLUTE EOS #: 0.1 K/UL (ref 0–0.4)
ABSOLUTE IMMATURE GRANULOCYTE: ABNORMAL K/UL (ref 0–0.3)
ABSOLUTE LYMPH #: 0.5 K/UL (ref 1–4.8)
ABSOLUTE MONO #: 0.4 K/UL (ref 0–1)
ALBUMIN SERPL-MCNC: 3.9 G/DL (ref 3.5–5.2)
ALBUMIN/GLOBULIN RATIO: ABNORMAL (ref 1–2.5)
ALP BLD-CCNC: 72 U/L (ref 40–129)
ALT SERPL-CCNC: 16 U/L (ref 5–41)
ANION GAP SERPL CALCULATED.3IONS-SCNC: 8 MMOL/L (ref 9–17)
AST SERPL-CCNC: 18 U/L
BASOPHILS # BLD: 0 % (ref 0–2)
BASOPHILS ABSOLUTE: 0 K/UL (ref 0–0.2)
BILIRUB SERPL-MCNC: 0.34 MG/DL (ref 0.3–1.2)
BUN BLDV-MCNC: 16 MG/DL (ref 8–23)
BUN/CREAT BLD: 20 (ref 9–20)
CALCIUM SERPL-MCNC: 8.9 MG/DL (ref 8.6–10.4)
CHLORIDE BLD-SCNC: 103 MMOL/L (ref 98–107)
CO2: 24 MMOL/L (ref 20–31)
CREAT SERPL-MCNC: 0.81 MG/DL (ref 0.7–1.2)
DIFFERENTIAL TYPE: YES
EOSINOPHILS RELATIVE PERCENT: 2 % (ref 0–5)
GFR AFRICAN AMERICAN: >60 ML/MIN
GFR NON-AFRICAN AMERICAN: >60 ML/MIN
GFR SERPL CREATININE-BSD FRML MDRD: ABNORMAL ML/MIN/{1.73_M2}
GFR SERPL CREATININE-BSD FRML MDRD: ABNORMAL ML/MIN/{1.73_M2}
GLUCOSE BLD-MCNC: 202 MG/DL (ref 70–99)
HCT VFR BLD CALC: 41.1 % (ref 41–53)
HEMOGLOBIN: 13.8 G/DL (ref 13.5–17.5)
IMMATURE GRANULOCYTES: ABNORMAL %
LYMPHOCYTES # BLD: 11 % (ref 13–44)
MCH RBC QN AUTO: 32.3 PG (ref 26–34)
MCHC RBC AUTO-ENTMCNC: 33.5 G/DL (ref 31–37)
MCV RBC AUTO: 96.4 FL (ref 80–100)
MONOCYTES # BLD: 8 % (ref 5–9)
NRBC AUTOMATED: ABNORMAL PER 100 WBC
PDW BLD-RTO: 15.7 % (ref 12.1–15.2)
PLATELET # BLD: 212 K/UL (ref 140–450)
PLATELET ESTIMATE: ABNORMAL
PMV BLD AUTO: ABNORMAL FL (ref 6–12)
POTASSIUM SERPL-SCNC: 3.9 MMOL/L (ref 3.7–5.3)
RBC # BLD: 4.26 M/UL (ref 4.5–5.9)
RBC # BLD: ABNORMAL 10*6/UL
SEG NEUTROPHILS: 79 % (ref 39–75)
SEGMENTED NEUTROPHILS ABSOLUTE COUNT: 3.9 K/UL (ref 2.1–6.5)
SODIUM BLD-SCNC: 135 MMOL/L (ref 135–144)
TOTAL PROTEIN: 6.6 G/DL (ref 6.4–8.3)
WBC # BLD: 4.9 K/UL (ref 3.5–11)
WBC # BLD: ABNORMAL 10*3/UL

## 2021-09-10 PROCEDURE — 36415 COLL VENOUS BLD VENIPUNCTURE: CPT

## 2021-09-10 PROCEDURE — 93005 ELECTROCARDIOGRAM TRACING: CPT

## 2021-09-10 PROCEDURE — 80053 COMPREHEN METABOLIC PANEL: CPT

## 2021-09-10 PROCEDURE — 85025 COMPLETE CBC W/AUTO DIFF WBC: CPT

## 2021-09-12 LAB
EKG ATRIAL RATE: 68 BPM
EKG P AXIS: 36 DEGREES
EKG P-R INTERVAL: 184 MS
EKG Q-T INTERVAL: 446 MS
EKG QRS DURATION: 88 MS
EKG QTC CALCULATION (BAZETT): 474 MS
EKG R AXIS: 0 DEGREES
EKG T AXIS: 31 DEGREES
EKG VENTRICULAR RATE: 68 BPM

## 2021-09-12 PROCEDURE — 93010 ELECTROCARDIOGRAM REPORT: CPT | Performed by: INTERNAL MEDICINE

## 2021-09-14 NOTE — PROGRESS NOTES
Queen Jose M.D. [address] [place] [address]  [phone]        2021        Cecile Riggs MD  [address] [address]    RE:   Tati Kidney  :  1940    Dear Dr. Eric Hall:    CHIEF COMPLAINT:  1. Sick sinus syndrome. 2.  Recurrent atrial fibrillation with RVR. 3.  Bradycardia with heart rates in the 30s. 4.  History of syncope. HISTORY OF PRESENT ILLNESS:  I met with Mr. Opal Linares. As you know, he was hospitalized because of syncope. During the hospitalization, he was found to have atrial fibrillation with RVR along with bradycardia with heart rates in the 30s. We stopped his metoprolol and his calcium channel blocker. We did a tilt table that was negative. He was discharged on an event recorder, and I brought him back today to go over the event recorder. The event recorder showed recurrent episodes of atrial fibrillation with heart rates up to the 140s. It also showed bradycardia with heart rates in the 30s with both secondary to sinus bradycardia as well as 2:1 AV block. His inhalers were changed and his shortness of breath has markedly improved. He has had no further syncopal episodes. He becomes mildly lightheaded if he stands rapidly. He has had no chest pain or chest discomfort and he has been very active at home, although he does get short of breath with activity, which has been a long-term complaint. MEDICATIONS:  As follows, Ventolin inhaler two puffs p.r.n., Fosamax 70 mg weekly, Pletal 50 mg b.i.d., Pepcid 20 mg daily, Proscar 5 mg daily, folic acid 1 mg daily, Imdur 30 mg daily, methotrexate 10 mg weekly, Prilosec 20 mg b.i.d., Xarelto 20 mg daily, Crestor 40 mg half a tablet daily, Zoloft 50 mg daily, Flomax 0.4 mg daily, Spiriva daily, Desyrel 100 mg daily, vitamin D daily.     PHYSICAL EXAMINATION:  VITAL SIGNS:  His blood pressure was 110/60, with a heart rate of 70 and regular. Respiratory rate 18. He weighed 203 pounds. O2 sat was 98%. His exam was unremarkable. Again, his event recorder showed recurrent episodes of atrial fibrillation with heart rates up to 140s as well as bradycardia with heart rates in the 30s and 2:1 block with a heart rate of 30. IMPRESSION:  1. Sick sinus syndrome. 2.  Paroxysmal atrial fibrillation with RVR with heart rates in the 140 range. 3.  Bradycardia with heart rates in the 30s. 4.  History of syncope. 5.  Not on any beta-blocker or calcium channel blocker at this time in spite of his known coronary artery disease. PLAN:  1. We will proceed with a DDD permanent pacemaker. 2.  We will implement Lopressor to try to control his ventricular response with atrial fibrillation. DISCUSSION:  Mr. Annetta Patel is asymptomatic. However, his atrial fibrillation and RVR is not being treated. Also, he is not on any beta-blocker in spite of his coronary artery disease. I think the best option would be to proceed with a cardiac catheterization to define his anatomy. I discussed with him and Osmel Pleasant, and they are willing to proceed. We will do so on 09/27. Thank you very much for allowing me the privilege of seeing Mr. Annetta Patel. If you have any questions on my thoughts, please do not hesitate to contact me.      Sincerely,        Deisy Montes    D: 09/08/2021 11:35:08     T: 09/08/2021 11:38:25     [email or web address]  Job#: [id number]   Doc#: [id number]

## 2021-09-21 ENCOUNTER — ANESTHESIA EVENT (OUTPATIENT)
Dept: OPERATING ROOM | Age: 81
End: 2021-09-21
Payer: MEDICARE

## 2021-09-23 NOTE — PROGRESS NOTES
[facility]   Preadmission Testing    Name: Rakan Patel  : 1940  Patient Phone: [phone] (home)     Procedure: Pacemaker insertion  Date of Procedure: 21  Surgeon: Edwige Bliss MD    Ht:  6' 1\" (185.4 cm)  Wt: 200 lb (90.7 kg)  Wt method: Allergies: No Known Allergies        Latex Allergy Screening Tool  Have you ever had a reaction to or been told by a physician that you have an allergy to latex or natural rubber?: No    There were no vitals filed for this visit. No LMP for male patient. Do you take blood thinners? [x] Yes    [] No         Instructed to stop blood thinners prior to procedure? [x] Yes    [] No      [] N/A   Do you have sleep apnea? [] Yes    [x] No     Do you have acid reflux ? [x] Yes    [] No     Do you have  hiatal hernia? [] Yes    [x] No    Do you ever experience motion sickness? [] Yes    [x] No     Have you had a respiratory infection or sore throat in last 4 weeks before surgery? [] Yes    [x] No     Do you have poorly controlled asthma or COPD? Difficulty with intubation in past? [] Yes    [x] No      [] Yes    [x] No       Do you have a history of angina in the last month or symptomatic arrhythmia? [x] Yes    [] No    Symptomatic bradycardia   Do you have significant central nervous system disease? [] Yes    [x] No     Have you had an EKG, labs, or chest xray in last 12 months? If yes provide copies to anesthesia   [x] Yes    [] No       [x] Lab    [x] EKG    [] CXR     Have you had a stress test?     [x] Yes    [] No    When/where:[facility] 2021    Was it normal?    [] Yes    [] No     Do you or your family have a history of Malignant Hyperthermia? [] Yes    [x] No           Do you smoke? [] Yes    [x] No      Please refrain from smoking on the day of surgery.       Patient instructed on: [x] NPO Status   [x] Meds to Take  [x] Ride Home  [x]No Jewelry/Contact Lenses/Nail [place]  [] Prep/Lax/Clear Liquids    [] Chlorhexidene

## 2021-09-28 ENCOUNTER — OFFICE VISIT (OUTPATIENT)
Dept: FAMILY MEDICINE CLINIC | Age: 81
End: 2021-09-28
Payer: MEDICARE

## 2021-09-28 VITALS
OXYGEN SATURATION: 96 % | HEART RATE: 94 BPM | SYSTOLIC BLOOD PRESSURE: 100 MMHG | DIASTOLIC BLOOD PRESSURE: 60 MMHG | BODY MASS INDEX: 26.65 KG/M2 | WEIGHT: 202 LBS

## 2021-09-28 DIAGNOSIS — I48.0 PAROXYSMAL ATRIAL FIBRILLATION (HCC): ICD-10-CM

## 2021-09-28 DIAGNOSIS — K21.9 GASTROESOPHAGEAL REFLUX DISEASE WITHOUT ESOPHAGITIS: ICD-10-CM

## 2021-09-28 DIAGNOSIS — I10 ESSENTIAL HYPERTENSION: Primary | ICD-10-CM

## 2021-09-28 DIAGNOSIS — R53.83 OTHER FATIGUE: ICD-10-CM

## 2021-09-28 DIAGNOSIS — E78.00 PURE HYPERCHOLESTEROLEMIA: ICD-10-CM

## 2021-09-28 DIAGNOSIS — F32.89 OTHER DEPRESSION: ICD-10-CM

## 2021-09-28 PROCEDURE — G8427 DOCREV CUR MEDS BY ELIG CLIN: HCPCS | Performed by: FAMILY MEDICINE

## 2021-09-28 PROCEDURE — G8417 CALC BMI ABV UP PARAM F/U: HCPCS | Performed by: FAMILY MEDICINE

## 2021-09-28 PROCEDURE — 1123F ACP DISCUSS/DSCN MKR DOCD: CPT | Performed by: FAMILY MEDICINE

## 2021-09-28 PROCEDURE — 1036F TOBACCO NON-USER: CPT | Performed by: FAMILY MEDICINE

## 2021-09-28 PROCEDURE — 4040F PNEUMOC VAC/ADMIN/RCVD: CPT | Performed by: FAMILY MEDICINE

## 2021-09-28 PROCEDURE — 99215 OFFICE O/P EST HI 40 MIN: CPT | Performed by: FAMILY MEDICINE

## 2021-09-28 RX ORDER — SERTRALINE HYDROCHLORIDE 100 MG/1
100 TABLET, FILM COATED ORAL DAILY
COMMUNITY
End: 2021-09-28 | Stop reason: SDUPTHER

## 2021-09-28 RX ORDER — SERTRALINE HYDROCHLORIDE 100 MG/1
100 TABLET, FILM COATED ORAL DAILY
Qty: 30 TABLET | Refills: 1 | Status: SHIPPED | OUTPATIENT
Start: 2021-09-28 | End: 2021-10-27 | Stop reason: SDUPTHER

## 2021-09-28 ASSESSMENT — ENCOUNTER SYMPTOMS
NAUSEA: 0
CONSTIPATION: 0
SORE THROAT: 0
FACIAL SWELLING: 0
EYE DISCHARGE: 0
TROUBLE SWALLOWING: 0
DIARRHEA: 0
ABDOMINAL PAIN: 0
COUGH: 0
VOMITING: 0
BLOOD IN STOOL: 0
CHOKING: 0
EYE REDNESS: 0
SHORTNESS OF BREATH: 0

## 2021-09-28 NOTE — PATIENT INSTRUCTIONS
SURVEY:    You may be receiving a survey from [employer] regarding your visit today. Please complete the survey to enable us to provide the highest quality of care to you and your family. If you cannot score us a very good (5 stars) on any question, please call the office to discuss how we could have made your experience a very good one. Thank you.     Clinical Care Team:  MD Arin Juarez LPN    Clerical Team:  Rachele Jaeger

## 2021-09-29 ENCOUNTER — APPOINTMENT (OUTPATIENT)
Dept: GENERAL RADIOLOGY | Age: 81
End: 2021-09-29
Attending: INTERNAL MEDICINE
Payer: MEDICARE

## 2021-09-29 ENCOUNTER — HOSPITAL ENCOUNTER (OUTPATIENT)
Dept: PREADMISSION TESTING | Age: 81
Setting detail: SPECIMEN
Discharge: HOME | End: 2021-09-29
Payer: MEDICARE

## 2021-09-29 ENCOUNTER — ANESTHESIA (OUTPATIENT)
Dept: OPERATING ROOM | Age: 81
End: 2021-09-29
Payer: MEDICARE

## 2021-09-29 ENCOUNTER — HOSPITAL ENCOUNTER (OUTPATIENT)
Age: 81
Discharge: HOME | End: 2021-09-30
Attending: INTERNAL MEDICINE | Admitting: INTERNAL MEDICINE
Payer: MEDICARE

## 2021-09-29 VITALS
SYSTOLIC BLOOD PRESSURE: 117 MMHG | RESPIRATION RATE: 23 BRPM | DIASTOLIC BLOOD PRESSURE: 68 MMHG | OXYGEN SATURATION: 99 %

## 2021-09-29 DIAGNOSIS — Z41.9 SURGERY, ELECTIVE: Primary | ICD-10-CM

## 2021-09-29 DIAGNOSIS — I49.5 SSS (SICK SINUS SYNDROME) (HCC): ICD-10-CM

## 2021-09-29 LAB
GLUCOSE BLD-MCNC: 154 MG/DL (ref 65–99)
SARS-COV-2, RAPID: NOT DETECTED
SPECIMEN DESCRIPTION: NORMAL

## 2021-09-29 PROCEDURE — 2580000003 HC RX 258: Performed by: INTERNAL MEDICINE

## 2021-09-29 PROCEDURE — 2500000003 HC RX 250 WO HCPCS: Performed by: NURSE ANESTHETIST, CERTIFIED REGISTERED

## 2021-09-29 PROCEDURE — C1898 LEAD, PMKR, OTHER THAN TRANS: HCPCS | Performed by: INTERNAL MEDICINE

## 2021-09-29 PROCEDURE — 2709999900 HC NON-CHARGEABLE SUPPLY: Performed by: INTERNAL MEDICINE

## 2021-09-29 PROCEDURE — 3600000004 HC SURGERY LEVEL 4 BASE: Performed by: INTERNAL MEDICINE

## 2021-09-29 PROCEDURE — C9803 HOPD COVID-19 SPEC COLLECT: HCPCS

## 2021-09-29 PROCEDURE — 6360000002 HC RX W HCPCS: Performed by: INTERNAL MEDICINE

## 2021-09-29 PROCEDURE — 7100000000 HC PACU RECOVERY - FIRST 15 MIN: Performed by: INTERNAL MEDICINE

## 2021-09-29 PROCEDURE — 76000 FLUOROSCOPY <1 HR PHYS/QHP: CPT

## 2021-09-29 PROCEDURE — C1785 PMKR, DUAL, RATE-RESP: HCPCS | Performed by: INTERNAL MEDICINE

## 2021-09-29 PROCEDURE — 33208 INSRT HEART PM ATRIAL & VENT: CPT | Performed by: INTERNAL MEDICINE

## 2021-09-29 PROCEDURE — 94761 N-INVAS EAR/PLS OXIMETRY MLT: CPT

## 2021-09-29 PROCEDURE — 7100000001 HC PACU RECOVERY - ADDTL 15 MIN: Performed by: INTERNAL MEDICINE

## 2021-09-29 PROCEDURE — 6360000002 HC RX W HCPCS: Performed by: NURSE ANESTHETIST, CERTIFIED REGISTERED

## 2021-09-29 PROCEDURE — 6370000000 HC RX 637 (ALT 250 FOR IP): Performed by: INTERNAL MEDICINE

## 2021-09-29 PROCEDURE — 3700000000 HC ANESTHESIA ATTENDED CARE: Performed by: INTERNAL MEDICINE

## 2021-09-29 PROCEDURE — C1892 INTRO/SHEATH,FIXED,PEEL-AWAY: HCPCS | Performed by: INTERNAL MEDICINE

## 2021-09-29 PROCEDURE — 87635 SARS-COV-2 COVID-19 AMP PRB: CPT

## 2021-09-29 PROCEDURE — 3700000001 HC ADD 15 MINUTES (ANESTHESIA): Performed by: INTERNAL MEDICINE

## 2021-09-29 PROCEDURE — 82947 ASSAY GLUCOSE BLOOD QUANT: CPT

## 2021-09-29 PROCEDURE — 71045 X-RAY EXAM CHEST 1 VIEW: CPT

## 2021-09-29 PROCEDURE — 3600000014 HC SURGERY LEVEL 4 ADDTL 15MIN: Performed by: INTERNAL MEDICINE

## 2021-09-29 PROCEDURE — 2500000003 HC RX 250 WO HCPCS: Performed by: INTERNAL MEDICINE

## 2021-09-29 DEVICE — PACEMAKER CARD 22.5GM W50.8XH46.6MM D7.4MM TI POLYUR SIL: Type: IMPLANTABLE DEVICE | Status: FUNCTIONAL

## 2021-09-29 DEVICE — LEAD PACE 5.7FR L45CM VENT SIL PLAT INSUL BPLR STR: Type: IMPLANTABLE DEVICE | Status: FUNCTIONAL

## 2021-09-29 DEVICE — LEAD PACE 5.7FR L52CM VENT SIL PLAT INSUL BPLR STEROID: Type: IMPLANTABLE DEVICE | Status: FUNCTIONAL

## 2021-09-29 RX ORDER — ACETAMINOPHEN 325 MG/1
650 TABLET ORAL EVERY 4 HOURS PRN
Status: DISCONTINUED | OUTPATIENT
Start: 2021-09-29 | End: 2021-09-30 | Stop reason: HOSPADM

## 2021-09-29 RX ORDER — ALENDRONATE SODIUM 70 MG/1
70 TABLET ORAL
Status: DISCONTINUED | OUTPATIENT
Start: 2021-09-29 | End: 2021-09-29 | Stop reason: CLARIF

## 2021-09-29 RX ORDER — PANTOPRAZOLE SODIUM 40 MG/1
40 TABLET, DELAYED RELEASE ORAL
Status: DISCONTINUED | OUTPATIENT
Start: 2021-09-29 | End: 2021-09-30 | Stop reason: HOSPADM

## 2021-09-29 RX ORDER — LIDOCAINE HYDROCHLORIDE 10 MG/ML
INJECTION, SOLUTION EPIDURAL; INFILTRATION; INTRACAUDAL; PERINEURAL PRN
Status: DISCONTINUED | OUTPATIENT
Start: 2021-09-29 | End: 2021-09-29 | Stop reason: SDUPTHER

## 2021-09-29 RX ORDER — PROPOFOL 10 MG/ML
INJECTION, EMULSION INTRAVENOUS CONTINUOUS PRN
Status: DISCONTINUED | OUTPATIENT
Start: 2021-09-29 | End: 2021-09-29 | Stop reason: SDUPTHER

## 2021-09-29 RX ORDER — SODIUM CHLORIDE 9 MG/ML
25 INJECTION, SOLUTION INTRAVENOUS PRN
Status: DISCONTINUED | OUTPATIENT
Start: 2021-09-29 | End: 2021-09-30 | Stop reason: HOSPADM

## 2021-09-29 RX ORDER — PROPOFOL 10 MG/ML
INJECTION, EMULSION INTRAVENOUS PRN
Status: DISCONTINUED | OUTPATIENT
Start: 2021-09-29 | End: 2021-09-29 | Stop reason: SDUPTHER

## 2021-09-29 RX ORDER — CEFAZOLIN SODIUM 2 G/50ML
2000 SOLUTION INTRAVENOUS EVERY 8 HOURS
Status: COMPLETED | OUTPATIENT
Start: 2021-09-29 | End: 2021-09-30

## 2021-09-29 RX ORDER — FINASTERIDE 5 MG/1
5 TABLET, FILM COATED ORAL DAILY
Status: DISCONTINUED | OUTPATIENT
Start: 2021-09-30 | End: 2021-09-30 | Stop reason: HOSPADM

## 2021-09-29 RX ORDER — SODIUM CHLORIDE 0.9 % (FLUSH) 0.9 %
5-40 SYRINGE (ML) INJECTION EVERY 12 HOURS SCHEDULED
Status: DISCONTINUED | OUTPATIENT
Start: 2021-09-29 | End: 2021-09-30 | Stop reason: HOSPADM

## 2021-09-29 RX ORDER — OXYCODONE HYDROCHLORIDE 5 MG/1
10 TABLET ORAL EVERY 4 HOURS PRN
Status: DISCONTINUED | OUTPATIENT
Start: 2021-09-29 | End: 2021-09-30 | Stop reason: HOSPADM

## 2021-09-29 RX ORDER — CILOSTAZOL 50 MG/1
50 TABLET ORAL 2 TIMES DAILY
Status: DISCONTINUED | OUTPATIENT
Start: 2021-09-29 | End: 2021-09-30 | Stop reason: HOSPADM

## 2021-09-29 RX ORDER — FENTANYL CITRATE 50 UG/ML
INJECTION, SOLUTION INTRAMUSCULAR; INTRAVENOUS PRN
Status: DISCONTINUED | OUTPATIENT
Start: 2021-09-29 | End: 2021-09-29 | Stop reason: SDUPTHER

## 2021-09-29 RX ORDER — ALBUTEROL SULFATE 90 UG/1
2 AEROSOL, METERED RESPIRATORY (INHALATION) EVERY 4 HOURS PRN
Status: DISCONTINUED | OUTPATIENT
Start: 2021-09-29 | End: 2021-09-30 | Stop reason: HOSPADM

## 2021-09-29 RX ORDER — BUPIVACAINE HYDROCHLORIDE 2.5 MG/ML
INJECTION, SOLUTION EPIDURAL; INFILTRATION; INTRACAUDAL PRN
Status: DISCONTINUED | OUTPATIENT
Start: 2021-09-29 | End: 2021-09-29 | Stop reason: ALTCHOICE

## 2021-09-29 RX ORDER — FAMOTIDINE 20 MG/1
20 TABLET, FILM COATED ORAL DAILY
Status: DISCONTINUED | OUTPATIENT
Start: 2021-09-30 | End: 2021-09-30 | Stop reason: HOSPADM

## 2021-09-29 RX ORDER — TAMSULOSIN HYDROCHLORIDE 0.4 MG/1
0.4 CAPSULE ORAL DAILY
Status: DISCONTINUED | OUTPATIENT
Start: 2021-09-29 | End: 2021-09-30 | Stop reason: HOSPADM

## 2021-09-29 RX ORDER — SODIUM CHLORIDE 0.9 % (FLUSH) 0.9 %
5-40 SYRINGE (ML) INJECTION PRN
Status: DISCONTINUED | OUTPATIENT
Start: 2021-09-29 | End: 2021-09-30 | Stop reason: HOSPADM

## 2021-09-29 RX ORDER — TRAZODONE HYDROCHLORIDE 50 MG/1
100 TABLET ORAL NIGHTLY
Status: DISCONTINUED | OUTPATIENT
Start: 2021-09-29 | End: 2021-09-30 | Stop reason: HOSPADM

## 2021-09-29 RX ORDER — ISOSORBIDE MONONITRATE 30 MG/1
30 TABLET, EXTENDED RELEASE ORAL DAILY
Status: DISCONTINUED | OUTPATIENT
Start: 2021-09-30 | End: 2021-09-30 | Stop reason: HOSPADM

## 2021-09-29 RX ORDER — OXYCODONE HYDROCHLORIDE 5 MG/1
5 TABLET ORAL EVERY 4 HOURS PRN
Status: DISCONTINUED | OUTPATIENT
Start: 2021-09-29 | End: 2021-09-30 | Stop reason: HOSPADM

## 2021-09-29 RX ORDER — ROSUVASTATIN CALCIUM 20 MG/1
20 TABLET, COATED ORAL EVERY EVENING
Status: DISCONTINUED | OUTPATIENT
Start: 2021-09-29 | End: 2021-09-30 | Stop reason: HOSPADM

## 2021-09-29 RX ORDER — HEPARIN SODIUM 1000 [USP'U]/ML
INJECTION, SOLUTION INTRAVENOUS; SUBCUTANEOUS PRN
Status: DISCONTINUED | OUTPATIENT
Start: 2021-09-29 | End: 2021-09-29 | Stop reason: ALTCHOICE

## 2021-09-29 RX ORDER — VANCOMYCIN HYDROCHLORIDE 1 G/20ML
INJECTION, POWDER, LYOPHILIZED, FOR SOLUTION INTRAVENOUS PRN
Status: DISCONTINUED | OUTPATIENT
Start: 2021-09-29 | End: 2021-09-29 | Stop reason: ALTCHOICE

## 2021-09-29 RX ORDER — MIDAZOLAM HYDROCHLORIDE 2 MG/2ML
INJECTION, SOLUTION INTRAMUSCULAR; INTRAVENOUS PRN
Status: DISCONTINUED | OUTPATIENT
Start: 2021-09-29 | End: 2021-09-29 | Stop reason: SDUPTHER

## 2021-09-29 RX ORDER — CEFAZOLIN SODIUM 2 G/50ML
2000 SOLUTION INTRAVENOUS
Status: COMPLETED | OUTPATIENT
Start: 2021-09-29 | End: 2021-09-29

## 2021-09-29 RX ORDER — SODIUM CHLORIDE 9 MG/ML
INJECTION, SOLUTION INTRAVENOUS CONTINUOUS
Status: DISCONTINUED | OUTPATIENT
Start: 2021-09-29 | End: 2021-09-29

## 2021-09-29 RX ADMIN — PANTOPRAZOLE SODIUM 40 MG: 40 TABLET, DELAYED RELEASE ORAL at 16:36

## 2021-09-29 RX ADMIN — LIDOCAINE HYDROCHLORIDE 50 MG: 10 INJECTION, SOLUTION EPIDURAL; INFILTRATION; INTRACAUDAL; PERINEURAL at 07:32

## 2021-09-29 RX ADMIN — CEFAZOLIN SODIUM 2000 MG: 2 SOLUTION INTRAVENOUS at 23:55

## 2021-09-29 RX ADMIN — SODIUM CHLORIDE: 9 INJECTION, SOLUTION INTRAVENOUS at 06:52

## 2021-09-29 RX ADMIN — CEFAZOLIN SODIUM 2000 MG: 2 SOLUTION INTRAVENOUS at 07:22

## 2021-09-29 RX ADMIN — ROSUVASTATIN CALCIUM 20 MG: 20 TABLET, COATED ORAL at 18:00

## 2021-09-29 RX ADMIN — FENTANYL CITRATE 25 MCG: 50 INJECTION, SOLUTION INTRAMUSCULAR; INTRAVENOUS at 07:24

## 2021-09-29 RX ADMIN — CILOSTAZOL 50 MG: 50 TABLET ORAL at 20:24

## 2021-09-29 RX ADMIN — TRAZODONE HYDROCHLORIDE 100 MG: 50 TABLET ORAL at 20:24

## 2021-09-29 RX ADMIN — OXYCODONE HYDROCHLORIDE 5 MG: 5 TABLET ORAL at 23:55

## 2021-09-29 RX ADMIN — OXYCODONE HYDROCHLORIDE 5 MG: 5 TABLET ORAL at 12:23

## 2021-09-29 RX ADMIN — MIDAZOLAM HYDROCHLORIDE 1 MG: 1 INJECTION, SOLUTION INTRAMUSCULAR; INTRAVENOUS at 07:24

## 2021-09-29 RX ADMIN — ACETAMINOPHEN 650 MG: 325 TABLET ORAL at 20:24

## 2021-09-29 RX ADMIN — PROPOFOL 50 MCG/KG/MIN: 10 INJECTION, EMULSION INTRAVENOUS at 07:32

## 2021-09-29 RX ADMIN — SODIUM CHLORIDE, PRESERVATIVE FREE 10 ML: 5 INJECTION INTRAVENOUS at 20:24

## 2021-09-29 RX ADMIN — PROPOFOL 50 MG: 10 INJECTION, EMULSION INTRAVENOUS at 07:32

## 2021-09-29 RX ADMIN — CEFAZOLIN SODIUM 2000 MG: 2 SOLUTION INTRAVENOUS at 14:57

## 2021-09-29 RX ADMIN — SODIUM CHLORIDE, PRESERVATIVE FREE 10 ML: 5 INJECTION INTRAVENOUS at 14:57

## 2021-09-29 RX ADMIN — OXYCODONE HYDROCHLORIDE 5 MG: 5 TABLET ORAL at 17:59

## 2021-09-29 ASSESSMENT — PAIN SCALES - GENERAL
PAINLEVEL_OUTOF10: 4
PAINLEVEL_OUTOF10: 5
PAINLEVEL_OUTOF10: 0
PAINLEVEL_OUTOF10: 4
PAINLEVEL_OUTOF10: 4

## 2021-09-29 ASSESSMENT — PAIN DESCRIPTION - PAIN TYPE
TYPE: SURGICAL PAIN;ACUTE PAIN
TYPE: SURGICAL PAIN

## 2021-09-29 ASSESSMENT — PAIN - FUNCTIONAL ASSESSMENT: PAIN_FUNCTIONAL_ASSESSMENT: 0-10

## 2021-09-29 ASSESSMENT — PAIN DESCRIPTION - ORIENTATION
ORIENTATION: LEFT
ORIENTATION: RIGHT

## 2021-09-29 ASSESSMENT — PAIN DESCRIPTION - DESCRIPTORS
DESCRIPTORS: SORE
DESCRIPTORS: SORE

## 2021-09-29 ASSESSMENT — PAIN DESCRIPTION - LOCATION
LOCATION: SHOULDER;RIB CAGE
LOCATION: CHEST

## 2021-09-29 ASSESSMENT — ENCOUNTER SYMPTOMS: SHORTNESS OF BREATH: 1

## 2021-09-29 ASSESSMENT — COPD QUESTIONNAIRES: CAT_SEVERITY: NO INTERVAL CHANGE

## 2021-09-29 NOTE — PROGRESS NOTES
Pharmacist Home Medication Verification    Reviewed/verified home medication supply using drug identification software for inpatient use. Original order entered for 1 tab QAM. Pt's bottle states 1/2 tab QAM. Verified with pt and daughter that pt takes as written on bottle = 12.5 mg QAM. Order updated and reverified for pt supplied.     Ladarius Benites PharmD 9/29/2021 10:41 AM

## 2021-09-29 NOTE — ACP (ADVANCE CARE PLANNING)
Advance Care Planning     Advance Care Planning Activator (Inpatient)  Conversation Note      Date of ACP Conversation: 9/29/2021     Conversation Conducted with: Patient with Decision Making Capacity but decision maker answers to complete assessment    ACP Activator: Emerson Thomson, [address] [facility] Decision Maker:     Current Designated Health Care Decision Maker:     Primary Decision Maker: Xavier Westfall - [phone]  Click here to complete Healthcare Decision Makers including section of the Healthcare Decision Maker Relationship (ie \"Primary\")  Today we documented Decision Maker(s) consistent with Legal Next of Kin hierarchy. Care Preferences    Ventilation: \"If you were in your present state of health and suddenly became very ill and were unable to breathe on your own, what would your preference be about the use of a ventilator (breathing machine) if it were available to you? \"      Would the patient desire the use of ventilator (breathing machine)?: yes  In acute instances  \"If your health worsens and it becomes clear that your chance of recovery is unlikely, what would your preference be about the use of a ventilator (breathing machine) if it were available to you? \"     Would the patient desire the use of ventilator (breathing machine)?: No      Resuscitation  \"CPR works best to restart the heart when there is a sudden event, like a heart attack, in someone who is otherwise healthy. Unfortunately, CPR does not typically restart the heart for people who have serious health conditions or who are very sick. \"    \"In the event your heart stopped as a result of an underlying serious health condition, would you want attempts to be made to restart your heart (answer \"yes\" for attempt to resuscitate) or would you prefer a natural death (answer \"no\" for do not attempt to resuscitate)? \" yes       [] Yes   [x] No   Educated Patient / Joselin Singer regarding differences between Advance

## 2021-09-29 NOTE — H&P
Jessica Le M.D. [address] [place] [address]  [phone]        2021        Trice Thao MD  [address] [address]    RE:   Howie Manvel  :  1940    Dear Dr. Kane Alvarado:    CHIEF COMPLAINT:  1. Sick sinus syndrome. 2.  Recurrent atrial fibrillation with RVR. 3.  Bradycardia with heart rates in the 30s. 4.  History of syncope. HISTORY OF PRESENT ILLNESS:  I met with Mr. Recardo Rubinstein. As you know, he was hospitalized because of syncope. During the hospitalization, he was found to have atrial fibrillation with RVR along with bradycardia with heart rates in the 30s. We stopped his metoprolol and his calcium channel blocker. We did a tilt table that was negative. He was discharged on an event recorder, and I brought him back today to go over the event recorder. The event recorder showed recurrent episodes of atrial fibrillation with heart rates up to the 140s. It also showed bradycardia with heart rates in the 30s with both secondary to sinus bradycardia as well as 2:1 AV block. His inhalers were changed and his shortness of breath has markedly improved. He has had no further syncopal episodes. He becomes mildly lightheaded if he stands rapidly. He has had no chest pain or chest discomfort and he has been very active at home, although he does get short of breath with activity, which has been a long-term complaint. MEDICATIONS:  As follows, Ventolin inhaler two puffs p.r.n., Fosamax 70 mg weekly, Pletal 50 mg b.i.d., Pepcid 20 mg daily, Proscar 5 mg daily, folic acid 1 mg daily, Imdur 30 mg daily, methotrexate 10 mg weekly, Prilosec 20 mg b.i.d., Xarelto 20 mg daily, Crestor 40 mg half a tablet daily, Zoloft 50 mg daily, Flomax 0.4 mg daily, Spiriva daily, Desyrel 100 mg daily, vitamin D daily.     PHYSICAL EXAMINATION:  VITAL SIGNS:  His blood pressure was 110/60, with a heart rate of 70 and regular. Respiratory rate 18. He weighed 203 pounds. O2 sat was 98%. His exam was unremarkable. Again, his event recorder showed recurrent episodes of atrial fibrillation with heart rates up to 140s as well as bradycardia with heart rates in the 30s and 2:1 block with a heart rate of 30. IMPRESSION:  1. Sick sinus syndrome. 2.  Paroxysmal atrial fibrillation with RVR with heart rates in the 140 range. 3.  Bradycardia with heart rates in the 30s. 4.  History of syncope. 5.  Not on any beta-blocker or calcium channel blocker at this time in spite of his known coronary artery disease. PLAN:  1. We will proceed with a DDD permanent pacemaker. 2.  We will implement Lopressor to try to control his ventricular response with atrial fibrillation. DISCUSSION:  Mr. Elaina Santiago is asymptomatic. However, his atrial fibrillation and RVR is not being treated. Also, he is not on any beta-blocker in spite of his coronary artery disease. I think the best option would be to proceed with a permanent pacemaker. I discussed with him and Angela Rosas, and they are willing to proceed. We will do so on 09/29. Thank you very much for allowing me the privilege of seeing Mr. Elaina Santiago. If you have any questions on my thoughts, please do not hesitate to contact me.      Sincerely,        Loren Fuller    D: 09/08/2021 11:35:08     T: 09/08/2021 11:38:25     [email or web address]  Job#: [id number]   Doc#: [id number]

## 2021-09-29 NOTE — PROCEDURES
[place] [address]                            CARDIAC CATHETERIZATION    PATIENT NAME: Carmen Johnston                      :        1940  MED REC NO:   [id number]                              ROOM:       7570  ACCOUNT NO:   [de-identified]                           ADMIT DATE: 2021  PROVIDER:     Reina Whittington    DATE OF PROCEDURE:  2021    NAME OF PROCEDURE:  DDD permanent pacemaker. INDICATION:  Sick sinus syndrome. PROCEDURE:  We prepped and draped in the usual manner for a permanent  pacemaker on the left subclavicular region for sick sinus syndrome. In  Surgery, with him under deep sedation, I used Marcaine for local  analgesia. I then made an incision, and through the incision, did blunt  dissection for the pacemaker pocket. We carefully bovied any bleeders. After we made the pocket, we irrigated with an Ancef solution and placed  a wet Ancef gauze within the pocket. We then cannulated the left subclavian vein twice using the guidewire  each time. We then placed a sheath over one wire and placed a  ventricular lead within the right ventricle. We found good threshold  and sensing near the apex on the lateral wall. This was screwed in  place and sutured in place with 1 silk. We then placed a sheath over the second wire and placed the atrial lead  in the right atrium. We tried to place this in more lateral wall as he  had previous open heart surgery. We found good threshold and sensing in  the lateral wall. This was sutured in place with 1 silk. We repeatedly  checked the wires for good sensing and threshold. We irrigated the  pocket again and attached the pacemaker to the leads and placed it  within the pocket. We sutured the pacemaker with 1 silk to the  posterior wall. We used fluoroscopy through the case.     We then closed the skin with 4-0 Vicryl and 2-0 Vicryl. Placed  Steri-Strips at the end. There was no complication and left the Surgery  in good condition. The device is a Medtronic [id number] Phyllis XT DR MRI-compatible pacemaker,  model number [id number], serial number [id number], implant date 09/29/2020. Right atrial lead is a model number [id number], length 45 cm, serial number  [id number] Medtronic in the right atrial lateral wall. Ventricular lead  is a [id number], length 52 cm, serial number [id number], implant date  09/29/2021. Threshold in atrium 0.5 milliseconds, 0.9 volts and a P  wave of 5.3 millivolts. Ventricular lead is 8.5 milliseconds, 0.5  volts, ohms is 1177, with an R wave of 7.9. FINAL SETTINGS:  Lower rate 60, upper rate 130. IMPRESSION:  Successful placement of DDD permanent pacemaker for sick  sinus syndrome.       Nayda Watkins    D: 09/29/2021 9:12:51       T: 09/29/2021 9:15:13     [email or web address]  Job#: [id number]     Doc#: [id number]    CC:  Jackeline Muhammad

## 2021-09-29 NOTE — ANESTHESIA PRE PROCEDURE
Department of Anesthesiology  Preprocedure Note       Name:  James Cowden   Age:  80 y.o.  :  1940                                          MRN:  [id number]         Date:  2021      Surgeon: Beba Bravo):  Alex Biswas MD    Procedure: Procedure(s):  PACEMAKER INSERTION PERMANENT    Medications prior to admission:   Prior to Admission medications    Medication Sig Start Date End Date Taking? Authorizing Provider   empagliflozin (JARDIANCE) 25 MG tablet Take 25 mg by mouth daily   Yes Historical Provider, MD   rivaroxaban (XARELTO) 20 MG TABS tablet Take 1 tablet by mouth daily 21  Yes Alex Biswas MD   Tiotropium Bromide Monohydrate (SPIRIVA RESPIMAT IN) Inhale into the lungs daily   Yes Historical Provider, MD   famotidine (PEPCID) 20 MG tablet Take 20 mg by mouth daily   Yes Historical Provider, MD   METHOTREXATE PO Take 10 mg by mouth once a week   Yes Historical Provider, MD   traZODone (DESYREL) 100 MG tablet Take 100 mg by mouth nightly   Yes Historical Provider, MD   rosuvastatin (CRESTOR) 40 MG tablet Take 20 mg by mouth every evening   Yes Historical Provider, MD   alendronate (FOSAMAX) 70 MG tablet Take 70 mg by mouth every 7 days   Yes Historical Provider, MD   VITAMIN D PO Take by mouth   Yes Historical Provider, MD   cilostazol (PLETAL) 50 MG tablet Take 1 tablet by mouth 2 times daily 21  Yes Alex Biswas MD   finasteride (PROSCAR) 5 MG tablet Take 1 tablet by mouth daily 10/12/20  Yes Lalito Lujan MD   isosorbide mononitrate (IMDUR) 30 MG extended release tablet Take 1 tablet by mouth daily 20  Yes Alex Biswas MD   tamsulosin (FLOMAX) 0.4 MG capsule Take 0.4 mg by mouth daily   Yes Historical Provider, MD   folic acid (FOLVITE) 1 MG tablet Take 1 tablet by mouth daily Daily except on day of taking Methotrexate 9/8/15  Yes Lalito Lujan MD   omeprazole (PRILOSEC) 20 MG capsule Take 20 mg by mouth 2 times daily.    Yes Historical Provider, MD   sertraline (ZOLOFT) 100 MG tablet Take 1 tablet by mouth daily 9/28/21   Sary Best MD   naproxen (NAPROSYN) 500 MG tablet Take 500 mg by mouth daily as needed     Historical Provider, MD   nitroGLYCERIN (NITROSTAT) 0.4 MG SL tablet up to max of 3 total doses.  If no relief after 1 dose, call 911. 10/24/17   Sybil Coombs MD   albuterol (PROVENTIL HFA;VENTOLIN HFA) 108 (90 BASE) MCG/ACT inhaler Inhale 2 puffs into the lungs 4 times daily as needed for Shortness of Breath     Historical Provider, MD       Current medications:    Current Facility-Administered Medications   Medication Dose Route Frequency Provider Last Rate Last Admin    0.9 % sodium chloride infusion   IntraVENous Continuous Sybil Coombs  mL/hr at 09/29/21 0652 New Bag at 09/29/21 4146    ceFAZolin (ANCEF) 2000 mg in dextrose 3 % 50 mL IVPB (duplex)  2,000 mg IntraVENous On Call to [address] MD Karie 100 mL/hr at 09/29/21 0722 2,000 mg at 09/29/21 8177       Allergies:  No Known Allergies    Problem List:    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    COPD (chronic obstructive pulmonary disease) ([facility] [address] J44.9    Rheumatic fever I00    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    Ureteral stone with hydronephrosis N13.2    Acute cystitis without hematuria N30.00    NSTEMI (non-ST elevated myocardial infarction) ([facility] [address] I21.4    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 (HCC) I25.700    Hematuria R31.9    SOB (shortness of breath) R06.02    Renal stone N20.0    Nocturia R35.1    BPH with obstruction/lower urinary tract Counseling given: Not Answered      Vital Signs (Current):   Vitals:    09/23/21 1333 09/29/21 0623 09/29/21 0648   BP:   110/72   Pulse:   68   Resp:   18   Temp:   36.7 °C (98.1 °F)   SpO2:   93%   Weight: 200 lb (90.7 kg) 197 lb 11.2 oz (89.7 kg)    Height: 6' 1\" (1.854 m) 6' (1.829 m)                                               BP Readings from Last 3 Encounters:   09/29/21 110/72   09/28/21 100/60   09/08/21 110/60       NPO Status: Time of last liquid consumption: 0430 (0430 for sip with medications, otherwise the day before for last drink)                        Time of last solid consumption: 2000                        Date of last liquid consumption: 09/29/21                        Date of last solid food consumption: 09/28/21    BMI:   Wt Readings from Last 3 Encounters:   09/29/21 197 lb 11.2 oz (89.7 kg)   09/28/21 202 lb (91.6 kg)   09/08/21 203 lb (92.1 kg)     Body mass index is 26.81 kg/m².     CBC:   Lab Results   Component Value Date    WBC 4.9 09/10/2021    RBC 4.26 09/10/2021    RBC 4.73 02/14/2012    HGB 13.8 09/10/2021    HCT 41.1 09/10/2021    MCV 96.4 09/10/2021    RDW 15.7 09/10/2021     09/10/2021     02/14/2012       CMP:   Lab Results   Component Value Date     09/10/2021    K 3.9 09/10/2021     09/10/2021    CO2 24 09/10/2021    BUN 16 09/10/2021    CREATININE 0.81 09/10/2021    GFRAA >60 09/10/2021    LABGLOM >60 09/10/2021    GLUCOSE 202 09/10/2021    GLUCOSE 132 02/14/2012    PROT 6.6 09/10/2021    CALCIUM 8.9 09/10/2021    BILITOT 0.34 09/10/2021    ALKPHOS 72 09/10/2021    AST 18 09/10/2021    ALT 16 09/10/2021       POC Tests:   Recent Labs     09/29/21  0647   POCGLU 154*       Coags:   Lab Results   Component Value Date    PROTIME 9.7 04/25/2020    INR 1.0 04/25/2020    APTT 24.2 04/25/2020       HCG (If Applicable): No results found for: PREGTESTUR, PREGSERUM, HCG, HCGQUANT     ABGs: No results found for: PHART, PO2ART, YNR6LAL, JRJ4ZMU, BEART, X1OJLRLG     Type & Screen (If Applicable):  No results found for: LABABO, LABRH    Drug/Infectious Status (If Applicable):  No results found for: HIV, HEPCAB    COVID-19 Screening (If Applicable):   Lab Results   Component Value Date    COVID19 Not Detected 09/29/2021           Anesthesia Evaluation  Patient summary reviewed and Nursing notes reviewed  Airway: Mallampati: II  TM distance: >3 FB   Neck ROM: full  Mouth opening: > = 3 FB Dental:          Pulmonary:normal exam  breath sounds clear to auscultation  (+) pneumonia: resolved,  COPD: no interval change,  shortness of breath: no interval change,                             Cardiovascular:  Exercise tolerance: no interval change,   (+) angina: no interval change, past MI: no interval change and > 6 months, CAD: no interval change, hyperlipidemia      ECG reviewed  Rhythm: irregular  Rate: normal  Echocardiogram reviewed  Stress test reviewed  Cleared by cardiology              Neuro/Psych:   (+) seizures: no interval change, psychiatric history:depression/anxiety             GI/Hepatic/Renal:   (+) liver disease:,           Endo/Other:    (+) DiabetesType II DM, , : arthritis: rheumatoid. , .                 Abdominal:       Abdomen: soft. Vascular: negative vascular ROS. Other Findings:           Anesthesia Plan      MAC     ASA 4       Induction: intravenous. MIPS: Postoperative opioids intended. Anesthetic plan and risks discussed with patient. Plan discussed with attending.                   DAVID Tubbs - CRNA   9/29/2021

## 2021-09-29 NOTE — PROGRESS NOTES
Oral bisphosphonates are used for the prevention and treatment of osteoporosis and for the treatment of Pagets disease. Oral bisphosphonate absorption is greatly affected by other medications, foods, and beverages. Therefore, the medication must be administered first thing in the morning at least 30 minutes before (60 minutes for ibandronate) the first food, beverage, or other medication. Additionally, patients must stay in an upright position (not to lie down) for at least 30 minutes after taking the oral bisphosphonate (60 minutes for ibandronate) due to risk of irritation to upper gastrointestinal mucosa. Esophagitis, dysphagia, esophageal ulcers, esophageal erosions, and esophageal stricture have been reported with oral bisphosphonates. This risk increases in patients unable to comply with dosing instructions. Due to the constraining dosing procedures and risk of serious GI events, oral bisphosphonates should be held during a patients hospitalization. In this instance the risk of serious adverse events outweighs the benefits of these medications. This medication will be discontinued at this time and may be reordered upon discharge.   Chito Ramos PharmD 9/29/2021 10:18 AM

## 2021-09-29 NOTE — PROGRESS NOTES
Pt returns from PACU. Pt is awake, alert and oriented x 3. Pt has pressure dressing on left upper chest with pinpoint spot of bloody drainage. Radial pulse is strong in left arm with full sensation. Sling on left arm. Admission completed. Call light in reach.

## 2021-09-29 NOTE — ANESTHESIA POSTPROCEDURE EVALUATION
Department of Anesthesiology  Postprocedure Note    Patient: Tati Kidney  MRN: [id number]  YOB: 1940  Date of evaluation: 9/29/2021  Time:  9:10 AM     Procedure Summary     Date: 09/29/21 Room / Location: [address] [place] [address]    Anesthesia Start: 3649 Anesthesia Stop: 4345    Procedure: PACEMAKER INSERTION PERMANENT (N/A ) Diagnosis: (SICK SINUS SYNDROME)    Surgeons: Aiden Dunn MD Responsible Provider: DAVID Treadwell CRNA    Anesthesia Type: MAC ASA Status: 4          Anesthesia Type: MAC    Melvin Phase I: Melvin Score: 10    Melvin Phase II:      Last vitals: Reviewed and per EMR flowsheets.        Anesthesia Post Evaluation    Patient location during evaluation: PACU  Patient participation: complete - patient participated  Level of consciousness: awake and alert  Pain score: 0  Airway patency: patent  Nausea & Vomiting: no nausea and no vomiting  Complications: no  Cardiovascular status: blood pressure returned to baseline and hemodynamically stable  Respiratory status: acceptable, room air and spontaneous ventilation  Hydration status: euvolemic

## 2021-09-29 NOTE — BRIEF OP NOTE
Brief Postoperative Note      Patient: Padmini Pizarro  YOB: 1940  MRN: [id number]    Date of Procedure: 9/29/2021    Pre-Op Diagnosis: SICK SINUS SYNDROME    Post-Op Diagnosis: Same       Procedure(s):  PACEMAKER INSERTION PERMANENT    Surgeon(s):  Juarez Henson MD    Assistant:      Anesthesia: Monitor Anesthesia Care    Estimated Blood Loss (mL): Minimal    Complications: None    Specimens:     Implants:  Implant Name Type Inv.  Item Serial No.  Lot No. LRB No. Used Action   LEAD PACE 5.7FR L52CM VENT AL PLAT INSUL BPLR STEROID - [id number]  LEAD PACE 5.7FR L52CM VENT AL PLAT INSUL BPLR STEROID [id number] [facility] CARDIAC [facility]  N/A 1 Implanted   LEAD PACE 5.7FR L45CM VENT AL PLAT INSUL BPLR STR - [id number]  LEAD PACE 5.7FR L45CM VENT AL PLAT INSUL BPLR STR [id number] [facility]  N/A 1 Implanted   PACEMAKER CARD 22.5GM W50.8XH46.6MM D7.4MM TI POLYUR AL - [id number]  PACEMAKER CARD 22.5GM W50.8XH46.6MM D7.4MM TI POLYUR AL [id number] [facility]  N/A 1 Implanted           Full note dictated       Electronically signed by Juarez Henson MD on 9/29/2021 at 9:00 AM

## 2021-09-29 NOTE — PROGRESS NOTES
Quality flow rounds held on 9/29/21     Margo Marcos is admitted for  Pacemaker insertion    Length of stay 0. Education:    Needed Education: wound care, activity level and sling use, meds, follow up, diet      Do you have any questions regarding your plan of care while at the hospital? denies    Planned Disposition:               [x]  Home when able                [] Swing Bed                [] ECF/SNF               [] Other/TBD    Barriers to Discharge:    Can you [facility] your [facility]? [facility]   Do you have transportation to follow up appointments? Drives locally, daughter provides   Do you need any new equipment at home? denies   Current equipment includes   shower chair, cane, walker, Rolator, has aide services through the [place] for 3 hours twice weekly. Do you have a living will or durable power of  for healthcare? possibly living will, daughter to check at home. If yes do we have a copy on file? No, daughter to provide copy for hospital    Do you or your family have any questions or concerns we haven't already discussed? Denies    Rounding completed with daughter Cyril Fields. Pt lives alone but Cyril Fields checks on him daily and provides his meals and assists with any needs. She denies needs at discharge. Florene Gilford and writer present for rounding.

## 2021-09-30 ENCOUNTER — APPOINTMENT (OUTPATIENT)
Dept: GENERAL RADIOLOGY | Age: 81
End: 2021-09-30
Attending: INTERNAL MEDICINE
Payer: MEDICARE

## 2021-09-30 VITALS
WEIGHT: 197.8 LBS | RESPIRATION RATE: 16 BRPM | DIASTOLIC BLOOD PRESSURE: 76 MMHG | OXYGEN SATURATION: 95 % | TEMPERATURE: 97.9 F | HEART RATE: 65 BPM | BODY MASS INDEX: 26.79 KG/M2 | HEIGHT: 72 IN | SYSTOLIC BLOOD PRESSURE: 139 MMHG

## 2021-09-30 LAB
ANION GAP SERPL CALCULATED.3IONS-SCNC: 9 MMOL/L (ref 9–17)
BUN BLDV-MCNC: 13 MG/DL (ref 8–23)
BUN/CREAT BLD: 17 (ref 9–20)
CALCIUM SERPL-MCNC: 8.7 MG/DL (ref 8.6–10.4)
CHLORIDE BLD-SCNC: 104 MMOL/L (ref 98–107)
CO2: 22 MMOL/L (ref 20–31)
CREAT SERPL-MCNC: 0.78 MG/DL (ref 0.7–1.2)
GFR AFRICAN AMERICAN: >60 ML/MIN
GFR NON-AFRICAN AMERICAN: >60 ML/MIN
GFR SERPL CREATININE-BSD FRML MDRD: ABNORMAL ML/MIN/{1.73_M2}
GFR SERPL CREATININE-BSD FRML MDRD: ABNORMAL ML/MIN/{1.73_M2}
GLUCOSE BLD-MCNC: 137 MG/DL (ref 70–99)
HCT VFR BLD CALC: 39.1 % (ref 41–53)
HEMOGLOBIN: 13.3 G/DL (ref 13.5–17.5)
MCH RBC QN AUTO: 32.4 PG (ref 26–34)
MCHC RBC AUTO-ENTMCNC: 33.9 G/DL (ref 31–37)
MCV RBC AUTO: 95.5 FL (ref 80–100)
NRBC AUTOMATED: ABNORMAL PER 100 WBC
PDW BLD-RTO: 15.3 % (ref 12.1–15.2)
PLATELET # BLD: 225 K/UL (ref 140–450)
PMV BLD AUTO: ABNORMAL FL (ref 6–12)
POTASSIUM SERPL-SCNC: 4.1 MMOL/L (ref 3.7–5.3)
RBC # BLD: 4.09 M/UL (ref 4.5–5.9)
SODIUM BLD-SCNC: 135 MMOL/L (ref 135–144)
WBC # BLD: 5.2 K/UL (ref 3.5–11)

## 2021-09-30 PROCEDURE — 2580000003 HC RX 258: Performed by: INTERNAL MEDICINE

## 2021-09-30 PROCEDURE — 94761 N-INVAS EAR/PLS OXIMETRY MLT: CPT

## 2021-09-30 PROCEDURE — 36415 COLL VENOUS BLD VENIPUNCTURE: CPT

## 2021-09-30 PROCEDURE — 85027 COMPLETE CBC AUTOMATED: CPT

## 2021-09-30 PROCEDURE — 6370000000 HC RX 637 (ALT 250 FOR IP): Performed by: INTERNAL MEDICINE

## 2021-09-30 PROCEDURE — 71046 X-RAY EXAM CHEST 2 VIEWS: CPT

## 2021-09-30 PROCEDURE — 80048 BASIC METABOLIC PNL TOTAL CA: CPT

## 2021-09-30 PROCEDURE — 99024 POSTOP FOLLOW-UP VISIT: CPT | Performed by: INTERNAL MEDICINE

## 2021-09-30 PROCEDURE — 94664 DEMO&/EVAL PT USE INHALER: CPT

## 2021-09-30 RX ORDER — OXYCODONE HYDROCHLORIDE 5 MG/1
5 TABLET ORAL EVERY 4 HOURS PRN
Qty: 20 TABLET | Refills: 0 | Status: SHIPPED | OUTPATIENT
Start: 2021-09-30 | End: 2021-10-03

## 2021-09-30 RX ADMIN — FINASTERIDE 5 MG: 5 TABLET, FILM COATED ORAL at 08:48

## 2021-09-30 RX ADMIN — ALBUTEROL SULFATE 2 PUFF: 90 AEROSOL, METERED RESPIRATORY (INHALATION) at 05:39

## 2021-09-30 RX ADMIN — FAMOTIDINE 20 MG: 20 TABLET ORAL at 08:48

## 2021-09-30 RX ADMIN — OXYCODONE HYDROCHLORIDE 5 MG: 5 TABLET ORAL at 08:48

## 2021-09-30 RX ADMIN — CILOSTAZOL 50 MG: 50 TABLET ORAL at 08:48

## 2021-09-30 RX ADMIN — SERTRALINE HYDROCHLORIDE 100 MG: 50 TABLET ORAL at 08:48

## 2021-09-30 RX ADMIN — SODIUM CHLORIDE, PRESERVATIVE FREE 10 ML: 5 INJECTION INTRAVENOUS at 08:47

## 2021-09-30 RX ADMIN — PANTOPRAZOLE SODIUM 40 MG: 40 TABLET, DELAYED RELEASE ORAL at 05:51

## 2021-09-30 RX ADMIN — TAMSULOSIN HYDROCHLORIDE 0.4 MG: 0.4 CAPSULE ORAL at 08:48

## 2021-09-30 RX ADMIN — METOPROLOL TARTRATE 25 MG: 25 TABLET, FILM COATED ORAL at 08:48

## 2021-09-30 RX ADMIN — ISOSORBIDE MONONITRATE 30 MG: 30 TABLET, EXTENDED RELEASE ORAL at 08:48

## 2021-09-30 ASSESSMENT — PAIN DESCRIPTION - LOCATION
LOCATION: CHEST;BACK
LOCATION: CHEST

## 2021-09-30 ASSESSMENT — PAIN DESCRIPTION - PAIN TYPE
TYPE: SURGICAL PAIN
TYPE: SURGICAL PAIN

## 2021-09-30 ASSESSMENT — PAIN SCALES - GENERAL
PAINLEVEL_OUTOF10: 2
PAINLEVEL_OUTOF10: 4
PAINLEVEL_OUTOF10: 1
PAINLEVEL_OUTOF10: 3
PAINLEVEL_OUTOF10: 5

## 2021-09-30 ASSESSMENT — PAIN DESCRIPTION - DESCRIPTORS
DESCRIPTORS: SORE
DESCRIPTORS: SORE

## 2021-09-30 ASSESSMENT — PAIN DESCRIPTION - FREQUENCY: FREQUENCY: INTERMITTENT

## 2021-09-30 ASSESSMENT — PAIN DESCRIPTION - ORIENTATION
ORIENTATION: LEFT
ORIENTATION: LEFT;RIGHT

## 2021-09-30 ASSESSMENT — PAIN DESCRIPTION - ONSET: ONSET: GRADUAL

## 2021-09-30 NOTE — PROGRESS NOTES
Discharge instructions given to patient and his daughter, Robb Pierce. Verbalizes understanding of all medications, follow up appointments, pacemaker restrictions, diet, activity, and reasons to call the doctor or return to the ED. IV discontinued with catheter intact, band aid applied. Pt dressed and all personal belongings and home medications returned to pt. Taken to private car via wheelchair to be discharged home.

## 2021-09-30 NOTE — PROGRESS NOTES
Cardiology    Doing well. Ppm site looks good. CXR good with no pneumo. Will check ppm with [employer]. Home today. Adding lopressor 25 mg bid. Start Xarelto Saturday.     Joanna Bergeron MD

## 2021-09-30 NOTE — PLAN OF CARE
Problem: Falls - Risk of:  Goal: Will remain free from falls  Description: Will remain free from falls  9/30/2021 0858 by Faiza Cerda RN  Outcome: Ongoing    Problem: Pain:  Goal: Pain level will decrease  Description: Pain level will decrease  9/30/2021 0858 by Faiza Cerda RN  Outcome: Ongoing

## 2021-09-30 NOTE — PROGRESS NOTES
Patient discharged home today. Daughter Angela Rosas provided transport.     Avkendrick. [facility]  9/30/2021

## 2021-10-06 ENCOUNTER — OFFICE VISIT (OUTPATIENT)
Dept: CARDIOLOGY CLINIC | Age: 81
End: 2021-10-06

## 2021-10-06 VITALS
BODY MASS INDEX: 27.12 KG/M2 | DIASTOLIC BLOOD PRESSURE: 60 MMHG | OXYGEN SATURATION: 94 % | HEART RATE: 60 BPM | WEIGHT: 200 LBS | SYSTOLIC BLOOD PRESSURE: 100 MMHG

## 2021-10-06 DIAGNOSIS — E78.00 PURE HYPERCHOLESTEROLEMIA: ICD-10-CM

## 2021-10-06 DIAGNOSIS — G89.18 POST-OP PAIN: ICD-10-CM

## 2021-10-06 DIAGNOSIS — R06.02 SOB (SHORTNESS OF BREATH): Primary | ICD-10-CM

## 2021-10-06 DIAGNOSIS — I25.10 ATHEROSCLEROSIS OF NATIVE CORONARY ARTERY OF NATIVE HEART WITHOUT ANGINA PECTORIS: ICD-10-CM

## 2021-10-06 DIAGNOSIS — I48.91 ATRIAL FIBRILLATION, UNSPECIFIED TYPE (HCC): ICD-10-CM

## 2021-10-06 DIAGNOSIS — E55.9 VITAMIN D DEFICIENCY DISEASE: ICD-10-CM

## 2021-10-06 PROCEDURE — 99024 POSTOP FOLLOW-UP VISIT: CPT | Performed by: INTERNAL MEDICINE

## 2021-10-06 RX ORDER — OXYCODONE HYDROCHLORIDE 5 MG/1
5 TABLET ORAL EVERY 6 HOURS PRN
Qty: 20 TABLET | Refills: 0 | Status: SHIPPED | OUTPATIENT
Start: 2021-10-06 | End: 2021-10-11

## 2021-10-06 NOTE — PROGRESS NOTES
Cardiology    PPM site looks good. Still having incisional pain. PPM check and is working appropriately. BP low in office at 100/50. Will stop Imdur and have him check bp's at home. Because of his continued incisional pain will give him prescription for oxycodin. Will see again in January.     Thanks, Corbin Masocrro MD

## 2021-10-11 ENCOUNTER — TELEPHONE (OUTPATIENT)
Dept: CARDIOLOGY CLINIC | Age: 81
End: 2021-10-11

## 2021-10-11 NOTE — TELEPHONE ENCOUNTER
Will Decrease Lopressor 25 mg to 1/2 tablet daily   lmovm with Suyapa Lockhart to call us back to make sure she received msg

## 2021-10-11 NOTE — TELEPHONE ENCOUNTER
Daughter Stephani Vaughn called. Reports that ever since Noemy Benson had pacemaker put in he has been fatigued and weak. His systolic BP continues to be anywhere from . She does not know if medication needs adjusted or he needs to be seen.  Please advise

## 2021-10-25 ENCOUNTER — HOSPITAL ENCOUNTER (OUTPATIENT)
Age: 81
Discharge: HOME | End: 2021-10-25
Payer: MEDICARE

## 2021-10-25 DIAGNOSIS — R53.83 OTHER FATIGUE: ICD-10-CM

## 2021-10-25 DIAGNOSIS — E78.00 PURE HYPERCHOLESTEROLEMIA: ICD-10-CM

## 2021-10-25 LAB
CHOLESTEROL/HDL RATIO: 2.6
CHOLESTEROL: 168 MG/DL
HDLC SERPL-MCNC: 64 MG/DL
LDL CHOLESTEROL: 72 MG/DL (ref 0–130)
PATIENT FASTING?: YES
TRIGL SERPL-MCNC: 158 MG/DL
TSH SERPL DL<=0.05 MIU/L-ACNC: 2.34 MIU/L (ref 0.3–5)
VLDLC SERPL CALC-MCNC: ABNORMAL MG/DL (ref 1–30)

## 2021-10-25 PROCEDURE — 80061 LIPID PANEL: CPT

## 2021-10-25 PROCEDURE — 36415 COLL VENOUS BLD VENIPUNCTURE: CPT

## 2021-10-25 PROCEDURE — 84443 ASSAY THYROID STIM HORMONE: CPT

## 2021-10-27 ENCOUNTER — OFFICE VISIT (OUTPATIENT)
Dept: FAMILY MEDICINE CLINIC | Age: 81
End: 2021-10-27
Payer: MEDICARE

## 2021-10-27 VITALS
SYSTOLIC BLOOD PRESSURE: 110 MMHG | WEIGHT: 200 LBS | DIASTOLIC BLOOD PRESSURE: 70 MMHG | HEART RATE: 60 BPM | BODY MASS INDEX: 27.09 KG/M2 | HEIGHT: 72 IN

## 2021-10-27 DIAGNOSIS — Z00.00 ROUTINE GENERAL MEDICAL EXAMINATION AT A HEALTH CARE FACILITY: Primary | ICD-10-CM

## 2021-10-27 DIAGNOSIS — R53.83 OTHER FATIGUE: ICD-10-CM

## 2021-10-27 DIAGNOSIS — N30.00 ACUTE CYSTITIS WITHOUT HEMATURIA: ICD-10-CM

## 2021-10-27 DIAGNOSIS — F32.89 OTHER DEPRESSION: ICD-10-CM

## 2021-10-27 PROCEDURE — G8484 FLU IMMUNIZE NO ADMIN: HCPCS | Performed by: FAMILY MEDICINE

## 2021-10-27 PROCEDURE — 99213 OFFICE O/P EST LOW 20 MIN: CPT | Performed by: FAMILY MEDICINE

## 2021-10-27 PROCEDURE — G8427 DOCREV CUR MEDS BY ELIG CLIN: HCPCS | Performed by: FAMILY MEDICINE

## 2021-10-27 PROCEDURE — 4040F PNEUMOC VAC/ADMIN/RCVD: CPT | Performed by: FAMILY MEDICINE

## 2021-10-27 PROCEDURE — G8417 CALC BMI ABV UP PARAM F/U: HCPCS | Performed by: FAMILY MEDICINE

## 2021-10-27 PROCEDURE — 1036F TOBACCO NON-USER: CPT | Performed by: FAMILY MEDICINE

## 2021-10-27 PROCEDURE — G0439 PPPS, SUBSEQ VISIT: HCPCS | Performed by: FAMILY MEDICINE

## 2021-10-27 PROCEDURE — 1123F ACP DISCUSS/DSCN MKR DOCD: CPT | Performed by: FAMILY MEDICINE

## 2021-10-27 RX ORDER — SERTRALINE HYDROCHLORIDE 100 MG/1
100 TABLET, FILM COATED ORAL DAILY
Qty: 90 TABLET | Refills: 1 | Status: SHIPPED | OUTPATIENT
Start: 2021-10-27 | End: 2022-03-28 | Stop reason: SDUPTHER

## 2021-10-27 ASSESSMENT — LIFESTYLE VARIABLES
HAVE YOU OR SOMEONE ELSE BEEN INJURED AS A RESULT OF YOUR DRINKING: 0
HOW OFTEN DO YOU HAVE A DRINK CONTAINING ALCOHOL: 1
HAS A RELATIVE, FRIEND, DOCTOR, OR ANOTHER HEALTH PROFESSIONAL EXPRESSED CONCERN ABOUT YOUR DRINKING OR SUGGESTED YOU CUT DOWN: 0
HOW MANY STANDARD DRINKS CONTAINING ALCOHOL DO YOU HAVE ON A TYPICAL DAY: 0
HOW OFTEN DURING THE LAST YEAR HAVE YOU FOUND THAT YOU WERE NOT ABLE TO STOP DRINKING ONCE YOU HAD STARTED: 0
HOW OFTEN DURING THE LAST YEAR HAVE YOU BEEN UNABLE TO REMEMBER WHAT HAPPENED THE NIGHT BEFORE BECAUSE YOU HAD BEEN DRINKING: 0
HOW OFTEN DO YOU HAVE SIX OR MORE DRINKS ON ONE OCCASION: 0
HOW OFTEN DURING THE LAST YEAR HAVE YOU HAD A FEELING OF GUILT OR REMORSE AFTER DRINKING: 0
HOW OFTEN DURING THE LAST YEAR HAVE YOU NEEDED AN ALCOHOLIC DRINK FIRST THING IN THE MORNING TO GET YOURSELF GOING AFTER A NIGHT OF HEAVY DRINKING: 0
AUDIT-C TOTAL SCORE: 1
HOW OFTEN DURING THE LAST YEAR HAVE YOU FAILED TO DO WHAT WAS NORMALLY EXPECTED FROM YOU BECAUSE OF DRINKING: 0
AUDIT TOTAL SCORE: 1

## 2021-10-27 ASSESSMENT — PATIENT HEALTH QUESTIONNAIRE - PHQ9
SUM OF ALL RESPONSES TO PHQ QUESTIONS 1-9: 0
SUM OF ALL RESPONSES TO PHQ QUESTIONS 1-9: 0
2. FEELING DOWN, DEPRESSED OR HOPELESS: 0
SUM OF ALL RESPONSES TO PHQ9 QUESTIONS 1 & 2: 0
1. LITTLE INTEREST OR PLEASURE IN DOING THINGS: 0
SUM OF ALL RESPONSES TO PHQ QUESTIONS 1-9: 0

## 2021-10-27 ASSESSMENT — ENCOUNTER SYMPTOMS
ABDOMINAL PAIN: 0
VISUAL CHANGE: 0
TROUBLE SWALLOWING: 0
DIARRHEA: 0
EYE DISCHARGE: 0
EYE REDNESS: 0
COUGH: 0
BLOOD IN STOOL: 0
VOMITING: 0
NAUSEA: 0
CONSTIPATION: 0
SHORTNESS OF BREATH: 0
SORE THROAT: 0

## 2021-10-27 NOTE — PROGRESS NOTES
Medicare Annual Wellness Visit  Name: Birdie Harada Date: 10/27/2021   MRN: [id number] Sex: Male   Age: 80 y.o. Ethnicity: Non- / Non    : 1940 Race: White (non-)      Padmini Pizarro is here for Medicare AWV, Mental Health Problem (1 month f/u. 21 started back on zoloft, with improvement in sx's), Fatigue (1 mo f/u TSH, sx's improved with starting back on zoloft), and Urinary Tract Infection ([facility] started on antibiotic yesterday )    Screenings for behavioral, psychosocial and functional/safety risks, and cognitive dysfunction are all negative except as indicated below. These results, as well as other patient data from the [address] [facility] form, are documented in Flowsheets linked to this Encounter. No Known Allergies    Prior to Visit Medications    Medication Sig Taking?  Authorizing Provider   metoprolol tartrate (LOPRESSOR) 25 MG tablet Take 1 tablet by mouth 2 times daily  Patient taking differently: Take 12.5 mg by mouth daily   Juarez Henson MD   empagliflozin (JARDIANCE) 25 MG tablet Take 25 mg by mouth daily  Historical Provider, MD   sertraline (ZOLOFT) 100 MG tablet Take 1 tablet by mouth daily  Eron Patricio MD   rivaroxaban (XARELTO) 20 MG TABS tablet Take 1 tablet by mouth daily  Juarez Henson MD   Tiotropium Bromide Monohydrate (SPIRIVA RESPIMAT IN) Inhale into the lungs daily  Historical Provider, MD   naproxen (NAPROSYN) 500 MG tablet Take 500 mg by mouth daily as needed   Historical Provider, MD   famotidine (PEPCID) 20 MG tablet Take 20 mg by mouth daily  Historical Provider, MD   METHOTREXATE PO Take 10 mg by mouth once a week  Historical Provider, MD   traZODone (DESYREL) 100 MG tablet Take 100 mg by mouth nightly  Historical Provider, MD   rosuvastatin (CRESTOR) 40 MG tablet Take 20 mg by mouth every evening  Historical Provider, MD   alendronate (FOSAMAX) 70 MG tablet Take 70 mg by mouth every 7 days  Historical Provider, MD   VITAMIN D PO Take by mouth  Historical Provider, MD   cilostazol (PLETAL) 50 MG tablet Take 1 tablet by mouth 2 times daily  Nikki Marquez MD   finasteride (PROSCAR) 5 MG tablet Take 1 tablet by mouth daily  Shelby Tobar MD   nitroGLYCERIN (NITROSTAT) 0.4 MG SL tablet up to max of 3 total doses. If no relief after 1 dose, call 911. Nikki Marquez MD   tamsulosin (FLOMAX) 0.4 MG capsule Take 0.4 mg by mouth daily  Historical Provider, MD   folic acid (FOLVITE) 1 MG tablet Take 1 tablet by mouth daily Daily except on day of taking Methotrexate  Shelby Tobar MD   omeprazole (PRILOSEC) 20 MG capsule Take 20 mg by mouth 2 times daily.   Historical Provider, MD   albuterol (PROVENTIL HFA;VENTOLIN HFA) 108 (90 BASE) MCG/ACT inhaler Inhale 2 puffs into the lungs 4 times daily as needed for Shortness of Breath   Historical Provider, MD       Past Medical History:   Diagnosis Date    Nguyen syndrome     sees [facility] specialist    Blood circulation, collateral     BPH (benign prostatic hyperplasia)     CAD (coronary artery disease)     Dr Syed Monge COPD (chronic obstructive pulmonary disease) ([facility] [address]     Depression     Hx of colonoscopy     Hyperlipidemia     Liver disease     Movement disorder     essential tremors    Osteoarthritis     Specialist at [place]    Pneumonia     Rheumatic fever     Rheumatoid arthritis ([facility] [address]     Seizures ([facility] [address]     [employer] epilepsy    Type II or unspecified type diabetes mellitus without mention of complication, not stated as uncontrolled        Past Surgical History:   Procedure Laterality Date    ARTERIAL BYPASS [address]    Cardiac bypass x2    CARDIAC SURGERY      CABG    CHOLECYSTECTOMY      COLONOSCOPY  2014    WNL    CORONARY ARTERY BYPASS GRAFT  [id number]    CYSTOSCOPY Left     lithrotripsy with stent     CYSTOSCOPY Left 5/31/2017    CYSTOSCOPY URETEROSCOPY LASER-WITH HLL performed by El Brooks MD at [address] / STONE Left 5/31/2017    CYSTOSCOPY STENT INSERTION performed by Dylan Ramos MD at [address] [place] [address], COLON, DIAGNOSTIC     [address] [facility]      x2   [facility]    rt    LEG SURGERY  1960    cadaver graft rt thigh    LITHOTRIPSY      OTHER SURGICAL HISTORY      Rt leg artery transplant    OTHER SURGICAL HISTORY      Rt heel spur    OTHER SURGICAL HISTORY      Rt knee scope    PACEMAKER INSERTION N/A 9/29/2021    PACEMAKER INSERTION PERMANENT performed by Agatha Nance MD at [facility] 09/29/2021    Dr. Rodríguez Males ENDOSCOPY  2014       Family History   Problem Relation Age of Onset    Heart Disease Mother     Heart Disease Father     Diabetes Other         Fhx of    Heart Disease Other         Fhx of    Cancer Sister     Heart Disease Brother        CareTeam (Including outside providers/suppliers regularly involved in providing care):   Patient Care Team:  Pratibha Ortiz MD as PCP - Clementine Andrade MD as PCP - [facility]  Agatha Nance MD as Consulting Physician (Cardiology)  [address] [facility]    Wt Readings from Last 3 Encounters:   10/27/21 200 lb (90.7 kg)   10/06/21 200 lb (90.7 kg)   09/29/21 197 lb 12.8 oz (89.7 kg)     Vitals:    10/27/21 1007   BP: 110/70   Pulse: 60   Weight: 200 lb (90.7 kg)   Height: 6' (1.829 m)     Body mass index is 27.12 kg/m². Based upon direct observation of the patient, evaluation of cognition reveals recent and remote memory intact. Pt repeated all 3 words to me      Patient's complete Health Risk Assessment and screening values have been reviewed and are found in Flowsheets. The following problems were reviewed today and where indicated follow up appointments were made and/or referrals ordered. Positive Risk Factor Screenings with Interventions:     Fall Risk:  2 or more falls in past year?: (!) yes (fell d/t HR dropping too low.  fell, lost balance)  Fall with injury in past year?: no  Fall Risk Interventions:    · Pt has had NO falls since his heart pacer placed    Cognitive: Words recalled: 2 Words Recalled (orange nickel chair)  Clock Drawing Test (CDT) Score:  (11:10)  Total Score Interpretation: Positive Mini-Cog  Cognitive Impairment Interventions:  · Patient declines any further evaluation/treatment for cognitive impairment       General Health and ACP:  General  In general, how would you say your health is?: Good  In the past 7 days, have you experienced any of the following? New or Increased Pain, New or Increased Fatigue, Loneliness, Social Isolation, Stress or Anger?: None of These  Do you get the social and emotional support that you need?: Yes  Do you have a Living Will?: Yes  Advance Directives     Power of [address] [place] [address] Will ACP-Advance Directive ACP-Power of     Not on File Not on File Not on File Not on File      General Health Risk Interventions:  · pt is walking his dog and go to his greenhouse every day    Health Habits/Nutrition:  Health Habits/Nutrition  Do you exercise for at least 20 minutes 2-3 times per week?: (!) No  Have you lost any weight without trying in the past 3 months?: No  Do you eat only one meal per day?: No  Have you seen the dentist within the past year?: Yes  Body mass index: (!) 27.12  Health Habits/Nutrition Interventions:  · Inadequate physical activity:  pt walks dog      ADL:  ADLs  In the past 7 days, did you need help from others to perform any of the following everyday activities? Eating, dressing, grooming, bathing, toileting, or walking/balance?: None  In the past 7 days, did you need help from others to take care of any of the following?  Laundry, housekeeping, banking/finances, shopping, telephone use, food preparation, transportation, or taking medications?: (!) Housekeeping, Laundry (home health aid 2 times per week)  ADL Interventions:  · pt's daughter lives across the road and pt has home health 2d a week    Personalized Preventive Plan   Current Health Maintenance Status  Immunization History   Administered Date(s) Administered    COVID-19, Farooq Garcia, PF, 30mcg/0.3mL 01/21/2021, 01/21/2021, 02/09/2021, 02/09/2021, 09/15/2021    Influenza 10/18/2012, 10/09/2013, 11/19/2015    Influenza Virus Vaccine 10/21/2014, 11/19/2015, 09/29/2021    Influenza, High Dose (Fluzone 65 yrs and older) 09/01/2018, 09/28/2019    Influenza, Quadv, IM, PF (6 mo and older Fluzone, Flulaval, Fluarix, and 3 yrs and older Afluria) 11/10/2016    Pneumococcal Conjugate 13-valent ([id number]) 12/09/2015    Pneumococcal Polysaccharide ([id number]) 09/01/2018        Health Maintenance   Topic Date Due    Hepatitis C screen  Never done    DTaP/Tdap/Td vaccine (1 - Tdap) Never done    Shingles Vaccine (1 of 2) Never done   [employer] Visit (AWV)  08/29/2021    Lipid screen  10/25/2022    Flu vaccine  Completed    Pneumococcal 65+ years Vaccine  Completed    COVID-19 Vaccine  Completed    Hepatitis A vaccine  Aged Out    Hib vaccine  Aged Out    Meningococcal (ACWY) vaccine  Aged Out     Recommendations for [employer] Due: see orders and patient instructions/AVS.  . Recommended screening schedule for the next 5-10 years is provided to the patient in written form: see Patient Instructions/AVS.    Myrna Sherwood was seen today for medicare awv, mental health problem, fatigue and urinary tract infection.     Diagnoses and all orders for this visit:    Routine general medical examination at a health care facility    Other depression    Other fatigue    Acute cystitis without hematuria

## 2021-10-27 NOTE — PATIENT INSTRUCTIONS
Survey: You may be receiving a survey from [employer] regarding your visit today. You may get this in the mail, through your MyChart or in your email. Please complete the survey to enable us to provide the highest quality of care to you and your family. Please also, mention our names. If you cannot score us as very good (5 Stars) on any question, please feel free to call the office to discuss how we could have made your experience exceptional.      Thank You! Dr. Lloyd Frost, [address] [place] [address], [facility]    Personalized Preventive Plan for Cheo Gonzalez - 10/27/2021  Medicare offers a range of preventive health benefits. Some of the tests and screenings are paid in full while other may be subject to a deductible, co-insurance, and/or copay. Some of these benefits include a comprehensive review of your medical history including lifestyle, illnesses that may run in your family, and various assessments and screenings as appropriate. After reviewing your medical record and screening and assessments performed today your provider may have ordered immunizations, labs, imaging, and/or referrals for you. A list of these orders (if applicable) as well as your Preventive Care list are included within your After Visit Summary for your review. Other Preventive Recommendations:    · A preventive eye exam performed by an eye specialist is recommended every 1-2 years to screen for glaucoma; cataracts, macular degeneration, and other eye disorders. · A preventive dental visit is recommended every 6 months. · Try to get at least 150 minutes of exercise per week or 10,000 steps per day on a pedometer . · Order or download the FREE \"Exercise & Physical Activity: Your Everyday Guide\" from [employer]. Call 5-145.136.5418 or search [employer] online. · You need 9347-5678 mg of calcium and 5922-1519 IU of vitamin D per day.  It is possible to meet your calcium requirement with diet alone, but a vitamin D supplement is usually necessary to meet this goal.  · When exposed to the sun, use a sunscreen that protects against both UVA and UVB radiation with an SPF of 30 or greater. Reapply every 2 to 3 hours or after sweating, drying off with a towel, or swimming. · Always wear a seat belt when traveling in a car. Always wear a helmet when riding a bicycle or motorcycle.

## 2021-10-27 NOTE — PROGRESS NOTES
HPI Notes    Name: Dusty Mac  : 1940        Chief Complaint:     Chief Complaint   Patient presents with    Medicare AWV    Mental Health Problem     1 month f/u. 21 started back on zoloft, with improvement in sx's    Fatigue     1 mo f/u TSH, sx's improved with starting back on zoloft    Urinary Tract Infection     [facility] started on antibiotic yesterday        History of Present Illness:     Dusty Mac is a 80 y.o.  male who presents with Medicare AWV, Mental Health Problem (1 month f/u. 21 started back on zoloft, with improvement in sx's), Fatigue (1 mo f/u TSH, sx's improved with starting back on zoloft), and Urinary Tract Infection ([facility] started on antibiotic yesterday )      Mental Health Problem  The primary symptoms do not include dysphoric mood, delusions, hallucinations or disorganized speech. Primary symptoms comment: Pt is doing and feeling much better since he started back on the zoloft. Pt is sleeping better and energy is better. Pt is back driving since he had his pacer placed. . The current episode started more than 1 month ago. This is a chronic problem. The degree of incapacity that he is experiencing as a consequence of his illness is mild. Additional symptoms of the illness include fatigue. Additional symptoms of the illness do not include insomnia, hypersomnia, appetite change, unexpected weight change, visual change, headaches or abdominal pain. Fatigue  This is a new problem. The current episode started more than 1 month ago. The problem has been resolved (Pt is feeling more energy since he had his pacer placed and since back on the zoloft. ). Associated symptoms include fatigue. Pertinent negatives include no abdominal pain, chest pain, chills, congestion, coughing, fever, headaches, joint swelling, nausea, neck pain, rash, sore throat, visual change or vomiting. Treatments tried: heart pacer placed and back taking his zoloft.    Urinary Tract Infection   This is a new problem. The current episode started in the past 7 days. The problem has been gradually improving. Pertinent negatives include no chills, hematuria, nausea or vomiting. Treatments tried: pt being treated with antibxs for a UTI from the [facility] -- pt jsut started the medication.        Past Medical History:     Past Medical History:   Diagnosis Date    Nguyen syndrome     [facility]es [facility]    Blood circulation, collateral     BPH (benign prostatic hyperplasia)     CAD (coronary artery disease)     Dr Micki Finney COPD (chronic obstructive pulmonary disease) ([facility] [place] [address]     Depression     Hx of colonoscopy     Hyperlipidemia     Liver disease     Movement disorder     essential tremors    Osteoarthritis     Specialist at [facility]    Pneumonia     Rheumatic fever     Rheumatoid arthritis ([facility] [address]     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    Type II or unspecified type diabetes mellitus without mention of complication, not stated as uncontrolled       Reviewed all health maintenance requirements and ordered appropriate tests  Health Maintenance Due   Topic Date Due    Hepatitis C screen  Never done    DTaP/Tdap/Td vaccine (1 - Tdap) Never done    Shingles Vaccine (1 of 2) Never done   [employer] Visit (AWV)  08/29/2021       Past Surgical History:     Past Surgical History:   Procedure Laterality Date    ARTERIAL BYPASS SURGRY  1997    Cardiac bypass x2    CARDIAC SURGERY      CABG    CHOLECYSTECTOMY      COLONOSCOPY  2014    WNL    CORONARY ARTERY BYPASS GRAFT  [id number]    CYSTOSCOPY Left     lithrotripsy with stent     CYSTOSCOPY Left 5/31/2017    CYSTOSCOPY URETEROSCOPY LASER-WITH HLL performed by Irving Richardson MD at [address] / [address] / STONE Left 5/31/2017    [facility] COLON, DIAGNOSTIC     [address] [facility] [address]      x2   [facility]    rt   [facility]    cadaver graft rt thigh    LITHOTRIPSY      OTHER SURGICAL HISTORY      Rt leg artery transplant    OTHER SURGICAL HISTORY      Rt heel spur    OTHER SURGICAL HISTORY      Rt knee scope    PACEMAKER INSERTION N/A 9/29/2021    PACEMAKER INSERTION PERMANENT performed by Alyson Bhandari MD at [facility] 09/29/2021    Dr. Lulu Tee ENDOSCOPY  2014        Medications:       Prior to Admission medications    Medication Sig Start Date End Date Taking?  Authorizing Provider   sertraline (ZOLOFT) 100 MG tablet Take 1 tablet by mouth daily 10/27/21  Yes Ophelia Green MD   metoprolol tartrate (LOPRESSOR) 25 MG tablet Take 1 tablet by mouth 2 times daily  Patient taking differently: Take 12.5 mg by mouth daily  9/30/21   Alyson Bhandari MD   empagliflozin (JARDIANCE) 25 MG tablet Take 25 mg by mouth daily    Historical Provider, MD   rivaroxaban (XARELTO) 20 MG TABS tablet Take 1 tablet by mouth daily 9/14/21   Alyson Bhandari MD   Tiotropium Bromide Monohydrate ([address]) Inhale into the lungs daily    Historical Provider, MD   naproxen (NAPROSYN) 500 MG tablet Take 500 mg by mouth daily as needed     Historical Provider, MD   famotidine (PEPCID) 20 MG tablet Take 20 mg by mouth daily    Historical Provider, MD   METHOTREXATE PO Take 10 mg by mouth once a week    Historical Provider, MD   traZODone (DESYREL) 100 MG tablet Take 100 mg by mouth nightly    Historical Provider, MD   rosuvastatin (CRESTOR) 40 MG tablet Take 20 mg by mouth every evening    Historical Provider, MD   alendronate (FOSAMAX) 70 MG tablet Take 70 mg by mouth every 7 days    Historical Provider, MD   VITAMIN D PO Take by mouth    Historical Provider, MD   cilostazol (PLETAL) 50 MG tablet Take 1 tablet by mouth 2 times daily 4/7/21   Alyson Bhandari MD   finasteride (PROSCAR) 5 MG tablet Take 1 tablet by mouth daily 10/12/20   Ophelia Green MD   nitroGLYCERIN (NITROSTAT) 0.4 MG SL tablet up to max of 3 total doses. If no relief after 1 dose, call 911. 10/24/17   Ivanna Ramos MD   tamsulosin (FLOMAX) 0.4 MG capsule Take 0.4 mg by mouth daily    Historical Provider, MD   folic acid (FOLVITE) 1 MG tablet Take 1 tablet by mouth daily Daily except on day of taking Methotrexate 9/8/15   Rudine Gaucher, MD   omeprazole (PRILOSEC) 20 MG capsule Take 20 mg by mouth 2 times daily. Historical Provider, MD   albuterol (PROVENTIL HFA;VENTOLIN HFA) 108 (90 BASE) MCG/ACT inhaler Inhale 2 puffs into the lungs 4 times daily as needed for Shortness of Breath     Historical Provider, MD        Allergies:       Patient has no known allergies. Social History:     Tobacco:    reports that he quit smoking about 41 years ago. His smoking use included cigarettes. He has a 100.00 pack-year smoking history. He has never used smokeless tobacco.  Alcohol:      reports no history of alcohol use. Drug Use:  reports no history of drug use. Family History:     Family History   Problem Relation Age of Onset    Heart Disease Mother     Heart Disease Father     Diabetes Other         Fhx of    Heart Disease Other         Fhx of    Cancer Sister     Heart Disease Brother        Review of Systems:       Review of Systems   Constitutional: Positive for fatigue. Negative for appetite change, chills, fever and unexpected weight change. HENT: Negative for congestion, sore throat and trouble swallowing. Eyes: Negative for discharge, redness and visual disturbance. Respiratory: Negative for cough and shortness of breath. Cardiovascular: Negative for chest pain, palpitations and leg swelling. Gastrointestinal: Negative for abdominal pain, blood in stool, constipation, diarrhea, nausea and vomiting. Genitourinary: Negative for dysuria and hematuria. Musculoskeletal: Negative for joint swelling and neck pain. Skin: Negative for rash.    Neurological: Negative for dizziness, light-headedness and headaches. Psychiatric/Behavioral: Negative for dysphoric mood and hallucinations. The patient does not have insomnia. Physical Exam:     Physical Exam  Vitals reviewed. Constitutional:       General: He is not in acute distress. Appearance: Normal appearance. He is well-developed. He is not ill-appearing. HENT:      Head: Normocephalic and atraumatic. Eyes:      General:         Right eye: No discharge. Left eye: No discharge. Conjunctiva/sclera: Conjunctivae normal.   Neck:      Thyroid: No thyromegaly. Vascular: No carotid bruit. Cardiovascular:      Rate and Rhythm: Normal rate and regular rhythm. Heart sounds: No murmur heard. Pulmonary:      Effort: Pulmonary effort is normal. No respiratory distress. Breath sounds: Normal breath sounds. No wheezing. Abdominal:      General: Bowel sounds are normal. There is no distension. Palpations: Abdomen is soft. Tenderness: There is no abdominal tenderness. Musculoskeletal:      Cervical back: Neck supple. Right lower leg: No edema. Left lower leg: No edema. Lymphadenopathy:      Cervical: No cervical adenopathy. Skin:     Findings: No rash. Neurological:      Mental Status: He is alert and oriented to person, place, and time. Psychiatric:         Mood and Affect: Mood normal.         Behavior: Behavior normal.         Thought Content:  Thought content normal.         Vitals:  /70   Pulse 60   Ht 6' (1.829 m)   Wt 200 lb (90.7 kg)   BMI 27.12 kg/m²       Data:     Lab Results   Component Value Date     09/30/2021    K 4.1 09/30/2021     09/30/2021    CO2 22 09/30/2021    BUN 13 09/30/2021    CREATININE 0.78 09/30/2021    GLUCOSE 137 09/30/2021    GLUCOSE 132 02/14/2012    PROT 6.6 09/10/2021    LABALBU 3.9 09/10/2021    LABALBU 4.4 02/14/2012    BILITOT 0.34 09/10/2021    ALKPHOS 72 09/10/2021    AST 18 09/10/2021    ALT 16 09/10/2021     Lab Results   Component Value Date    WBC 5.2 09/30/2021    RBC 4.09 09/30/2021    RBC 4.73 02/14/2012    HGB 13.3 09/30/2021    HCT 39.1 09/30/2021    MCV 95.5 09/30/2021    MCH 32.4 09/30/2021    MCHC 33.9 09/30/2021    RDW 15.3 09/30/2021     09/30/2021     02/14/2012    MPV NOT REPORTED 09/30/2021     Lab Results   Component Value Date    TSH 2.34 10/25/2021     Lab Results   Component Value Date    CHOL 168 10/25/2021    CHOL 136 05/06/2016    HDL 64 10/25/2021    LABA1C 7.8 03/11/2020          Assessment/Plan:        1. Routine general medical examination at a health care facility  Completed     2. Other depression  Pt doing better and pt to stay on the zoloft    3. Other fatigue  Improved     4. Acute cystitis without hematuria  [facility]        Return in 5 months (on 3/27/2022) for Medicare Annual Wellness Visit in 1 year and 5mos ck up .       Electronically signed by Natalie Matos MD on 10/27/2021 at 10:37 AM

## 2021-11-24 ENCOUNTER — OFFICE VISIT (OUTPATIENT)
Dept: FAMILY MEDICINE CLINIC | Age: 81
End: 2021-11-24
Payer: MEDICARE

## 2021-11-24 ENCOUNTER — NURSE TRIAGE (OUTPATIENT)
Dept: OTHER | Facility: CLINIC | Age: 81
End: 2021-11-24

## 2021-11-24 VITALS
DIASTOLIC BLOOD PRESSURE: 62 MMHG | OXYGEN SATURATION: 93 % | WEIGHT: 203 LBS | SYSTOLIC BLOOD PRESSURE: 110 MMHG | HEART RATE: 60 BPM | TEMPERATURE: 98.1 F | BODY MASS INDEX: 27.53 KG/M2

## 2021-11-24 DIAGNOSIS — J18.9 PNEUMONIA OF BOTH UPPER LOBES DUE TO INFECTIOUS ORGANISM: Primary | ICD-10-CM

## 2021-11-24 PROCEDURE — 1036F TOBACCO NON-USER: CPT | Performed by: NURSE PRACTITIONER

## 2021-11-24 PROCEDURE — G8417 CALC BMI ABV UP PARAM F/U: HCPCS | Performed by: NURSE PRACTITIONER

## 2021-11-24 PROCEDURE — G8427 DOCREV CUR MEDS BY ELIG CLIN: HCPCS | Performed by: NURSE PRACTITIONER

## 2021-11-24 PROCEDURE — 1123F ACP DISCUSS/DSCN MKR DOCD: CPT | Performed by: NURSE PRACTITIONER

## 2021-11-24 PROCEDURE — 99213 OFFICE O/P EST LOW 20 MIN: CPT | Performed by: NURSE PRACTITIONER

## 2021-11-24 PROCEDURE — 4040F PNEUMOC VAC/ADMIN/RCVD: CPT | Performed by: NURSE PRACTITIONER

## 2021-11-24 PROCEDURE — G8484 FLU IMMUNIZE NO ADMIN: HCPCS | Performed by: NURSE PRACTITIONER

## 2021-11-24 RX ORDER — GUAIFENESIN 600 MG/1
1200 TABLET, EXTENDED RELEASE ORAL 2 TIMES DAILY
Qty: 28 TABLET | Refills: 0 | Status: SHIPPED | OUTPATIENT
Start: 2021-11-24 | End: 2021-12-01

## 2021-11-24 RX ORDER — LEVOFLOXACIN 750 MG/1
750 TABLET ORAL DAILY
Qty: 7 TABLET | Refills: 0 | Status: SHIPPED | OUTPATIENT
Start: 2021-11-24 | End: 2021-12-01

## 2021-11-24 ASSESSMENT — ENCOUNTER SYMPTOMS
COUGH: 1
SHORTNESS OF BREATH: 1
SORE THROAT: 0

## 2021-11-24 NOTE — TELEPHONE ENCOUNTER
Received call from [facility]  at [address] [facility] with [facility]. Brief description of triage: Difficulty Breathing, caller disconnected from [facility], called patient no answer left message on verified answering machine. Attention Provider: Thank you for allowing me to participate in the care of your patient. The patient was connected to triage in response to information provided to the ECC/PSC. Please do not respond through this encounter as the response is not directed to a shared pool.         Reason for Disposition   Message left on identified voicemail    Protocols used: NO CONTACT OR DUPLICATE CONTACT CALL-ADULT-[place]

## 2021-11-24 NOTE — PROGRESS NOTES
HPI Notes    Name: Elisha Cerrato  : 1940         Chief Complaint:     Chief Complaint   Patient presents with    Cough     Patient here today with cough x4 days. Hurts right rib cage area when he coughs. No fever. History of Present Illness:        Cough  This is a new problem. The current episode started in the past 7 days. The problem has been gradually worsening. The cough is productive of sputum. Associated symptoms include chest pain (right side only) and shortness of breath. Pertinent negatives include no chills, ear congestion, ear pain, fever, headaches, myalgias, nasal congestion, postnasal drip or sore throat. The symptoms are aggravated by lying down.        Past Medical History:     Past Medical History:   Diagnosis Date    Nguyen syndrome     [facility]es [facility]    Blood circulation, collateral     BPH (benign prostatic hyperplasia)     CAD (coronary artery disease)     Dr Carlos Eduardo Rey COPD (chronic obstructive pulmonary disease) ([facility] [address]     Depression     Hx of colonoscopy     Hyperlipidemia     Liver disease     Movement disorder     essential tremors    Osteoarthritis     Specialist at [place]    Pneumonia     Rheumatic fever     Rheumatoid arthritis ([facility] [address]     Seizures ([facility] [address]     jacksonian epilepsy    Type II or unspecified type diabetes mellitus without mention of complication, not stated as uncontrolled       Reviewed all health maintenance requirements and ordered appropriate tests  Health Maintenance Due   Topic Date Due    Hepatitis C screen  Never done    DTaP/Tdap/Td vaccine (1 - Tdap) Never done    Shingles Vaccine (1 of 2) Never done       Past Surgical History:     Past Surgical History:   Procedure Laterality Date    ARTERIAL BYPASS SURGRY      Cardiac bypass x2    CARDIAC SURGERY      CABG    CHOLECYSTECTOMY      COLONOSCOPY      WNL    CORONARY ARTERY BYPASS GRAFT  [id number]    CYSTOSCOPY Left     lithrotripsy with stent     CYSTOSCOPY Left 5/31/2017    CYSTOSCOPY URETEROSCOPY LASER-WITH HLL performed by Bienvenido Castro MD at [address] / [address] Left 5/31/2017    CYSTOSCOPY STENT INSERTION performed by Bienvenido Castro MD at [address] [place], COLON, DIAGNOSTIC     [address]      x2   [facility]    rt   [address] [facility]    cadaver graft rt thigh    LITHOTRIPSY      OTHER SURGICAL HISTORY      Rt leg artery transplant    OTHER SURGICAL HISTORY      Rt heel spur    OTHER SURGICAL HISTORY      Rt knee scope    PACEMAKER INSERTION N/A 9/29/2021    PACEMAKER INSERTION PERMANENT performed by James Macdonald MD at [facility] 09/29/2021    Dr. Slim Trejo ENDOSCOPY  2014        Medications:       Prior to Admission medications    Medication Sig Start Date End Date Taking?  Authorizing Provider   levoFLOXacin (LEVAQUIN) 750 MG tablet Take 1 tablet by mouth daily for 7 days 11/24/21 12/1/21 Yes DAVID Tucker CNP   guaiFENesin (MUCINEX) 600 MG extended release tablet Take 2 tablets by mouth 2 times daily for 7 days 11/24/21 12/1/21 Yes DAVID Tucker - CNP   sertraline (ZOLOFT) 100 MG tablet Take 1 tablet by mouth daily 10/27/21  Yes Tez Ibrahim MD   metoprolol tartrate (LOPRESSOR) 25 MG tablet Take 1 tablet by mouth 2 times daily  Patient taking differently: Take 12.5 mg by mouth daily  9/30/21  Yes James Macdonald MD   empagliflozin (JARDIANCE) 25 MG tablet Take 25 mg by mouth daily   Yes Historical Provider, MD   rivaroxaban (XARELTO) 20 MG TABS tablet Take 1 tablet by mouth daily 9/14/21  Yes James Macdonald MD   Tiotropium Bromide Monohydrate ([address]) Inhale into the lungs daily   Yes Historical Provider, MD   naproxen (NAPROSYN) 500 MG tablet Take 500 mg by mouth daily as needed    Yes Historical Provider, MD   famotidine (PEPCID) 20 MG tablet Take 20 mg by mouth daily   Yes Historical Provider, MD   METHOTREXATE PO Take 10 mg by mouth once a week   Yes Historical Provider, MD   traZODone (DESYREL) 100 MG tablet Take 100 mg by mouth nightly   Yes Historical Provider, MD   rosuvastatin (CRESTOR) 40 MG tablet Take 20 mg by mouth every evening   Yes Historical Provider, MD   alendronate (FOSAMAX) 70 MG tablet Take 70 mg by mouth every 7 days   Yes Historical Provider, MD   VITAMIN D PO Take by mouth   Yes Historical Provider, MD   cilostazol (PLETAL) 50 MG tablet Take 1 tablet by mouth 2 times daily 4/7/21  Yes Candelario Toledo MD   finasteride (PROSCAR) 5 MG tablet Take 1 tablet by mouth daily 10/12/20  Yes Elsie Snellen, MD   nitroGLYCERIN (NITROSTAT) 0.4 MG SL tablet up to max of 3 total doses. If no relief after 1 dose, call 911. 10/24/17  Yes Candelario Toledo MD   tamsulosin (FLOMAX) 0.4 MG capsule Take 0.4 mg by mouth daily   Yes Historical Provider, MD   folic acid (FOLVITE) 1 MG tablet Take 1 tablet by mouth daily Daily except on day of taking Methotrexate 9/8/15  Yes Elsie Snellen, MD   omeprazole (PRILOSEC) 20 MG capsule Take 20 mg by mouth 2 times daily. Yes Historical Provider, MD   albuterol (PROVENTIL HFA;VENTOLIN HFA) 108 (90 BASE) MCG/ACT inhaler Inhale 2 puffs into the lungs 4 times daily as needed for Shortness of Breath    Yes Historical Provider, MD        Allergies:       Patient has no known allergies. Social History:     Tobacco:    reports that he quit smoking about 41 years ago. His smoking use included cigarettes. He has a 100.00 pack-year smoking history. He has never used smokeless tobacco.  Alcohol:      reports no history of alcohol use. Drug Use:  reports no history of drug use.     Family History:        Family History   Problem Relation Age of Onset    Heart Disease Mother     Heart Disease Father     Diabetes Other         Fhx of    Heart Disease Other         Fhx of    Cancer Sister     Heart Disease Brother        Review of Systems:         Review of Systems   Constitutional: Negative for chills and fever. HENT: Negative for ear pain, postnasal drip and sore throat. Respiratory: Positive for cough and shortness of breath. Cardiovascular: Positive for chest pain (right side only). Musculoskeletal: Negative for myalgias. Neurological: Negative for headaches. Physical Exam:     Vitals:  /62   Pulse 60   Temp 98.1 °F (36.7 °C) (Oral)   Wt 203 lb (92.1 kg)   SpO2 93%   BMI 27.53 kg/m²       Physical Exam  Vitals and nursing note reviewed. Constitutional:       Appearance: He is well-developed. Cardiovascular:      Rate and Rhythm: Normal rate and regular rhythm. Heart sounds: S1 normal and S2 normal.   Pulmonary:      Effort: Pulmonary effort is normal. No respiratory distress. Breath sounds: Examination of the right-upper field reveals rales. Examination of the left-upper field reveals rales. Examination of the right-lower field reveals decreased breath sounds. Examination of the left-lower field reveals decreased breath sounds. Decreased breath sounds and rales present. Abdominal:      General: Bowel sounds are normal.      Palpations: Abdomen is soft. Tenderness: There is no abdominal tenderness. Skin:     General: Skin is warm and dry. Psychiatric:         Behavior: Behavior is cooperative.                Data:     Lab Results   Component Value Date     09/30/2021    K 4.1 09/30/2021     09/30/2021    CO2 22 09/30/2021    BUN 13 09/30/2021    CREATININE 0.78 09/30/2021    GLUCOSE 137 09/30/2021    GLUCOSE 132 02/14/2012    PROT 6.6 09/10/2021    LABALBU 3.9 09/10/2021    LABALBU 4.4 02/14/2012    BILITOT 0.34 09/10/2021    ALKPHOS 72 09/10/2021    AST 18 09/10/2021    ALT 16 09/10/2021     Lab Results   Component Value Date    WBC 5.2 09/30/2021    RBC 4.09 09/30/2021    RBC 4.73 02/14/2012    HGB 13.3 09/30/2021    HCT 39.1 09/30/2021    MCV 95.5 09/30/2021    MCH 32.4 09/30/2021    MCHC 33.9 09/30/2021    RDW 15.3 09/30/2021     09/30/2021     02/14/2012    MPV NOT REPORTED 09/30/2021     Lab Results   Component Value Date    TSH 2.34 10/25/2021     Lab Results   Component Value Date    CHOL 168 10/25/2021    CHOL 136 05/06/2016    HDL 64 10/25/2021    LABA1C 7.8 03/11/2020          Assessment & Plan        Diagnosis Orders   1. Pneumonia of both upper lobes due to infectious organism       Will start patient on Levaquin and Mucinex. Patient educated about both medications. Patient educated to deep breathing and cough several times an hour throughout the day. Patient educated to increase his water intake throughout the day. Patient verbalizes understanding and agreement with plan. All questions answered. If symptoms do not resolve or worsen, return to office. Completed Refills   Requested Prescriptions     Signed Prescriptions Disp Refills    levoFLOXacin (LEVAQUIN) 750 MG tablet 7 tablet 0     Sig: Take 1 tablet by mouth daily for 7 days    guaiFENesin (MUCINEX) 600 MG extended release tablet 28 tablet 0     Sig: Take 2 tablets by mouth 2 times daily for 7 days     No follow-ups on file. Orders Placed This Encounter   Medications    levoFLOXacin (LEVAQUIN) 750 MG tablet     Sig: Take 1 tablet by mouth daily for 7 days     Dispense:  7 tablet     Refill:  0    guaiFENesin (MUCINEX) 600 MG extended release tablet     Sig: Take 2 tablets by mouth 2 times daily for 7 days     Dispense:  28 tablet     Refill:  0     No orders of the defined types were placed in this encounter. Patient Instructions   SURVEY:    You may be receiving a survey from [employer] regarding your visit today. Please complete the survey to enable us to provide the highest quality of care to you and your family.     If you cannot score us a very good (5 Stars) on any question, please call the office to discuss how we could have made your experience a very

## 2021-11-29 ENCOUNTER — OFFICE VISIT (OUTPATIENT)
Dept: FAMILY MEDICINE CLINIC | Age: 81
End: 2021-11-29
Payer: MEDICARE

## 2021-11-29 VITALS
SYSTOLIC BLOOD PRESSURE: 104 MMHG | DIASTOLIC BLOOD PRESSURE: 62 MMHG | TEMPERATURE: 97.9 F | WEIGHT: 197 LBS | OXYGEN SATURATION: 94 % | HEART RATE: 80 BPM | BODY MASS INDEX: 26.72 KG/M2

## 2021-11-29 DIAGNOSIS — J18.9 PNEUMONIA OF BOTH UPPER LOBES DUE TO INFECTIOUS ORGANISM: Primary | ICD-10-CM

## 2021-11-29 PROCEDURE — 1036F TOBACCO NON-USER: CPT | Performed by: NURSE PRACTITIONER

## 2021-11-29 PROCEDURE — 1123F ACP DISCUSS/DSCN MKR DOCD: CPT | Performed by: NURSE PRACTITIONER

## 2021-11-29 PROCEDURE — G8427 DOCREV CUR MEDS BY ELIG CLIN: HCPCS | Performed by: NURSE PRACTITIONER

## 2021-11-29 PROCEDURE — G8484 FLU IMMUNIZE NO ADMIN: HCPCS | Performed by: NURSE PRACTITIONER

## 2021-11-29 PROCEDURE — G8417 CALC BMI ABV UP PARAM F/U: HCPCS | Performed by: NURSE PRACTITIONER

## 2021-11-29 PROCEDURE — 4040F PNEUMOC VAC/ADMIN/RCVD: CPT | Performed by: NURSE PRACTITIONER

## 2021-11-29 PROCEDURE — 99213 OFFICE O/P EST LOW 20 MIN: CPT | Performed by: NURSE PRACTITIONER

## 2021-11-29 ASSESSMENT — ENCOUNTER SYMPTOMS
NAUSEA: 0
DIARRHEA: 0
COUGH: 1
VOMITING: 0
SHORTNESS OF BREATH: 0

## 2021-11-29 NOTE — PATIENT INSTRUCTIONS
SURVEY:    You may be receiving a survey from [employer] regarding your visit today. Please complete the survey to enable us to provide the highest quality of care to you and your family. If you cannot score us a very good (5 Stars) on any question, please call the office to discuss how we could have made your experience a very good one. Thank you.     Clinical Care Team: PETER Pérez LPN    Clerical Team: Yvonne Bowser

## 2022-01-01 NOTE — PROGRESS NOTES
HPI Notes    Name: Nerissa Chavez  : 1940        Chief Complaint:     Chief Complaint   Patient presents with    Hypertension    Atrial Fibrillation     Dr Osbaldo Collins follows, Pt is scheduled for pace maker tomorrow.  Hyperlipidemia    Gastroesophageal Reflux       History of Present Illness:     Nerissa Chavez is a 80 y.o.  male who presents with Hypertension, Atrial Fibrillation (Dr Osbaldo Collins follows, Pt is scheduled for pace maker tomorrow.), Hyperlipidemia, and Gastroesophageal Reflux      Hypertension  This is a chronic problem. The current episode started more than 1 year ago. The problem is unchanged. The problem is controlled. Pertinent negatives include no chest pain, headaches, malaise/fatigue, neck pain, palpitations, peripheral edema or shortness of breath. There are no associated agents to hypertension. Risk factors for coronary artery disease include dyslipidemia and male gender. The current treatment provides significant improvement. Hyperlipidemia  This is a chronic problem. The current episode started more than 1 year ago. The problem is controlled. Recent lipid tests were reviewed and are normal. He has no history of diabetes or hypothyroidism. Pertinent negatives include no chest pain or shortness of breath. Current antihyperlipidemic treatment includes statins. Risk factors for coronary artery disease include dyslipidemia and hypertension. Gastroesophageal Reflux  He reports no abdominal pain, no chest pain, no choking, no coughing, no dysphagia, no nausea or no sore throat. This is a chronic problem. The current episode started more than 1 year ago. The problem has been unchanged. Associated symptoms include fatigue. Pertinent negatives include no melena or weight loss. He has tried a PPI and a histamine-2 antagonist for the symptoms. The treatment provided significant relief. Afib - pt still feels some palpitations and pt has just been more tired since he has had the Afib. Pt diagnosed with Afib back in July. Pt states he is \"tired of being tired\". Pt is not anemic. Pt admits more tired since he had the Afib but also for about a year he also stayed in side with his wife and just got deconditioned. 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 Pt had normal CBC and CMP. Last TSH March 2020 and normal.      Fatigue - pt has been more tired over past year and admits didn't do much so deconditioned. Pt also felt more tired since has the Afib this summer (see above). Pt is \"tired of being tired\". Pt had normal CBC this month and normal TSH March 2020. Pt sleeps ok at night but the dog     Depression - Pt admits today he has been more depressed. BUt pt now stating he MAY not be taking the zoloft for a month. Actually, pt states he may not be taking it for a \"good month\". Pt states he called to renewed it and they said he couldn't so pt states he stopped taking it. Pt has been more down like this weekend and less motivated. Pt gets more tearful at times. Pt couldn't stay and help his daughter can food as too down. Pt has his wife in nursing home so he gets down about that. Pt tries to do some things like built a greenhouse. Pt sleeps ok and has a dog. Reviewed with pt all his medication and reviewed recent labs.      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    Type II or unspecified type diabetes mellitus without mention of complication, not stated as uncontrolled       Reviewed all health maintenance requirements and ordered appropriate tests  Health Maintenance Due   Topic Date Due    Hepatitis C screen  Never done    DTaP/Tdap/Td vaccine (1 - Tdap) Never done    Shingles Vaccine (1 of 2) Never done   [employer] Visit (AWV)  Never done    Lipid screen  03/11/2021    Flu vaccine (1) 09/01/2021       Past Surgical History:     Past Surgical History:   Procedure Laterality Date    ARTERIAL BYPASS Vincent Parsons 1723    Cardiac bypass x2    CARDIAC SURGERY      CHOLECYSTECTOMY      COLONOSCOPY  2014    WNL    CORONARY ARTERY BYPASS GRAFT  [id number]    CYSTOSCOPY Left     lithrotripsy with stent     CYSTOSCOPY Left 5/31/2017    CYSTOSCOPY URETEROSCOPY Niurka Pacer performed by Megan Stephens MD at [facility] / AMMY Vergara / Robert Schafer Left 5/31/2017    CYSTOSCOPY STENT INSERTION performed by Megan Stephens MD at [facility], COLON, DIAGNOSTIC     [address]      x2   [facility]    rt   [address] [facility]    cadaver graft rt thigh    LITHOTRIPSY      OTHER SURGICAL HISTORY      Rt leg artery transplant    OTHER SURGICAL HISTORY      Rt heel spur    OTHER SURGICAL HISTORY      Rt knee scope    UPPER GASTROINTESTINAL ENDOSCOPY  2014        Medications:       Prior to Admission medications    Medication Sig Start Date End Date Taking?  Authorizing Provider   sertraline (ZOLOFT) 100 MG tablet Take 1 tablet by mouth daily 9/28/21  Yes Deepthi Cullen MD   rivaroxaban (XARELTO) 20 MG TABS tablet Take 1 tablet by mouth daily 9/14/21  Yes Celina Liu MD   Tiotropium Bromide Monohydrate (SPIRIVA RESPIMAT IN) Inhale into the lungs daily   Yes Historical Provider, MD   famotidine (PEPCID) 20 MG tablet Take 20 mg by mouth daily   Yes Historical Provider, MD   METHOTREXATE PO Take 10 mg by mouth once a week   Yes Historical Provider, MD   traZODone (DESYREL) 100 MG tablet Take 100 mg by mouth nightly   Yes Historical Provider, MD   rosuvastatin (CRESTOR) 40 MG tablet Take 20 mg by mouth every evening   Yes Historical Provider, MD   alendronate (FOSAMAX) 70 MG tablet Take 70 mg by mouth every 7 days   Yes Historical Provider, MD   VITAMIN D PO Take by mouth   Yes Historical Provider, MD   cilostazol (PLETAL) 50 MG tablet Take 1 tablet by mouth 2 times daily 4/7/21  Yes Whitney Mace MD   finasteride (PROSCAR) 5 MG tablet Take 1 tablet by mouth daily 10/12/20  Yes Keon Manning MD   isosorbide mononitrate (IMDUR) 30 MG extended release tablet Take 1 tablet by mouth daily 9/29/20  Yes Whitney Mace MD   tamsulosin (FLOMAX) 0.4 MG capsule Take 0.4 mg by mouth daily   Yes Historical Provider, MD   folic acid (FOLVITE) 1 MG tablet Take 1 tablet by mouth daily Daily except on day of taking Methotrexate 9/8/15  Yes Keon Manning MD   omeprazole (PRILOSEC) 20 MG capsule Take 20 mg by mouth 2 times daily. Yes Historical Provider, MD   albuterol (PROVENTIL HFA;VENTOLIN HFA) 108 (90 BASE) MCG/ACT inhaler Inhale 2 puffs into the lungs 4 times daily as needed for Shortness of Breath    Yes Historical Provider, MD   naproxen (NAPROSYN) 500 MG tablet Take 500 mg by mouth daily as needed     Historical Provider, MD   nitroGLYCERIN (NITROSTAT) 0.4 MG SL tablet up to max of 3 total doses. If no relief after 1 dose, call 911. 10/24/17   Whitney Mace MD        Allergies:       Patient has no known allergies. Social History:     Tobacco:    reports that he quit smoking about 41 years ago. His smoking use included cigarettes. He has a 100.00 pack-year smoking history. He has never used smokeless tobacco.  Alcohol:      reports no history of alcohol use. Drug Use:  reports no history of drug use.     Family History:     Family History   Problem Relation Age of Onset    Heart Disease Mother     Heart Disease Father     Diabetes Other         Fhx of    Heart Disease Other Fhx of    Cancer Sister     Heart Disease Brother        Review of Systems:       Review of Systems   Constitutional: Positive for fatigue. Negative for chills, fever, malaise/fatigue and weight loss. HENT: Negative for congestion, facial swelling, sore throat and trouble swallowing. Eyes: Negative for discharge, redness and visual disturbance. Respiratory: Negative for cough, choking and shortness of breath. Cardiovascular: Negative for chest pain and palpitations. Gastrointestinal: Negative for abdominal pain, blood in stool, constipation, diarrhea, dysphagia, melena, nausea and vomiting. Genitourinary: Negative for dysuria and hematuria. Musculoskeletal: Negative for joint swelling, neck pain and neck stiffness. Skin: Negative for pallor and rash. Neurological: Negative for dizziness, facial asymmetry, light-headedness and headaches. Psychiatric/Behavioral: Positive for dysphoric mood. Negative for sleep disturbance. Physical Exam:     Physical Exam  Vitals reviewed. Constitutional:       General: He is not in acute distress. Appearance: Normal appearance. He is well-developed. He is not ill-appearing. HENT:      Head: Normocephalic and atraumatic. Eyes:      General:         Right eye: No discharge. Left eye: No discharge. Conjunctiva/sclera: Conjunctivae normal.   Neck:      Thyroid: No thyromegaly. Vascular: No carotid bruit. Cardiovascular:      Rate and Rhythm: Normal rate and regular rhythm. Heart sounds: No murmur heard. Pulmonary:      Effort: No respiratory distress. Breath sounds: Normal breath sounds. No wheezing. Chest:      Chest wall: No tenderness. Abdominal:      General: Bowel sounds are normal.      Palpations: Abdomen is soft. Tenderness: There is no abdominal tenderness. Musculoskeletal:      Cervical back: Neck supple. No tenderness. Lymphadenopathy:      Cervical: No cervical adenopathy.    Skin: General: Skin is warm and dry. Findings: No rash. Neurological:      Mental Status: He is alert and oriented to person, place, and time. Psychiatric:         Mood and Affect: Mood is depressed. Speech: Speech normal.         Behavior: Behavior normal.         Thought Content: Thought content normal. Thought content is not paranoid or delusional.         Vitals:  /60 (Site: Left Upper Arm, Cuff Size: Medium Adult)   Pulse 94   Wt 202 lb (91.6 kg)   SpO2 96%   BMI 26.65 kg/m²       Data:     Lab Results   Component Value Date     09/10/2021    K 3.9 09/10/2021     09/10/2021    CO2 24 09/10/2021    BUN 16 09/10/2021    CREATININE 0.81 09/10/2021    GLUCOSE 202 09/10/2021    GLUCOSE 132 02/14/2012    PROT 6.6 09/10/2021    LABALBU 3.9 09/10/2021    LABALBU 4.4 02/14/2012    BILITOT 0.34 09/10/2021    ALKPHOS 72 09/10/2021    AST 18 09/10/2021    ALT 16 09/10/2021     Lab Results   Component Value Date    WBC 4.9 09/10/2021    RBC 4.26 09/10/2021    RBC 4.73 02/14/2012    HGB 13.8 09/10/2021    HCT 41.1 09/10/2021    MCV 96.4 09/10/2021    MCH 32.3 09/10/2021    MCHC 33.5 09/10/2021    RDW 15.7 09/10/2021     09/10/2021     02/14/2012    MPV NOT REPORTED 09/10/2021     Lab Results   Component Value Date    TSH 1.99 03/11/2020     Lab Results   Component Value Date    CHOL 160 03/11/2020    CHOL 136 05/06/2016    HDL 71 03/11/2020    LABA1C 7.8 03/11/2020          Assessment/Plan:        1. Essential hypertension  Stable and reviewed labs and medications    2. Pure hypercholesterolemia  Stable on crestor   - Lipid Panel; Future    3. Gastroesophageal reflux disease without esophagitis  Pt to stay on pepcid and prilosec    4. Paroxysmal atrial fibrillation (HCC)  Stable on xarelto     5. Other fatigue  In 1mos ck TSH but recent CBC is WNL  - TSH without Reflex; Future    6.  Other depression  Had d/w pt and reviewed meds pt is NOT taking the zoloft  My nurse called pt's daughter and made sure she knows pt needs to take the zoloft 100mg one daily pt called earlier and dent ney        Pearl received counseling on the following healthy behaviors: nutrition and exercise  Reviewed prior labs and health maintenance  Continue current medications, diet and exercise. Discussed use, benefit, and side effects of prescribed medications. Barriers to medication compliance addressed. Patient given educational materials - see patient instructions  Was a self-tracking handout given in paper form or via [employer]? Yes    Requested Prescriptions     Signed Prescriptions Disp Refills    sertraline (ZOLOFT) 100 MG tablet 30 tablet 1     Sig: Take 1 tablet by mouth daily       All patient questions answered. Patient voiced understanding. Quality Measures    Body mass index is 26.65 kg/m². Normal. Weight control planned discussed Healthy diet and regular exercise. BP: 100/60. Blood pressure is normal. Treatment plan consists of No treatment change needed. Fall Risk 9/28/2021 8/28/2020 8/24/2020 8/21/2019 4/10/2018 4/10/2018 6/13/2017   2 or more falls in past year? yes no no no no no no   Fall with injury in past year? no no no no no no no     The patient does not have a history of falls. I did not - not indicated , complete a risk assessment for falls.  A plan of care for falls No Treatment plan indicated    Lab Results   Component Value Date    LDLCALC 78 05/06/2016    LDLCHOLESTEROL 62 03/11/2020    (goal LDL reduction with dx if diabetes is 50% LDL reduction)    PHQ Scores 2/17/2021 8/28/2020 2/20/2020 8/21/2019 2/14/2019 4/10/2018 4/10/2018   PHQ2 Score 0 0 0 0 0 0 0   PHQ9 Score 0 0 0 0 0 0 0     Interpretation of Total Score Depression Severity: 1-4 = Minimal depression, 5-9 = Mild depression, 10-14 = Moderate depression, 15-19 = Moderately severe depression, 20-27 = Severe depression        Return in about 4 weeks (around 10/26/2021) for Medicare wellness and depression, fatigue.       Electronically signed by Daisha Kendall MD on 9/28/2021 at 10:48 PM English

## 2022-01-17 ENCOUNTER — HOSPITAL ENCOUNTER (OUTPATIENT)
Age: 82
Discharge: HOME | End: 2022-01-17
Payer: MEDICARE

## 2022-01-17 DIAGNOSIS — I25.10 ATHEROSCLEROSIS OF NATIVE CORONARY ARTERY OF NATIVE HEART WITHOUT ANGINA PECTORIS: ICD-10-CM

## 2022-01-17 DIAGNOSIS — I48.91 ATRIAL FIBRILLATION, UNSPECIFIED TYPE (HCC): ICD-10-CM

## 2022-01-17 DIAGNOSIS — E55.9 VITAMIN D DEFICIENCY DISEASE: ICD-10-CM

## 2022-01-17 DIAGNOSIS — R06.02 SOB (SHORTNESS OF BREATH): ICD-10-CM

## 2022-01-17 DIAGNOSIS — E78.00 PURE HYPERCHOLESTEROLEMIA: ICD-10-CM

## 2022-01-17 LAB
ABSOLUTE EOS #: 0.2 K/UL (ref 0–0.4)
ABSOLUTE IMMATURE GRANULOCYTE: ABNORMAL K/UL (ref 0–0.3)
ABSOLUTE LYMPH #: 0.9 K/UL (ref 1–4.8)
ABSOLUTE MONO #: 0.4 K/UL (ref 0–1)
ALBUMIN SERPL-MCNC: 3.8 G/DL (ref 3.5–5.2)
ALBUMIN/GLOBULIN RATIO: ABNORMAL (ref 1–2.5)
ALP BLD-CCNC: 86 U/L (ref 40–129)
ALT SERPL-CCNC: 20 U/L (ref 5–41)
ANION GAP SERPL CALCULATED.3IONS-SCNC: 13 MMOL/L (ref 9–17)
AST SERPL-CCNC: 24 U/L
BASOPHILS # BLD: 0 % (ref 0–2)
BASOPHILS ABSOLUTE: 0 K/UL (ref 0–0.2)
BILIRUB SERPL-MCNC: 0.49 MG/DL (ref 0.3–1.2)
BUN BLDV-MCNC: 12 MG/DL (ref 8–23)
BUN/CREAT BLD: 16 (ref 9–20)
CALCIUM SERPL-MCNC: 9.1 MG/DL (ref 8.6–10.4)
CHLORIDE BLD-SCNC: 100 MMOL/L (ref 98–107)
CHOLESTEROL/HDL RATIO: 2.6
CHOLESTEROL: 183 MG/DL
CO2: 25 MMOL/L (ref 20–31)
CREAT SERPL-MCNC: 0.73 MG/DL (ref 0.7–1.2)
DIFFERENTIAL TYPE: YES
EOSINOPHILS RELATIVE PERCENT: 3 % (ref 0–5)
GFR AFRICAN AMERICAN: >60 ML/MIN
GFR NON-AFRICAN AMERICAN: >60 ML/MIN
GFR SERPL CREATININE-BSD FRML MDRD: ABNORMAL ML/MIN/{1.73_M2}
GFR SERPL CREATININE-BSD FRML MDRD: ABNORMAL ML/MIN/{1.73_M2}
GLUCOSE BLD-MCNC: 173 MG/DL (ref 70–99)
HCT VFR BLD CALC: 42.2 % (ref 41–53)
HDLC SERPL-MCNC: 70 MG/DL
HEMOGLOBIN: 14.1 G/DL (ref 13.5–17.5)
IMMATURE GRANULOCYTES: ABNORMAL %
LDL CHOLESTEROL: 79 MG/DL (ref 0–130)
LYMPHOCYTES # BLD: 16 % (ref 13–44)
MAGNESIUM: 2.1 MG/DL (ref 1.6–2.6)
MCH RBC QN AUTO: 30.7 PG (ref 26–34)
MCHC RBC AUTO-ENTMCNC: 33.5 G/DL (ref 31–37)
MCV RBC AUTO: 91.8 FL (ref 80–100)
MONOCYTES # BLD: 8 % (ref 5–9)
NRBC AUTOMATED: ABNORMAL PER 100 WBC
PATIENT FASTING?: YES
PDW BLD-RTO: 17.8 % (ref 12.1–15.2)
PLATELET # BLD: 238 K/UL (ref 140–450)
PLATELET ESTIMATE: ABNORMAL
PMV BLD AUTO: ABNORMAL FL (ref 6–12)
POTASSIUM SERPL-SCNC: 3.9 MMOL/L (ref 3.7–5.3)
RBC # BLD: 4.6 M/UL (ref 4.5–5.9)
RBC # BLD: ABNORMAL 10*6/UL
SEG NEUTROPHILS: 73 % (ref 39–75)
SEGMENTED NEUTROPHILS ABSOLUTE COUNT: 4 K/UL (ref 2.1–6.5)
SODIUM BLD-SCNC: 138 MMOL/L (ref 135–144)
TOTAL PROTEIN: 7.1 G/DL (ref 6.4–8.3)
TRIGL SERPL-MCNC: 170 MG/DL
TSH SERPL DL<=0.05 MIU/L-ACNC: 1.62 MIU/L (ref 0.3–5)
VITAMIN D 25-HYDROXY: 24.5 NG/ML (ref 30–100)
VLDLC SERPL CALC-MCNC: ABNORMAL MG/DL (ref 1–30)
WBC # BLD: 5.5 K/UL (ref 3.5–11)
WBC # BLD: ABNORMAL 10*3/UL

## 2022-01-17 PROCEDURE — 84443 ASSAY THYROID STIM HORMONE: CPT

## 2022-01-17 PROCEDURE — 82306 VITAMIN D 25 HYDROXY: CPT

## 2022-01-17 PROCEDURE — 85025 COMPLETE CBC W/AUTO DIFF WBC: CPT

## 2022-01-17 PROCEDURE — 83735 ASSAY OF MAGNESIUM: CPT

## 2022-01-17 PROCEDURE — 80061 LIPID PANEL: CPT

## 2022-01-17 PROCEDURE — 36415 COLL VENOUS BLD VENIPUNCTURE: CPT

## 2022-01-17 PROCEDURE — 80053 COMPREHEN METABOLIC PANEL: CPT

## 2022-01-18 ENCOUNTER — OFFICE VISIT (OUTPATIENT)
Dept: CARDIOLOGY CLINIC | Age: 82
End: 2022-01-18
Payer: MEDICARE

## 2022-01-18 VITALS
HEART RATE: 76 BPM | SYSTOLIC BLOOD PRESSURE: 110 MMHG | DIASTOLIC BLOOD PRESSURE: 60 MMHG | WEIGHT: 203 LBS | BODY MASS INDEX: 27.53 KG/M2 | OXYGEN SATURATION: 98 %

## 2022-01-18 DIAGNOSIS — I48.91 ATRIAL FIBRILLATION, UNSPECIFIED TYPE (HCC): Primary | ICD-10-CM

## 2022-01-18 DIAGNOSIS — I25.10 ATHEROSCLEROSIS OF NATIVE CORONARY ARTERY OF NATIVE HEART WITHOUT ANGINA PECTORIS: ICD-10-CM

## 2022-01-18 DIAGNOSIS — E55.9 VITAMIN D DEFICIENCY DISEASE: ICD-10-CM

## 2022-01-18 DIAGNOSIS — E78.00 PURE HYPERCHOLESTEROLEMIA: ICD-10-CM

## 2022-01-18 PROCEDURE — 93288 INTERROG EVL PM/LDLS PM IP: CPT | Performed by: INTERNAL MEDICINE

## 2022-01-18 PROCEDURE — G8484 FLU IMMUNIZE NO ADMIN: HCPCS | Performed by: INTERNAL MEDICINE

## 2022-01-18 PROCEDURE — 4040F PNEUMOC VAC/ADMIN/RCVD: CPT | Performed by: INTERNAL MEDICINE

## 2022-01-18 PROCEDURE — 1036F TOBACCO NON-USER: CPT | Performed by: INTERNAL MEDICINE

## 2022-01-18 PROCEDURE — 1123F ACP DISCUSS/DSCN MKR DOCD: CPT | Performed by: INTERNAL MEDICINE

## 2022-01-18 PROCEDURE — G8427 DOCREV CUR MEDS BY ELIG CLIN: HCPCS | Performed by: INTERNAL MEDICINE

## 2022-01-18 PROCEDURE — 99214 OFFICE O/P EST MOD 30 MIN: CPT | Performed by: INTERNAL MEDICINE

## 2022-01-18 PROCEDURE — G8417 CALC BMI ABV UP PARAM F/U: HCPCS | Performed by: INTERNAL MEDICINE
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
## 2022-01-18 NOTE — PROGRESS NOTES
Ov DR Amaury Marquez follow up  Pacemaker insert 9/19/21  Pacemaker checked per medtronic. C/o sob not any worse. No chest pain  No ankle edema  No dizziness. Obie Salon a lot. Seen Devin in Nov   Had pneumonia. Using stationary bike. C/o leg pain   So got rid of bike. Does ok walking. Lost some weight   Over a period of 2 yrs. 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 Bedside echo done. Will see in 6 mths.
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
## 2022-03-28 ENCOUNTER — OFFICE VISIT (OUTPATIENT)
Dept: FAMILY MEDICINE CLINIC | Age: 82
End: 2022-03-28
Payer: MEDICARE

## 2022-03-28 VITALS
HEART RATE: 108 BPM | DIASTOLIC BLOOD PRESSURE: 72 MMHG | BODY MASS INDEX: 28.21 KG/M2 | OXYGEN SATURATION: 96 % | WEIGHT: 208 LBS | SYSTOLIC BLOOD PRESSURE: 120 MMHG

## 2022-03-28 DIAGNOSIS — I48.0 PAROXYSMAL ATRIAL FIBRILLATION (HCC): ICD-10-CM

## 2022-03-28 DIAGNOSIS — K21.9 GASTROESOPHAGEAL REFLUX DISEASE WITHOUT ESOPHAGITIS: ICD-10-CM

## 2022-03-28 DIAGNOSIS — J43.8 OTHER EMPHYSEMA (HCC): ICD-10-CM

## 2022-03-28 DIAGNOSIS — F32.89 OTHER DEPRESSION: ICD-10-CM

## 2022-03-28 DIAGNOSIS — E78.00 PURE HYPERCHOLESTEROLEMIA: ICD-10-CM

## 2022-03-28 DIAGNOSIS — I10 ESSENTIAL HYPERTENSION: Primary | ICD-10-CM

## 2022-03-28 PROCEDURE — 99214 OFFICE O/P EST MOD 30 MIN: CPT | Performed by: FAMILY MEDICINE

## 2022-03-28 RX ORDER — SERTRALINE HYDROCHLORIDE 100 MG/1
100 TABLET, FILM COATED ORAL DAILY
Qty: 90 TABLET | Refills: 1 | Status: SHIPPED | OUTPATIENT
Start: 2022-03-28

## 2022-03-28 ASSESSMENT — ENCOUNTER SYMPTOMS
SHORTNESS OF BREATH: 0
CONSTIPATION: 0
RHINORRHEA: 0
CHEST TIGHTNESS: 0
TROUBLE SWALLOWING: 0
EYE REDNESS: 0
SORE THROAT: 0
BLOOD IN STOOL: 0
COUGH: 0
ABDOMINAL PAIN: 0
EYE DISCHARGE: 0
VOMITING: 0
DIFFICULTY BREATHING: 0
NAUSEA: 0
DIARRHEA: 0

## 2022-03-28 ASSESSMENT — COPD QUESTIONNAIRES: COPD: 1

## 2022-03-28 NOTE — PROGRESS NOTES
HPI Notes    Name: Marcus Fields  : 1940        Chief Complaint:     Chief Complaint   Patient presents with    Hypertension    Atrial Fibrillation     Dr Sohail Barron follows    Hyperlipidemia    Gastroesophageal Reflux    Depression     Pt takes Zoloft daily as directed. History of Present Illness:     Macrus Fields is a 80 y.o.  male who presents with Hypertension, Atrial Fibrillation (Dr Sohail Barron follows), Hyperlipidemia, Gastroesophageal Reflux, and Depression (Pt takes Zoloft daily as directed.)      Hypertension  This is a chronic problem. The current episode started more than 1 year ago. The problem is controlled. Pertinent negatives include no chest pain, headaches, malaise/fatigue, palpitations, peripheral edema or shortness of breath. There are no associated agents to hypertension. Risk factors for coronary artery disease include male gender and dyslipidemia. The current treatment provides significant improvement. Hyperlipidemia  This is a chronic problem. The current episode started more than 1 year ago. The problem is controlled. Recent lipid tests were reviewed and are normal. He has no history of diabetes or hypothyroidism. Pertinent negatives include no chest pain or shortness of breath. Current antihyperlipidemic treatment includes statins. The current treatment provides significant improvement of lipids. Risk factors for coronary artery disease include dyslipidemia and hypertension. Gastroesophageal Reflux  He reports no abdominal pain, no chest pain, no coughing, no nausea or no sore throat. moods are stable . This is a chronic problem. The current episode started more than 1 year ago. The problem has been unchanged. Nothing aggravates the symptoms. Pertinent negatives include no fatigue, melena, orthopnea or weight loss. He has tried a PPI for the symptoms. The treatment provided significant relief.    COPD  There is no chest tightness, cough, difficulty breathing or shortness of breath. This is a chronic problem. The current episode started more than 1 year ago. The problem has been unchanged. Pertinent negatives include no appetite change, chest pain, fever, headaches, malaise/fatigue, orthopnea, rhinorrhea, sore throat, trouble swallowing or weight loss. Relieved by: spiriva. He reports significant improvement on treatment. Mental Health Problem  The primary symptoms do not include delusions or hallucinations. Primary symptoms comment: moods are stable . The current episode started more than 1 month ago. This is a chronic problem. The onset of the illness is precipitated by emotional stress (Pt has some stress with his wife having dementia in the nursing home. BUt pt state he is \"maxi peaceful with everything going on\" as far a his wife in the nursing. Pt is for the most part calm. ). Additional symptoms of the illness do not include insomnia, hypersomnia, appetite change, fatigue, headaches or abdominal pain. He does not admit to suicidal ideas. He does not have a plan to attempt suicide. He does not contemplate harming himself. He has not already injured self. He does not contemplate injuring another person. He has not already  injured another person. Afib - chronic but stable and no palpitations. No chest pain. No SOB.  Pt is seeing Cardiology in August for check  Past Medical History:     Past Medical History:   Diagnosis Date    Nguyen syndrome     [facility]    Blood circulation, collateral     BPH (benign prostatic hyperplasia)     CAD (coronary artery disease)     Dr Gurmeet Phillip COPD (chronic obstructive pulmonary disease) ([address]     Depression     Hx of colonoscopy     Hyperlipidemia     Liver disease     Movement disorder     essential tremors    Osteoarthritis     Specialist at [facility]    Pneumonia     Rheumatic fever     Rheumatoid arthritis ([address]     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    Type II or unspecified type diabetes mellitus without mention of complication, not stated as uncontrolled       Reviewed all health maintenance requirements and ordered appropriate tests  Health Maintenance Due   Topic Date Due    Hepatitis C screen  Never done    DTaP/Tdap/Td vaccine (1 - Tdap) Never done    Shingles Vaccine (1 of 2) Never done       Past Surgical History:     Past Surgical History:   Procedure Laterality Date    ARTERIAL BYPASS [address]    Cardiac bypass x2    CARDIAC SURGERY      CABG    CHOLECYSTECTOMY      COLONOSCOPY  2014    WNL    CORONARY ARTERY BYPASS GRAFT  [id number]    CYSTOSCOPY Left     lithrotripsy with stent     CYSTOSCOPY Left 5/31/2017    CYSTOSCOPY URETEROSCOPY LASER-WITH HLL performed by Ernestina Downey MD at [address] / [address] / STONE Left 5/31/2017    CYSTOSCOPY STENT INSERTION performed by Ernestina Downey MD at [address], COLON, DIAGNOSTIC     [address] [facility]      x2   [facility]    rt   [address] [facility]    cadaver graft rt thigh    LITHOTRIPSY      OTHER SURGICAL HISTORY      Rt leg artery transplant    OTHER SURGICAL HISTORY      Rt heel spur    OTHER SURGICAL HISTORY      Rt knee scope    PACEMAKER INSERTION N/A 9/29/2021    PACEMAKER INSERTION PERMANENT performed by Yu Prajapati MD at [facility] Left 09/29/2021    Dr. Vicki King ENDOSCOPY  2014        Medications:       Prior to Admission medications    Medication Sig Start Date End Date Taking?  Authorizing Provider   sertraline (ZOLOFT) 100 MG tablet Take 1 tablet by mouth daily 3/28/22  Yes Gilles Boast, MD   metoprolol tartrate (LOPRESSOR) 25 MG tablet Take 1 tablet by mouth 2 times daily  Patient taking differently: Take 12.5 mg by mouth daily  9/30/21  Yes Yu Prajapati MD   empagliflozin (JARDIANCE) 25 MG tablet Take 25 mg by mouth daily   Yes Historical Provider, MD   rivaroxaban (Ronak Dawley) 20 MG TABS tablet Take 1 tablet by mouth daily 9/14/21  Yes Gauri Almanza MD   Tiotropium Bromide Monohydrate (SPIRIVA RESPIMAT IN) Inhale into the lungs daily   Yes Historical Provider, MD   famotidine (PEPCID) 20 MG tablet Take 20 mg by mouth daily   Yes Historical Provider, MD   METHOTREXATE PO Take 10 mg by mouth once a week   Yes Historical Provider, MD   traZODone (DESYREL) 100 MG tablet Take 100 mg by mouth nightly   Yes Historical Provider, MD   rosuvastatin (CRESTOR) 40 MG tablet Take 20 mg by mouth every evening   Yes Historical Provider, MD   alendronate (FOSAMAX) 70 MG tablet Take 70 mg by mouth every 7 days   Yes Historical Provider, MD   VITAMIN D PO Take by mouth   Yes Historical Provider, MD   cilostazol (PLETAL) 50 MG tablet Take 1 tablet by mouth 2 times daily 4/7/21  Yes Gauri Almanza MD   finasteride (PROSCAR) 5 MG tablet Take 1 tablet by mouth daily 10/12/20  Yes Austin Tse MD   tamsulosin (FLOMAX) 0.4 MG capsule Take 0.4 mg by mouth daily   Yes Historical Provider, MD   folic acid (FOLVITE) 1 MG tablet Take 1 tablet by mouth daily Daily except on day of taking Methotrexate 9/8/15  Yes Austin Tse MD   omeprazole (PRILOSEC) 20 MG capsule Take 20 mg by mouth 2 times daily. Yes Historical Provider, MD   naproxen (NAPROSYN) 500 MG tablet Take 500 mg by mouth daily as needed   Patient not taking: Reported on 3/28/2022    Historical Provider, MD   nitroGLYCERIN (NITROSTAT) 0.4 MG SL tablet up to max of 3 total doses. If no relief after 1 dose, call 911. 10/24/17   Gauri Almanza MD   albuterol (PROVENTIL HFA;VENTOLIN HFA) 108 (90 BASE) MCG/ACT inhaler Inhale 2 puffs into the lungs 4 times daily as needed for Shortness of Breath     Historical Provider, MD        Allergies:       Atorvastatin    Social History:     Tobacco:    reports that he quit smoking about 41 years ago. His smoking use included cigarettes. He has a 100.00 pack-year smoking history.  He has never used smokeless tobacco.  Alcohol:      reports no history of alcohol use. Drug Use:  reports no history of drug use. Family History:     Family History   Problem Relation Age of Onset    Heart Disease Mother     Heart Disease Father     Diabetes Other         Fhx of    Heart Disease Other         Fhx of    Cancer Sister     Heart Disease Brother        Review of Systems:       Review of Systems   Constitutional: Negative for appetite change, chills, fatigue, fever, malaise/fatigue and weight loss. HENT: Negative for rhinorrhea, sore throat and trouble swallowing. Eyes: Negative for discharge and redness. Respiratory: Negative for cough and shortness of breath. Cardiovascular: Negative for chest pain and palpitations. Gastrointestinal: Negative for abdominal pain, blood in stool, constipation, diarrhea, melena, nausea and vomiting. Genitourinary: Negative for dysuria and hematuria. Musculoskeletal: Negative for joint swelling and neck stiffness. Skin: Negative for pallor and rash. Neurological: Negative for dizziness, tremors, light-headedness and headaches. Psychiatric/Behavioral: Negative for hallucinations and sleep disturbance. The patient does not have insomnia. Physical Exam:     Physical Exam  Vitals reviewed. Constitutional:       General: He is not in acute distress. Appearance: Normal appearance. He is well-developed. He is not ill-appearing. HENT:      Head: Normocephalic and atraumatic. Eyes:      General:         Right eye: No discharge. Left eye: No discharge. Conjunctiva/sclera: Conjunctivae normal.   Neck:      Thyroid: No thyromegaly. Vascular: No carotid bruit. Cardiovascular:      Rate and Rhythm: Normal rate and regular rhythm. Heart sounds: Normal heart sounds. No murmur heard. Pulmonary:      Effort: Pulmonary effort is normal. No respiratory distress. Breath sounds: Normal breath sounds.    Abdominal:      General: Bowel sounds are normal.      Palpations: Abdomen is soft. Tenderness: There is no abdominal tenderness. Musculoskeletal:      Cervical back: Neck supple. Right lower leg: No edema. Left lower leg: No edema. Lymphadenopathy:      Cervical: No cervical adenopathy. Skin:     Findings: No rash. Neurological:      Mental Status: He is alert and oriented to person, place, and time. Psychiatric:         Mood and Affect: Mood normal.         Behavior: Behavior normal.         Vitals:  /72   Pulse 108   Wt 208 lb (94.3 kg)   SpO2 96%   BMI 28.21 kg/m²       Data:     Lab Results   Component Value Date     01/17/2022    K 3.9 01/17/2022     01/17/2022    CO2 25 01/17/2022    BUN 12 01/17/2022    CREATININE 0.73 01/17/2022    GLUCOSE 173 01/17/2022    GLUCOSE 132 02/14/2012    PROT 7.1 01/17/2022    LABALBU 3.8 01/17/2022    LABALBU 4.4 02/14/2012    BILITOT 0.49 01/17/2022    ALKPHOS 86 01/17/2022    AST 24 01/17/2022    ALT 20 01/17/2022     Lab Results   Component Value Date    WBC 5.5 01/17/2022    RBC 4.60 01/17/2022    RBC 4.73 02/14/2012    HGB 14.1 01/17/2022    HCT 42.2 01/17/2022    MCV 91.8 01/17/2022    MCH 30.7 01/17/2022    MCHC 33.5 01/17/2022    RDW 17.8 01/17/2022     01/17/2022     02/14/2012    MPV NOT REPORTED 01/17/2022     Lab Results   Component Value Date    TSH 1.62 01/17/2022     Lab Results   Component Value Date    CHOL 183 01/17/2022    CHOL 136 05/06/2016    HDL 70 01/17/2022    LABA1C 7.8 03/11/2020          Assessment/Plan:        1. Essential hypertension  Stable on lopressor    2. Pure hypercholesterolemia  Stable crestor     3. Gastroesophageal reflux disease without esophagitis  Stable pepcid     4. Other emphysema (HCC)  Stable spiriva    5. Other depression  Stable on zoloft and trazodone for sleep     6.  Paroxysmal atrial fibrillation ([address]  Stable on xarelto     Pt follows with urology -- on flomax and proscar    Merle received counseling on the following healthy behaviors: nutrition and exercise  Reviewed prior labs and health maintenance  Continue current medications, diet and exercise. Discussed use, benefit, and side effects of prescribed medications. Barriers to medication compliance addressed. Patient given educational materials - see patient instructions  Was a self-tracking handout given in paper form or via [employer]? Yes    Requested Prescriptions     Signed Prescriptions Disp Refills    sertraline (ZOLOFT) 100 MG tablet 90 tablet 1     Sig: Take 1 tablet by mouth daily       All patient questions answered. Patient voiced understanding. Quality Measures    Body mass index is 28.21 kg/m². Elevated. Weight control planned discussed Healthy diet and regular exercise. BP: 120/72. Blood pressure is normal. Treatment plan consists of No treatment change needed. Fall Risk 10/27/2021 9/28/2021 8/28/2020 8/24/2020 8/21/2019 4/10/2018 4/10/2018   2 or more falls in past year? yes yes no no no no no   Fall with injury in past year? no no no no no no no     The patient does not have a history of falls. I did not - not indicated , complete a risk assessment for falls. A plan of care for falls No Treatment plan indicated    Lab Results   Component Value Date    LDLCALC 78 05/06/2016    LDLCHOLESTEROL 79 01/17/2022    (goal LDL reduction with dx if diabetes is 50% LDL reduction)    PHQ Scores 10/27/2021 2/17/2021 8/28/2020 2/20/2020 8/21/2019 2/14/2019 4/10/2018   PHQ2 Score 0 0 0 0 0 0 0   PHQ9 Score 0 0 0 0 0 0 0     Interpretation of Total Score Depression Severity: 1-4 = Minimal depression, 5-9 = Mild depression, 10-14 = Moderate depression, 15-19 = Moderately severe depression, 20-27 = Severe depression        Return in about 6 months (around 9/28/2022) for HTN, Hyperlipidemia, COPD, gerd, depression.       Electronically signed by Gil Scott MD on 3/28/2022 at 10:35 AM

## 2022-04-28 RX ORDER — CILOSTAZOL 50 MG/1
50 TABLET ORAL 2 TIMES DAILY
Qty: 180 TABLET | Refills: 3 | Status: SHIPPED | OUTPATIENT
Start: 2022-04-28

## 2022-04-28 NOTE — TELEPHONE ENCOUNTER
Patient called for refill of his Cilostazol 50mg BID  Needs 90 day supply.  Send to  [facility]

## 2022-06-08 ENCOUNTER — TELEPHONE (OUTPATIENT)
Dept: FAMILY MEDICINE CLINIC | Age: 82
End: 2022-06-08

## 2022-08-09 ENCOUNTER — NURSE ONLY (OUTPATIENT)
Dept: CARDIOLOGY CLINIC | Age: 82
End: 2022-08-09
Payer: MEDICARE

## 2022-08-09 DIAGNOSIS — Z95.0 PACEMAKER: Primary | ICD-10-CM

## 2022-08-09 PROCEDURE — 93288 INTERROG EVL PM/LDLS PM IP: CPT | Performed by: INTERNAL MEDICINE

## 2022-08-15 ENCOUNTER — OFFICE VISIT (OUTPATIENT)
Dept: CARDIOLOGY CLINIC | Age: 82
End: 2022-08-15
Payer: MEDICARE

## 2022-08-15 ENCOUNTER — HOSPITAL ENCOUNTER (OUTPATIENT)
Dept: GENERAL RADIOLOGY | Age: 82
Discharge: HOME | End: 2022-08-17
Payer: MEDICARE

## 2022-08-15 ENCOUNTER — HOSPITAL ENCOUNTER (OUTPATIENT)
Age: 82
Discharge: HOME | End: 2022-08-15
Payer: MEDICARE

## 2022-08-15 ENCOUNTER — HOSPITAL ENCOUNTER (OUTPATIENT)
Age: 82
Discharge: HOME | End: 2022-08-17
Payer: MEDICARE

## 2022-08-15 VITALS
BODY MASS INDEX: 26.85 KG/M2 | WEIGHT: 198 LBS | DIASTOLIC BLOOD PRESSURE: 60 MMHG | SYSTOLIC BLOOD PRESSURE: 128 MMHG | HEART RATE: 70 BPM | OXYGEN SATURATION: 93 %

## 2022-08-15 DIAGNOSIS — Z95.0 PACEMAKER: Primary | ICD-10-CM

## 2022-08-15 DIAGNOSIS — E55.9 VITAMIN D DEFICIENCY DISEASE: ICD-10-CM

## 2022-08-15 DIAGNOSIS — I48.91 ATRIAL FIBRILLATION, UNSPECIFIED TYPE (HCC): ICD-10-CM

## 2022-08-15 DIAGNOSIS — E78.00 PURE HYPERCHOLESTEROLEMIA: ICD-10-CM

## 2022-08-15 DIAGNOSIS — I25.10 ATHEROSCLEROSIS OF NATIVE CORONARY ARTERY OF NATIVE HEART WITHOUT ANGINA PECTORIS: ICD-10-CM

## 2022-08-15 DIAGNOSIS — G89.18 POST-OP PAIN: ICD-10-CM

## 2022-08-15 LAB
ABSOLUTE EOS #: 0.1 K/UL (ref 0–0.4)
ABSOLUTE LYMPH #: 0.8 K/UL (ref 1–4.8)
ABSOLUTE MONO #: 0.5 K/UL (ref 0–1)
ALBUMIN SERPL-MCNC: 3.9 G/DL (ref 3.5–5.2)
ALP BLD-CCNC: 94 U/L (ref 40–129)
ALT SERPL-CCNC: 16 U/L (ref 5–41)
ANION GAP SERPL CALCULATED.3IONS-SCNC: 8 MMOL/L (ref 9–17)
AST SERPL-CCNC: 21 U/L
BASOPHILS # BLD: 0 % (ref 0–2)
BASOPHILS ABSOLUTE: 0 K/UL (ref 0–0.2)
BILIRUB SERPL-MCNC: 0.37 MG/DL (ref 0.3–1.2)
BUN BLDV-MCNC: 13 MG/DL (ref 8–23)
BUN/CREAT BLD: 15 (ref 9–20)
CALCIUM SERPL-MCNC: 9.4 MG/DL (ref 8.6–10.4)
CHLORIDE BLD-SCNC: 102 MMOL/L (ref 98–107)
CHOLESTEROL/HDL RATIO: 2.8
CHOLESTEROL: 180 MG/DL
CO2: 29 MMOL/L (ref 20–31)
CREAT SERPL-MCNC: 0.84 MG/DL (ref 0.7–1.2)
DIFFERENTIAL TYPE: YES
EOSINOPHILS RELATIVE PERCENT: 3 % (ref 0–5)
GFR AFRICAN AMERICAN: >60 ML/MIN
GFR NON-AFRICAN AMERICAN: >60 ML/MIN
GFR SERPL CREATININE-BSD FRML MDRD: ABNORMAL ML/MIN/{1.73_M2}
GLUCOSE BLD-MCNC: 157 MG/DL (ref 70–99)
HCT VFR BLD CALC: 39.2 % (ref 41–53)
HDLC SERPL-MCNC: 65 MG/DL
HEMOGLOBIN: 12.9 G/DL (ref 13.5–17.5)
LDL CHOLESTEROL: 81 MG/DL (ref 0–130)
LYMPHOCYTES # BLD: 15 % (ref 13–44)
MAGNESIUM: 2.1 MG/DL (ref 1.6–2.6)
MCH RBC QN AUTO: 30.3 PG (ref 26–34)
MCHC RBC AUTO-ENTMCNC: 32.9 G/DL (ref 31–37)
MCV RBC AUTO: 92.2 FL (ref 80–100)
MONOCYTES # BLD: 9 % (ref 5–9)
PATIENT FASTING?: YES
PDW BLD-RTO: 16.1 % (ref 12.1–15.2)
PLATELET # BLD: 226 K/UL (ref 140–450)
POTASSIUM SERPL-SCNC: 4.5 MMOL/L (ref 3.7–5.3)
RBC # BLD: 4.25 M/UL (ref 4.5–5.9)
SEG NEUTROPHILS: 73 % (ref 39–75)
SEGMENTED NEUTROPHILS ABSOLUTE COUNT: 4 K/UL (ref 2.1–6.5)
SODIUM BLD-SCNC: 139 MMOL/L (ref 135–144)
TOTAL PROTEIN: 7.2 G/DL (ref 6.4–8.3)
TRIGL SERPL-MCNC: 170 MG/DL
TSH SERPL DL<=0.05 MIU/L-ACNC: 1.6 UIU/ML (ref 0.3–5)
VITAMIN D 25-HYDROXY: 25.2 NG/ML
WBC # BLD: 5.4 K/UL (ref 3.5–11)

## 2022-08-15 PROCEDURE — 80053 COMPREHEN METABOLIC PANEL: CPT

## 2022-08-15 PROCEDURE — 85025 COMPLETE CBC W/AUTO DIFF WBC: CPT

## 2022-08-15 PROCEDURE — 93005 ELECTROCARDIOGRAM TRACING: CPT

## 2022-08-15 PROCEDURE — 36415 COLL VENOUS BLD VENIPUNCTURE: CPT

## 2022-08-15 PROCEDURE — 82306 VITAMIN D 25 HYDROXY: CPT

## 2022-08-15 PROCEDURE — 80061 LIPID PANEL: CPT

## 2022-08-15 PROCEDURE — 71046 X-RAY EXAM CHEST 2 VIEWS: CPT

## 2022-08-15 PROCEDURE — 84443 ASSAY THYROID STIM HORMONE: CPT

## 2022-08-15 PROCEDURE — 1123F ACP DISCUSS/DSCN MKR DOCD: CPT | Performed by: INTERNAL MEDICINE

## 2022-08-15 PROCEDURE — 83735 ASSAY OF MAGNESIUM: CPT

## 2022-08-15 PROCEDURE — 99214 OFFICE O/P EST MOD 30 MIN: CPT | Performed by: INTERNAL MEDICINE

## 2022-08-15 RX ORDER — OXYCODONE HYDROCHLORIDE 10 MG/1
10 TABLET ORAL EVERY 6 HOURS PRN
Qty: 10 TABLET | Refills: 0 | Status: SHIPPED | OUTPATIENT
Start: 2022-08-15 | End: 2022-08-22
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
## 2022-08-16 LAB
EKG ATRIAL RATE: 60 BPM
EKG P AXIS: -43 DEGREES
EKG P-R INTERVAL: 390 MS
EKG Q-T INTERVAL: 444 MS
EKG QRS DURATION: 84 MS
EKG QTC CALCULATION (BAZETT): 444 MS
EKG R AXIS: -4 DEGREES
EKG T AXIS: 15 DEGREES
EKG VENTRICULAR RATE: 60 BPM

## 2022-08-16 PROCEDURE — 93010 ELECTROCARDIOGRAM REPORT: CPT | Performed by: INTERNAL MEDICINE
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
## 2022-09-21 NOTE — ED PROVIDER NOTES
[address] [facility]  eMERGENCY dEPARTMENT eNCOUnter          CHIEF COMPLAINT       Chief Complaint   Patient presents with    URI     for the last three days       Nurses Notes reviewed and I agree except as noted in the HPI. HISTORY OF PRESENT ILLNESS    Jael Crowder is a 68 y.o. male who presents to the emergency room via private vehicle with daughter, concern for URI symptoms for the past 3 days, patient describes his breathing as \"it shitty that is what it is\", patient is to have some coughing, hacking, and states at times hard to breathe. Patient has had no relief with his home inhalers, patient does not have a home nebulizer unit. Per daughter, his home pulse ox was 87% room air. Patient does not normally wear oxygen. [facility]     Review of Systems   Respiratory: Positive for shortness of breath. All other systems reviewed and are negative. PAST MEDICAL HISTORY    has a past medical history of Nguyen syndrome, Blood circulation, collateral, BPH (benign prostatic hyperplasia), CAD (coronary artery disease), COPD (chronic obstructive pulmonary disease) ([facility] [address] Depression, Hx of colonoscopy, Hyperlipidemia, Liver disease, Movement disorder, Osteoarthritis, Pneumonia, Rheumatic fever, Rheumatoid arthritis ([address] Seizures ([address] and Type II or unspecified type diabetes mellitus without mention of complication, not stated as uncontrolled. SURGICAL HISTORY      has a past surgical history that includes other surgical history; other surgical history; other surgical history; Arterial bypass surgry (1997); Coronary artery bypass graft (0014,1944); knee surgery (1990); Lithotripsy; Leg Surgery (1960); Cholecystectomy; Upper gastrointestinal endoscopy (2014); Colonoscopy (2014); hernia repair; Cystoscopy (Left); Cystoscopy (Left, 5/31/2017); CYSTOSCOPY INSERTION / REMOVAL STENT / STONE (Left, 5/31/2017);  Endoscopy, colon, diagnostic; and Cardiac surgery. CURRENT MEDICATIONS       Discharge Medication List as of 5/30/2021  2:14 PM      CONTINUE these medications which have NOT CHANGED    Details   gemfibrozil (LOPID) 600 MG tablet Take 600 mg by mouth 2 times daily (before meals)Historical Med      cilostazol (PLETAL) 50 MG tablet Take 1 tablet by mouth 2 times daily, Disp-180 tablet, R-3Normal      clopidogrel (PLAVIX) 75 MG tablet Take 1 tablet by mouth daily, Disp-90 tablet, R-3Normal      finasteride (PROSCAR) 5 MG tablet Take 1 tablet by mouth daily, Disp-90 tablet, R-1This prescription was filled on 9/29/2020. Any refills authorized will be placed on file. Normal      isosorbide mononitrate (IMDUR) 30 MG extended release tablet Take 1 tablet by mouth daily, Disp-90 tablet, R-3Normal      calcium carbonate (OSCAL) 500 MG TABS tablet Take 500 mg by mouth 2 times dailyHistorical Med      tiotropium (SPIRIVA HANDIHALER) 18 MCG inhalation capsule Inhale 1 capsule into the lungs daily, Disp-90 capsule, R-3Print      diclofenac sodium 1 % GEL Apply 2 g topically 4 times daily as needed for Pain, Topical, 4 TIMES DAILY PRN, Historical Med      nitroGLYCERIN (NITROSTAT) 0.4 MG SL tablet up to max of 3 total doses. If no relief after 1 dose, call 911., Disp-25 tablet, R-3Normal      aspirin 81 MG tablet Take 81 mg by mouth daily Historical Med      nitroGLYCERIN (NITROLINGUAL) 0.4 MG/SPRAY 0.4 mg spray Place 1 spray under the tongue every 5 minutes as needed for Chest pain, Disp-1 Bottle, R-3Normal      tamsulosin (FLOMAX) 0.4 MG capsule Take 0.4 mg by mouth dailyHistorical Med      folic acid (FOLVITE) 1 MG tablet Take 1 tablet by mouth daily Daily except on day of taking Methotrexate, Disp-90 tablet, R-1Normal      omeprazole (PRILOSEC) 20 MG capsule Take 20 mg by mouth 2 times daily. Historical Med      Cholecalciferol (VITAMIN D) 2000 UNITS CAPS capsule Take 4,000 Units by mouth daily Historical Med      albuterol (PROVENTIL HFA;VENTOLIN HFA) 108 (90 BASE) MCG/ACT inhaler Inhale 2 puffs into the lungs 4 times daily as needed for Shortness of Breath Historical Med      glucose blood VI test strips (BL TEST STRIP PACK) strip Historical MedTests BS daily and as needed. ALLERGIES     has No Known Allergies. FAMILY HISTORY     He indicated that his mother is . He indicated that his father is . He indicated that the status of his sister is unknown. He indicated that the status of his brother is unknown. He indicated that the status of his other is unknown.   family history includes Cancer in his sister; Diabetes in an other family member; Heart Disease in his brother, father, mother, and another family member. SOCIAL HISTORY      reports that he quit smoking about 40 years ago. His smoking use included cigarettes. He has a 100.00 pack-year smoking history. He has never used smokeless tobacco. He reports that he does not drink alcohol and does not use drugs. PHYSICAL EXAM     INITIAL VITALS:  height is 6' 1\" (1.854 m) and weight is 195 lb (88.5 kg). His oral temperature is 98.4 °F (36.9 °C). His blood pressure is 103/59 (abnormal) and his pulse is 53. His respiration is 20 and oxygen saturation is 93%. Physical Exam   Constitutional: Patient is oriented to person, place, and time. Patient appears well-developed and well-nourished. Patient is active and cooperative. HENT:   Head: Normocephalic and atraumatic. Head is without contusion. Right Ear: Hearing and external ear normal. No drainage. Left Ear: Hearing and external ear normal. No drainage. Nose: Nose normal. No nasal deformity. No epistaxis. Mouth/Throat: Mucous membranes are not dry. Eyes: EOMI. Conjunctivae, sclera, and lids are normal. Right eye exhibits no discharge. Left eye exhibits no discharge.    Neck: Full passive range of motion without pain and phonation normal.  Negative JVD, negative tracheal deviation  Cardiovascular: Normal rate, regular rhythm and intact distal pulses. No lower extremity edema. Pulses: Right radial pulse  2+   Pulmonary/Chest: Effort normal. No tachypnea and no bradypnea. Some trace coarseness bilateral lung bases left greater than right, without significant wheezing rhonchi or rales  Abdominal: Soft. Patient without distension or tenderness  Musculoskeletal:   Negative acute trauma or deformity,  apparent full range of motion and normal strength all extremities appropriate to age. Neurological: Patient is alert and oriented to person, place, and time. patient displays no tremor. Patient displays no seizure activity. .  Lymphatic: No cervical lymphadenopathy  Skin: Skin is warm and dry. Patient is not diaphoretic. Psychiatric: Patient has a normal mood and affect. Patient speech is normal and behavior is normal. Cognition and memory are normal.    DIFFERENTIAL DIAGNOSIS:   COPD exacerbation pneumonia bronchitis URI effusion dysrhythmia NOS    DIAGNOSTIC RESULTS     EKG: All EKG's are interpreted by the Emergency Department Physician who either signs or Co-signs this chart in the absence of a cardiologist.  EKG    The patient had an EKG which is interpreted by me in the absence of a Cardiologist.   [] Without comparison to previous. [x] With comparison to a previous EKG Dated 3/11/2020    EKG @ 1137 hrs -sinus bradycardia with arrhythmia and first-degree AV block, rate 47, left axis -6,  ms, QRS 86 ms QTc 419 ms, serving ME intervals possible Wenckebach presentation      RADIOLOGY: non-plain film images(s) such as CT, Ultrasound and MRI are read by the radiologist.  XR CHEST PORTABLE   Final Result      Postoperative changes. Stable enlargement of the cardiac silhouette. No overt    pulmonary edema or other acute process is identified.                 LABS:   Labs Reviewed   CBC WITH AUTO DIFFERENTIAL - Abnormal; Notable for the following components:       Result Value    RBC 3.93 (*)     Hemoglobin 12.4 (*)     Hematocrit 38.0 (*)     Monocytes 11 (*)     Absolute Lymph # 0.97 (*)     All other components within normal limits   BASIC METABOLIC PANEL W/ REFLEX TO MG FOR LOW K - Abnormal; Notable for the following components:    Glucose 162 (*)     Anion Gap 8 (*)     All other components within normal limits   TROPONIN   BRAIN NATRIURETIC PEPTIDE       EMERGENCY DEPARTMENT COURSE:   Vitals:    Vitals:    05/30/21 1133   BP: (!) 103/59   Pulse: 53   Resp: 20   Temp: 98.4 °F (36.9 °C)   TempSrc: Oral   SpO2: 93%   Weight: 195 lb (88.5 kg)   Height: 6' 1\" (1.854 m)     Patient history and physical exam taken at bedside, discussed patient symptoms and exam findings, discussed his plan work-up to include EKG chest x-ray blood work IV access, DuoNeb regimen, IV Solu-Medrol. Patient placed on cardiac monitor, pulse ox, sitting high semi-Fowlers in bed with daughter present. EKG chest x-ray as above    Initial labs reviewed    Patient evaluated after first breathing treatment, does show improvement overall, will give a second breathing treatment and reevaluate including walking with pulse oximetry, patient acknowledges. I did review patient's noted bradycardia, Allises had been seen in the past, will try to get all his cardiologist to discuss case if possible. I was able to get a message to patient's cardiologist Dr. Maryjane Donnelly, including EKG images, agree with decreasing metoprolol tartrate to 12.5 mg twice daily and outpatient follow-up    Patient was able to tolerate ambulating with pulse ox, maintains good pulse extremity no increased work of breathing. We will go ahead and discharge patient home at this time, place patient on steroid burst, continue with home MDIs, close outpatient follow-up [facility] or [facility], follow-up with his cardiologist for his bradycardia, noting the medication change, patient and patient daughter acknowledge.     FINAL IMPRESSION      1. COPD exacerbation [facility])          DISPOSITION/PLAN   Discharge    PATIENT REFERRED TO:  Axel Ferreira MD  [address] [address]  [phone]    Call       Jacob Cano MD  [address] [address] [phone]    Call       [facility] ED  [address] [address] [phone]    As needed, If symptoms worsen      DISCHARGE MEDICATIONS:  Discharge Medication List as of 5/30/2021  2:14 PM      START taking these medications    Details   predniSONE (DELTASONE) 20 MG tablet Take 2 tablets by mouth daily for 5 days, Disp-10 tablet, R-0Normal                 Summation      Patient Course: Discharge    ED Medications administered this visit:    Medications   methylPREDNISolone sodium (SOLU-MEDROL) injection 125 mg (125 mg Intravenous Given 5/30/21 1224)   ipratropium-albuterol (DUONEB) nebulizer solution 1 ampule (1 ampule Inhalation Given 5/30/21 1228)   ipratropium-albuterol (DUONEB) nebulizer solution 1 ampule (1 ampule Inhalation Given 5/30/21 1254)       New Prescriptions from this visit:    Discharge Medication List as of 5/30/2021  2:14 PM      START taking these medications    Details   predniSONE (DELTASONE) 20 MG tablet Take 2 tablets by mouth daily for 5 days, Disp-10 tablet, R-0Normal             Follow-up:  Axel Ferreira MD  [address] [place] [id number]  [phone]    Call       Jacob Cano MD  [address] [place] [address]  [phone]    Call       [facility] ED  [address] [address] [phone]    As needed, If symptoms worsen        Final Impression:   1.  COPD exacerbation ([address]               (Please note that portions of this note were completed with a voice recognition program.  Efforts were made to edit the dictations but occasionally words are mis-transcribed.)    MD Braden Roman MD  05/30/21 0510 reed   awake alert mild distress secondary to urinary retention   bladder distended    heart s1s2 rrr  abd soft  ext nl

## 2022-09-28 ENCOUNTER — OFFICE VISIT (OUTPATIENT)
Dept: FAMILY MEDICINE CLINIC | Age: 82
End: 2022-09-28
Payer: MEDICARE

## 2022-09-28 VITALS
BODY MASS INDEX: 27.63 KG/M2 | DIASTOLIC BLOOD PRESSURE: 82 MMHG | HEIGHT: 72 IN | WEIGHT: 204 LBS | HEART RATE: 84 BPM | SYSTOLIC BLOOD PRESSURE: 122 MMHG

## 2022-09-28 DIAGNOSIS — I10 ESSENTIAL HYPERTENSION: Primary | ICD-10-CM

## 2022-09-28 DIAGNOSIS — J43.8 OTHER EMPHYSEMA (HCC): ICD-10-CM

## 2022-09-28 DIAGNOSIS — I25.10 ATHEROSCLEROSIS OF NATIVE CORONARY ARTERY OF NATIVE HEART WITHOUT ANGINA PECTORIS: ICD-10-CM

## 2022-09-28 DIAGNOSIS — K21.9 GASTROESOPHAGEAL REFLUX DISEASE WITHOUT ESOPHAGITIS: ICD-10-CM

## 2022-09-28 DIAGNOSIS — E11.9 TYPE 2 DIABETES MELLITUS WITHOUT COMPLICATION, WITHOUT LONG-TERM CURRENT USE OF INSULIN (HCC): ICD-10-CM

## 2022-09-28 LAB — HBA1C MFR BLD: 7.8 %

## 2022-09-28 PROCEDURE — 99214 OFFICE O/P EST MOD 30 MIN: CPT | Performed by: FAMILY MEDICINE

## 2022-09-28 PROCEDURE — 83036 HEMOGLOBIN GLYCOSYLATED A1C: CPT | Performed by: FAMILY MEDICINE

## 2022-09-28 PROCEDURE — 1123F ACP DISCUSS/DSCN MKR DOCD: CPT | Performed by: FAMILY MEDICINE

## 2022-09-28 PROCEDURE — 3051F HG A1C>EQUAL 7.0%<8.0%: CPT | Performed by: FAMILY MEDICINE

## 2022-09-28 SDOH — ECONOMIC STABILITY: FOOD INSECURITY: WITHIN THE PAST 12 MONTHS, THE FOOD YOU BOUGHT JUST DIDN'T LAST AND YOU DIDN'T HAVE MONEY TO GET MORE.: NEVER TRUE

## 2022-09-28 SDOH — ECONOMIC STABILITY: FOOD INSECURITY: WITHIN THE PAST 12 MONTHS, YOU WORRIED THAT YOUR FOOD WOULD RUN OUT BEFORE YOU GOT MONEY TO BUY MORE.: NEVER TRUE

## 2022-09-28 ASSESSMENT — PATIENT HEALTH QUESTIONNAIRE - PHQ9
SUM OF ALL RESPONSES TO PHQ QUESTIONS 1-9: 0
7. TROUBLE CONCENTRATING ON THINGS, SUCH AS READING THE NEWSPAPER OR WATCHING TELEVISION: 0
6. FEELING BAD ABOUT YOURSELF - OR THAT YOU ARE A FAILURE OR HAVE LET YOURSELF OR YOUR FAMILY DOWN: 0
9. THOUGHTS THAT YOU WOULD BE BETTER OFF DEAD, OR OF HURTING YOURSELF: 0
8. MOVING OR SPEAKING SO SLOWLY THAT OTHER PEOPLE COULD HAVE NOTICED. OR THE OPPOSITE, BEING SO FIGETY OR RESTLESS THAT YOU HAVE BEEN MOVING AROUND A LOT MORE THAN USUAL: 0
5. POOR APPETITE OR OVEREATING: 0
SUM OF ALL RESPONSES TO PHQ QUESTIONS 1-9: 0
2. FEELING DOWN, DEPRESSED OR HOPELESS: 0
SUM OF ALL RESPONSES TO PHQ QUESTIONS 1-9: 0
1. LITTLE INTEREST OR PLEASURE IN DOING THINGS: 0
SUM OF ALL RESPONSES TO PHQ9 QUESTIONS 1 & 2: 0
SUM OF ALL RESPONSES TO PHQ QUESTIONS 1-9: 0
3. TROUBLE FALLING OR STAYING ASLEEP: 0
4. FEELING TIRED OR HAVING LITTLE ENERGY: 0

## 2022-09-28 ASSESSMENT — SOCIAL DETERMINANTS OF HEALTH (SDOH): HOW HARD IS IT FOR YOU TO PAY FOR THE VERY BASICS LIKE FOOD, HOUSING, MEDICAL CARE, AND HEATING?: NOT VERY HARD

## 2022-09-28 ASSESSMENT — ENCOUNTER SYMPTOMS
BLOOD IN STOOL: 0
VISUAL CHANGE: 0
CHOKING: 0
EYE REDNESS: 0
RHINORRHEA: 0
BLURRED VISION: 0
SHORTNESS OF BREATH: 0
DIARRHEA: 0
VOMITING: 0
CHEST TIGHTNESS: 0
HEARTBURN: 0

## 2022-09-28 ASSESSMENT — COPD QUESTIONNAIRES: COPD: 1

## 2022-09-28 NOTE — PATIENT INSTRUCTIONS
Survey: You may be receiving a survey from [employer] regarding your visit today. You may get this in the mail, through your MyChart or in your email. Please complete the survey to enable us to provide the highest quality of care to you and your family. Please also, mention our names. If you cannot score us as very good (5 Stars) on any question, please feel free to call the office to discuss how we could have made your experience exceptional.      Thank You!         MD Fortunato Sanchez LPN

## 2022-09-28 NOTE — PROGRESS NOTES
HPI Notes    Name: Isidro Hebert  : 1940        Chief Complaint:     Chief Complaint   Patient presents with    Hypertension     6 month check up. [facility]    Gastroesophageal Reflux    COPD    Coronary Artery Disease     A-fib. Follows with Dr Marisa Pollard. Hyperglycemia     8/15/22 labs. History of Present Illness:     Isidro Hebert is a [de-identified] y.o.  male who presents with Hypertension (6 month check up. [facility]), Gastroesophageal Reflux, COPD, Coronary Artery Disease (A-fib. Follows with Dr Marisa Pollard. ), and Hyperglycemia (8/15/22 labs.)      Hypertension  This is a chronic problem. The current episode started more than 1 year ago. The problem is unchanged. The problem is controlled. Pertinent negatives include no blurred vision, chest pain, malaise/fatigue, palpitations, peripheral edema or shortness of breath. There are no associated agents to hypertension. Risk factors for coronary artery disease include dyslipidemia and male gender. The current treatment provides significant improvement. Hypertensive end-organ damage includes CAD/MI. Gastroesophageal Reflux  He reports no chest pain, no choking, no dysphagia or no heartburn. This is a chronic problem. The current episode started more than 1 year ago. The problem has been unchanged. Pertinent negatives include no melena or weight loss. He has tried a histamine-2 antagonist for the symptoms. The treatment provided significant relief. COPD  There is no shortness of breath. This is a chronic problem. The current episode started more than 1 year ago. The problem occurs intermittently. The problem has been unchanged. Pertinent negatives include no chest pain, ear pain, fever, heartburn, malaise/fatigue, rhinorrhea or weight loss. Relieved by: spiriva. He reports significant improvement on treatment. Coronary Artery Disease  Presents for follow-up visit.  Pertinent negatives include no chest pain, chest pressure, chest tightness, dizziness, leg swelling, palpitations or shortness of breath. The symptoms have been stable. Compliance with diet is good. Compliance with exercise is variable. Compliance with medications is good. Diabetes  He presents for his follow-up diabetic visit. He has type 2 diabetes mellitus. Pertinent negatives for hypoglycemia include no dizziness. Pertinent negatives for diabetes include no blurred vision, no chest pain, no polydipsia, no polyuria, no visual change and no weight loss. There are no hypoglycemic complications. Risk factors for coronary artery disease include diabetes mellitus, dyslipidemia, male sex and hypertension. He is compliant with treatment most of the time. His weight is stable. He is following a generally healthy diet. When asked about meal planning, he reported none. His home blood glucose trend is increasing steadily. Eye exam is current.      Past Medical History:     Past Medical History:   Diagnosis Date    Nguyen syndrome     sees [facility] specialist    Blood circulation, collateral     BPH (benign prostatic hyperplasia)     CAD (coronary artery disease)     Dr Belinda Alvarado    COPD (chronic obstructive pulmonary disease) (HCC)     Depression     Hx of colonoscopy     Hyperlipidemia     Liver disease     Movement disorder     essential tremors    Osteoarthritis     Specialist at [facility]    Pneumonia     Rheumatic fever     Rheumatoid arthritis ([facility] [address]     Seizures ([facility] [address]     jacksonian epilepsy    Type II or unspecified type diabetes mellitus without mention of complication, not stated as uncontrolled       Reviewed all health maintenance requirements and ordered appropriate tests  Health Maintenance Due   Topic Date Due    DTaP/Tdap/Td vaccine (1 - Tdap) Never done    Shingles vaccine (1 of 2) Never done    Flu vaccine (1) 08/01/2022    Annual Wellness Visit ([facility])  10/28/2022       Past Surgical History:     Past Surgical History:   Procedure Laterality Date    ARTERIAL BYPASS SURGRY  1997    Cardiac bypass x2    CARDIAC SURGERY      CABG    CHOLECYSTECTOMY      COLONOSCOPY  2014    WNL    CORONARY ARTERY BYPASS GRAFT  [id number]    CYSTOSCOPY Left     lithrotripsy with stent     CYSTOSCOPY Left 5/31/2017    CYSTOSCOPY URETEROSCOPY LASER-WITH HLL performed by Rajinder Camarillo MD at [address] / [place] Garcia Left 5/31/2017    CYSTOSCOPY STENT INSERTION performed by Rajinder Camarillo MD at [address], COLON, [address]      x2    [facility]    rt    LEG SURGERY  1960    cadaver graft rt thigh    LITHOTRIPSY      OTHER SURGICAL HISTORY      Rt leg artery transplant    OTHER SURGICAL HISTORY      Rt heel spur    OTHER SURGICAL HISTORY      Rt knee scope    PACEMAKER INSERTION N/A 9/29/2021    PACEMAKER INSERTION PERMANENT performed by Juarez Henson MD at [address] Left 09/29/2021    Dr. Bladimir Womack  2014        Medications:       Prior to Admission medications    Medication Sig Start Date End Date Taking? Authorizing Provider   cilostazol (PLETAL) 50 MG tablet Take 1 tablet by mouth 2 times daily 4/28/22  Yes Juarez Henson MD   sertraline (ZOLOFT) 100 MG tablet Take 1 tablet by mouth daily 3/28/22  Yes Eron Patricio MD   metoprolol tartrate (LOPRESSOR) 25 MG tablet Take 1 tablet by mouth 2 times daily  Patient taking differently: Take 12.5 mg by mouth in the morning.  9/30/21  Yes Juarez Henson MD   empagliflozin (JARDIANCE) 25 MG tablet Take 25 mg by mouth daily   Yes Historical Provider, MD   rivaroxaban (XARELTO) 20 MG TABS tablet Take 1 tablet by mouth daily 9/14/21  Yes Juarez Henson MD   Tiotropium Bromide Monohydrate (SPIRIVA RESPIMAT IN) Inhale into the lungs daily   Yes Historical Provider, MD   famotidine (PEPCID) 20 MG tablet Take 20 mg by mouth daily   Yes Historical Provider, MD   METHOTREXATE PO Take 10 mg by mouth once a week   Yes ear pain and rhinorrhea. Eyes:  Negative for blurred vision and redness. Respiratory:  Negative for choking, chest tightness and shortness of breath. Cardiovascular:  Negative for chest pain, palpitations and leg swelling. Gastrointestinal:  Negative for blood in stool, diarrhea, dysphagia, heartburn, melena and vomiting. Endocrine: Negative for polydipsia and polyuria. Genitourinary:  Negative for hematuria. Skin:  Negative for rash. Neurological:  Negative for dizziness. Psychiatric/Behavioral:  Negative for dysphoric mood and sleep disturbance. Physical Exam:     Physical Exam  Vitals reviewed. Constitutional:       General: He is not in acute distress. Appearance: Normal appearance. He is well-developed. He is not ill-appearing. HENT:      Head: Normocephalic and atraumatic. Eyes:      Conjunctiva/sclera: Conjunctivae normal.   Neck:      Thyroid: No thyromegaly. Vascular: No carotid bruit. Cardiovascular:      Rate and Rhythm: Normal rate and regular rhythm. Heart sounds: Normal heart sounds. No murmur heard. Pulmonary:      Effort: Pulmonary effort is normal. No respiratory distress. Breath sounds: Normal breath sounds. Abdominal:      General: There is no distension. Palpations: Abdomen is soft. Tenderness: There is no abdominal tenderness. Musculoskeletal:      Cervical back: Neck supple. Right lower leg: No edema. Left lower leg: No edema. Lymphadenopathy:      Cervical: No cervical adenopathy. Skin:     Findings: No rash. Neurological:      Mental Status: He is alert and oriented to person, place, and time.    Psychiatric:         Mood and Affect: Mood normal.       Vitals:  /82   Pulse 84   Ht 6' (1.829 m)   Wt 204 lb (92.5 kg)   BMI 27.67 kg/m²       Data:     Lab Results   Component Value Date/Time     08/15/2022 08:46 AM    K 4.5 08/15/2022 08:46 AM     08/15/2022 08:46 AM    CO2 29 08/15/2022 medications. Barriers to medication compliance addressed. Patient given educational materials - see patient instructions  Was a self-tracking handout given in paper form or via [employer]? Yes    Requested Prescriptions      No prescriptions requested or ordered in this encounter       All patient questions answered. Patient voiced understanding. Quality Measures    Body mass index is 27.67 kg/m². Normal. Weight control planned discussed Healthy diet and regular exercise. BP: 122/82. Blood pressure is Normal. Treatment plan consists of No treatment change needed. Fall Risk 10/27/2021 9/28/2021 8/28/2020 8/24/2020 8/21/2019 4/10/2018 4/10/2018   2 or more falls in past year? yes yes no no no no no   Fall with injury in past year? no no no no no no no     The patient does not have a history of falls. I did not - not indicated , complete a risk assessment for falls. A plan of care for falls No Treatment plan indicated    Lab Results   Component Value Date    LDLCALC 78 05/06/2016    LDLCHOLESTEROL 81 08/15/2022    (goal LDL reduction with dx if diabetes is 50% LDL reduction)    PHQ Scores 9/28/2022 10/27/2021 2/17/2021 8/28/2020 2/20/2020 8/21/2019 2/14/2019   PHQ2 Score 0 0 0 0 0 0 0   PHQ9 Score 0 0 0 0 0 0 0     Interpretation of Total Score Depression Severity: 1-4 = Minimal depression, 5-9 = Mild depression, 10-14 = Moderate depression, 15-19 = Moderately severe depression, 20-27 = Severe depression      Return in about 6 months (around 3/28/2023) for DM, HTN, gerd, COPD, CAD and Medicare wellness.       Electronically signed by Ibrahima Jefferson MD on 9/28/2022 at 10:13 AM

## 2022-12-12 RX ORDER — SERTRALINE HYDROCHLORIDE 100 MG/1
100 TABLET, FILM COATED ORAL DAILY
Qty: 90 TABLET | Refills: 1 | Status: SHIPPED | OUTPATIENT
Start: 2022-12-12

## 2022-12-12 NOTE — TELEPHONE ENCOUNTER
Last OV: 9/28/2022    Next scheduled apt: 3/30/2023    Patient requesting refill of Zoloft  Medication pending

## 2023-01-04 NOTE — CARE COORDINATION
[address] Transitions Initial Follow Up Call- 1st attempt     Call within 2 business days of discharge: Yes    Patient: Wolf Harmon Patient : 1940   MRN: [id number]  Reason for Admission: syncope and collapse  Discharge Date: 21 RARS: Readmission Risk Score: 13      Last Discharge [facility]       Complaint Diagnosis Description Type Department Provider    21 Loss of Consciousness Syncope and collapse . .. ED to Hosp-Admission (Discharged) (ADMITTED) [address] MD; Itzel Stone MD           Date/Time:  2021 1:10 PM  Attempted to reach patient by telephone. Call within 2 business days of discharge: Yes Left HIPPA compliant message requesting a return call. Will attempt to reach patient again.       Follow Up  Future Appointments   Date Time Provider Esvin Mejia   2021 11:00 AM Batool Lamar MD [facility]   2021 10:20 AM Batool Lamar MD [facility]   2021 11:00 AM MD Celia Beverly [facility]       Elliot Larson RN Cimzia Pregnancy And Lactation Text: This medication crosses the placenta but can be considered safe in certain situations. Cimzia may be excreted in breast milk.

## 2023-01-16 ENCOUNTER — TELEPHONE (OUTPATIENT)
Dept: FAMILY MEDICINE CLINIC | Age: 83
End: 2023-01-16

## 2023-01-16 NOTE — TELEPHONE ENCOUNTER
Patient's daughter called to see if Dr. Araseli Smith would increase Pearl's Zoloft? She states he feels anxious all the time and his shakes are getting worse. Patient last seen  9/28/22. Any questions or concerns please call [place] at [phone]. Next appt 3/30/23. [facility] does know Dr. Araseli Smith is done for today and this will be addressed tomorrow.     Health Maintenance   Topic Date Due    DTaP/Tdap/Td vaccine (1 - Tdap) Never done    Shingles vaccine (1 of 2) Never done    COVID-19 Vaccine (5 - Booster for Pfizer series) 05/31/2022    Flu vaccine (1) 08/01/2022    Annual Wellness Visit (AWV)  10/28/2022    Lipids  08/15/2023    Depression Monitoring  09/28/2023    Pneumococcal 65+ years Vaccine  Completed    Hepatitis A vaccine  Aged Out    Hib vaccine  Aged Out    Meningococcal (ACWY) vaccine  Aged Out             (applicable per patient's age: Cancer Screenings, Depression Screening, Fall Risk Screening, Immunizations)    Hemoglobin A1C (%)   Date Value   09/28/2022 7.8   03/11/2020 7.8 (H)   09/03/2019 7.3 (H)     LDL Cholesterol (mg/dL)   Date Value   08/15/2022 81     LDL Calculated (mg/dL)   Date Value   05/06/2016 78     AST (U/L)   Date Value   08/15/2022 21     ALT (U/L)   Date Value   08/15/2022 16     BUN (mg/dL)   Date Value   08/15/2022 13      (goal A1C is < 7)   (goal LDL is <100) need 30-50% reduction from baseline     BP Readings from Last 3 Encounters:   09/28/22 122/82   08/15/22 128/60   03/28/22 120/72    (goal /80)      All Future Testing planned in [facility]:  Lab Frequency Next Occurrence   TSH with Reflex Once 02/15/2023   CBC with Auto Differential Once 02/15/2023   Comprehensive Metabolic Panel Once 74/22/7193   Lipid Panel Once 02/15/2023   Magnesium Once 02/15/2023   EKG 12 Lead Once 02/15/2023   XR CHEST (2 VW) Once 02/15/2023       Next Visit Date:  Future Appointments   Date Time Provider Esvin Mejia   2/7/2023  9:30 AM MD [facility]   3/30/2023 9:00 AM Harris Duff MD [facility]            Patient Active Problem List:     Pure hypercholesterolemia     Coronary atherosclerosis of native coronary artery     Generalized osteoarthrosis, involving multiple sites     Disturbance of skin sensation     Incisional hernia     Diverticulosis of large intestine     Other extrapyramidal disease and abnormal movement disorder     COPD (chronic obstructive pulmonary disease) (HCC)     Rheumatic fever     Nguyen's esophagus with low grade dysplasia     Ureteral stone with hydronephrosis     Acute cystitis without hematuria     NSTEMI (non-ST elevated myocardial infarction) ([address]     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 (HCC)     Hematuria     SOB (shortness of breath)     Renal stone     Nocturia     BPH with obstruction/lower urinary tract symptoms     Cystitis     Syncope and collapse     SSS (sick sinus syndrome) ([address]

## 2023-01-17 NOTE — TELEPHONE ENCOUNTER
Tell pt's daughter that he should stay on the zoloft 100mg and then I will send in additional zoloft 50mg to take with the 100mg in AM as does not come in a 150mg and keep march 30th appt

## 2023-02-02 ENCOUNTER — HOSPITAL ENCOUNTER (OUTPATIENT)
Dept: GENERAL RADIOLOGY | Age: 83
Discharge: HOME | End: 2023-02-04
Payer: MEDICARE

## 2023-02-02 ENCOUNTER — HOSPITAL ENCOUNTER (OUTPATIENT)
Age: 83
Discharge: HOME | End: 2023-02-02
Payer: MEDICARE

## 2023-02-02 ENCOUNTER — HOSPITAL ENCOUNTER (OUTPATIENT)
Age: 83
Discharge: HOME | End: 2023-02-04
Payer: MEDICARE

## 2023-02-02 DIAGNOSIS — E78.00 PURE HYPERCHOLESTEROLEMIA: ICD-10-CM

## 2023-02-02 DIAGNOSIS — I48.91 ATRIAL FIBRILLATION, UNSPECIFIED TYPE (HCC): ICD-10-CM

## 2023-02-02 DIAGNOSIS — Z95.0 PACEMAKER: ICD-10-CM

## 2023-02-02 LAB
ABSOLUTE EOS #: 0.1 K/UL (ref 0–0.4)
ABSOLUTE LYMPH #: 0.7 K/UL (ref 1–4.8)
ABSOLUTE MONO #: 0.5 K/UL (ref 0–1)
ALBUMIN SERPL-MCNC: 4.1 G/DL (ref 3.5–5.2)
ALP SERPL-CCNC: 81 U/L (ref 40–129)
ALT SERPL-CCNC: 17 U/L (ref 5–41)
ANION GAP SERPL CALCULATED.3IONS-SCNC: 12 MMOL/L (ref 9–17)
AST SERPL-CCNC: 19 U/L
BASOPHILS # BLD: 0 % (ref 0–2)
BASOPHILS ABSOLUTE: 0 K/UL (ref 0–0.2)
BILIRUB SERPL-MCNC: 0.4 MG/DL (ref 0.3–1.2)
BUN SERPL-MCNC: 18 MG/DL (ref 8–23)
BUN/CREAT BLD: 19 (ref 9–20)
CALCIUM SERPL-MCNC: 9.5 MG/DL (ref 8.6–10.4)
CHLORIDE SERPL-SCNC: 102 MMOL/L (ref 98–107)
CHOLEST SERPL-MCNC: 191 MG/DL
CHOLESTEROL/HDL RATIO: 2.5
CO2 SERPL-SCNC: 25 MMOL/L (ref 20–31)
CREAT SERPL-MCNC: 0.94 MG/DL (ref 0.7–1.2)
DIFFERENTIAL TYPE: YES
EOSINOPHILS RELATIVE PERCENT: 2 % (ref 0–5)
GFR SERPL CREATININE-BSD FRML MDRD: >60 ML/MIN/1.73M2
GLUCOSE SERPL-MCNC: 181 MG/DL (ref 70–99)
HCT VFR BLD AUTO: 42 % (ref 41–53)
HDLC SERPL-MCNC: 76 MG/DL
HGB BLD-MCNC: 13.9 G/DL (ref 13.5–17.5)
LDLC SERPL CALC-MCNC: 75 MG/DL (ref 0–130)
LYMPHOCYTES # BLD: 13 % (ref 13–44)
MAGNESIUM SERPL-MCNC: 2.2 MG/DL (ref 1.6–2.6)
MCH RBC QN AUTO: 30.8 PG (ref 26–34)
MCHC RBC AUTO-ENTMCNC: 33.2 G/DL (ref 31–37)
MCV RBC AUTO: 93 FL (ref 80–100)
MONOCYTES # BLD: 9 % (ref 5–9)
PATIENT FASTING?: YES
PDW BLD-RTO: 16.6 % (ref 12.1–15.2)
PLATELET # BLD AUTO: 209 K/UL (ref 140–450)
POTASSIUM SERPL-SCNC: 4.4 MMOL/L (ref 3.7–5.3)
PROT SERPL-MCNC: 7.2 G/DL (ref 6.4–8.3)
RBC # BLD: 4.51 M/UL (ref 4.5–5.9)
SEG NEUTROPHILS: 76 % (ref 39–75)
SEGMENTED NEUTROPHILS ABSOLUTE COUNT: 3.8 K/UL (ref 2.1–6.5)
SODIUM SERPL-SCNC: 139 MMOL/L (ref 135–144)
TRIGL SERPL-MCNC: 202 MG/DL
TSH SERPL-ACNC: 1.99 UIU/ML (ref 0.3–5)
WBC # BLD AUTO: 5.1 K/UL (ref 3.5–11)

## 2023-02-02 PROCEDURE — 71046 X-RAY EXAM CHEST 2 VIEWS: CPT

## 2023-02-02 PROCEDURE — 80061 LIPID PANEL: CPT

## 2023-02-02 PROCEDURE — 84443 ASSAY THYROID STIM HORMONE: CPT

## 2023-02-02 PROCEDURE — 85025 COMPLETE CBC W/AUTO DIFF WBC: CPT

## 2023-02-02 PROCEDURE — 80053 COMPREHEN METABOLIC PANEL: CPT

## 2023-02-02 PROCEDURE — 36415 COLL VENOUS BLD VENIPUNCTURE: CPT

## 2023-02-02 PROCEDURE — 83735 ASSAY OF MAGNESIUM: CPT

## 2023-02-02 PROCEDURE — 93005 ELECTROCARDIOGRAM TRACING: CPT

## 2023-02-03 LAB
EKG ATRIAL RATE: 61 BPM
EKG P AXIS: 79 DEGREES
EKG P-R INTERVAL: 386 MS
EKG Q-T INTERVAL: 456 MS
EKG QRS DURATION: 88 MS
EKG QTC CALCULATION (BAZETT): 459 MS
EKG R AXIS: 23 DEGREES
EKG T AXIS: 70 DEGREES
EKG VENTRICULAR RATE: 61 BPM

## 2023-02-07 ENCOUNTER — OFFICE VISIT (OUTPATIENT)
Dept: CARDIOLOGY CLINIC | Age: 83
End: 2023-02-07
Payer: MEDICARE

## 2023-02-07 ENCOUNTER — HOSPITAL ENCOUNTER (OUTPATIENT)
Age: 83
Discharge: HOME | End: 2023-02-07
Payer: MEDICARE

## 2023-02-07 ENCOUNTER — HOSPITAL ENCOUNTER (OUTPATIENT)
Dept: MRI IMAGING | Age: 83
Discharge: HOME | End: 2023-02-09
Payer: MEDICARE

## 2023-02-07 VITALS
OXYGEN SATURATION: 97 % | SYSTOLIC BLOOD PRESSURE: 120 MMHG | DIASTOLIC BLOOD PRESSURE: 60 MMHG | WEIGHT: 204 LBS | HEART RATE: 86 BPM | BODY MASS INDEX: 27.67 KG/M2

## 2023-02-07 VITALS — SYSTOLIC BLOOD PRESSURE: 126 MMHG | OXYGEN SATURATION: 92 % | DIASTOLIC BLOOD PRESSURE: 83 MMHG | HEART RATE: 84 BPM

## 2023-02-07 DIAGNOSIS — I25.10 ATHEROSCLEROSIS OF NATIVE CORONARY ARTERY OF NATIVE HEART WITHOUT ANGINA PECTORIS: ICD-10-CM

## 2023-02-07 DIAGNOSIS — R55 SYNCOPE AND COLLAPSE: ICD-10-CM

## 2023-02-07 DIAGNOSIS — E78.00 PURE HYPERCHOLESTEROLEMIA: ICD-10-CM

## 2023-02-07 DIAGNOSIS — R26.9 ABNORMAL GAIT: ICD-10-CM

## 2023-02-07 DIAGNOSIS — G25.0 ESSENTIAL TREMOR: ICD-10-CM

## 2023-02-07 DIAGNOSIS — I48.91 ATRIAL FIBRILLATION, UNSPECIFIED TYPE (HCC): ICD-10-CM

## 2023-02-07 DIAGNOSIS — G25.0 ESSENTIAL TREMOR: Primary | ICD-10-CM

## 2023-02-07 DIAGNOSIS — Z95.0 PACEMAKER: ICD-10-CM

## 2023-02-07 LAB
CRP SERPL HS-MCNC: 7 MG/L (ref 0–5)
ERYTHROCYTE [SEDIMENTATION RATE] IN BLOOD BY WESTERGREN METHOD: 43 MM/HR (ref 0–20)
FOLATE SERPL-MCNC: 16.6 NG/ML
VIT B12 SERPL-MCNC: 824 PG/ML (ref 232–1245)

## 2023-02-07 PROCEDURE — 82746 ASSAY OF FOLIC ACID SERUM: CPT

## 2023-02-07 PROCEDURE — 86235 NUCLEAR ANTIGEN ANTIBODY: CPT

## 2023-02-07 PROCEDURE — 84425 ASSAY OF VITAMIN B-1: CPT

## 2023-02-07 PROCEDURE — 82175 ASSAY OF ARSENIC: CPT

## 2023-02-07 PROCEDURE — A9577 INJ MULTIHANCE: HCPCS | Performed by: INTERNAL MEDICINE

## 2023-02-07 PROCEDURE — 83825 ASSAY OF MERCURY: CPT

## 2023-02-07 PROCEDURE — 84155 ASSAY OF PROTEIN SERUM: CPT

## 2023-02-07 PROCEDURE — 84207 ASSAY OF VITAMIN B-6: CPT

## 2023-02-07 PROCEDURE — 99214 OFFICE O/P EST MOD 30 MIN: CPT | Performed by: INTERNAL MEDICINE

## 2023-02-07 PROCEDURE — 84166 PROTEIN E-PHORESIS/URINE/CSF: CPT

## 2023-02-07 PROCEDURE — 84165 PROTEIN E-PHORESIS SERUM: CPT

## 2023-02-07 PROCEDURE — 6360000004 HC RX CONTRAST MEDICATION: Performed by: INTERNAL MEDICINE

## 2023-02-07 PROCEDURE — 86334 IMMUNOFIX E-PHORESIS SERUM: CPT

## 2023-02-07 PROCEDURE — 93288 INTERROG EVL PM/LDLS PM IP: CPT | Performed by: INTERNAL MEDICINE

## 2023-02-07 PROCEDURE — 82607 VITAMIN B-12: CPT

## 2023-02-07 PROCEDURE — 70553 MRI BRAIN STEM W/O & W/DYE: CPT

## 2023-02-07 PROCEDURE — 86335 IMMUNFIX E-PHORSIS/URINE/CSF: CPT

## 2023-02-07 PROCEDURE — 1123F ACP DISCUSS/DSCN MKR DOCD: CPT | Performed by: INTERNAL MEDICINE

## 2023-02-07 PROCEDURE — 36415 COLL VENOUS BLD VENIPUNCTURE: CPT

## 2023-02-07 PROCEDURE — 82300 ASSAY OF CADMIUM: CPT

## 2023-02-07 PROCEDURE — 86140 C-REACTIVE PROTEIN: CPT

## 2023-02-07 PROCEDURE — 85652 RBC SED RATE AUTOMATED: CPT

## 2023-02-07 PROCEDURE — 84156 ASSAY OF PROTEIN URINE: CPT

## 2023-02-07 PROCEDURE — 83655 ASSAY OF LEAD: CPT

## 2023-02-07 RX ORDER — PROPRANOLOL HYDROCHLORIDE 40 MG/1
40 TABLET ORAL 2 TIMES DAILY
Qty: 60 TABLET | Refills: 11 | Status: SHIPPED | OUTPATIENT
Start: 2023-02-07

## 2023-02-07 RX ADMIN — GADOBENATE DIMEGLUMINE 19 ML: 529 INJECTION, SOLUTION INTRAVENOUS at 13:18

## 2023-02-07 NOTE — PROGRESS NOTES
Ov DR Reggie Cerda 6 mth follow up  No chest pain   Some sob at times. No ankle edema  C/o dizziness   Last week getting   Out of bed fell down. No hospitalizations or   Procedures since seen. Has had diarrhea since being  On crestor   Will stop it for few weeks  See if it goes away   Then restart it. Tremors getting worse  In rt hand. Walking is getting harder  Will refer to neurologist     Bedside echo done. Pacemaker check per medtronic. Will do MRI brain and multiple   Lab and urine testing. See in 6 mths. 2/8 pt called with MRI results.
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
## 2023-02-08 LAB
ALBUMIN (CALCULATED): 4.3 G/DL (ref 3.2–5.2)
ALBUMIN PERCENT: 61 % (ref 45–65)
ALPHA 1 PERCENT: 3 % (ref 3–6)
ALPHA 2 PERCENT: 15 % (ref 6–13)
ALPHA1 GLOB SERPL ELPH-MCNC: 0.2 G/DL (ref 0.1–0.4)
ALPHA2 GLOB SERPL ELPH-MCNC: 1 G/DL (ref 0.5–0.9)
B-GLOBULIN SERPL ELPH-MCNC: 0.8 G/DL (ref 0.5–1.1)
BETA PERCENT: 11 % (ref 11–19)
GAMMA GLOB SERPL ELPH-MCNC: 0.7 G/DL (ref 0.5–1.5)
GAMMA GLOBULIN %: 10 % (ref 9–20)
P E INTERPRETATION, U: NORMAL
PATHOLOGIST: ABNORMAL
PATHOLOGIST: NORMAL
PATHOLOGIST: NORMAL
PROT SERPL-MCNC: 7 G/DL (ref 6.4–8.3)
PROTEIN ELECTROPHORESIS, SERUM: ABNORMAL
SERUM IFX INTERP: NORMAL
SPECIMEN TYPE: NORMAL
TOTAL PROT. SUM,%: 100 % (ref 98–102)
TOTAL PROT. SUM: 7 G/DL (ref 6.3–8.2)
URINE IFX INTERP: NORMAL
URINE IFX SPECIMEN: NORMAL
URINE TOTAL PROTEIN: 7 MG/DL
URINE TOTAL PROTEIN: 7 MG/DL
VOLUME: NORMAL ML

## 2023-02-09 LAB
ANTI JO-1 IGG: <0.4 U/ML
ANTI-CENTROMERE: <0.4 U/ML
ARSENIC, BLOOD: <10 UG/L
CADMIUM: <1 UG/L
ENA SCL70 IGG SER IA-ACNC: <0.6 U/ML
ENA SM IGG SER-ACNC: 1 U/ML
ENA SS-A IGG SER QL: <0.3 U/ML
ENA SS-B IGG SER IA-ACNC: <0.3 U/ML
LEAD RBC-MCNC: 1 UG/DL (ref 0–4)
MERCURY, BLOOD: <2.5 UG/L
U1 SNRNP IGG SER IA-ACNC: <0.3 U/ML
U1 SNRNP IGG SER IA-ACNC: <0.3 U/ML
VITAMIN B6: 33 NMOL/L (ref 20–125)
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
## 2023-03-13 ENCOUNTER — OFFICE VISIT (OUTPATIENT)
Dept: NEUROLOGY | Age: 83
End: 2023-03-13
Payer: MEDICARE

## 2023-03-13 VITALS
DIASTOLIC BLOOD PRESSURE: 73 MMHG | RESPIRATION RATE: 18 BRPM | SYSTOLIC BLOOD PRESSURE: 116 MMHG | TEMPERATURE: 97.5 F | HEIGHT: 73 IN | WEIGHT: 206.6 LBS | BODY MASS INDEX: 27.38 KG/M2 | HEART RATE: 59 BPM

## 2023-03-13 DIAGNOSIS — G25.0 ESSENTIAL TREMOR: Primary | ICD-10-CM

## 2023-03-13 DIAGNOSIS — M21.371 FOOT DROP, RIGHT: ICD-10-CM

## 2023-03-13 PROCEDURE — 99203 OFFICE O/P NEW LOW 30 MIN: CPT | Performed by: NEUROMUSCULOSKELETAL MEDICINE, SPORTS MEDICINE

## 2023-03-13 RX ORDER — PRIMIDONE 50 MG/1
50 TABLET ORAL 3 TIMES DAILY
Qty: 15 TABLET | Refills: 0 | Status: SHIPPED | OUTPATIENT
Start: 2023-03-13 | End: 2023-03-13 | Stop reason: DRUGHIGH

## 2023-03-13 RX ORDER — PRIMIDONE 50 MG/1
25 TABLET ORAL NIGHTLY
Qty: 15 TABLET | Refills: 1 | Status: SHIPPED | OUTPATIENT
Start: 2023-03-13 | End: 2023-04-12

## 2023-03-13 NOTE — PATIENT INSTRUCTIONS
SURVEY:    You may be receiving a survey from [employer] regarding your visit today. Please complete the survey to enable us to provide the highest quality of care to you and your family. If you cannot score us a very good on any question, please call the office to discuss how we could have made your experience a very good one. Thank you.
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
## 2023-04-24 ENCOUNTER — OFFICE VISIT (OUTPATIENT)
Dept: FAMILY MEDICINE CLINIC | Age: 83
End: 2023-04-24
Payer: MEDICARE

## 2023-04-24 VITALS
BODY MASS INDEX: 27.85 KG/M2 | DIASTOLIC BLOOD PRESSURE: 78 MMHG | WEIGHT: 205.6 LBS | HEART RATE: 62 BPM | HEIGHT: 72 IN | SYSTOLIC BLOOD PRESSURE: 116 MMHG | OXYGEN SATURATION: 98 %

## 2023-04-24 DIAGNOSIS — R20.0 NUMBNESS AND TINGLING IN RIGHT HAND: ICD-10-CM

## 2023-04-24 DIAGNOSIS — M54.2 ACUTE NECK PAIN: Primary | ICD-10-CM

## 2023-04-24 DIAGNOSIS — M54.12 RADICULOPATHY, CERVICAL REGION: ICD-10-CM

## 2023-04-24 DIAGNOSIS — R20.2 NUMBNESS AND TINGLING IN RIGHT HAND: ICD-10-CM

## 2023-04-24 PROBLEM — Z91.81 AT HIGH RISK FOR FALLS: Status: ACTIVE | Noted: 2023-04-24

## 2023-04-24 PROCEDURE — 1123F ACP DISCUSS/DSCN MKR DOCD: CPT | Performed by: FAMILY MEDICINE

## 2023-04-24 PROCEDURE — 99213 OFFICE O/P EST LOW 20 MIN: CPT | Performed by: FAMILY MEDICINE

## 2023-04-24 RX ORDER — GABAPENTIN 100 MG/1
100 CAPSULE ORAL DAILY
Qty: 90 CAPSULE | Refills: 1 | Status: SHIPPED | OUTPATIENT
Start: 2023-04-24 | End: 2023-06-23

## 2023-04-24 RX ORDER — PREDNISONE 20 MG/1
TABLET ORAL
Qty: 20 TABLET | Refills: 0 | Status: SHIPPED | OUTPATIENT
Start: 2023-04-24

## 2023-04-24 SDOH — ECONOMIC STABILITY: INCOME INSECURITY: HOW HARD IS IT FOR YOU TO PAY FOR THE VERY BASICS LIKE FOOD, HOUSING, MEDICAL CARE, AND HEATING?: NOT HARD AT ALL

## 2023-04-24 SDOH — ECONOMIC STABILITY: HOUSING INSECURITY
IN THE LAST 12 MONTHS, WAS THERE A TIME WHEN YOU DID NOT HAVE A STEADY PLACE TO SLEEP OR SLEPT IN A SHELTER (INCLUDING NOW)?: NO

## 2023-04-24 SDOH — ECONOMIC STABILITY: FOOD INSECURITY: WITHIN THE PAST 12 MONTHS, YOU WORRIED THAT YOUR FOOD WOULD RUN OUT BEFORE YOU GOT MONEY TO BUY MORE.: NEVER TRUE

## 2023-04-24 SDOH — ECONOMIC STABILITY: FOOD INSECURITY: WITHIN THE PAST 12 MONTHS, THE FOOD YOU BOUGHT JUST DIDN'T LAST AND YOU DIDN'T HAVE MONEY TO GET MORE.: NEVER TRUE

## 2023-04-24 ASSESSMENT — PATIENT HEALTH QUESTIONNAIRE - PHQ9
4. FEELING TIRED OR HAVING LITTLE ENERGY: 0
8. MOVING OR SPEAKING SO SLOWLY THAT OTHER PEOPLE COULD HAVE NOTICED. OR THE OPPOSITE, BEING SO FIGETY OR RESTLESS THAT YOU HAVE BEEN MOVING AROUND A LOT MORE THAN USUAL: 0
SUM OF ALL RESPONSES TO PHQ9 QUESTIONS 1 & 2: 2
SUM OF ALL RESPONSES TO PHQ QUESTIONS 1-9: 2
7. TROUBLE CONCENTRATING ON THINGS, SUCH AS READING THE NEWSPAPER OR WATCHING TELEVISION: 0
9. THOUGHTS THAT YOU WOULD BE BETTER OFF DEAD, OR OF HURTING YOURSELF: 0
5. POOR APPETITE OR OVEREATING: 0
2. FEELING DOWN, DEPRESSED OR HOPELESS: 1
10. IF YOU CHECKED OFF ANY PROBLEMS, HOW DIFFICULT HAVE THESE PROBLEMS MADE IT FOR YOU TO DO YOUR WORK, TAKE CARE OF THINGS AT HOME, OR GET ALONG WITH OTHER PEOPLE: 1
3. TROUBLE FALLING OR STAYING ASLEEP: 0
6. FEELING BAD ABOUT YOURSELF - OR THAT YOU ARE A FAILURE OR HAVE LET YOURSELF OR YOUR FAMILY DOWN: 0
1. LITTLE INTEREST OR PLEASURE IN DOING THINGS: 1
SUM OF ALL RESPONSES TO PHQ QUESTIONS 1-9: 2

## 2023-04-24 NOTE — PATIENT INSTRUCTIONS
SURVEY:    You may be receiving a survey from [employer] regarding your visit today. Please complete the survey to enable us to provide the highest quality of care to you and your family. If you cannot score us a very good on any question, please call the office to discuss how we could of made your experience a very good one. Thank you.

## 2023-05-10 ENCOUNTER — HOSPITAL ENCOUNTER (OUTPATIENT)
Dept: MRI IMAGING | Age: 83
Discharge: HOME | End: 2023-05-12
Payer: MEDICARE

## 2023-05-10 ENCOUNTER — TELEPHONE (OUTPATIENT)
Dept: FAMILY MEDICINE CLINIC | Age: 83
End: 2023-05-10

## 2023-05-10 VITALS — OXYGEN SATURATION: 95 % | SYSTOLIC BLOOD PRESSURE: 153 MMHG | DIASTOLIC BLOOD PRESSURE: 82 MMHG | HEART RATE: 80 BPM

## 2023-05-10 DIAGNOSIS — R20.2 NUMBNESS AND TINGLING IN RIGHT HAND: ICD-10-CM

## 2023-05-10 DIAGNOSIS — M54.2 ACUTE NECK PAIN: ICD-10-CM

## 2023-05-10 DIAGNOSIS — R20.0 NUMBNESS AND TINGLING IN RIGHT HAND: ICD-10-CM

## 2023-05-10 DIAGNOSIS — M54.12 RADICULOPATHY, CERVICAL REGION: ICD-10-CM

## 2023-05-10 DIAGNOSIS — M54.2 ACUTE NECK PAIN: Primary | ICD-10-CM

## 2023-05-10 PROCEDURE — 72141 MRI NECK SPINE W/O DYE: CPT

## 2023-05-10 NOTE — PROGRESS NOTES
Pt here from MRI with Pacemaker. C/O left arm pain 8/10, states he took pain meds a home prior to procedure. Procedure paused briefly due to patients left arm pain to give him a break. Pt agreeable to try to finish the rest of procedure. Pt HR, BP, SPO2 monitored throughout procedure, tolerated fairly well. Discharged with his daughter, Wing Shannon.

## 2023-05-10 NOTE — TELEPHONE ENCOUNTER
Pt trying to have a MRI today but lots of pain in the Rt shoulder and neck region so referral to pain mgn.

## 2023-05-18 ENCOUNTER — TELEPHONE (OUTPATIENT)
Dept: FAMILY MEDICINE CLINIC | Age: 83
End: 2023-05-18

## 2023-05-18 RX ORDER — GABAPENTIN 100 MG/1
100 CAPSULE ORAL 3 TIMES DAILY
Qty: 180 CAPSULE | Refills: 1 | Status: SHIPPED | OUTPATIENT
Start: 2023-05-18 | End: 2023-07-17

## 2023-05-18 NOTE — TELEPHONE ENCOUNTER
Gabapentin 100 mg     2 tabs 3 times/day    DM-indio    Health Maintenance   Topic Date Due    DTaP/Tdap/Td vaccine (1 - Tdap) Never done    Shingles vaccine (1 of 2) Never done    Annual Wellness Visit (AWV)  10/28/2022    Lipids  02/02/2024    Depression Monitoring  04/24/2024    Flu vaccine  Completed    Pneumococcal 65+ years Vaccine  Completed    COVID-19 Vaccine  Completed    Hepatitis A vaccine  Aged Out    Hib vaccine  Aged Out    Meningococcal (ACWY) vaccine  Aged Out             (applicable per patient's age: Cancer Screenings, Depression Screening, Fall Risk Screening, Immunizations)    Hemoglobin A1C (%)   Date Value   09/28/2022 7.8   03/11/2020 7.8 (H)   09/03/2019 7.3 (H)     LDL Cholesterol (mg/dL)   Date Value   02/02/2023 75     LDL Calculated (mg/dL)   Date Value   05/06/2016 78     AST (U/L)   Date Value   02/02/2023 19     ALT (U/L)   Date Value   02/02/2023 17     BUN (mg/dL)   Date Value   02/02/2023 18      (goal A1C is < 7)   (goal LDL is <100) need 30-50% reduction from baseline     BP Readings from Last 3 Encounters:   05/10/23 (!) 153/82   04/24/23 116/78   04/10/23 (!) 148/92    (goal /80)      All Future Testing planned in [facility]:  Lab Frequency Next Occurrence   TSH with Reflex Once 08/07/2023   CBC with Auto Differential Once 08/07/2023   Comprehensive Metabolic Panel Once 68/03/5356   Lipid Panel Once 08/07/2023   Magnesium Once 08/07/2023   EKG 12 Lead Once 08/07/2023   XR CHEST (2 VW) Once 08/07/2023       Next Visit Date:  Future Appointments   Date Time Provider Esvin Mejia   6/26/2023  1:00 PM MD Chantelle Salas Neurology -   7/27/2023 11:00 AM [address] [place] [address] PAIN [facility]   8/8/2023 12:30 PM MD Venu Estes [facility]            Patient Active Problem List:     Pure hypercholesterolemia     Coronary atherosclerosis of native coronary artery     Generalized osteoarthrosis, involving multiple sites     Disturbance of skin

## 2023-05-18 NOTE — TELEPHONE ENCOUNTER
Last OV: 4/24/2023   Neck pain   Last RX:    Next scheduled apt: Visit date not found          Pt requesting a  refill       From office notes on 04/24/23 stated to take 100mg TID up to 200mg TID, changed quantity on Rx.

## 2023-06-26 ENCOUNTER — OFFICE VISIT (OUTPATIENT)
Dept: NEUROLOGY | Age: 83
End: 2023-06-26
Payer: MEDICARE

## 2023-06-26 VITALS
DIASTOLIC BLOOD PRESSURE: 84 MMHG | RESPIRATION RATE: 18 BRPM | WEIGHT: 204.6 LBS | HEART RATE: 81 BPM | BODY MASS INDEX: 27.71 KG/M2 | SYSTOLIC BLOOD PRESSURE: 138 MMHG | TEMPERATURE: 96.9 F | HEIGHT: 72 IN

## 2023-06-26 DIAGNOSIS — G25.0 ESSENTIAL TREMOR: Primary | ICD-10-CM

## 2023-06-26 PROCEDURE — 99212 OFFICE O/P EST SF 10 MIN: CPT | Performed by: NEUROMUSCULOSKELETAL MEDICINE, SPORTS MEDICINE

## 2023-06-26 PROCEDURE — 1123F ACP DISCUSS/DSCN MKR DOCD: CPT | Performed by: NEUROMUSCULOSKELETAL MEDICINE, SPORTS MEDICINE

## 2023-07-11 ENCOUNTER — OFFICE VISIT (OUTPATIENT)
Dept: FAMILY MEDICINE CLINIC | Age: 83
End: 2023-07-11
Payer: MEDICARE

## 2023-07-11 VITALS
HEART RATE: 76 BPM | SYSTOLIC BLOOD PRESSURE: 124 MMHG | WEIGHT: 204 LBS | DIASTOLIC BLOOD PRESSURE: 80 MMHG | TEMPERATURE: 97.8 F | OXYGEN SATURATION: 96 % | BODY MASS INDEX: 27.67 KG/M2

## 2023-07-11 DIAGNOSIS — M70.31 BURSITIS OF RIGHT ELBOW, UNSPECIFIED BURSA: Primary | ICD-10-CM

## 2023-07-11 PROCEDURE — 1123F ACP DISCUSS/DSCN MKR DOCD: CPT | Performed by: NURSE PRACTITIONER

## 2023-07-11 PROCEDURE — 99213 OFFICE O/P EST LOW 20 MIN: CPT | Performed by: NURSE PRACTITIONER

## 2023-07-11 ASSESSMENT — ENCOUNTER SYMPTOMS
DIARRHEA: 0
NAUSEA: 0
VOMITING: 0
COUGH: 0
SHORTNESS OF BREATH: 0

## 2023-07-11 NOTE — PROGRESS NOTES
HPI Notes    Name: Hector Jimenez  : 1940         Chief Complaint:     Chief Complaint   Patient presents with    Arm Pain     Patient here today with right arm pain and numbness. He said he noticed lump area right elbow a couple months ago. He does not remember any injury to the elbow. History of Present Illness:        HPI  Pt is a 81 yo male who reports for left elbow swelling. First noticed about 2 months ago. Patient does not remember any injury to the elbow. Patient is not on blood thinners.    Past Medical History:     Past Medical History:   Diagnosis Date    Nguyen syndrome     sees [place] specialist    Blood circulation, collateral     BPH (benign prostatic hyperplasia)     CAD (coronary artery disease)     Dr Akbar Hale    COPD (chronic obstructive pulmonary disease) (HCC)     Depression     Hx of colonoscopy     Hyperlipidemia     Liver disease     Movement disorder     essential tremors    Osteoarthritis     Specialist at [place]    Pneumonia     Rheumatic fever     Rheumatoid arthritis ([address] )     Seizures ([address])     jacksonian epilepsy    Type II or unspecified type diabetes mellitus without mention of complication, not stated as uncontrolled       Reviewed all health maintenance requirements and ordered appropriate tests  Health Maintenance Due   Topic Date Due    DTaP/Tdap/Td vaccine (1 - Tdap) Never done    Shingles vaccine (1 of 2) Never done    Annual Wellness Visit (AWV)  10/28/2022       Past Surgical History:     Past Surgical History:   Procedure Laterality Date    ARTERIAL BYPASS [address] [place]    Cardiac bypass x2    CARDIAC SURGERY      CABG    CHOLECYSTECTOMY      COLONOSCOPY      WNL    CORONARY ARTERY BYPASS GRAFT  [id number]    CYSTOSCOPY Left     lithrotripsy with stent     CYSTOSCOPY Left 2017    CYSTOSCOPY URETEROSCOPY LASER-WITH HLL performed by Florencio Cabrera MD at [address] [facility] / Juaquin Chacon Left 2017    CYSTOSCOPY STENT INSERTION

## 2023-07-11 NOTE — PATIENT INSTRUCTIONS
SURVEY:    You may be receiving a survey from [employer] regarding your visit today. Please complete the survey to enable us to provide the highest quality of care to you and your family. If you cannot score us a very good (5 Stars) on any question, please call the office to discuss how we could have made your experience a very good one. Thank you.     Clinical Care Team: DAVID Ramirez-DARLENE Hinds LPN    Clerical Team: 1 Esteban Roth

## 2023-07-13 ENCOUNTER — OFFICE VISIT (OUTPATIENT)
Dept: FAMILY MEDICINE CLINIC | Age: 83
End: 2023-07-13

## 2023-07-13 VITALS
SYSTOLIC BLOOD PRESSURE: 128 MMHG | BODY MASS INDEX: 27.77 KG/M2 | WEIGHT: 205 LBS | HEIGHT: 72 IN | DIASTOLIC BLOOD PRESSURE: 76 MMHG | OXYGEN SATURATION: 96 % | HEART RATE: 60 BPM

## 2023-07-13 DIAGNOSIS — Z00.00 MEDICARE ANNUAL WELLNESS VISIT, SUBSEQUENT: Primary | ICD-10-CM

## 2023-07-13 DIAGNOSIS — M70.21 OLECRANON BURSITIS OF RIGHT ELBOW: ICD-10-CM

## 2023-07-13 RX ORDER — CILOSTAZOL 50 MG/1
50 TABLET ORAL 2 TIMES DAILY
Qty: 180 TABLET | Refills: 3 | Status: SHIPPED | OUTPATIENT
Start: 2023-07-13

## 2023-07-13 SDOH — HEALTH STABILITY: PHYSICAL HEALTH: ON AVERAGE, HOW MANY DAYS PER WEEK DO YOU ENGAGE IN MODERATE TO STRENUOUS EXERCISE (LIKE A BRISK WALK)?: 2 DAYS

## 2023-07-13 SDOH — HEALTH STABILITY: PHYSICAL HEALTH: ON AVERAGE, HOW MANY MINUTES DO YOU ENGAGE IN EXERCISE AT THIS LEVEL?: 30 MIN

## 2023-07-13 ASSESSMENT — PATIENT HEALTH QUESTIONNAIRE - PHQ9
1. LITTLE INTEREST OR PLEASURE IN DOING THINGS: 0
3. TROUBLE FALLING OR STAYING ASLEEP: 0
2. FEELING DOWN, DEPRESSED OR HOPELESS: 0
5. POOR APPETITE OR OVEREATING: 0
10. IF YOU CHECKED OFF ANY PROBLEMS, HOW DIFFICULT HAVE THESE PROBLEMS MADE IT FOR YOU TO DO YOUR WORK, TAKE CARE OF THINGS AT HOME, OR GET ALONG WITH OTHER PEOPLE: 0
SUM OF ALL RESPONSES TO PHQ9 QUESTIONS 1 & 2: 0
8. MOVING OR SPEAKING SO SLOWLY THAT OTHER PEOPLE COULD HAVE NOTICED. OR THE OPPOSITE, BEING SO FIGETY OR RESTLESS THAT YOU HAVE BEEN MOVING AROUND A LOT MORE THAN USUAL: 0
4. FEELING TIRED OR HAVING LITTLE ENERGY: 0
SUM OF ALL RESPONSES TO PHQ QUESTIONS 1-9: 0
6. FEELING BAD ABOUT YOURSELF - OR THAT YOU ARE A FAILURE OR HAVE LET YOURSELF OR YOUR FAMILY DOWN: 0
SUM OF ALL RESPONSES TO PHQ QUESTIONS 1-9: 0
9. THOUGHTS THAT YOU WOULD BE BETTER OFF DEAD, OR OF HURTING YOURSELF: 0
7. TROUBLE CONCENTRATING ON THINGS, SUCH AS READING THE NEWSPAPER OR WATCHING TELEVISION: 0
SUM OF ALL RESPONSES TO PHQ QUESTIONS 1-9: 0
SUM OF ALL RESPONSES TO PHQ QUESTIONS 1-9: 0

## 2023-07-13 ASSESSMENT — LIFESTYLE VARIABLES
HOW OFTEN DO YOU HAVE A DRINK CONTAINING ALCOHOL: MONTHLY OR LESS
HOW MANY STANDARD DRINKS CONTAINING ALCOHOL DO YOU HAVE ON A TYPICAL DAY: PATIENT DOES NOT DRINK
HOW OFTEN DO YOU HAVE SIX OR MORE DRINKS ON ONE OCCASION: 1
HOW OFTEN DO YOU HAVE A DRINK CONTAINING ALCOHOL: 2
HOW MANY STANDARD DRINKS CONTAINING ALCOHOL DO YOU HAVE ON A TYPICAL DAY: 0

## 2023-07-13 NOTE — PROGRESS NOTES
HPI Notes    Name: Blayne Campuzano  : 1940        Chief Complaint:     Chief Complaint   Patient presents with    Bursitis     Right elbow,drained on 23. States that it filled back up the same day. Still having swelling and discomfort. Medicare AWV       History of Present Illness:     Blayne Campuzano is a 82 y.o.  male who presents with Bursitis (Right elbow,drained on 23. States that it filled back up the same day. Still having swelling and discomfort. 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 Elbow is NOT red or hot. Swelling overall about the same as before [facility] put sterile needle in and drain it. NO F/C/N/V. No rash. Pt feels good otherwise. It is sore bump the Rt elbow.     Past Medical History:     Past Medical History:   Diagnosis Date    Nguyen syndrome     sees [place] specialist    Blood circulation, collateral     BPH (benign prostatic hyperplasia)     CAD (coronary artery disease)     Dr Finesse Carreon    COPD (chronic obstructive pulmonary disease) (HCC)     Depression     Hx of colonoscopy     Hyperlipidemia     Liver disease     Movement disorder     essential tremors    Osteoarthritis     Specialist at [place]    Pneumonia     Rheumatic fever     Rheumatoid arthritis ([address])     Seizures ([address])     jacksonian epilepsy    Type II or unspecified type diabetes mellitus without mention of complication, not stated as uncontrolled       Reviewed all health maintenance requirements and ordered appropriate tests  Health Maintenance Due   Topic Date Due    DTaP/Tdap/Td vaccine (1 - Tdap) Never done    Shingles vaccine (1 of 2) Never done       Past Surgical History:     Past Surgical History:   Procedure Laterality Date    ARTERIAL BYPASS  [facility]    Cardiac bypass x2    CARDIAC SURGERY

## 2023-07-14 ASSESSMENT — ENCOUNTER SYMPTOMS
NAUSEA: 0
EYE REDNESS: 0
SHORTNESS OF BREATH: 0
COUGH: 0
TROUBLE SWALLOWING: 0
ABDOMINAL PAIN: 0
VOMITING: 0
CONSTIPATION: 0
DIARRHEA: 0
EYE DISCHARGE: 0
BLOOD IN STOOL: 0

## 2023-07-27 ENCOUNTER — OFFICE VISIT (OUTPATIENT)
Dept: PAIN MANAGEMENT | Age: 83
End: 2023-07-27
Payer: MEDICARE

## 2023-07-27 VITALS
RESPIRATION RATE: 19 BRPM | OXYGEN SATURATION: 96 % | HEART RATE: 47 BPM | BODY MASS INDEX: 27.77 KG/M2 | WEIGHT: 205 LBS | HEIGHT: 72 IN

## 2023-07-27 DIAGNOSIS — M50.30 DEGENERATIVE DISC DISEASE, CERVICAL: ICD-10-CM

## 2023-07-27 DIAGNOSIS — Z79.02 ENCOUNTER FOR CURRENT LONG TERM USE OF ANTIPLATELET DRUG: ICD-10-CM

## 2023-07-27 DIAGNOSIS — G56.01 CARPAL TUNNEL SYNDROME OF RIGHT WRIST: Primary | ICD-10-CM

## 2023-07-27 DIAGNOSIS — Z79.01 LONG TERM (CURRENT) USE OF ANTICOAGULANTS: ICD-10-CM

## 2023-07-27 DIAGNOSIS — M47.812 CERVICAL SPONDYLOSIS: ICD-10-CM

## 2023-07-27 PROCEDURE — 1123F ACP DISCUSS/DSCN MKR DOCD: CPT | Performed by: STUDENT IN AN ORGANIZED HEALTH CARE EDUCATION/TRAINING PROGRAM

## 2023-07-27 PROCEDURE — 99204 OFFICE O/P NEW MOD 45 MIN: CPT | Performed by: STUDENT IN AN ORGANIZED HEALTH CARE EDUCATION/TRAINING PROGRAM

## 2023-07-27 NOTE — H&P (VIEW-ONLY)
examination. There are no red flags in the patient's history. The patient has failed conservative measures including outpatient physical therapy, greater than 3 medications for pain relief, a self-directed therapy program, as well as activity modification all within the last 6 weeks over 3 months. The patient's pain is moderate to severe that causes functional deficit measured on pain and disability scale. The patient's Oswestry Disability Index is currently 46%. The patient reports worsening quality of life. PLAN:  Medications: For nonopioid therapy, the following medications were prescribed:    -Continue medication prescribed by primary care physician    Opioid therapy:  -Not indicated    Interventions:  -Plan for bilateral cervical C3, C4, C5 medial branch blocks  -Hold Pletal 2 days  -Hold Xarelto 3 days    Imaging:  -Reviewed cervical MRI with patient in room    Behavioral Therapies:  -Continue daily stretching and home exercise program    Referrals:  -Nerve conduction study/EMG of right upper extremity to rule out carpal tunnel syndrome    Follow-up Plan:  -After procedure    Patient was offered intervention where appropriate. Multi-modal Pain Therapy: The patient was explicitly considered for multimodal and interdisciplinary therapy. Non-opioid and non-pharmacological opportunities to enhance analgesia and quality of life have been and will continue to be pursued.     Juma Marshall,   Interventional Pain Management/PM&R   [facility]    Orders Placed This Encounter    Facet Joint C/T 2nd Level     Standing Status:   Future     Standing Expiration Date:   7/27/2024    Facet Joint C/T     Standing Status:   Future     Standing Expiration Date:   7/27/2024     Scheduling Instructions:      Bilateral cervical C3, C4, C5 MBBx2      Hold Pletal 2 days      Hold Xarelto 3 days    Amb External Referral To Neurology     Referral Priority:   Routine     Referral Type:   Consult for Advice and Opinion     Referral Reason:   Specialty Services Required     Referred to Provider:   Shanon Roque DO     Requested Specialty:   Neurology     Number of Visits Requested:   1

## 2023-07-27 NOTE — PROGRESS NOTES
History:   Diagnosis Date    Nguyen syndrome     sees [place] specialist    Blood circulation, collateral     BPH (benign prostatic hyperplasia)     CAD (coronary artery disease)     Dr Emma Jameson    COPD (chronic obstructive pulmonary disease) ([address])     Depression     Hx of colonoscopy     Hyperlipidemia     Liver disease     Movement disorder     essential tremors    Osteoarthritis     Specialist at [place]    Pneumonia     Rheumatic fever     Rheumatoid arthritis ([address])     Seizures ([address])     [facility]    Type II or unspecified type diabetes mellitus without mention of complication, not stated as uncontrolled        Past Surgical History:   Procedure Laterality Date    ARTERIAL BYPASS [address] [place]    Cardiac bypass x2    CARDIAC SURGERY      CABG    CHOLECYSTECTOMY      COLONOSCOPY  2014    WNL    CORONARY ARTERY BYPASS GRAFT  [phone]    CYSTOSCOPY Left     lithrotripsy with stent     CYSTOSCOPY Left 5/31/2017    CYSTOSCOPY URETEROSCOPY Nenita Feather performed by Jeanne Hector MD at [address] [facility] / Catherin Shearing / STONE Left 5/31/2017    CYSTOSCOPY STENT INSERTION performed by Jeanne Hector MD at [facility], [address] [place] [address]      x2    [address]    rt    [place]    cadaver graft rt thigh    LITHOTRIPSY      OTHER SURGICAL HISTORY      Rt leg artery transplant    OTHER SURGICAL HISTORY      Rt heel spur    OTHER SURGICAL HISTORY      Rt knee scope    PACEMAKER INSERTION N/A 9/29/2021    PACEMAKER INSERTION PERMANENT performed by Shanice Luna MD at [facility] [place] 09/29/2021    Dr. Vern Long  2014       Family History   Problem Relation Age of Onset    Heart Disease Mother     Heart Disease Father     Diabetes Other         Fhx of    Heart Disease Other         Fhx of    Cancer Sister     Heart Disease Brother        Social History

## 2023-07-31 ENCOUNTER — TELEPHONE (OUTPATIENT)
Dept: CARDIOLOGY CLINIC | Age: 83
End: 2023-07-31

## 2023-07-31 NOTE — TELEPHONE ENCOUNTER
Cardiac clearance faxed to Dr Gaona Makeclaudia to hold xarelto for 3 days and pletal 2 days prior to medial branch blocks

## 2023-08-03 ENCOUNTER — HOSPITAL ENCOUNTER (OUTPATIENT)
Age: 83
Discharge: HOME | End: 2023-08-03
Payer: MEDICARE

## 2023-08-03 ENCOUNTER — HOSPITAL ENCOUNTER (OUTPATIENT)
Dept: GENERAL RADIOLOGY | Age: 83
Discharge: HOME | End: 2023-08-05
Payer: MEDICARE

## 2023-08-03 ENCOUNTER — HOSPITAL ENCOUNTER (OUTPATIENT)
Age: 83
Discharge: HOME | End: 2023-08-05
Payer: MEDICARE

## 2023-08-03 DIAGNOSIS — Z95.0 PACEMAKER: ICD-10-CM

## 2023-08-03 DIAGNOSIS — E78.00 PURE HYPERCHOLESTEROLEMIA: ICD-10-CM

## 2023-08-03 DIAGNOSIS — I48.91 ATRIAL FIBRILLATION, UNSPECIFIED TYPE (HCC): ICD-10-CM

## 2023-08-03 DIAGNOSIS — G25.0 ESSENTIAL TREMOR: ICD-10-CM

## 2023-08-03 DIAGNOSIS — I25.10 ATHEROSCLEROSIS OF NATIVE CORONARY ARTERY OF NATIVE HEART WITHOUT ANGINA PECTORIS: ICD-10-CM

## 2023-08-03 LAB
ALBUMIN SERPL-MCNC: 3.8 G/DL (ref 3.5–5.2)
ALP SERPL-CCNC: 78 U/L (ref 40–129)
ALT SERPL-CCNC: 10 U/L (ref 5–41)
ANION GAP SERPL CALCULATED.3IONS-SCNC: 14 MMOL/L (ref 9–17)
AST SERPL-CCNC: 12 U/L
BASOPHILS # BLD: 0 K/UL (ref 0–0.2)
BASOPHILS NFR BLD: 0 % (ref 0–2)
BILIRUB SERPL-MCNC: 0.5 MG/DL (ref 0.3–1.2)
BUN SERPL-MCNC: 12 MG/DL (ref 8–23)
BUN/CREAT SERPL: 17 (ref 9–20)
CALCIUM SERPL-MCNC: 9 MG/DL (ref 8.6–10.4)
CHLORIDE SERPL-SCNC: 102 MMOL/L (ref 98–107)
CO2 SERPL-SCNC: 23 MMOL/L (ref 20–31)
CREAT SERPL-MCNC: 0.7 MG/DL (ref 0.7–1.2)
DIFFERENTIAL TYPE: YES
EKG ATRIAL RATE: 267 BPM
EKG Q-T INTERVAL: 426 MS
EKG QRS DURATION: 148 MS
EKG QTC CALCULATION (BAZETT): 503 MS
EKG R AXIS: -85 DEGREES
EKG T AXIS: 59 DEGREES
EKG VENTRICULAR RATE: 84 BPM
EOSINOPHIL # BLD: 0.1 K/UL (ref 0–0.4)
EOSINOPHILS RELATIVE PERCENT: 2 % (ref 0–5)
ERYTHROCYTE [DISTWIDTH] IN BLOOD BY AUTOMATED COUNT: 15.7 % (ref 12.1–15.2)
GFR SERPL CREATININE-BSD FRML MDRD: >60 ML/MIN/1.73M2
GLUCOSE SERPL-MCNC: 196 MG/DL (ref 70–99)
HCT VFR BLD AUTO: 41.8 % (ref 41–53)
HGB BLD-MCNC: 13.6 G/DL (ref 13.5–17.5)
LYMPHOCYTES NFR BLD: 0.7 K/UL (ref 1–4.8)
LYMPHOCYTES RELATIVE PERCENT: 14 % (ref 13–44)
MAGNESIUM SERPL-MCNC: 2 MG/DL (ref 1.6–2.6)
MCH RBC QN AUTO: 31.5 PG (ref 26–34)
MCHC RBC AUTO-ENTMCNC: 32.6 G/DL (ref 31–37)
MCV RBC AUTO: 96.8 FL (ref 80–100)
MONOCYTES NFR BLD: 0.5 K/UL (ref 0–1)
MONOCYTES NFR BLD: 9 % (ref 5–9)
NEUTROPHILS NFR BLD: 75 % (ref 39–75)
NEUTS SEG NFR BLD: 3.8 K/UL (ref 2.1–6.5)
PATIENT FASTING?: YES
PLATELET # BLD AUTO: 193 K/UL (ref 140–450)
POTASSIUM SERPL-SCNC: 4.4 MMOL/L (ref 3.7–5.3)
PROT SERPL-MCNC: 7 G/DL (ref 6.4–8.3)
RBC # BLD AUTO: 4.32 M/UL (ref 4.5–5.9)
SODIUM SERPL-SCNC: 139 MMOL/L (ref 135–144)
TSH SERPL DL<=0.05 MIU/L-ACNC: 2.77 UIU/ML (ref 0.3–5)
WBC OTHER # BLD: 5 K/UL (ref 3.5–11)

## 2023-08-03 PROCEDURE — 85025 COMPLETE CBC W/AUTO DIFF WBC: CPT

## 2023-08-03 PROCEDURE — 80061 LIPID PANEL: CPT

## 2023-08-03 PROCEDURE — 71046 X-RAY EXAM CHEST 2 VIEWS: CPT

## 2023-08-03 PROCEDURE — 80053 COMPREHEN METABOLIC PANEL: CPT

## 2023-08-03 PROCEDURE — 36415 COLL VENOUS BLD VENIPUNCTURE: CPT

## 2023-08-03 PROCEDURE — 83735 ASSAY OF MAGNESIUM: CPT

## 2023-08-03 PROCEDURE — 84443 ASSAY THYROID STIM HORMONE: CPT

## 2023-08-04 LAB
CHOLEST SERPL-MCNC: 169 MG/DL
CHOLESTEROL/HDL RATIO: 2.8
HDLC SERPL-MCNC: 60 MG/DL
LDLC SERPL CALC-MCNC: 76 MG/DL (ref 0–130)
TRIGL SERPL-MCNC: 163 MG/DL

## 2023-08-07 LAB
EKG ATRIAL RATE: 267 BPM
EKG Q-T INTERVAL: 426 MS
EKG QRS DURATION: 148 MS
EKG QTC CALCULATION (BAZETT): 503 MS
EKG R AXIS: -85 DEGREES
EKG T AXIS: 59 DEGREES
EKG VENTRICULAR RATE: 84 BPM

## 2023-08-08 ENCOUNTER — OFFICE VISIT (OUTPATIENT)
Dept: CARDIOLOGY CLINIC | Age: 83
End: 2023-08-08
Payer: MEDICARE

## 2023-08-08 VITALS
BODY MASS INDEX: 26.85 KG/M2 | DIASTOLIC BLOOD PRESSURE: 70 MMHG | OXYGEN SATURATION: 94 % | SYSTOLIC BLOOD PRESSURE: 118 MMHG | WEIGHT: 198 LBS | HEART RATE: 68 BPM

## 2023-08-08 DIAGNOSIS — I25.10 ATHEROSCLEROSIS OF NATIVE CORONARY ARTERY OF NATIVE HEART WITHOUT ANGINA PECTORIS: ICD-10-CM

## 2023-08-08 DIAGNOSIS — E55.9 VITAMIN D DEFICIENCY: ICD-10-CM

## 2023-08-08 DIAGNOSIS — E78.00 PURE HYPERCHOLESTEROLEMIA: ICD-10-CM

## 2023-08-08 DIAGNOSIS — I21.4 NSTEMI (NON-ST ELEVATED MYOCARDIAL INFARCTION) (HCC): ICD-10-CM

## 2023-08-08 DIAGNOSIS — I49.5 SSS (SICK SINUS SYNDROME) (HCC): Primary | ICD-10-CM

## 2023-08-08 PROCEDURE — 99214 OFFICE O/P EST MOD 30 MIN: CPT | Performed by: INTERNAL MEDICINE

## 2023-08-08 PROCEDURE — 1123F ACP DISCUSS/DSCN MKR DOCD: CPT | Performed by: INTERNAL MEDICINE

## 2023-08-09 NOTE — PROGRESS NOTES
[facility]   Preadmission Testing    Name: Alexandra Short  : 1940  Patient Phone: [phone] (home)     Procedure: Bilateral Cervical C3, C4, C5 medial branch blocks - Bilateral  Date of Procedure: 8/10/2023  Surgeon: Piedad Ratliff DO    Ht:  6'  Wt: 198lb  Wt method: Stated    Allergies: Allergies   Allergen Reactions    Atorvastatin Diarrhea                There were no vitals filed for this visit. No LMP for male patient. Do you take blood thinners? [x] Yes    [] No         Instructed to stop blood thinners prior to procedure? [x] Yes    [] No      [] N/A    []Eliquis - 3 days  [x]Xarelto - 3 days  []Pradaxa - 5 days  []Coumadin - 5 days   []Plavix - 7 days  []ASA 325mg - 7 days  []Brillinta - 5 days  [x]Pletal - 2 days   Have you had a respiratory infection or sore throat in last 4 weeks before surgery?     [] Yes    [x] No     Patient instructed on: [x] NPO Status - if receiving sedation  [x] Meds to Take  [x] Ride Home - sedation/ epidurals  [x]No Jewelry/Contact Lenses/Nail Polish     DOS Patient Needs [] HCG   [x] Blood Sugar  [] PT/INR

## 2023-08-10 ENCOUNTER — HOSPITAL ENCOUNTER (OUTPATIENT)
Age: 83
Setting detail: OUTPATIENT SURGERY
Discharge: HOME | End: 2023-08-10
Attending: STUDENT IN AN ORGANIZED HEALTH CARE EDUCATION/TRAINING PROGRAM | Admitting: STUDENT IN AN ORGANIZED HEALTH CARE EDUCATION/TRAINING PROGRAM
Payer: MEDICARE

## 2023-08-10 ENCOUNTER — APPOINTMENT (OUTPATIENT)
Dept: GENERAL RADIOLOGY | Age: 83
End: 2023-08-10
Attending: STUDENT IN AN ORGANIZED HEALTH CARE EDUCATION/TRAINING PROGRAM
Payer: MEDICARE

## 2023-08-10 VITALS
BODY MASS INDEX: 26.82 KG/M2 | HEART RATE: 80 BPM | SYSTOLIC BLOOD PRESSURE: 160 MMHG | DIASTOLIC BLOOD PRESSURE: 90 MMHG | OXYGEN SATURATION: 94 % | TEMPERATURE: 97.2 F | WEIGHT: 198 LBS | HEIGHT: 72 IN | RESPIRATION RATE: 11 BRPM

## 2023-08-10 LAB — GLUCOSE BLD-MCNC: 140 MG/DL (ref 65–99)

## 2023-08-10 PROCEDURE — 64491 INJ PARAVERT F JNT C/T 2 LEV: CPT | Performed by: STUDENT IN AN ORGANIZED HEALTH CARE EDUCATION/TRAINING PROGRAM

## 2023-08-10 PROCEDURE — 99152 MOD SED SAME PHYS/QHP 5/>YRS: CPT | Performed by: STUDENT IN AN ORGANIZED HEALTH CARE EDUCATION/TRAINING PROGRAM

## 2023-08-10 PROCEDURE — 2580000003 HC RX 258: Performed by: STUDENT IN AN ORGANIZED HEALTH CARE EDUCATION/TRAINING PROGRAM

## 2023-08-10 PROCEDURE — 3600000058 HC PAIN LEVEL 5 BASE: Performed by: STUDENT IN AN ORGANIZED HEALTH CARE EDUCATION/TRAINING PROGRAM

## 2023-08-10 PROCEDURE — 2500000003 HC RX 250 WO HCPCS: Performed by: STUDENT IN AN ORGANIZED HEALTH CARE EDUCATION/TRAINING PROGRAM

## 2023-08-10 PROCEDURE — 6360000002 HC RX W HCPCS: Performed by: STUDENT IN AN ORGANIZED HEALTH CARE EDUCATION/TRAINING PROGRAM

## 2023-08-10 PROCEDURE — 2709999900 HC NON-CHARGEABLE SUPPLY: Performed by: STUDENT IN AN ORGANIZED HEALTH CARE EDUCATION/TRAINING PROGRAM

## 2023-08-10 PROCEDURE — 7100000010 HC PHASE II RECOVERY - FIRST 15 MIN: Performed by: STUDENT IN AN ORGANIZED HEALTH CARE EDUCATION/TRAINING PROGRAM

## 2023-08-10 PROCEDURE — 64490 INJ PARAVERT F JNT C/T 1 LEV: CPT | Performed by: STUDENT IN AN ORGANIZED HEALTH CARE EDUCATION/TRAINING PROGRAM

## 2023-08-10 PROCEDURE — 82947 ASSAY GLUCOSE BLOOD QUANT: CPT

## 2023-08-10 PROCEDURE — 76000 FLUOROSCOPY <1 HR PHYS/QHP: CPT

## 2023-08-10 PROCEDURE — 7100000011 HC PHASE II RECOVERY - ADDTL 15 MIN: Performed by: STUDENT IN AN ORGANIZED HEALTH CARE EDUCATION/TRAINING PROGRAM

## 2023-08-10 RX ORDER — MIDAZOLAM HYDROCHLORIDE 1 MG/ML
INJECTION INTRAMUSCULAR; INTRAVENOUS PRN
Status: DISCONTINUED | OUTPATIENT
Start: 2023-08-10 | End: 2023-08-10 | Stop reason: ALTCHOICE

## 2023-08-10 RX ORDER — LIDOCAINE HYDROCHLORIDE 20 MG/ML
INJECTION, SOLUTION EPIDURAL; INFILTRATION; INTRACAUDAL; PERINEURAL PRN
Status: DISCONTINUED | OUTPATIENT
Start: 2023-08-10 | End: 2023-08-10 | Stop reason: ALTCHOICE

## 2023-08-10 RX ORDER — SODIUM CHLORIDE, SODIUM LACTATE, POTASSIUM CHLORIDE, CALCIUM CHLORIDE 600; 310; 30; 20 MG/100ML; MG/100ML; MG/100ML; MG/100ML
INJECTION, SOLUTION INTRAVENOUS CONTINUOUS
Status: DISCONTINUED | OUTPATIENT
Start: 2023-08-10 | End: 2023-08-10 | Stop reason: HOSPADM

## 2023-08-10 RX ADMIN — SODIUM CHLORIDE, POTASSIUM CHLORIDE, SODIUM LACTATE AND CALCIUM CHLORIDE: 600; 310; 30; 20 INJECTION, SOLUTION INTRAVENOUS at 14:22

## 2023-08-10 ASSESSMENT — PAIN - FUNCTIONAL ASSESSMENT: PAIN_FUNCTIONAL_ASSESSMENT: 0-10

## 2023-08-10 NOTE — OP NOTE
PROCEDURE PERFORMED: Bilateral Cervical Medial Branch Block using Fluoroscopy    PREOPERATIVE DIAGNOSIS: Bilateral neck pain/cervical spondylosis    INDICATIONS: Chronic neck pain    The patient's history and physical exam were reviewed. The risk, benefits, and alternatives of the procedure were discussed and all questions were answered to the patient's satisfaction. The patient agreed to proceed and written informed consent was obtained. POSTOPERATIVE DIAGNOSIS: Same    PHYSICIAN:  Dr. Hermes Perry DO    ANESTHESIA:  LOCAL    ASSISTANT:  NONE    PATHOLOGY:  NONE    ESTIMATED BLOOD LOSS:  N/A    IMPLANTS:  NONE    PROCEDURE DESCRIPTION: Diagnostic bilateral cervical medial branch block using fluoroscopy    The patient was placed on the operative bed in prone position. The area was prepped with  Chlorhexidine. The area was then draped in a sterile fashion. Targeted levels: Bilateral cervical C3, C4, C5 medial branch block    An AP  fluoroscopic film was obtained to identify the levels. At each cervical medial branch, a 25-gauge 3-1/2inch needle was inserted under AP fluoroscopic projections until bone was contacted. Then the needle tip was advanced to contact the centroid of the lateral articular pillar at each respective level. Aspiration of each needle was negative for blood, CSF and paresthesia prior to injection. The nerve block was then performed by injecting 0.5 mL lidocaine 2% through each needle. The needles were then removed and the needle sites were dressed appropriately. The same procedure was performed on the opposite side. The patient did not experience any hemodynamic or neurologic sequelae. The patient was transferred to the postoperative care unit in stable condition. Written discharge instructions were given to the patient. A pain diary was given to the patient upon discharge. COMPLICATIONS:  There were no apparent complications.   The patient tolerated the procedure

## 2023-08-10 NOTE — TELEPHONE ENCOUNTER
Patient is out of Woody Regulus - he was unsure if he was out of it because he ran out or not supposed to take - I called Dr Leonard Steele office to question because looks like he may have started it back in 09/2021 - Dr Beryl Frausto states that he would prefer Dr Tamanna Shelton to make that call - glucose was 196 at last visit - please let patient know - if he is to continue to take he will need a new script sent to [facility]

## 2023-08-10 NOTE — TELEPHONE ENCOUNTER
Last OV: 7/13/2023 AWV   Last RX:    Next scheduled apt: Visit date not found        Pt requesting a refill to [facility]   Medication pending for approval

## 2023-08-10 NOTE — INTERVAL H&P NOTE
Update History & Physical    The patient's History and Physical of July 27, 2023 was reviewed with the patient and I examined the patient. There was no change. The surgical site was confirmed by the patient and me. Plan: The risks, benefits, expected outcome, and alternative to the recommended procedure have been discussed with the patient. Patient understands and wants to proceed with the procedure.      Electronically signed by Mary Grimes DO on 8/10/2023 at 1:47 PM

## 2023-08-10 NOTE — PROGRESS NOTES
Discharge Criteria  Outpatients must meet criteria 1 through 7. Up to restroom, void sufficient amount. Yes. Gait steady when up. 1.  Minimum 30 minutes after last dose of sedative medication, minimum 120 minutes after last dose of reversal agent. Yes    2. Systolic BP stable within 20 mmHg for 30 minutes & systolic BP between 90 & 530 or within 10 mmHg of baseline. Yes    3. Pulse between 60 and 100 or within 10 bpm of baseline. Yes    4. Spontaneous respiratory rate >/= 10 per minute. Yes    5. SaO2 >/= 95 or  >/= baseline. Yes    6. Able to cough and swallow or return to baseline function. Yes    7. Alert and oriented or return to baseline mental status. Yes    8. Demonstrates controlled, coordinated movements, ambulates with steady gait, or return to baseline activity function. Yes    9. Minimal or no pain or nausea, or at a level tolerable and acceptable to patient. Yes    10. Takes and retains oral fluids as allowed. Yes    11. Procedural / perioperative site stable. Minimal or no bleeding. Yes        12. If GI endoscopy procedure, minimal or no abdominal distention or passing flatus. Yes    13. Written discharge instructions and emergency telephone number provided. Yes    14. Accompanied by a responsible adult. Yes    Adult patient discharged from facility without responsible person meets above criteria plus the following:   a) remains awake without stimulus for 30 minutes     b) oriented appropriate for age      c) all vital signs stable   d) no significant risk of losing protective reflexes      e) able to maintain pre-procedure mobility without assistance   f) no nausea or dizziness      g) transportation arrangements that do not require patient to operate motor   Vehicle.   Yes

## 2023-08-15 ENCOUNTER — TELEPHONE (OUTPATIENT)
Dept: PAIN MANAGEMENT | Age: 83
End: 2023-08-15

## 2023-08-15 NOTE — TELEPHONE ENCOUNTER
Patient states that he feels little relief now. He felt mild relief the first day, maybe 50%. Did not get much ROM improvement. The patient does want to have the 2nd injection. He estimates 50% improvement overall.

## 2023-08-23 ENCOUNTER — TELEPHONE (OUTPATIENT)
Dept: PAIN MANAGEMENT | Age: 83
End: 2023-08-23

## 2023-08-23 NOTE — TELEPHONE ENCOUNTER
Attempted to reach Pearl, no answer and no VMB set up. He does communicate through [employer] therefore sent a message. 2nd injection was denied due to 80% relief not being achieved the 1st time.

## 2023-08-23 NOTE — PROGRESS NOTES
Pearl here for 6 mo follow up  And pacemaker check  Patient states foot drop is better  Still gets some shaking, but is better  No cardiac issues  No CP  Getting some injections Thursday on right arm   Has been told he needs a right total knee  Echo done in room  See in 6 months
If you have any questions on my thoughts, please do not hesitate to contact me.     Sincerely,        Jessie Rivera MD    D: 08/21/2023 5:03:21     T: 08/22/2023 3:44:46     [email or web address]  Job#: [id number]   Doc#: [id number]

## 2023-08-30 ENCOUNTER — TELEPHONE (OUTPATIENT)
Age: 83
End: 2023-08-30

## 2023-08-30 NOTE — TELEPHONE ENCOUNTER
I trieed to reach the patient about the denial, I also sent him a [employer] message when I couldn't reach him since he has contacted me via [employer] before. Lois Villanueva He has an appt set up in Oct. He can keep this appt as is or ask Bob/ Amy Solorzano to add him to a day we arent already overly booked. Thanks!

## 2023-08-30 NOTE — TELEPHONE ENCOUNTER
Patient called office stating that Medicare has rejected the second round of injections. Patient asking what the next steps are? Please advise.

## 2023-09-28 ENCOUNTER — TELEPHONE (OUTPATIENT)
Dept: CARDIOLOGY CLINIC | Age: 83
End: 2023-09-28

## 2023-09-28 RX ORDER — PROPRANOLOL HYDROCHLORIDE 80 MG/1
80 TABLET ORAL 2 TIMES DAILY
Qty: 60 TABLET | Refills: 11 | Status: SHIPPED | OUTPATIENT
Start: 2023-09-28

## 2023-09-28 NOTE — TELEPHONE ENCOUNTER
Pearl called to ask for a refill of Metoprolol 50 mg bid to be sent to [facility] in [place]. He stated that he did not stop taking it when he started taking Propanolol 40 mg BID. He stated that his BP today was 155/89 and pulse was 60. He did not have a list of recent BP readings. Please advise.

## 2023-10-03 RX ORDER — PRIMIDONE 50 MG/1
25 TABLET ORAL NIGHTLY
Qty: 45 TABLET | Refills: 1 | Status: SHIPPED | OUTPATIENT
Start: 2023-10-03 | End: 2024-03-31

## 2023-10-05 ENCOUNTER — OFFICE VISIT (OUTPATIENT)
Dept: PAIN MANAGEMENT | Age: 83
End: 2023-10-05
Payer: MEDICARE

## 2023-10-05 VITALS
BODY MASS INDEX: 26.72 KG/M2 | OXYGEN SATURATION: 98 % | RESPIRATION RATE: 19 BRPM | HEART RATE: 78 BPM | WEIGHT: 197 LBS

## 2023-10-05 DIAGNOSIS — Z79.01 LONG TERM (CURRENT) USE OF ANTICOAGULANTS: ICD-10-CM

## 2023-10-05 DIAGNOSIS — M47.812 CERVICAL SPONDYLOSIS: Primary | ICD-10-CM

## 2023-10-05 DIAGNOSIS — M50.30 DEGENERATIVE DISC DISEASE, CERVICAL: ICD-10-CM

## 2023-10-05 DIAGNOSIS — Z79.02 ENCOUNTER FOR CURRENT LONG TERM USE OF ANTIPLATELET DRUG: ICD-10-CM

## 2023-10-05 PROCEDURE — 99214 OFFICE O/P EST MOD 30 MIN: CPT | Performed by: STUDENT IN AN ORGANIZED HEALTH CARE EDUCATION/TRAINING PROGRAM

## 2023-10-05 PROCEDURE — 1123F ACP DISCUSS/DSCN MKR DOCD: CPT | Performed by: STUDENT IN AN ORGANIZED HEALTH CARE EDUCATION/TRAINING PROGRAM

## 2023-10-05 NOTE — H&P (VIEW-ONLY)
hypertrophy throughout the cervical spine specifically at C3-4 and C4-5 facet joints. He underwent bilateral cervical C3, C4, C5 medial branch blocks on 8/10/2023 with 100% improvement in pain and function. His Oswestry disability index improved from 28 to 3 (refer to scanned in media). I will plan for bilateral cervical C3, C4, C5 medial branch block - confirmatory block using fluoroscopy with progression to radiofrequency ablation if successful. Neurologically, it appears the patient has full strength and normal sensation. There is no evidence of radiculopathy or myelopathy on examination. There are no red flags in the patient's history. The patient has failed conservative measures including outpatient physical therapy, greater than 3 medications for pain relief, a self-directed therapy program, as well as activity modification all within the last 6 weeks over 3 months. The patient's pain is moderate to severe that causes functional deficit measured on pain and disability scale. The patient's Oswestry Disability Index is currently 28 (refer to scanned in media). The patient reports worsening quality of life. PLAN:  Medications: For nonopioid therapy, the following medications were prescribed:    -Continue medication prescribed by primary care physician    Opioid therapy:  -Not indicated    Interventions:  -Plan for bilateral cervical C3, C4, C5 medial branch blocks - confirmatory block  -Hold Pletal 2 days  -Hold Xarelto 3 days    Imaging:  -No new imaging    Behavioral Therapies:  -Continue daily stretching and home exercise program    Referrals:  -Nerve conduction study/EMG of right upper extremity to rule out carpal tunnel syndrome    Follow-up Plan:  -After procedure    Patient was offered intervention where appropriate. Multi-modal Pain Therapy: The patient was explicitly considered for multimodal and interdisciplinary therapy.  Non-opioid and non-pharmacological opportunities to enhance analgesia and quality of life have been and will continue to be pursued. Juma Marshall DO  Interventional Pain Management/PM&R   [facility]    32 minutes was utilized for this patient encounter including face-to-face time with patient, going through and filling out preprocedural and postprocedural Oswestry disability index with patient in room, documentation, reviewing previous imaging, injections, and medical history while reviewing plan of care with patient which included injection therapy, physical therapy, and medication management. No orders of the defined types were placed in this encounter.

## 2023-10-05 NOTE — PROGRESS NOTES
Chronic Pain Clinic Note     Encounter Date: 10/5/2023     SUBJECTIVE:  Chief Complaint   Patient presents with    Back Pain       History of Present Illness:   Iris Armijo is a 82 y.o. male who presents with cervical pain. His 2nd MBB was denied by medicare. He denies changes in pain levels. Medication Refill: N/A    Current Complaints of Pain:   Location: cervical   Radiation: None  Severity: moderate- severe  Pain Numerical Score - 6   Average: 6-7     Highest: 10  Lowest: 6  Character/Quality: Complains of pain that is aching, dull, throbbing, \"stabbing pain\"  Timing: Constant  Associated symptoms: numbess- RUE  Numbness: RUE to thumb  Weakness: RUE  Exacerbating factors: coldness  Alleviating factors: heat  Length of time pain has been present: Started on - chronic issues   Inciting event/injury: injury to the elbow- forklift incident   Bowel/Bladder incontinence: No  Falls: No  Physical Therapy: no    History of Interventions:   Surgery: No previous lumbar/cervical surgeries  Injections: None    Imaging:    MRI cervical 5/10/23    FINDINGS: Patient was in a great deal of pain. Images are somewhat degraded by   motion artifact. Vertebral segments are intact and appropriately aligned with uniform and normal   internal bone marrow signal. Mild degenerative disc disease, disc interspace   narrowing, and bulging disc contour from C3 to C7 with what appear to be   shallow impressions on the ventral subarachnoid space. Axial scans did show multilevel disc bulging from C3 to C7, mild effacements of   the ventral subarachnoid space without cord compression or displacement. Facet arthrosis favoring the left at C3-C4 and C4-C5 levels appears to be   associated with at least mild left foraminal narrowing. At C6-C7 there appears   to be bilateral uncovertebral arthrosis and hypertrophy with moderate degree   bilateral foraminal narrowing. Cord signal appears to be satisfactory based on these studies.

## 2023-10-12 NOTE — ADDENDUM NOTE
Addended by: Janina Molina on: 7/23/2021 03:31 PM     Modules accepted: Orders Left message for Patient to return call.

## 2023-10-16 NOTE — PROGRESS NOTES
[facility]   Preadmission Testing    Name: Estefania Zelaya  : 1940  Patient Phone: [phone] (home)     Procedure: BILATERAL CERVICAL C3, C4, C5 MEDIAL BRANCH BLOCKS - Bilateral  Date of Procedure: 10/26/2023  Surgeon: Alex Kate DO    Ht:  6'  Wt: 190lb  Wt method: Stated    Allergies: Allergies   Allergen Reactions    Atorvastatin Diarrhea                There were no vitals filed for this visit. No LMP for male patient. Do you take blood thinners? [x] Yes    [] No         Instructed to stop blood thinners prior to procedure? [] Yes    [] No      [] N/A    []Eliquis - 3 days  [x]Xarelto - 3 days  []Pradaxa - 5 days  []Coumadin - 5 days   []Plavix - 7 days  []ASA 325mg - 7 days  []Brillinta - 5 days  [x]Pletal - 2 days   Have you had a respiratory infection or sore throat in last 4 weeks before surgery?     [] Yes    [x] No     Patient instructed on: [x] NPO Status - if receiving sedation  [x] Meds to Take  [x] Ride Home - sedation/ epidurals  []No Jewelry/Contact Lenses/Nail [place]     DOS Patient Needs [] HCG   [x] Blood Sugar  [] PT/INR

## 2023-10-26 ENCOUNTER — APPOINTMENT (OUTPATIENT)
Dept: GENERAL RADIOLOGY | Age: 83
End: 2023-10-26
Attending: STUDENT IN AN ORGANIZED HEALTH CARE EDUCATION/TRAINING PROGRAM
Payer: MEDICARE

## 2023-10-26 ENCOUNTER — HOSPITAL ENCOUNTER (OUTPATIENT)
Age: 83
Setting detail: OUTPATIENT SURGERY
Discharge: HOME | End: 2023-10-26
Attending: STUDENT IN AN ORGANIZED HEALTH CARE EDUCATION/TRAINING PROGRAM | Admitting: STUDENT IN AN ORGANIZED HEALTH CARE EDUCATION/TRAINING PROGRAM
Payer: MEDICARE

## 2023-10-26 VITALS
BODY MASS INDEX: 25.73 KG/M2 | SYSTOLIC BLOOD PRESSURE: 142 MMHG | DIASTOLIC BLOOD PRESSURE: 110 MMHG | TEMPERATURE: 97.2 F | HEART RATE: 52 BPM | OXYGEN SATURATION: 95 % | WEIGHT: 190 LBS | HEIGHT: 72 IN

## 2023-10-26 PROCEDURE — 99152 MOD SED SAME PHYS/QHP 5/>YRS: CPT | Performed by: STUDENT IN AN ORGANIZED HEALTH CARE EDUCATION/TRAINING PROGRAM

## 2023-10-26 PROCEDURE — 2580000003 HC RX 258: Performed by: STUDENT IN AN ORGANIZED HEALTH CARE EDUCATION/TRAINING PROGRAM

## 2023-10-26 PROCEDURE — 7100000011 HC PHASE II RECOVERY - ADDTL 15 MIN: Performed by: STUDENT IN AN ORGANIZED HEALTH CARE EDUCATION/TRAINING PROGRAM

## 2023-10-26 PROCEDURE — 2500000003 HC RX 250 WO HCPCS: Performed by: STUDENT IN AN ORGANIZED HEALTH CARE EDUCATION/TRAINING PROGRAM

## 2023-10-26 PROCEDURE — 76000 FLUOROSCOPY <1 HR PHYS/QHP: CPT

## 2023-10-26 PROCEDURE — 2709999900 HC NON-CHARGEABLE SUPPLY: Performed by: STUDENT IN AN ORGANIZED HEALTH CARE EDUCATION/TRAINING PROGRAM

## 2023-10-26 PROCEDURE — 3600000058 HC PAIN LEVEL 5 BASE: Performed by: STUDENT IN AN ORGANIZED HEALTH CARE EDUCATION/TRAINING PROGRAM

## 2023-10-26 PROCEDURE — 6360000002 HC RX W HCPCS: Performed by: STUDENT IN AN ORGANIZED HEALTH CARE EDUCATION/TRAINING PROGRAM

## 2023-10-26 PROCEDURE — 64490 INJ PARAVERT F JNT C/T 1 LEV: CPT | Performed by: STUDENT IN AN ORGANIZED HEALTH CARE EDUCATION/TRAINING PROGRAM

## 2023-10-26 PROCEDURE — 7100000010 HC PHASE II RECOVERY - FIRST 15 MIN: Performed by: STUDENT IN AN ORGANIZED HEALTH CARE EDUCATION/TRAINING PROGRAM

## 2023-10-26 PROCEDURE — 64491 INJ PARAVERT F JNT C/T 2 LEV: CPT | Performed by: STUDENT IN AN ORGANIZED HEALTH CARE EDUCATION/TRAINING PROGRAM

## 2023-10-26 RX ORDER — LIDOCAINE HYDROCHLORIDE 20 MG/ML
INJECTION, SOLUTION EPIDURAL; INFILTRATION; INTRACAUDAL; PERINEURAL PRN
Status: DISCONTINUED | OUTPATIENT
Start: 2023-10-26 | End: 2023-10-26 | Stop reason: ALTCHOICE

## 2023-10-26 RX ORDER — MIDAZOLAM HYDROCHLORIDE 1 MG/ML
INJECTION INTRAMUSCULAR; INTRAVENOUS PRN
Status: DISCONTINUED | OUTPATIENT
Start: 2023-10-26 | End: 2023-10-26 | Stop reason: ALTCHOICE

## 2023-10-26 RX ORDER — SODIUM CHLORIDE, SODIUM LACTATE, POTASSIUM CHLORIDE, CALCIUM CHLORIDE 600; 310; 30; 20 MG/100ML; MG/100ML; MG/100ML; MG/100ML
INJECTION, SOLUTION INTRAVENOUS CONTINUOUS
Status: DISCONTINUED | OUTPATIENT
Start: 2023-10-26 | End: 2023-10-26 | Stop reason: HOSPADM

## 2023-10-26 RX ADMIN — SODIUM CHLORIDE, POTASSIUM CHLORIDE, SODIUM LACTATE AND CALCIUM CHLORIDE: 600; 310; 30; 20 INJECTION, SOLUTION INTRAVENOUS at 14:27

## 2023-10-26 ASSESSMENT — PAIN - FUNCTIONAL ASSESSMENT: PAIN_FUNCTIONAL_ASSESSMENT: 0-10

## 2023-10-26 ASSESSMENT — PAIN SCALES - GENERAL
PAINLEVEL_OUTOF10: 0
PAINLEVEL_OUTOF10: 0

## 2023-10-26 NOTE — INTERVAL H&P NOTE
Update History & Physical    The patient's History and Physical of October 5, 2023 was reviewed with the patient and I examined the patient. There was no change. The surgical site was confirmed by the patient and me. Plan: The risks, benefits, expected outcome, and alternative to the recommended procedure have been discussed with the patient. Patient understands and wants to proceed with the procedure.      Electronically signed by Hans Frias DO on 10/26/2023 at 2:11 PM

## 2023-10-26 NOTE — DISCHARGE INSTRUCTIONS
You have received an injection of local anesthetic and corticosteroids. The local anesthetic effect typically last 4-6 hours. Please pay close attention to the following information/instructions: You may not drive for 12 hours after your injection if you had sedation or epidural injection. It is common to experience mild soreness at the injection site(s) for 24-48 hours. Ice is the best remedy. You may apply ice for 20 minutes at a time several times a day as needed. Avoid heat to the injection area for 72 hours. No hot packs, saunas, or steam rooms during this time. A regular shower is OK. You may immediately restart your regular medication regimen, including pain medications, anti-inflammatory, and blood thinners. Please remove the sterile dressing/band-aid tonight or tomorrow morning. Do not leave it on after you have taken a shower or gotten it wet. While it is extremely rare to get an infection after a spinal procedure, please call the office if you develop any signs of infection. These signs include fevers/chills, severely increased pain, redness at the injections site, or any drainage from the injection site. You may resume all of your normal daily activities 24 hours after your injection. It is OK to restart your exercise or physical therapy program as soon as you feel comfortable doing so. Please complete the pain diary given to you after your injection. The information you provide on this diary is used to guide your treatment plan. You should schedule a follow-up visit in 2-3 weeks to review your response to this injection. Please bring your pain diary with you to this appointment. Education Materials Received: yes  Belongings Returned: yes    Resume all current outpatient medication(s). The discharge instructions have been reviewed with the patient and/or Guardian. Patient and/or Guardian signed and retained a printed copy.

## 2023-10-26 NOTE — OP NOTE
PROCEDURE PERFORMED: Bilateral Cervical Medial Branch Block using Fluoroscopy    PREOPERATIVE DIAGNOSIS: Bilateral neck pain/cervical spondylosis    INDICATIONS: Chronic neck pain    The patient's history and physical exam were reviewed. The risk, benefits, and alternatives of the procedure were discussed and all questions were answered to the patient's satisfaction. The patient agreed to proceed and written informed consent was obtained. POSTOPERATIVE DIAGNOSIS: Same    PHYSICIAN:  Dr. iMrian Berry DO    ANESTHESIA:  LOCAL    ASSISTANT:  NONE    PATHOLOGY:  NONE    ESTIMATED BLOOD LOSS:  N/A    IMPLANTS:  NONE    PROCEDURE DESCRIPTION: Diagnostic bilateral cervical medial branch block using fluoroscopy    The patient was placed on the operative bed in prone position. The area was prepped with  Chlorhexidine. The area was then draped in a sterile fashion. Targeted levels: Bilateral cervical C3, C4, C5 medial branch block    An AP  fluoroscopic film was obtained to identify the levels. At each cervical medial branch, a 25-gauge 3-1/2inch needle was inserted under AP fluoroscopic projections until bone was contacted. Then the needle tip was advanced to contact the centroid of the lateral articular pillar at each respective level. Aspiration of each needle was negative for blood, CSF and paresthesia prior to injection. The nerve block was then performed by injecting 0.5 mL lidocaine 2% through each needle. The needles were then removed and the needle sites were dressed appropriately. The same procedure was performed on the opposite side. The patient did not experience any hemodynamic or neurologic sequelae. The patient was transferred to the postoperative care unit in stable condition. Written discharge instructions were given to the patient. A pain diary was given to the patient upon discharge. COMPLICATIONS:  There were no apparent complications.   The patient tolerated the procedure well.      SEDATION NOTE:     ASA CLASSIFICATION  3  MP   CLASSIFICATION  2     Moderate intravenous conscious sedation was supervised by Dr. Mikaela Calvillo  The patient was independently monitored by a Registered Nurse assigned to the Procedure Room  Monitoring included automated blood pressure, continuous EKG, Capnography and continuous pulse oximetry. The detailed Conscious Record is permanently stored in the [address] [place] [address].       The following is the conscious sedation record:  Start Time:  1449  End Time:  1459  Duration:  10 minutes  MEDS GIVEN Versed 2 mg

## 2023-11-10 ENCOUNTER — PROCEDURE VISIT (OUTPATIENT)
Dept: CARDIOLOGY CLINIC | Age: 83
End: 2023-11-10

## 2023-11-10 DIAGNOSIS — Z95.0 PACEMAKER: Primary | ICD-10-CM

## 2023-11-10 NOTE — PROGRESS NOTES
Leonor Gonzalez sent in medtronic carelink pacemaker report. Reviewed by myself and Dr Matthew Mahmood.

## 2023-12-07 ENCOUNTER — OFFICE VISIT (OUTPATIENT)
Dept: PAIN MANAGEMENT | Age: 83
End: 2023-12-07
Payer: MEDICARE

## 2023-12-07 VITALS
RESPIRATION RATE: 19 BRPM | WEIGHT: 190 LBS | BODY MASS INDEX: 25.77 KG/M2 | DIASTOLIC BLOOD PRESSURE: 100 MMHG | SYSTOLIC BLOOD PRESSURE: 140 MMHG | OXYGEN SATURATION: 96 % | HEART RATE: 60 BPM

## 2023-12-07 DIAGNOSIS — M47.812 CERVICAL SPONDYLOSIS: Primary | ICD-10-CM

## 2023-12-07 DIAGNOSIS — M50.30 DEGENERATIVE DISC DISEASE, CERVICAL: ICD-10-CM

## 2023-12-07 DIAGNOSIS — Z79.02 ENCOUNTER FOR CURRENT LONG TERM USE OF ANTIPLATELET DRUG: ICD-10-CM

## 2023-12-07 DIAGNOSIS — Z79.01 LONG TERM (CURRENT) USE OF ANTICOAGULANTS: ICD-10-CM

## 2023-12-07 PROCEDURE — 99214 OFFICE O/P EST MOD 30 MIN: CPT | Performed by: STUDENT IN AN ORGANIZED HEALTH CARE EDUCATION/TRAINING PROGRAM

## 2023-12-07 PROCEDURE — 1123F ACP DISCUSS/DSCN MKR DOCD: CPT | Performed by: STUDENT IN AN ORGANIZED HEALTH CARE EDUCATION/TRAINING PROGRAM

## 2023-12-07 NOTE — PROGRESS NOTES
intervention where appropriate. Multi-modal Pain Therapy: The patient was explicitly considered for multimodal and interdisciplinary therapy. Non-opioid and non-pharmacological opportunities to enhance analgesia and quality of life have been and will continue to be pursued. Hermes Perry DO  Interventional Pain Management/PM&R   [facility]    31 minutes was utilized for this patient encounter including face-to-face time with patient, going through and filling out preprocedural and postprocedural Oswestry disability index with patient in room, documentation, reviewing previous imaging, injections, and medical history while reviewing plan of care with patient which included injection therapy, physical therapy, and medication management.     Orders Placed This Encounter    Nerver C/T Single     Standing Status:   Future     Standing Expiration Date:   12/7/2024     Scheduling Instructions:      Right then left cervical C3, C4, C5 RFA      Hold Pletal 2 days      Hold Xarelto 3 days    Nerve C/T Additional     Standing Status:   Future     Standing Expiration Date:   12/7/2024

## 2023-12-08 ENCOUNTER — TELEPHONE (OUTPATIENT)
Dept: PAIN MANAGEMENT | Age: 83
End: 2023-12-08

## 2023-12-08 NOTE — TELEPHONE ENCOUNTER
[address] [facility]   [address], [place]    Phone [phone] Fax [phone]      Arash Ariza 1940 male    [address], [address]   Marital Status:          Home Phone: [phone]      Cell Phone:    Telephone Information:   Mobile [phone]          Surgeon: Angelia Coleman   Surgery Date: 1/11/24     Time:     Procedure: Left cervical C3, C4, C5 medial branch radiofrequency ablation    Diagnosis: M47.812     Important Medical History:  In Epic    Special Inst/Equip: Regular    CPT Codes:    58364, 25085  Latex Allergy: No       Cardiac Device:  No    Anesthesia:  Local          Admission Type:  Same Day                          Admit Prior to Day of Surgery: No    Case Location:  Ambulatory            Preadmission Testing:  Phone Call          PAT Date and Time:     Need Preop Cardiac Clearance: No    Does Patient have Cardiologist/physician?      Vigesaa    Holding pletal 2 days, Xarelto 3 days

## 2023-12-08 NOTE — TELEPHONE ENCOUNTER
[address] [facility] [address] [facility]   [address], [place]    Phone [phone] Fax [phone]      Khadar Amaral 1940 male    [address], [address] [facility]   Marital Status:          Home Phone: [phone]      Cell Phone:    Telephone Information:   Mobile [phone]          Surgeon: Samantha Weller  Surgery Date: 12/28/23     Time:     Procedure: Right cervical C3, C4, C5 medial branch radiofrequency ablation    Diagnosis: M47.812     Important Medical History:  In Epic    Special Inst/Equip: Regular    CPT Codes:    74859, 02471    Latex Allergy: No       Cardiac Device:  No    Anesthesia:  Local          Admission Type:  Same Day                          Admit Prior to Day of Surgery: No    Case Location:  Ambulatory            Preadmission Testing:  Phone Call          PAT Date and Time:     Need Preop Cardiac Clearance: No    Does Patient have Cardiologist/physician?   Vigesaa    Holding Pletal 2 days  Holding Xarelto 3 days

## 2023-12-11 ENCOUNTER — OFFICE VISIT (OUTPATIENT)
Dept: NEUROLOGY | Age: 83
End: 2023-12-11
Payer: MEDICARE

## 2023-12-11 VITALS
WEIGHT: 199 LBS | HEIGHT: 72 IN | OXYGEN SATURATION: 100 % | TEMPERATURE: 97.1 F | RESPIRATION RATE: 18 BRPM | SYSTOLIC BLOOD PRESSURE: 138 MMHG | DIASTOLIC BLOOD PRESSURE: 80 MMHG | BODY MASS INDEX: 26.95 KG/M2 | HEART RATE: 74 BPM

## 2023-12-11 DIAGNOSIS — G25.0 ESSENTIAL TREMOR: Primary | ICD-10-CM

## 2023-12-11 PROCEDURE — 99213 OFFICE O/P EST LOW 20 MIN: CPT | Performed by: NEUROMUSCULOSKELETAL MEDICINE, SPORTS MEDICINE

## 2023-12-11 PROCEDURE — 1123F ACP DISCUSS/DSCN MKR DOCD: CPT | Performed by: NEUROMUSCULOSKELETAL MEDICINE, SPORTS MEDICINE

## 2023-12-11 NOTE — PATIENT INSTRUCTIONS
SURVEY:    You may be receiving a survey from [employer] regarding your visit today. Please complete the survey to enable us to provide the highest quality of care to you and your family. If you cannot score us a very good on any question, please call the office to discuss how we could have made your experience a very good one. Thank you!         Dr. Jayme Donovan

## 2023-12-12 ENCOUNTER — OFFICE VISIT (OUTPATIENT)
Dept: FAMILY MEDICINE CLINIC | Age: 83
End: 2023-12-12
Payer: MEDICARE

## 2023-12-12 VITALS
BODY MASS INDEX: 26.95 KG/M2 | SYSTOLIC BLOOD PRESSURE: 138 MMHG | OXYGEN SATURATION: 91 % | WEIGHT: 199 LBS | HEIGHT: 72 IN | HEART RATE: 61 BPM | DIASTOLIC BLOOD PRESSURE: 82 MMHG

## 2023-12-12 DIAGNOSIS — G89.29 CHRONIC PAIN OF RIGHT KNEE: Primary | ICD-10-CM

## 2023-12-12 DIAGNOSIS — B07.8 OTHER VIRAL WARTS: ICD-10-CM

## 2023-12-12 DIAGNOSIS — E11.9 TYPE 2 DIABETES MELLITUS WITHOUT COMPLICATION, WITHOUT LONG-TERM CURRENT USE OF INSULIN (HCC): ICD-10-CM

## 2023-12-12 DIAGNOSIS — M25.561 CHRONIC PAIN OF RIGHT KNEE: Primary | ICD-10-CM

## 2023-12-12 DIAGNOSIS — J43.8 OTHER EMPHYSEMA (HCC): ICD-10-CM

## 2023-12-12 PROCEDURE — 99214 OFFICE O/P EST MOD 30 MIN: CPT | Performed by: FAMILY MEDICINE

## 2023-12-12 PROCEDURE — 1123F ACP DISCUSS/DSCN MKR DOCD: CPT | Performed by: FAMILY MEDICINE

## 2023-12-12 PROCEDURE — 17110 DESTRUCTION B9 LES UP TO 14: CPT | Performed by: FAMILY MEDICINE

## 2023-12-12 ASSESSMENT — ENCOUNTER SYMPTOMS
VOMITING: 0
ABDOMINAL PAIN: 0
SORE THROAT: 0
BLOOD IN STOOL: 0
DIFFICULTY BREATHING: 0
TROUBLE SWALLOWING: 0
EYE REDNESS: 0
EYE DISCHARGE: 0
SHORTNESS OF BREATH: 0
COUGH: 0
DIARRHEA: 0
WHEEZING: 0

## 2023-12-12 ASSESSMENT — COPD QUESTIONNAIRES: COPD: 1

## 2023-12-12 NOTE — PROGRESS NOTES
HPI Notes    Name: Sharlene Brian  : 1940        Chief Complaint:     Chief Complaint   Patient presents with    Knee Pain     Rt knee pain. Has been going to the [place] for over a year. He has had injections in his knee which only lasted a few hours. Pt has also done PT, creams and medications with no relief. Would like a referral to see Ortho to get his knee taken care of. Lesion(s)     Wart on left hand on palm by middle finger. Been there for almost a year. Has tried freezing it off but has not went away. History of Present Illness:     Sharlene Brian is a 82 y.o.  male who presents with Knee Pain (Rt knee pain. Has been going to the [place] for over a year. He has had injections in his knee which only lasted a few hours. Pt has also done PT, creams and medications with no relief. Would like a referral to see Ortho to get his knee taken care of. ) and Lesion(s) (Wart on left hand on palm by middle finger. Been there for almost a year. Has tried freezing it off but has not went away. )      Knee Pain   There was no injury mechanism. The pain is present in the right knee (pt has had Rt knee pain and [facility]\" [facility]. Pt states first treated for arthritis and so has gone to PT, injections and ointments and all this is NOT helping.). The quality of the pain is described as aching. The pain is at a severity of 6/10. The pain has been Constant (pain does keep pt up at night) since onset. Pertinent negatives include no tingling. The symptoms are aggravated by weight bearing and movement. Treatments tried: knee injections, physical therapy, creams and medications. The treatment provided no relief. COPD  There is no cough, difficulty breathing, shortness of breath or wheezing. This is a chronic problem. The current episode started more than 1 year ago. The problem has been unchanged. Pertinent negatives include no chest pain, fever, postnasal drip, sore throat or trouble swallowing.

## 2024-01-08 ENCOUNTER — HOSPITAL ENCOUNTER (OUTPATIENT)
Age: 84
Discharge: HOME | End: 2024-01-10
Payer: MEDICARE

## 2024-01-08 ENCOUNTER — HOSPITAL ENCOUNTER (OUTPATIENT)
Dept: GENERAL RADIOLOGY | Age: 84
Discharge: HOME | End: 2024-01-10
Payer: MEDICARE

## 2024-01-08 DIAGNOSIS — M25.561 PAIN IN JOINT OF RIGHT KNEE: ICD-10-CM

## 2024-01-08 PROCEDURE — 73564 X-RAY EXAM KNEE 4 OR MORE: CPT

## 2024-01-09 RX ORDER — TRAMADOL HYDROCHLORIDE 50 MG/1
50 TABLET ORAL EVERY 6 HOURS PRN
COMMUNITY

## 2024-01-09 NOTE — PROGRESS NOTES
[facility]   Preadmission Testing    Name: Pearl Gonzalez  : 1940  Patient Phone: [phone] (home)     Procedure: Cervical RFA  Date of Procedure:   Surgeon: Robert Ulloa DO    Ht:    Wt: 88.5 kg (195 lb)  Wt method: Stated    Allergies:   Allergies   Allergen Reactions    Atorvastatin Diarrhea                There were no vitals filed for this visit.    No LMP for male patient.    Do you take blood thinners?   [x] Yes    [] No         Instructed to stop blood thinners prior to procedure?    [x] Yes    [] No      [] N/A    [x]Eliquis - 3 days  []Xarelto - 3 days  []Pradaxa - 5 days  []Coumadin - 5 days   []Plavix - 7 days  []ASA 325mg - 7 days  []Brillinta - 5 days  [x]Pletal - 2 days   Have you had a respiratory infection or sore throat in last 4 weeks before surgery?    [] Yes    [x] No     Patient instructed on: [x] NPO Status - if receiving sedation  [x] Meds to Take  [x] Ride Home - sedation/ epidurals  [x]No Jewelry/Contact Lenses/Nail Polish     DOS Patient Needs [] HCG   [x] Blood Sugar  [] PT/INR

## 2024-01-11 ENCOUNTER — APPOINTMENT (OUTPATIENT)
Dept: GENERAL RADIOLOGY | Age: 84
End: 2024-01-11
Attending: STUDENT IN AN ORGANIZED HEALTH CARE EDUCATION/TRAINING PROGRAM
Payer: MEDICARE

## 2024-01-11 ENCOUNTER — HOSPITAL ENCOUNTER (OUTPATIENT)
Age: 84
Setting detail: OUTPATIENT SURGERY
Discharge: HOME | End: 2024-01-11
Attending: STUDENT IN AN ORGANIZED HEALTH CARE EDUCATION/TRAINING PROGRAM | Admitting: STUDENT IN AN ORGANIZED HEALTH CARE EDUCATION/TRAINING PROGRAM
Payer: MEDICARE

## 2024-01-11 VITALS
HEART RATE: 70 BPM | BODY MASS INDEX: 26.27 KG/M2 | HEIGHT: 73 IN | TEMPERATURE: 97.3 F | RESPIRATION RATE: 19 BRPM | OXYGEN SATURATION: 96 % | SYSTOLIC BLOOD PRESSURE: 147 MMHG | DIASTOLIC BLOOD PRESSURE: 93 MMHG | WEIGHT: 198.2 LBS

## 2024-01-11 LAB — GLUCOSE BLD-MCNC: 146 MG/DL (ref 65–99)

## 2024-01-11 PROCEDURE — 7100000011 HC PHASE II RECOVERY - ADDTL 15 MIN: Performed by: STUDENT IN AN ORGANIZED HEALTH CARE EDUCATION/TRAINING PROGRAM

## 2024-01-11 PROCEDURE — 99153 MOD SED SAME PHYS/QHP EA: CPT | Performed by: STUDENT IN AN ORGANIZED HEALTH CARE EDUCATION/TRAINING PROGRAM

## 2024-01-11 PROCEDURE — 7100000010 HC PHASE II RECOVERY - FIRST 15 MIN: Performed by: STUDENT IN AN ORGANIZED HEALTH CARE EDUCATION/TRAINING PROGRAM

## 2024-01-11 PROCEDURE — 3600000059 HC PAIN LEVEL 5 ADDL 15 MIN: Performed by: STUDENT IN AN ORGANIZED HEALTH CARE EDUCATION/TRAINING PROGRAM

## 2024-01-11 PROCEDURE — 64633 DESTROY CERV/THOR FACET JNT: CPT | Performed by: STUDENT IN AN ORGANIZED HEALTH CARE EDUCATION/TRAINING PROGRAM

## 2024-01-11 PROCEDURE — 64634 DESTROY C/TH FACET JNT ADDL: CPT | Performed by: STUDENT IN AN ORGANIZED HEALTH CARE EDUCATION/TRAINING PROGRAM

## 2024-01-11 PROCEDURE — 3600000058 HC PAIN LEVEL 5 BASE: Performed by: STUDENT IN AN ORGANIZED HEALTH CARE EDUCATION/TRAINING PROGRAM

## 2024-01-11 PROCEDURE — 2580000003 HC RX 258: Performed by: STUDENT IN AN ORGANIZED HEALTH CARE EDUCATION/TRAINING PROGRAM

## 2024-01-11 PROCEDURE — 99152 MOD SED SAME PHYS/QHP 5/>YRS: CPT | Performed by: STUDENT IN AN ORGANIZED HEALTH CARE EDUCATION/TRAINING PROGRAM

## 2024-01-11 PROCEDURE — 82947 ASSAY GLUCOSE BLOOD QUANT: CPT

## 2024-01-11 PROCEDURE — 6360000002 HC RX W HCPCS: Performed by: STUDENT IN AN ORGANIZED HEALTH CARE EDUCATION/TRAINING PROGRAM

## 2024-01-11 PROCEDURE — 2709999900 HC NON-CHARGEABLE SUPPLY: Performed by: STUDENT IN AN ORGANIZED HEALTH CARE EDUCATION/TRAINING PROGRAM

## 2024-01-11 PROCEDURE — 2500000003 HC RX 250 WO HCPCS: Performed by: STUDENT IN AN ORGANIZED HEALTH CARE EDUCATION/TRAINING PROGRAM

## 2024-01-11 RX ORDER — DEXAMETHASONE SODIUM PHOSPHATE 10 MG/ML
INJECTION INTRAMUSCULAR; INTRAVENOUS PRN
Status: DISCONTINUED | OUTPATIENT
Start: 2024-01-11 | End: 2024-01-11 | Stop reason: ALTCHOICE

## 2024-01-11 RX ORDER — SODIUM CHLORIDE, SODIUM LACTATE, POTASSIUM CHLORIDE, CALCIUM CHLORIDE 600; 310; 30; 20 MG/100ML; MG/100ML; MG/100ML; MG/100ML
INJECTION, SOLUTION INTRAVENOUS CONTINUOUS
Status: DISCONTINUED | OUTPATIENT
Start: 2024-01-11 | End: 2024-01-11 | Stop reason: HOSPADM

## 2024-01-11 RX ORDER — LIDOCAINE HYDROCHLORIDE 10 MG/ML
INJECTION, SOLUTION EPIDURAL; INFILTRATION; INTRACAUDAL; PERINEURAL PRN
Status: DISCONTINUED | OUTPATIENT
Start: 2024-01-11 | End: 2024-01-11 | Stop reason: ALTCHOICE

## 2024-01-11 RX ORDER — LIDOCAINE HYDROCHLORIDE 20 MG/ML
INJECTION, SOLUTION EPIDURAL; INFILTRATION; INTRACAUDAL; PERINEURAL PRN
Status: DISCONTINUED | OUTPATIENT
Start: 2024-01-11 | End: 2024-01-11 | Stop reason: ALTCHOICE

## 2024-01-11 RX ORDER — MIDAZOLAM HYDROCHLORIDE 1 MG/ML
INJECTION INTRAMUSCULAR; INTRAVENOUS PRN
Status: DISCONTINUED | OUTPATIENT
Start: 2024-01-11 | End: 2024-01-11 | Stop reason: ALTCHOICE

## 2024-01-11 RX ADMIN — SODIUM CHLORIDE, POTASSIUM CHLORIDE, SODIUM LACTATE AND CALCIUM CHLORIDE: 600; 310; 30; 20 INJECTION, SOLUTION INTRAVENOUS at 12:47

## 2024-01-11 ASSESSMENT — PAIN - FUNCTIONAL ASSESSMENT: PAIN_FUNCTIONAL_ASSESSMENT: 0-10

## 2024-01-11 NOTE — OP NOTE
PROCEDURE PERFORMED: Right Cervical Medial Branch Radiofrequency Ablation using Fluoroscopy    PREOPERATIVE DIAGNOSIS: Cervical spondylosis    INDICATIONS: Chronic neck pain    The patient's history and physical exam were reviewed.  The risk, benefits, and alternatives of the procedure were discussed and all questions were answered to the patient's satisfaction.  The patient agreed to proceed and written informed consent was obtained.    POSTOPERATIVE DIAGNOSIS: Same    PHYSICIAN:  Dr. Robert Ulloa DO    ANESTHESIA:  LOCAL    ASSISTANT:  NONE    PATHOLOGY:  NONE    ESTIMATED BLOOD LOSS:  N/A    IMPLANTS:  NONE    PROCEDURE DESCRIPTION: Right cervical medial branch radiofrequency ablation using fluoroscopy    The patient was placed on the operative bed in prone position.  The area was prepped with  Chlorhexidine.  The area was then draped in a sterile fashion.    Targeted levels: Right cervical C3, C4, C5 medial branch radiofrequency ablation    An AP  fluoroscopic image was used to identify the cervical landmarks.  The skin and soft tissues were anesthetized with lidocaine 1%.  At each cervical medial branch, a 20-gauge, 100 mm curved with 5 mm active tip probe was inserted under AP fluoroscopic projections until bone was contacted.  A lateral fluoroscopic view was then obtained, and then the needle tip was advanced to contact the centroid of the lateral articular pillar at each respective level.  Aspiration of each needle was negative for blood, CSF and paresthesia prior to injection.  Motor stimulation at 2 Hz and 1.2 V was negative.  Then, after negative aspiration, 1 mL lidocaine 2% was injected at each level prior to lesioning which was performed at 80°C for 90 seconds.  Once the lesion was complete, injectate solution containing dexamethasone 10 mg and 2 mL lidocaine 1% was injected at each site with a total of 1 mL.  The probes were then removed.  The needle sites were dressed appropriately.  The

## 2024-01-11 NOTE — H&P
Pain Pre-Op H&P Note    SUBJECTIVE:  No chief complaint on file.      History of Present Illness:   Pearl Gonzalez is a 83 y.o. male who presents with chronic neck pain.    Past Medical History:   Diagnosis Date    Nguyen syndrome     [facility]    Blood circulation, collateral     BPH (benign prostatic hyperplasia)     CAD (coronary artery disease)     Dr Restrepo    COPD (chronic obstructive pulmonary disease) (HCC)     COPD (chronic obstructive pulmonary disease) (HCC)     Depression     Hx of colonoscopy     Hyperlipidemia     Liver disease     Movement disorder     essential tremors    Osteoarthritis     [facility]    Pneumonia     Rheumatic fever     Rheumatoid arthritis (HCC)     Seizures (HCC)     jacksonian epilepsy    Type II or unspecified type diabetes mellitus without mention of complication, not stated as uncontrolled        Past Surgical History:   Procedure Laterality Date    ARTERIAL BYPASS SURGRY  1997    Cardiac bypass x2    CARDIAC SURGERY      CABG    CHOLECYSTECTOMY      COLONOSCOPY  2014    WNL    CORONARY ARTERY BYPASS GRAFT  1993,1995    CYSTOSCOPY Left     lithrotripsy with stent     CYSTOSCOPY Left 05/31/2017    CYSTOSCOPY URETEROSCOPY LASER-WITH HLL performed by Yousuf Hoff MD at [facility] OR    CYSTOSCOPY INSERTION / REMOVAL STENT / STONE Left 05/31/2017    CYSTOSCOPY STENT INSERTION performed by Yousuf Hoff MD at [facility] OR    ENDOSCOPY, COLON, DIAGNOSTIC      HERNIA REPAIR      x2    INJECTION Right 2023    Knee injection    KNEE SURGERY  1990    rt    LEG SURGERY  1960    cadaver graft rt thigh    LITHOTRIPSY      NERVE BLOCK Bilateral 10/26/2023    BILATERAL CERVICAL C3, C4, C5 MEDIAL BRANCH BLOCKS performed by Robert Ulloa DO at [facility] OR    OTHER SURGICAL HISTORY      Rt leg artery transplant    OTHER SURGICAL HISTORY      Rt heel spur    OTHER SURGICAL HISTORY      Rt knee scope    PACEMAKER INSERTION N/A 09/29/2021    PACEMAKER INSERTION PERMANENT performed by

## 2024-01-11 NOTE — DISCHARGE INSTRUCTIONS
You have received an injection of local anesthetic and corticosteroids.     The local anesthetic effect typically last 4-6 hours.     It may take 3-7 days for the corticosteroids to reach optimal effect.    Please pay close attention to the following information/instructions:    You may not drive for 12 hours after your injection if you had sedation or epidural injection.  It is common to experience mild soreness at the injection site(s) for 24-48 hours. Ice is the best remedy. You may apply ice for 20 minutes at a time several times a day as needed.  Avoid heat to the injection area for 72 hours. No hot packs, saunas, or steam rooms during this time. A regular shower is OK.  You may immediately restart your regular medication regimen, including pain medications, anti-inflammatory, and blood thinners.  Please remove the sterile dressing/band-aid tonight or tomorrow morning. Do not leave it on after you have taken a shower or gotten it wet.  Corticosteroid side effects can occur after this injection, but they usually resolve after several days. These side effects can include flushing, hot flashes, mild palpitations, insomnia, water retention, feeling anxious/restless, or headaches.  While it is extremely rare to get an infection after a spinal procedure, please call the office if you develop any signs of infection. These signs include fevers/chills, severely increased pain, redness at the injections site, or any drainage from the injection site.  Remember that the corticosteroid benefit (long-term pain relief) from an injection can take as long as 10 days to occur. You may have a period of slightly increased pain after your injection before the cortisone takes effect.  You may resume all of your normal daily activities 24 hours after your injection.  It is OK to restart your exercise or physical therapy program as soon as you feel comfortable doing so.  Please complete the pain diary given to you after your

## 2024-01-12 ENCOUNTER — TELEPHONE (OUTPATIENT)
Dept: PAIN MANAGEMENT | Age: 84
End: 2024-01-12

## 2024-01-12 NOTE — TELEPHONE ENCOUNTER
Patient states that he did very well with his injection. He states that he has zero pain.   100% relief.    Patient wanted me to thank  on his behalf.

## 2024-01-12 NOTE — PROGRESS NOTES
2 mL Versed wasted due to plunger pulling out of the syringe. Miriam documented waste but did not require a witness to the waste. Marisa Bonner RN was present to witness the waste on 1/11/2024.

## 2024-01-25 ENCOUNTER — APPOINTMENT (OUTPATIENT)
Dept: GENERAL RADIOLOGY | Age: 84
End: 2024-01-25
Attending: STUDENT IN AN ORGANIZED HEALTH CARE EDUCATION/TRAINING PROGRAM
Payer: MEDICARE

## 2024-01-25 ENCOUNTER — HOSPITAL ENCOUNTER (OUTPATIENT)
Age: 84
Setting detail: OUTPATIENT SURGERY
Discharge: HOME | End: 2024-01-25
Attending: STUDENT IN AN ORGANIZED HEALTH CARE EDUCATION/TRAINING PROGRAM | Admitting: STUDENT IN AN ORGANIZED HEALTH CARE EDUCATION/TRAINING PROGRAM
Payer: MEDICARE

## 2024-01-25 VITALS
TEMPERATURE: 97.6 F | RESPIRATION RATE: 16 BRPM | WEIGHT: 198.9 LBS | BODY MASS INDEX: 26.36 KG/M2 | SYSTOLIC BLOOD PRESSURE: 175 MMHG | OXYGEN SATURATION: 95 % | HEIGHT: 73 IN | DIASTOLIC BLOOD PRESSURE: 98 MMHG

## 2024-01-25 PROCEDURE — 3600000059 HC PAIN LEVEL 5 ADDL 15 MIN: Performed by: STUDENT IN AN ORGANIZED HEALTH CARE EDUCATION/TRAINING PROGRAM

## 2024-01-25 PROCEDURE — 6360000002 HC RX W HCPCS: Performed by: STUDENT IN AN ORGANIZED HEALTH CARE EDUCATION/TRAINING PROGRAM

## 2024-01-25 PROCEDURE — 2500000003 HC RX 250 WO HCPCS: Performed by: STUDENT IN AN ORGANIZED HEALTH CARE EDUCATION/TRAINING PROGRAM

## 2024-01-25 PROCEDURE — 64634 DESTROY C/TH FACET JNT ADDL: CPT | Performed by: STUDENT IN AN ORGANIZED HEALTH CARE EDUCATION/TRAINING PROGRAM

## 2024-01-25 PROCEDURE — 2580000003 HC RX 258: Performed by: STUDENT IN AN ORGANIZED HEALTH CARE EDUCATION/TRAINING PROGRAM

## 2024-01-25 PROCEDURE — 99152 MOD SED SAME PHYS/QHP 5/>YRS: CPT | Performed by: STUDENT IN AN ORGANIZED HEALTH CARE EDUCATION/TRAINING PROGRAM

## 2024-01-25 PROCEDURE — 3600000058 HC PAIN LEVEL 5 BASE: Performed by: STUDENT IN AN ORGANIZED HEALTH CARE EDUCATION/TRAINING PROGRAM

## 2024-01-25 PROCEDURE — 64633 DESTROY CERV/THOR FACET JNT: CPT | Performed by: STUDENT IN AN ORGANIZED HEALTH CARE EDUCATION/TRAINING PROGRAM

## 2024-01-25 PROCEDURE — 7100000010 HC PHASE II RECOVERY - FIRST 15 MIN: Performed by: STUDENT IN AN ORGANIZED HEALTH CARE EDUCATION/TRAINING PROGRAM

## 2024-01-25 PROCEDURE — 2709999900 HC NON-CHARGEABLE SUPPLY: Performed by: STUDENT IN AN ORGANIZED HEALTH CARE EDUCATION/TRAINING PROGRAM

## 2024-01-25 PROCEDURE — 7100000011 HC PHASE II RECOVERY - ADDTL 15 MIN: Performed by: STUDENT IN AN ORGANIZED HEALTH CARE EDUCATION/TRAINING PROGRAM

## 2024-01-25 RX ORDER — LIDOCAINE HYDROCHLORIDE 10 MG/ML
INJECTION, SOLUTION EPIDURAL; INFILTRATION; INTRACAUDAL; PERINEURAL PRN
Status: DISCONTINUED | OUTPATIENT
Start: 2024-01-25 | End: 2024-01-25 | Stop reason: ALTCHOICE

## 2024-01-25 RX ORDER — DEXAMETHASONE SODIUM PHOSPHATE 10 MG/ML
INJECTION INTRAMUSCULAR; INTRAVENOUS PRN
Status: DISCONTINUED | OUTPATIENT
Start: 2024-01-25 | End: 2024-01-25 | Stop reason: ALTCHOICE

## 2024-01-25 RX ORDER — SODIUM CHLORIDE, SODIUM LACTATE, POTASSIUM CHLORIDE, CALCIUM CHLORIDE 600; 310; 30; 20 MG/100ML; MG/100ML; MG/100ML; MG/100ML
INJECTION, SOLUTION INTRAVENOUS CONTINUOUS
Status: DISCONTINUED | OUTPATIENT
Start: 2024-01-25 | End: 2024-01-25 | Stop reason: HOSPADM

## 2024-01-25 RX ORDER — LIDOCAINE HYDROCHLORIDE 20 MG/ML
INJECTION, SOLUTION EPIDURAL; INFILTRATION; INTRACAUDAL; PERINEURAL PRN
Status: DISCONTINUED | OUTPATIENT
Start: 2024-01-25 | End: 2024-01-25 | Stop reason: ALTCHOICE

## 2024-01-25 RX ORDER — MIDAZOLAM HYDROCHLORIDE 1 MG/ML
INJECTION INTRAMUSCULAR; INTRAVENOUS PRN
Status: DISCONTINUED | OUTPATIENT
Start: 2024-01-25 | End: 2024-01-25 | Stop reason: ALTCHOICE

## 2024-01-25 RX ADMIN — SODIUM CHLORIDE, POTASSIUM CHLORIDE, SODIUM LACTATE AND CALCIUM CHLORIDE: 600; 310; 30; 20 INJECTION, SOLUTION INTRAVENOUS at 13:17

## 2024-01-25 ASSESSMENT — PAIN - FUNCTIONAL ASSESSMENT
PAIN_FUNCTIONAL_ASSESSMENT: 0-10
PAIN_FUNCTIONAL_ASSESSMENT: NONE - DENIES PAIN

## 2024-01-25 NOTE — DISCHARGE INSTRUCTIONS
You have received an injection of local anesthetic and corticosteroids.     The local anesthetic effect typically last 4-6 hours.     It may take 3-7 days for the corticosteroids to reach optimal effect.    Please pay close attention to the following information/instructions:    You may NOT drive for 12 hours after your injection if you had sedation or epidural injection, operate heavy machinery or make important decisions.  It is common to experience mild soreness at the injection site(s) for 24-48 hours. Ice is the best remedy. You may apply ice for 20 minutes at a time several times a day as needed.  Avoid heat to the injection area for 72 hours. No hot packs, saunas, or steam rooms during this time. A regular shower is OK.  You may immediately restart your regular medication regimen, including pain medications, anti-inflammatory, and blood thinners.  Please remove the sterile dressing/band-aid tonight or tomorrow morning. Do not leave it on after you have taken a shower or gotten it wet.  Corticosteroid side effects can occur after this injection, but they usually resolve after several days. These side effects can include flushing, hot flashes, mild palpitations, insomnia, water retention, feeling anxious/restless, or headaches.  While it is extremely rare to get an infection after a spinal procedure, please call the office if you develop any signs of infection. These signs include fevers/chills, severely increased pain, redness at the injections site, or any drainage from the injection site.  Remember that the corticosteroid benefit (long-term pain relief) from an injection can take as long as 10 days to occur. You may have a period of slightly increased pain after your injection before the cortisone takes effect.  You may resume all of your normal daily activities 24 hours after your injection.  It is OK to restart your exercise or physical therapy program as soon as you feel comfortable doing so.  Please

## 2024-01-25 NOTE — OP NOTE
PROCEDURE PERFORMED: Left Cervical Medial Branch Radiofrequency Ablation using Fluoroscopy    PREOPERATIVE DIAGNOSIS: Cervical spondylosis    INDICATIONS: Chronic neck pain    The patient's history and physical exam were reviewed.  The risk, benefits, and alternatives of the procedure were discussed and all questions were answered to the patient's satisfaction.  The patient agreed to proceed and written informed consent was obtained.    POSTOPERATIVE DIAGNOSIS: Same    PHYSICIAN:  Dr. Robert Ulloa DO    ANESTHESIA:  LOCAL    ASSISTANT:  NONE    PATHOLOGY:  NONE    ESTIMATED BLOOD LOSS:  N/A    IMPLANTS:  NONE    PROCEDURE DESCRIPTION: Left cervical medial branch radiofrequency ablation using fluoroscopy    The patient was placed on the operative bed in prone position.  The area was prepped with  Chlorhexidine.  The area was then draped in a sterile fashion.    Targeted levels: Left cervical C3, C4, C5 medial branch radiofrequency ablation    An AP  fluoroscopic image was used to identify the cervical landmarks.  The skin and soft tissues were anesthetized with lidocaine 1%.  At each cervical medial branch, a 20-gauge, 100 mm curved with 5 mm active tip probe was inserted under AP fluoroscopic projections until bone was contacted.  A lateral fluoroscopic view was then obtained, and then the needle tip was advanced to contact the centroid of the lateral articular pillar at each respective level.  Aspiration of each needle was negative for blood, CSF and paresthesia prior to injection.  Motor stimulation at 2 Hz and 1.2 V was negative.  Then, after negative aspiration, 1 mL lidocaine 2% was injected at each level prior to lesioning which was performed at 80°C for 90 seconds.  Once the lesion was complete, injectate solution containing dexamethasone 10 mg and 2 mL lidocaine 1% was injected at each site with a total of 1 mL.  The probes were then removed.  The needle sites were dressed appropriately.  The patient

## 2024-01-25 NOTE — H&P
Pain Pre-Op H&P Note    SUBJECTIVE:  No chief complaint on file.      History of Present Illness:   Pearl Gonzalez is a 83 y.o. male who presents with chronic neck pain.    Past Medical History:   Diagnosis Date    Nguyen syndrome     [facility]    Blood circulation, collateral     BPH (benign prostatic hyperplasia)     CAD (coronary artery disease)     Dr Restrepo    COPD (chronic obstructive pulmonary disease) (HCC)     COPD (chronic obstructive pulmonary disease) (HCC)     Depression     Hx of colonoscopy     Hyperlipidemia     Liver disease     Movement disorder     essential tremors    Osteoarthritis     [facility]    Pneumonia     Rheumatic fever     Rheumatoid arthritis (HCC)     Seizures (HCC)     jacksonian epilepsy    Type II or unspecified type diabetes mellitus without mention of complication, not stated as uncontrolled        Past Surgical History:   Procedure Laterality Date    ARTERIAL BYPASS SURGRY  1997    Cardiac bypass x2    CARDIAC SURGERY      CABG    CHOLECYSTECTOMY      COLONOSCOPY  2014    WNL    CORONARY ARTERY BYPASS GRAFT  1993,1995    CYSTOSCOPY Left     lithrotripsy with stent     CYSTOSCOPY Left 05/31/2017    CYSTOSCOPY URETEROSCOPY LASER-WITH HLL performed by Yousuf Hoff MD at [facility] OR    CYSTOSCOPY INSERTION / REMOVAL STENT / STONE Left 05/31/2017    CYSTOSCOPY STENT INSERTION performed by Yousuf Hoff MD at [facility] OR    ENDOSCOPY, COLON, DIAGNOSTIC      HERNIA REPAIR      x2    INJECTION Right 2023    Knee injection    KNEE SURGERY  1990    rt    LEG SURGERY  1960    cadaver graft rt thigh    LITHOTRIPSY      NERVE BLOCK Bilateral 10/26/2023    BILATERAL CERVICAL C3, C4, C5 MEDIAL BRANCH BLOCKS performed by Robert Ulloa DO at [facility] OR    OTHER SURGICAL HISTORY      Rt leg artery transplant    OTHER SURGICAL HISTORY      Rt heel spur    OTHER SURGICAL HISTORY      Rt knee scope    PACEMAKER INSERTION N/A 09/29/2021    PACEMAKER INSERTION PERMANENT performed by

## 2024-01-25 NOTE — PROGRESS NOTES

## 2024-02-29 ENCOUNTER — OFFICE VISIT (OUTPATIENT)
Dept: PAIN MANAGEMENT | Age: 84
End: 2024-02-29
Payer: MEDICARE

## 2024-02-29 VITALS
SYSTOLIC BLOOD PRESSURE: 128 MMHG | WEIGHT: 198 LBS | OXYGEN SATURATION: 98 % | DIASTOLIC BLOOD PRESSURE: 80 MMHG | RESPIRATION RATE: 18 BRPM | BODY MASS INDEX: 26.12 KG/M2

## 2024-02-29 DIAGNOSIS — M50.30 DEGENERATIVE DISC DISEASE, CERVICAL: ICD-10-CM

## 2024-02-29 DIAGNOSIS — Z79.02 ENCOUNTER FOR CURRENT LONG TERM USE OF ANTIPLATELET DRUG: ICD-10-CM

## 2024-02-29 DIAGNOSIS — Z79.01 LONG TERM (CURRENT) USE OF ANTICOAGULANTS: ICD-10-CM

## 2024-02-29 DIAGNOSIS — M47.812 CERVICAL SPONDYLOSIS: Primary | ICD-10-CM

## 2024-02-29 PROCEDURE — 99213 OFFICE O/P EST LOW 20 MIN: CPT | Performed by: STUDENT IN AN ORGANIZED HEALTH CARE EDUCATION/TRAINING PROGRAM

## 2024-02-29 PROCEDURE — 1123F ACP DISCUSS/DSCN MKR DOCD: CPT | Performed by: STUDENT IN AN ORGANIZED HEALTH CARE EDUCATION/TRAINING PROGRAM

## 2024-02-29 NOTE — PROGRESS NOTES
Chronic Pain Clinic Note     Encounter Date: 2/29/2024     SUBJECTIVE:  Chief Complaint   Patient presents with    Back Pain    1 Month Follow-Up       History of Present Illness:   Pearl Gonzalez is a 83 y.o. male who presents with neck pain. After RFA's. The patient states that he did very well and got 100% relief for several weeks and was 0/10 pain score. Today he is having some left sided pain.     Medication Refill: N/A    Current Complaints of Pain:   Location: Neck  Radiation: None  Severity: mild   Pain Numerical Score - 5  Average: 5     Highest: 10  Lowest: 0  Character/Quality: Complains of pain that is aching, dull  Timing: intermittent   Associated symptoms: No  Numbness: No  Weakness: No  Exacerbating factors: coldness  Alleviating factors: heat  Length of time pain has been present: Started on - chronic issues   Inciting event/injury: injury to the elbow- forklift incident   Bowel/Bladder incontinence: No  Falls: No  Physical Therapy: no    History of Interventions:   Surgery: No previous lumbar/cervical surgeries  Injections: MBB's, RFA's     Imaging:    MRI cervical 5/10/23    FINDINGS: Patient was in a great deal of pain. Images are somewhat degraded by   motion artifact.     Vertebral segments are intact and appropriately aligned with uniform and normal   internal bone marrow signal. Mild degenerative disc disease, disc interspace   narrowing, and bulging disc contour from C3 to C7 with what appear to be   shallow impressions on the ventral subarachnoid space.     Axial scans did show multilevel disc bulging from C3 to C7, mild effacements of   the ventral subarachnoid space without cord compression or displacement.     Facet arthrosis favoring the left at C3-C4 and C4-C5 levels appears to be   associated with at least mild left foraminal narrowing. At C6-C7 there appears   to be bilateral uncovertebral arthrosis and hypertrophy with moderate degree   bilateral foraminal narrowing.     Cord signal

## 2024-03-06 ENCOUNTER — HOSPITAL ENCOUNTER (OUTPATIENT)
Age: 84
Discharge: HOME | End: 2024-03-06
Payer: MEDICARE

## 2024-03-06 DIAGNOSIS — I21.4 NSTEMI (NON-ST ELEVATED MYOCARDIAL INFARCTION) (HCC): ICD-10-CM

## 2024-03-06 DIAGNOSIS — E78.00 PURE HYPERCHOLESTEROLEMIA: ICD-10-CM

## 2024-03-06 DIAGNOSIS — I25.10 ATHEROSCLEROSIS OF NATIVE CORONARY ARTERY OF NATIVE HEART WITHOUT ANGINA PECTORIS: ICD-10-CM

## 2024-03-06 DIAGNOSIS — I49.5 SSS (SICK SINUS SYNDROME) (HCC): ICD-10-CM

## 2024-03-06 DIAGNOSIS — E55.9 VITAMIN D DEFICIENCY: ICD-10-CM

## 2024-03-06 LAB
25(OH)D3 SERPL-MCNC: 15.5 NG/ML (ref 30–100)
ALBUMIN SERPL-MCNC: 3.8 G/DL (ref 3.5–5.2)
ALP SERPL-CCNC: 82 U/L (ref 40–129)
ALT SERPL-CCNC: 10 U/L (ref 5–41)
ANION GAP SERPL CALCULATED.3IONS-SCNC: 11 MMOL/L (ref 9–17)
AST SERPL-CCNC: 14 U/L
BASOPHILS # BLD: 0.04 K/UL (ref 0–0.2)
BASOPHILS NFR BLD: 1 % (ref 0–2)
BILIRUB SERPL-MCNC: 0.4 MG/DL (ref 0.3–1.2)
BUN SERPL-MCNC: 15 MG/DL (ref 8–23)
BUN/CREAT SERPL: 17 (ref 9–20)
CALCIUM SERPL-MCNC: 9.4 MG/DL (ref 8.6–10.4)
CHLORIDE SERPL-SCNC: 99 MMOL/L (ref 98–107)
CHOLEST SERPL-MCNC: 186 MG/DL (ref 0–199)
CHOLESTEROL/HDL RATIO: 3
CO2 SERPL-SCNC: 24 MMOL/L (ref 20–31)
CREAT SERPL-MCNC: 0.9 MG/DL (ref 0.7–1.2)
EKG ATRIAL RATE: 60 BPM
EKG P AXIS: 33 DEGREES
EKG P-R INTERVAL: 328 MS
EKG Q-T INTERVAL: 436 MS
EKG QRS DURATION: 88 MS
EKG R AXIS: -6 DEGREES
EKG T AXIS: -5 DEGREES
EKG VENTRICULAR RATE: 60 BPM
EOSINOPHIL # BLD: 0.12 K/UL (ref 0–0.4)
EOSINOPHILS RELATIVE PERCENT: 2 % (ref 0–5)
ERYTHROCYTE [DISTWIDTH] IN BLOOD BY AUTOMATED COUNT: 14.4 % (ref 12.1–15.2)
GFR SERPL CREATININE-BSD FRML MDRD: >60 ML/MIN/1.73M2
GLUCOSE SERPL-MCNC: 195 MG/DL (ref 70–99)
HCT VFR BLD AUTO: 41.2 % (ref 41–53)
HDLC SERPL-MCNC: 69 MG/DL
HGB BLD-MCNC: 13.5 G/DL (ref 13.5–17.5)
IMM GRANULOCYTES # BLD AUTO: 0.01 K/UL (ref 0–0.3)
IMM GRANULOCYTES NFR BLD: 0 % (ref 0–5)
LDLC SERPL CALC-MCNC: 90 MG/DL (ref 0–100)
LYMPHOCYTES NFR BLD: 0.59 K/UL (ref 1–4.8)
LYMPHOCYTES RELATIVE PERCENT: 10 % (ref 13–44)
MAGNESIUM SERPL-MCNC: 2.2 MG/DL (ref 1.6–2.6)
MCH RBC QN AUTO: 31.6 PG (ref 26–34)
MCHC RBC AUTO-ENTMCNC: 32.8 G/DL (ref 31–37)
MCV RBC AUTO: 96.5 FL (ref 80–100)
MONOCYTES NFR BLD: 0.56 K/UL (ref 0–1)
MONOCYTES NFR BLD: 10 % (ref 5–9)
NEUTROPHILS NFR BLD: 77 % (ref 39–75)
NEUTS SEG NFR BLD: 4.54 K/UL (ref 2.1–6.5)
PLATELET # BLD AUTO: 186 K/UL (ref 140–450)
PMV BLD AUTO: 9.3 FL (ref 6–12)
POTASSIUM SERPL-SCNC: 4.3 MMOL/L (ref 3.7–5.3)
PROT SERPL-MCNC: 6.8 G/DL (ref 6.4–8.3)
RBC # BLD AUTO: 4.27 M/UL (ref 4.5–5.9)
SODIUM SERPL-SCNC: 134 MMOL/L (ref 135–144)
TRIGL SERPL-MCNC: 137 MG/DL
TSH SERPL DL<=0.05 MIU/L-ACNC: 1.99 UIU/ML (ref 0.3–5)
VLDLC SERPL CALC-MCNC: 27 MG/DL
WBC OTHER # BLD: 5.9 K/UL (ref 3.5–11)

## 2024-03-06 PROCEDURE — 80053 COMPREHEN METABOLIC PANEL: CPT

## 2024-03-06 PROCEDURE — 93005 ELECTROCARDIOGRAM TRACING: CPT

## 2024-03-06 PROCEDURE — 80061 LIPID PANEL: CPT

## 2024-03-06 PROCEDURE — 85025 COMPLETE CBC W/AUTO DIFF WBC: CPT

## 2024-03-06 PROCEDURE — 82306 VITAMIN D 25 HYDROXY: CPT

## 2024-03-06 PROCEDURE — 36415 COLL VENOUS BLD VENIPUNCTURE: CPT

## 2024-03-06 PROCEDURE — 83735 ASSAY OF MAGNESIUM: CPT

## 2024-03-06 PROCEDURE — 84443 ASSAY THYROID STIM HORMONE: CPT

## 2024-03-12 ENCOUNTER — OFFICE VISIT (OUTPATIENT)
Dept: CARDIOLOGY CLINIC | Age: 84
End: 2024-03-12

## 2024-03-12 ENCOUNTER — OFFICE VISIT (OUTPATIENT)
Dept: FAMILY MEDICINE CLINIC | Age: 84
End: 2024-03-12
Payer: MEDICARE

## 2024-03-12 VITALS
WEIGHT: 192 LBS | SYSTOLIC BLOOD PRESSURE: 90 MMHG | OXYGEN SATURATION: 98 % | DIASTOLIC BLOOD PRESSURE: 60 MMHG | HEART RATE: 86 BPM | BODY MASS INDEX: 25.33 KG/M2

## 2024-03-12 VITALS
HEART RATE: 74 BPM | OXYGEN SATURATION: 95 % | BODY MASS INDEX: 25.86 KG/M2 | SYSTOLIC BLOOD PRESSURE: 98 MMHG | WEIGHT: 196 LBS | DIASTOLIC BLOOD PRESSURE: 64 MMHG

## 2024-03-12 DIAGNOSIS — E78.00 PURE HYPERCHOLESTEROLEMIA: ICD-10-CM

## 2024-03-12 DIAGNOSIS — G89.29 CHRONIC PAIN OF RIGHT KNEE: ICD-10-CM

## 2024-03-12 DIAGNOSIS — Z01.818 PRE-OPERATIVE CLEARANCE: Primary | ICD-10-CM

## 2024-03-12 DIAGNOSIS — M25.569 CHRONIC KNEE PAIN, UNSPECIFIED LATERALITY: ICD-10-CM

## 2024-03-12 DIAGNOSIS — I21.4 NSTEMI (NON-ST ELEVATED MYOCARDIAL INFARCTION) (HCC): ICD-10-CM

## 2024-03-12 DIAGNOSIS — M25.561 CHRONIC PAIN OF RIGHT KNEE: ICD-10-CM

## 2024-03-12 DIAGNOSIS — E55.9 VITAMIN D DEFICIENCY: ICD-10-CM

## 2024-03-12 DIAGNOSIS — Z95.0 PACEMAKER: Primary | ICD-10-CM

## 2024-03-12 DIAGNOSIS — G89.29 CHRONIC KNEE PAIN, UNSPECIFIED LATERALITY: ICD-10-CM

## 2024-03-12 PROBLEM — R22.41 KNEE MASS, RIGHT: Status: ACTIVE | Noted: 2024-03-12

## 2024-03-12 PROCEDURE — 99213 OFFICE O/P EST LOW 20 MIN: CPT | Performed by: FAMILY MEDICINE

## 2024-03-12 PROCEDURE — 1123F ACP DISCUSS/DSCN MKR DOCD: CPT | Performed by: FAMILY MEDICINE

## 2024-03-12 RX ORDER — OXYCODONE HYDROCHLORIDE 5 MG/1
5 TABLET ORAL EVERY 6 HOURS PRN
Qty: 20 TABLET | Refills: 0 | Status: CANCELLED | OUTPATIENT
Start: 2024-03-12 | End: 2024-03-19

## 2024-03-12 RX ORDER — OXYCODONE HYDROCHLORIDE 5 MG/1
5 TABLET ORAL EVERY 6 HOURS PRN
Qty: 20 TABLET | Refills: 0 | Status: SHIPPED | OUTPATIENT
Start: 2024-03-12 | End: 2024-04-11

## 2024-03-12 ASSESSMENT — ENCOUNTER SYMPTOMS
COUGH: 0
SHORTNESS OF BREATH: 0
EYE REDNESS: 0
ABDOMINAL PAIN: 0
WHEEZING: 0
EYE DISCHARGE: 0

## 2024-03-12 ASSESSMENT — PATIENT HEALTH QUESTIONNAIRE - PHQ9
2. FEELING DOWN, DEPRESSED OR HOPELESS: 0
9. THOUGHTS THAT YOU WOULD BE BETTER OFF DEAD, OR OF HURTING YOURSELF: 0
1. LITTLE INTEREST OR PLEASURE IN DOING THINGS: 0
7. TROUBLE CONCENTRATING ON THINGS, SUCH AS READING THE NEWSPAPER OR WATCHING TELEVISION: 0
8. MOVING OR SPEAKING SO SLOWLY THAT OTHER PEOPLE COULD HAVE NOTICED. OR THE OPPOSITE, BEING SO FIGETY OR RESTLESS THAT YOU HAVE BEEN MOVING AROUND A LOT MORE THAN USUAL: 0
10. IF YOU CHECKED OFF ANY PROBLEMS, HOW DIFFICULT HAVE THESE PROBLEMS MADE IT FOR YOU TO DO YOUR WORK, TAKE CARE OF THINGS AT HOME, OR GET ALONG WITH OTHER PEOPLE: 0
5. POOR APPETITE OR OVEREATING: 0
SUM OF ALL RESPONSES TO PHQ QUESTIONS 1-9: 0
3. TROUBLE FALLING OR STAYING ASLEEP: 0
SUM OF ALL RESPONSES TO PHQ QUESTIONS 1-9: 0
SUM OF ALL RESPONSES TO PHQ QUESTIONS 1-9: 0
6. FEELING BAD ABOUT YOURSELF - OR THAT YOU ARE A FAILURE OR HAVE LET YOURSELF OR YOUR FAMILY DOWN: 0
SUM OF ALL RESPONSES TO PHQ9 QUESTIONS 1 & 2: 0
4. FEELING TIRED OR HAVING LITTLE ENERGY: 0
SUM OF ALL RESPONSES TO PHQ QUESTIONS 1-9: 0

## 2024-03-12 NOTE — PROGRESS NOTES
Ov DR Restrepo 6 mth follow up  Pacemaker check per [employer]  No chest pain or sob  No dizziness.  No edema  Seeing DR Ulloa for neck pain    [facility]  Due to a previous injury.       Will see in 6 months    Will give oxycontin 5 mg one tablet q 6 hr as needed

## 2024-03-12 NOTE — PROGRESS NOTES
HPI Notes    Name: Pearl Gonzalez  : 1940        Chief Complaint:     Chief Complaint   Patient presents with    Knee Pain     Patient here today to discuss right knee pain. Lump area right knee. He is not sure if he wants to go to [facility]       History of Present Illness:     Pearl Gonzalez is a 83 y.o.  male who presents with Knee Pain (Patient here today to discuss right knee pain. Lump area right knee. He is not sure if he wants to go to [facility])    Surgery clearance - pt for now is scheduled on  for a Rt knee surgery to remove a mass.  Orthopedic surgeon is Dr Aron Fried. Pt is having second thoughts but here for surgery clearance.     Knee Pain   There was no injury mechanism (pt has a mass on the RT knee and pt is scheduled for Rt knee mass excision). The pain is present in the right knee. The quality of the pain is described as aching. The pain is moderate. Pertinent negatives include no inability to bear weight, numbness or tingling. He reports no foreign bodies present. The symptoms are aggravated by movement and weight bearing. He has tried ice and heat for the symptoms.     Past Medical History:     Past Medical History:   Diagnosis Date    Nguyen syndrome     [facility]    Blood circulation, collateral     BPH (benign prostatic hyperplasia)     CAD (coronary artery disease)     Dr Restrepo    COPD (chronic obstructive pulmonary disease) (HCC)     COPD (chronic obstructive pulmonary disease) (HCC)     Depression     Hx of colonoscopy     Hyperlipidemia     Liver disease     Movement disorder     essential tremors    Osteoarthritis     [facility]    Pneumonia     Rheumatic fever     Rheumatoid arthritis (HCC)     Seizures (HCC)     jacksonian epilepsy    Type II or unspecified type diabetes mellitus without mention of complication, not stated as uncontrolled       Reviewed all health maintenance requirements and ordered

## 2024-04-11 RX ORDER — PRIMIDONE 50 MG/1
TABLET ORAL
Qty: 45 TABLET | Refills: 1 | Status: SHIPPED | OUTPATIENT
Start: 2024-04-11

## 2024-04-20 NOTE — PROGRESS NOTES
Patient discharged home with family providing transportation. [facility] as Patient lives alone, per daughter report.     [address]  7/22/2021 81.6

## 2024-05-16 RX ORDER — CILOSTAZOL 50 MG/1
50 TABLET ORAL 2 TIMES DAILY
Qty: 180 TABLET | Refills: 3 | Status: SHIPPED | OUTPATIENT
Start: 2024-05-16

## 2024-05-29 RX ORDER — CILOSTAZOL 50 MG/1
50 TABLET ORAL 2 TIMES DAILY
Qty: 180 TABLET | Refills: 3 | Status: SHIPPED | OUTPATIENT
Start: 2024-05-29

## 2024-05-30 ENCOUNTER — OFFICE VISIT (OUTPATIENT)
Dept: PAIN MANAGEMENT | Age: 84
End: 2024-05-30
Payer: MEDICARE

## 2024-05-30 VITALS
SYSTOLIC BLOOD PRESSURE: 100 MMHG | DIASTOLIC BLOOD PRESSURE: 64 MMHG | OXYGEN SATURATION: 98 % | HEART RATE: 70 BPM | WEIGHT: 200 LBS | RESPIRATION RATE: 18 BRPM | BODY MASS INDEX: 26.39 KG/M2

## 2024-05-30 DIAGNOSIS — M47.812 CERVICAL SPONDYLOSIS: Primary | ICD-10-CM

## 2024-05-30 DIAGNOSIS — Z79.02 ENCOUNTER FOR CURRENT LONG TERM USE OF ANTIPLATELET DRUG: ICD-10-CM

## 2024-05-30 DIAGNOSIS — Z79.01 LONG TERM (CURRENT) USE OF ANTICOAGULANTS: ICD-10-CM

## 2024-05-30 DIAGNOSIS — M50.30 DEGENERATIVE DISC DISEASE, CERVICAL: ICD-10-CM

## 2024-05-30 PROCEDURE — 1123F ACP DISCUSS/DSCN MKR DOCD: CPT | Performed by: STUDENT IN AN ORGANIZED HEALTH CARE EDUCATION/TRAINING PROGRAM

## 2024-05-30 PROCEDURE — 99213 OFFICE O/P EST LOW 20 MIN: CPT | Performed by: STUDENT IN AN ORGANIZED HEALTH CARE EDUCATION/TRAINING PROGRAM

## 2024-05-30 NOTE — PROGRESS NOTES
studies. Included visible   cervical soft tissues appeared satisfactory.    Past Medical History:   Diagnosis Date    Nguyen syndrome     [facility]    Blood circulation, collateral     BPH (benign prostatic hyperplasia)     CAD (coronary artery disease)     Dr Restrepo    COPD (chronic obstructive pulmonary disease) (HCC)     COPD (chronic obstructive pulmonary disease) (HCC)     Depression     Hx of colonoscopy     Hyperlipidemia     Liver disease     Movement disorder     essential tremors    Osteoarthritis     [facility]    Pneumonia     Rheumatic fever     Rheumatoid arthritis (HCC)     Seizures (HCC)     [facility]    Type II or unspecified type diabetes mellitus without mention of complication, not stated as uncontrolled        Past Surgical History:   Procedure Laterality Date    ARTERIAL BYPASS SURGRY  1997    Cardiac bypass x2    CARDIAC SURGERY      CABG    CHOLECYSTECTOMY      COLONOSCOPY  2014    WNL    CORONARY ARTERY BYPASS GRAFT  1993,1995    CYSTOSCOPY Left     lithrotripsy with stent     CYSTOSCOPY Left 05/31/2017    CYSTOSCOPY URETEROSCOPY LASER-WITH HLL performed by Yousuf Hoff MD at [facility] OR    CYSTOSCOPY INSERTION / REMOVAL STENT / STONE Left 05/31/2017    CYSTOSCOPY STENT INSERTION performed by Yousuf Hoff MD at [facility] OR    ENDOSCOPY, COLON, DIAGNOSTIC      HERNIA REPAIR      x2    INJECTION Right 2023    Knee injection    KNEE SURGERY  1990    rt    LEG SURGERY  1960    cadaver graft rt thigh    LITHOTRIPSY      NERVE BLOCK Bilateral 10/26/2023    BILATERAL CERVICAL C3, C4, C5 MEDIAL BRANCH BLOCKS performed by Robert Ulloa DO at [facility] OR    OTHER SURGICAL HISTORY      Rt leg artery transplant    OTHER SURGICAL HISTORY      Rt heel spur    OTHER SURGICAL HISTORY      Rt knee scope    PACEMAKER INSERTION N/A 09/29/2021    PACEMAKER INSERTION PERMANENT performed by Hector Restrepo MD at [facility] OR    PACEMAKER PLACEMENT Left 09/29/2021    Dr. Restrepo-PACEMAKER

## 2024-06-03 ENCOUNTER — TRANSCRIBE ORDERS (OUTPATIENT)
Dept: ADMINISTRATIVE | Age: 84
End: 2024-06-03

## 2024-06-03 DIAGNOSIS — Z01.810 PRE-OPERATIVE CARDIOVASCULAR EXAMINATION: ICD-10-CM

## 2024-06-03 DIAGNOSIS — I73.9 PAD (PERIPHERAL ARTERY DISEASE) (HCC): Primary | ICD-10-CM

## 2024-06-11 ENCOUNTER — HOSPITAL ENCOUNTER (OUTPATIENT)
Dept: VASCULAR LAB | Age: 84
Discharge: HOME | End: 2024-06-13
Payer: MEDICARE

## 2024-06-11 DIAGNOSIS — Z01.810 PRE-OPERATIVE CARDIOVASCULAR EXAMINATION: ICD-10-CM

## 2024-06-11 DIAGNOSIS — I73.9 PAD (PERIPHERAL ARTERY DISEASE) (HCC): ICD-10-CM

## 2024-06-11 PROCEDURE — 93922 UPR/L XTREMITY ART 2 LEVELS: CPT

## 2024-06-13 ENCOUNTER — OFFICE VISIT (OUTPATIENT)
Dept: FAMILY MEDICINE CLINIC | Age: 84
End: 2024-06-13

## 2024-06-13 VITALS
HEART RATE: 64 BPM | DIASTOLIC BLOOD PRESSURE: 62 MMHG | BODY MASS INDEX: 26.24 KG/M2 | HEIGHT: 73 IN | SYSTOLIC BLOOD PRESSURE: 100 MMHG | OXYGEN SATURATION: 96 % | WEIGHT: 198 LBS

## 2024-06-13 DIAGNOSIS — I25.10 ATHEROSCLEROSIS OF NATIVE CORONARY ARTERY OF NATIVE HEART WITHOUT ANGINA PECTORIS: ICD-10-CM

## 2024-06-13 DIAGNOSIS — E78.00 PURE HYPERCHOLESTEROLEMIA: ICD-10-CM

## 2024-06-13 DIAGNOSIS — E11.9 TYPE 2 DIABETES MELLITUS WITHOUT COMPLICATION, WITHOUT LONG-TERM CURRENT USE OF INSULIN (HCC): ICD-10-CM

## 2024-06-13 DIAGNOSIS — K22.710 BARRETT'S ESOPHAGUS WITH LOW GRADE DYSPLASIA: ICD-10-CM

## 2024-06-13 DIAGNOSIS — J43.8 OTHER EMPHYSEMA (HCC): ICD-10-CM

## 2024-06-13 DIAGNOSIS — Z00.00 MEDICARE ANNUAL WELLNESS VISIT, SUBSEQUENT: Primary | ICD-10-CM

## 2024-06-13 LAB — HBA1C MFR BLD: 9.1 %

## 2024-06-13 SDOH — ECONOMIC STABILITY: INCOME INSECURITY: HOW HARD IS IT FOR YOU TO PAY FOR THE VERY BASICS LIKE FOOD, HOUSING, MEDICAL CARE, AND HEATING?: NOT HARD AT ALL

## 2024-06-13 SDOH — ECONOMIC STABILITY: FOOD INSECURITY: WITHIN THE PAST 12 MONTHS, YOU WORRIED THAT YOUR FOOD WOULD RUN OUT BEFORE YOU GOT MONEY TO BUY MORE.: NEVER TRUE

## 2024-06-13 SDOH — ECONOMIC STABILITY: FOOD INSECURITY: WITHIN THE PAST 12 MONTHS, THE FOOD YOU BOUGHT JUST DIDN'T LAST AND YOU DIDN'T HAVE MONEY TO GET MORE.: NEVER TRUE

## 2024-06-13 ASSESSMENT — ENCOUNTER SYMPTOMS
COUGH: 0
VOMITING: 0
CHEST TIGHTNESS: 0
CHOKING: 0
CONSTIPATION: 0
NAUSEA: 0
BLOOD IN STOOL: 0
TROUBLE SWALLOWING: 0
EYE DISCHARGE: 0
HEMOPTYSIS: 0
DIFFICULTY BREATHING: 0
SHORTNESS OF BREATH: 0
WHEEZING: 0
EYE REDNESS: 0
DIARRHEA: 0
ABDOMINAL PAIN: 0
HEARTBURN: 0

## 2024-06-13 ASSESSMENT — PATIENT HEALTH QUESTIONNAIRE - PHQ9
1. LITTLE INTEREST OR PLEASURE IN DOING THINGS: NOT AT ALL
SUM OF ALL RESPONSES TO PHQ QUESTIONS 1-9: 0
2. FEELING DOWN, DEPRESSED OR HOPELESS: NOT AT ALL
SUM OF ALL RESPONSES TO PHQ QUESTIONS 1-9: 0
SUM OF ALL RESPONSES TO PHQ9 QUESTIONS 1 & 2: 0

## 2024-06-13 ASSESSMENT — LIFESTYLE VARIABLES
HOW MANY STANDARD DRINKS CONTAINING ALCOHOL DO YOU HAVE ON A TYPICAL DAY: PATIENT DOES NOT DRINK
HOW OFTEN DO YOU HAVE A DRINK CONTAINING ALCOHOL: NEVER

## 2024-06-13 ASSESSMENT — COPD QUESTIONNAIRES: COPD: 1

## 2024-06-13 NOTE — PATIENT INSTRUCTIONS
SURVEY:    You may be receiving a survey from [employer] regarding your visit today.    Please complete the survey to enable us to provide the highest quality of care to you and your family.    If you cannot score us a very good (5 Stars) on any question, please call the office to discuss how we could have made your experience a very good one.    Thank you.    Clinical Care Team: MD Nirmal Rivas LPN              Triage: Rachele Gavin CMA              Clerical Team: Rachele Lennon        Preventing Falls: Care Instructions  Injuries and health problems such as trouble walking or poor eyesight can increase your risk of falling. So can some medicines. But there are things you can do to help prevent falls. You can exercise to get stronger. You can also arrange your home to make it safer.    Talk to your doctor about the medicines you take. Ask if any of them increase the risk of falls and whether they can be changed or stopped.   Try to exercise regularly. It can help improve your strength and balance. This can help lower your risk of falling.         Practice fall safety and prevention.   Wear low-heeled shoes that fit well and give your feet good support. Talk to your doctor if you have foot problems that make this hard.  Carry a cellphone or wear a medical alert device that you can use to call for help.  Use stepladders instead of chairs to reach high objects. Don't climb if you're at risk for falls. Ask for help, if needed.  Wear the correct eyeglasses, if you need them.        Make your home safer.   Remove rugs, cords, clutter, and furniture from walkways.  Keep your house well lit. Use night-lights in hallways and bathrooms.  Install and use sturdy handrails on stairways.  Wear nonskid footwear, even inside. Don't walk barefoot or in socks without shoes.        Be safe

## 2024-06-13 NOTE — PROGRESS NOTES
Medicare Annual Wellness Visit    Pearl Gonzalez is here for Medicare AWV (Patient here today for AWV), Diabetes Mellitus (Last A1C was 7.8 in 9/22. ), and COPD (On HCC gap)    Assessment & Plan   Type 2 diabetes mellitus without complication, without long-term current use of insulin (HCC)  -     POCT glycosylated hemoglobin (Hb A1C)  Other emphysema (HCC)  Pure hypercholesterolemia  Atherosclerosis of native coronary artery of native heart without angina pectoris  Nguyen's esophagus with low grade dysplasia  Medicare annual wellness visit, subsequent    Recommendations for Preventive Services Due: see orders and patient instructions/AVS.  Recommended screening schedule for the next 5-10 years is provided to the patient in written form: see Patient Instructions/AVS.     Return in about 6 months (around 12/13/2024) for DM, Hyperlipidemia, COPD, gerd.     Subjective   The following acute and/or chronic problems were also addressed today: see 6mos note    Patient's complete Health Risk Assessment and screening values have been reviewed and are found in Flowsheets. The following problems were reviewed today and where indicated follow up appointments were made and/or referrals ordered.    Positive Risk Factor Screenings with Interventions:    Fall Risk:  Do you feel unsteady or are you worried about falling? : (!) yes  2 or more falls in past year?: no  Fall with injury in past year?: no       Interventions:    Reviewed medications, home hazards, visual acuity, and co-morbidities that can increase risk for falls         Controlled Medication Review:      Today's Pain Level: No data recorded   Opioid Risk: (Low risk score <55) Opioid risk score: 17    Patient is low risk for opioid use disorder or overdose.    Last PDMP [place] as Reviewed:  Review User Review Instant Review Result                   Activity, Diet, and Weight:  On average, how many days per week do you engage in moderate to strenuous exercise (like a brisk

## 2024-06-13 NOTE — PROGRESS NOTES
HPI Notes    Name: Pearl Gonzalez  : 1940        Chief Complaint:     Chief Complaint   Patient presents with    Medicare AWV     Patient here today for AWV    Diabetes Mellitus     Last A1C was 7.8 in .     COPD     On HCC gap       History of Present Illness:     Pearl Gonzalez is a 83 y.o.  male who presents with Medicare AWV (Patient here today for AWV), Diabetes Mellitus (Last A1C was 7.8 in . ), and COPD (On HCC gap)      Diabetes  He presents for his follow-up diabetic visit. He has type 2 diabetes mellitus. Pertinent negatives for hypoglycemia include no dizziness, headaches or tremors. Pertinent negatives for diabetes include no chest pain, no fatigue and no weight loss. There are no hypoglycemic complications. Risk factors for coronary artery disease include diabetes mellitus, dyslipidemia and male sex. Current diabetic treatment includes oral agent (monotherapy). He is compliant with treatment most of the time. His weight is stable. Diabetic current diet: pt states he is 83 and will eat whatever he wants. When asked about meal planning, he reported none. His home blood glucose trend is increasing steadily (hgba1c 9.1). Eye exam is current.   COPD  There is no cough, difficulty breathing, hemoptysis, shortness of breath or wheezing. This is a chronic problem. The current episode started more than 1 year ago. The problem has been unchanged. Pertinent negatives include no chest pain, fever, headaches, heartburn, myalgias, trouble swallowing or weight loss. His symptoms are alleviated by beta-agonist (spiriva). He reports significant improvement on treatment.   Hyperlipidemia  This is a chronic problem. The current episode started more than 1 year ago. The problem is controlled. Recent lipid tests were reviewed and are normal. Exacerbating diseases include diabetes. He has no history of hypothyroidism. Pertinent negatives include no chest pain, myalgias or shortness of breath. Current

## 2024-07-24 ENCOUNTER — TELEPHONE (OUTPATIENT)
Dept: FAMILY MEDICINE CLINIC | Age: 84
End: 2024-07-24

## 2024-07-24 ENCOUNTER — HOSPITAL ENCOUNTER (OUTPATIENT)
Dept: GENERAL RADIOLOGY | Age: 84
Discharge: HOME | End: 2024-07-26
Payer: MEDICARE

## 2024-07-24 ENCOUNTER — OFFICE VISIT (OUTPATIENT)
Dept: FAMILY MEDICINE CLINIC | Age: 84
End: 2024-07-24

## 2024-07-24 ENCOUNTER — HOSPITAL ENCOUNTER (OUTPATIENT)
Age: 84
Discharge: HOME | End: 2024-07-26
Payer: MEDICARE

## 2024-07-24 VITALS
DIASTOLIC BLOOD PRESSURE: 64 MMHG | SYSTOLIC BLOOD PRESSURE: 100 MMHG | OXYGEN SATURATION: 95 % | HEART RATE: 68 BPM | WEIGHT: 194 LBS | BODY MASS INDEX: 25.6 KG/M2

## 2024-07-24 DIAGNOSIS — M53.3 ACUTE COCCYGEAL PAIN: Primary | ICD-10-CM

## 2024-07-24 DIAGNOSIS — M53.3 ACUTE COCCYGEAL PAIN: ICD-10-CM

## 2024-07-24 PROCEDURE — 72220 X-RAY EXAM SACRUM TAILBONE: CPT

## 2024-07-24 RX ORDER — PREDNISONE 20 MG/1
TABLET ORAL
Qty: 11 TABLET | Refills: 0 | Status: SHIPPED | OUTPATIENT
Start: 2024-07-24

## 2024-07-24 RX ORDER — TRAMADOL HYDROCHLORIDE 50 MG/1
50 TABLET ORAL EVERY 6 HOURS PRN
Qty: 28 TABLET | Refills: 0 | Status: SHIPPED | OUTPATIENT
Start: 2024-07-24 | End: 2024-07-31

## 2024-07-24 ASSESSMENT — ENCOUNTER SYMPTOMS
BACK PAIN: 1
DIARRHEA: 0

## 2024-07-24 NOTE — PROGRESS NOTES
HPI Notes    Name: Pearl Gonzalez  : 1940        Chief Complaint:     Chief Complaint   Patient presents with    Fall     Patient complains of fall 1 week ago. Fell in his barn , fell backwards and landed on a piece of steel. Hit his tailbone.        History of Present Illness:     Pearl Gonzalez is a 83 y.o.  male who presents with Fall (Patient complains of fall 1 week ago. Fell in his barn , fell backwards and landed on a piece of steel. Hit his tailbone. )      Fall  The accident occurred 5 to 7 days ago (about 1wk ago). The fall occurred while standing (pt was working in his barn and was pulling something and fell back and hit right on the tailbone.. Pain only on the tailbone). He fell from a height of 6 to 10 ft. There was no blood loss. Point of impact: tailbone. Pain location: tailbone. The pain is moderate. Pertinent negatives include no fever. He has tried NSAID and heat for the symptoms.   No pain in legs and no numbness or tingling. No bowel or bladder incontinence.    Past Medical History:     Past Medical History:   Diagnosis Date    Nguyen syndrome     [facility]    Blood circulation, collateral     BPH (benign prostatic hyperplasia)     CAD (coronary artery disease)     Dr Restrepo    COPD (chronic obstructive pulmonary disease) (HCC)     COPD (chronic obstructive pulmonary disease) (HCC)     Depression     Hx of colonoscopy     Hyperlipidemia     Liver disease     Movement disorder     essential tremors    Osteoarthritis     [facility]    Pneumonia     Rheumatic fever     Rheumatoid arthritis (HCC)     Seizures (HCC)     jacksonian epilepsy    Type II or unspecified type diabetes mellitus without mention of complication, not stated as uncontrolled       Reviewed all health maintenance requirements and ordered appropriate tests  Health Maintenance Due   Topic Date Due    Shingles vaccine (1 of 2) Never done       Past Surgical History:     Past Surgical History:   Procedure

## 2024-07-24 NOTE — PATIENT INSTRUCTIONS
SURVEY:    You may be receiving a survey from [facility] regarding your visit today.    Please complete the survey to enable us to provide the highest quality of care to you and your family.    If you cannot score us a very good (5 Stars) on any question, please call the office to discuss how we could have made your experience a very good one.    Thank you.    Clinical Care Team: MD Nirmal Rivas LPN              Triage: Rachele Gavin CMA              Clerical Team: Rachele Lennon

## 2024-07-24 NOTE — TELEPHONE ENCOUNTER
Pearl called to let Dr. Vlilegas know he has roughly 15 pills left of tramadol at home.      Health Maintenance   Topic Date Due    Shingles vaccine (1 of 2) Never done    Flu vaccine (1) 08/01/2024    Lipids  03/06/2025    Depression Screen  06/13/2025    DTaP/Tdap/Td vaccine (2 - Td or Tdap) 03/05/2034    Annual Wellness Visit (Medicare Advantage)  Completed    Pneumococcal 65+ years Vaccine  Completed    COVID-19 Vaccine  Completed    Respiratory Syncytial Virus (RSV) Pregnant or age 60 yrs+  Completed    Hepatitis A vaccine  Aged Out    Hepatitis B vaccine  Aged Out    Hib vaccine  Aged Out    Polio vaccine  Aged Out    Meningococcal (ACWY) vaccine  Aged Out    Depression Monitoring  Discontinued             (applicable per patient's age: Cancer Screenings, Depression Screening, Fall Risk Screening, Immunizations)    Hemoglobin A1C (%)   Date Value   06/13/2024 9.1   09/28/2022 7.8   03/11/2020 7.8 (H)     AST (U/L)   Date Value   03/06/2024 14     ALT (U/L)   Date Value   03/06/2024 10     BUN (mg/dL)   Date Value   03/06/2024 15      (goal A1C is < 7)   (goal LDL is <100) need 30-50% reduction from baseline     BP Readings from Last 3 Encounters:   07/24/24 100/64   06/13/24 100/62   05/30/24 100/64    (goal /80)      All Future Testing planned in [facility]:  Lab Frequency Next Occurrence   Facet Joint C/T 2nd Level Once 07/27/2023   Facet Joint C/T Once 07/27/2023   Nerver C/T Single Once 12/07/2023   Nerve C/T Additional Once 12/07/2023   TSH with Reflex Once 09/12/2024   CBC with Auto Differential Once 09/12/2024   Comprehensive Metabolic Panel Once 09/12/2024   Lipid Panel Once 09/12/2024   Magnesium Once 09/12/2024   Vitamin D 25 Hydroxy Once 09/12/2024   EKG 12 Lead Once 09/12/2024   XR CHEST (2 VW) Once 09/12/2024   TSH with Reflex Once 09/12/2024   CBC with Auto Differential Once 09/12/2024   Comprehensive Metabolic Panel Once 09/12/2024   Lipid Panel Once 09/12/2024   Magnesium Once 09/12/2024

## 2024-08-02 ENCOUNTER — PATIENT MESSAGE (OUTPATIENT)
Dept: FAMILY MEDICINE CLINIC | Age: 84
End: 2024-08-02

## 2024-08-02 ENCOUNTER — OFFICE VISIT (OUTPATIENT)
Dept: FAMILY MEDICINE CLINIC | Age: 84
End: 2024-08-02

## 2024-08-02 VITALS — BODY MASS INDEX: 25.98 KG/M2 | HEIGHT: 73 IN | WEIGHT: 196 LBS

## 2024-08-02 DIAGNOSIS — Z20.7 EXPOSURE TO SCABIES: ICD-10-CM

## 2024-08-02 DIAGNOSIS — L29.8 PRURITIC ERYTHEMATOUS RASH: Primary | ICD-10-CM

## 2024-08-02 RX ORDER — PERMETHRIN 50 MG/G
CREAM TOPICAL
Qty: 60 G | Refills: 0 | Status: SHIPPED | OUTPATIENT
Start: 2024-08-02

## 2024-08-02 ASSESSMENT — ENCOUNTER SYMPTOMS
COUGH: 0
BACK PAIN: 0
DIARRHEA: 0
SORE THROAT: 0
VOMITING: 0
WHEEZING: 0
SINUS PAIN: 0
ABDOMINAL PAIN: 0
NAUSEA: 0

## 2024-08-02 NOTE — TELEPHONE ENCOUNTER
From: Pearl Gonzalez  To: Dr. Amanda Villegas  Sent: 8/2/2024 10:23 AM EDT  Subject: Possible scabies    Caregiver from [facility] also works at [employer] and was exposed to and treated for scabies, could this be scabies?

## 2024-08-02 NOTE — TELEPHONE ENCOUNTER
Tell pt Maybe scabies?? But really needs to be seen. Is there any openings today or go to an urgent to have looked at before the weekend.

## 2024-08-02 NOTE — PROGRESS NOTES
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 The rash is on his forearms, spreading, and is quite pruritic.     Past Medical History:     Past Medical History:   Diagnosis Date    Nguyen syndrome     [facility]    Blood circulation, collateral     BPH (benign prostatic hyperplasia)     CAD (coronary artery disease)     Dr Restrepo    COPD (chronic obstructive pulmonary disease) (HCC)     COPD (chronic obstructive pulmonary disease) (HCC)     Depression     Hx of colonoscopy     Hyperlipidemia     Liver disease     Movement disorder     essential tremors    Osteoarthritis     [facility]    Pneumonia     Rheumatic fever     Rheumatoid arthritis (HCC)     Seizures (HCC)     jacksonian epilepsy    Type II or unspecified type diabetes mellitus without mention of complication, not stated as uncontrolled       Reviewed all health maintenance requirements and ordered appropriate tests  Health Maintenance Due   Topic Date Due    Shingles vaccine (1 of 2) Never done    Flu vaccine (1) 2024       Past Surgical History:     Past Surgical History:   Procedure Laterality Date    ARTERIAL BYPASS SURGRY      Cardiac bypass x2    CARDIAC SURGERY      CABG    CHOLECYSTECTOMY      COLONOSCOPY  2014    WNL    CORONARY ARTERY BYPASS GRAFT  ,    CYSTOSCOPY Left     lithrotripsy with stent     CYSTOSCOPY Left 2017    CYSTOSCOPY URETEROSCOPY LASER-WITH HLL performed by Yousuf Hoff MD at [facility] OR    CYSTOSCOPY INSERTION / REMOVAL STENT / STONE Left 2017    CYSTOSCOPY STENT INSERTION performed by Yousuf Hoff MD at [facility] OR

## 2024-10-09 ENCOUNTER — HOSPITAL ENCOUNTER (OUTPATIENT)
Age: 84
Discharge: HOME | End: 2024-10-11
Payer: COMMERCIAL

## 2024-10-09 ENCOUNTER — HOSPITAL ENCOUNTER (OUTPATIENT)
Age: 84
Discharge: HOME | End: 2024-10-09
Payer: COMMERCIAL

## 2024-10-09 ENCOUNTER — HOSPITAL ENCOUNTER (OUTPATIENT)
Dept: GENERAL RADIOLOGY | Age: 84
Discharge: HOME | End: 2024-10-11
Payer: COMMERCIAL

## 2024-10-09 DIAGNOSIS — I21.4 NSTEMI (NON-ST ELEVATED MYOCARDIAL INFARCTION) (HCC): ICD-10-CM

## 2024-10-09 DIAGNOSIS — Z95.0 PACEMAKER: ICD-10-CM

## 2024-10-09 DIAGNOSIS — E78.00 PURE HYPERCHOLESTEROLEMIA: ICD-10-CM

## 2024-10-09 DIAGNOSIS — E55.9 VITAMIN D DEFICIENCY: ICD-10-CM

## 2024-10-09 LAB
25(OH)D3 SERPL-MCNC: 21 NG/ML (ref 30–100)
ALBUMIN SERPL-MCNC: 3.6 G/DL (ref 3.5–5.2)
ALP SERPL-CCNC: 94 U/L (ref 40–129)
ALT SERPL-CCNC: 22 U/L (ref 5–41)
ANION GAP SERPL CALCULATED.3IONS-SCNC: 10 MMOL/L (ref 9–17)
AST SERPL-CCNC: 20 U/L
BASOPHILS # BLD: 0.02 K/UL (ref 0–0.2)
BASOPHILS NFR BLD: 0 % (ref 0–2)
BILIRUB SERPL-MCNC: 0.4 MG/DL (ref 0.3–1.2)
BUN SERPL-MCNC: 16 MG/DL (ref 8–23)
BUN/CREAT SERPL: 20 (ref 9–20)
CALCIUM SERPL-MCNC: 8.9 MG/DL (ref 8.6–10.4)
CHLORIDE SERPL-SCNC: 104 MMOL/L (ref 98–107)
CHOLEST SERPL-MCNC: 147 MG/DL (ref 0–199)
CHOLESTEROL/HDL RATIO: 3
CO2 SERPL-SCNC: 24 MMOL/L (ref 20–31)
CREAT SERPL-MCNC: 0.8 MG/DL (ref 0.7–1.2)
EKG ATRIAL RATE: 94 BPM
EKG Q-T INTERVAL: 460 MS
EKG QRS DURATION: 152 MS
EKG QTC CALCULATION (BAZETT): 540 MS
EKG R AXIS: -85 DEGREES
EKG T AXIS: 74 DEGREES
EKG VENTRICULAR RATE: 83 BPM
EOSINOPHIL # BLD: 0.13 K/UL (ref 0–0.4)
EOSINOPHILS RELATIVE PERCENT: 2 % (ref 0–5)
ERYTHROCYTE [DISTWIDTH] IN BLOOD BY AUTOMATED COUNT: 14.8 % (ref 12.1–15.2)
GFR, ESTIMATED: 88 ML/MIN/1.73M2
GLUCOSE SERPL-MCNC: 162 MG/DL (ref 70–99)
HCT VFR BLD AUTO: 40.3 % (ref 41–53)
HDLC SERPL-MCNC: 51 MG/DL
HGB BLD-MCNC: 13.5 G/DL (ref 13.5–17.5)
IMM GRANULOCYTES # BLD AUTO: 0.01 K/UL (ref 0–0.3)
IMM GRANULOCYTES NFR BLD: 0 % (ref 0–5)
LDLC SERPL CALC-MCNC: 71 MG/DL (ref 0–100)
LYMPHOCYTES NFR BLD: 0.77 K/UL (ref 1–4.8)
LYMPHOCYTES RELATIVE PERCENT: 14 % (ref 13–44)
MAGNESIUM SERPL-MCNC: 2 MG/DL (ref 1.6–2.6)
MCH RBC QN AUTO: 31.8 PG (ref 26–34)
MCHC RBC AUTO-ENTMCNC: 33.5 G/DL (ref 31–37)
MCV RBC AUTO: 95 FL (ref 80–100)
MONOCYTES NFR BLD: 0.49 K/UL (ref 0–1)
MONOCYTES NFR BLD: 9 % (ref 5–9)
NEUTROPHILS NFR BLD: 75 % (ref 39–75)
NEUTS SEG NFR BLD: 3.98 K/UL (ref 2.1–6.5)
PLATELET # BLD AUTO: 186 K/UL (ref 140–450)
PMV BLD AUTO: 8.9 FL (ref 6–12)
POTASSIUM SERPL-SCNC: 4 MMOL/L (ref 3.7–5.3)
PROT SERPL-MCNC: 6.8 G/DL (ref 6.4–8.3)
RBC # BLD AUTO: 4.24 M/UL (ref 4.5–5.9)
SODIUM SERPL-SCNC: 138 MMOL/L (ref 135–144)
TRIGL SERPL-MCNC: 122 MG/DL
TSH SERPL DL<=0.05 MIU/L-ACNC: 1.29 UIU/ML (ref 0.3–5)
VLDLC SERPL CALC-MCNC: 24 MG/DL
WBC OTHER # BLD: 5.4 K/UL (ref 3.5–11)

## 2024-10-09 PROCEDURE — 82306 VITAMIN D 25 HYDROXY: CPT

## 2024-10-09 PROCEDURE — 83735 ASSAY OF MAGNESIUM: CPT

## 2024-10-09 PROCEDURE — 36415 COLL VENOUS BLD VENIPUNCTURE: CPT

## 2024-10-09 PROCEDURE — 80053 COMPREHEN METABOLIC PANEL: CPT

## 2024-10-09 PROCEDURE — 84443 ASSAY THYROID STIM HORMONE: CPT

## 2024-10-09 PROCEDURE — 71046 X-RAY EXAM CHEST 2 VIEWS: CPT

## 2024-10-09 PROCEDURE — 80061 LIPID PANEL: CPT

## 2024-10-09 PROCEDURE — 85025 COMPLETE CBC W/AUTO DIFF WBC: CPT

## 2024-10-14 LAB
EKG ATRIAL RATE: 94 BPM
EKG Q-T INTERVAL: 460 MS
EKG QRS DURATION: 152 MS
EKG QTC CALCULATION (BAZETT): 540 MS
EKG R AXIS: -85 DEGREES
EKG T AXIS: 74 DEGREES
EKG VENTRICULAR RATE: 83 BPM

## 2024-10-15 ENCOUNTER — OFFICE VISIT (OUTPATIENT)
Dept: CARDIOLOGY CLINIC | Age: 84
End: 2024-10-15
Payer: COMMERCIAL

## 2024-10-15 VITALS — DIASTOLIC BLOOD PRESSURE: 70 MMHG | SYSTOLIC BLOOD PRESSURE: 120 MMHG | HEART RATE: 75 BPM | OXYGEN SATURATION: 95 %

## 2024-10-15 DIAGNOSIS — I25.10 ATHEROSCLEROSIS OF NATIVE CORONARY ARTERY OF NATIVE HEART WITHOUT ANGINA PECTORIS: ICD-10-CM

## 2024-10-15 DIAGNOSIS — E55.9 VITAMIN D DEFICIENCY: ICD-10-CM

## 2024-10-15 DIAGNOSIS — Z95.0 PACEMAKER: Primary | ICD-10-CM

## 2024-10-15 DIAGNOSIS — R06.02 SHORTNESS OF BREATH: ICD-10-CM

## 2024-10-15 DIAGNOSIS — I48.91 ATRIAL FIBRILLATION, UNSPECIFIED TYPE (HCC): ICD-10-CM

## 2024-10-15 DIAGNOSIS — E78.00 PURE HYPERCHOLESTEROLEMIA: ICD-10-CM

## 2024-10-15 PROCEDURE — 1123F ACP DISCUSS/DSCN MKR DOCD: CPT | Performed by: INTERNAL MEDICINE

## 2024-10-15 PROCEDURE — 99214 OFFICE O/P EST MOD 30 MIN: CPT | Performed by: INTERNAL MEDICINE

## 2024-10-15 NOTE — PROGRESS NOTES
Ov DR Restrepo 6 mth follow up  No chest pain or sob  C/o rt knee pain   They will not do a replacement  Sl edema  No dizziness.  No hospitalizations or   Procedures since seen.    Bedside echo done.    Will do full echo     See in 6 mths

## 2024-10-17 ENCOUNTER — HOSPITAL ENCOUNTER (OUTPATIENT)
Age: 84
End: 2024-10-17
Payer: COMMERCIAL

## 2024-10-17 ENCOUNTER — HOSPITAL ENCOUNTER (OUTPATIENT)
Dept: GENERAL RADIOLOGY | Age: 84
Discharge: HOME | End: 2024-10-19
Payer: COMMERCIAL

## 2024-10-17 ENCOUNTER — HOSPITAL ENCOUNTER (OUTPATIENT)
Age: 84
Discharge: HOME | End: 2024-10-17
Payer: COMMERCIAL

## 2024-10-17 DIAGNOSIS — E55.9 VITAMIN D DEFICIENCY: ICD-10-CM

## 2024-10-17 DIAGNOSIS — Z95.0 PACEMAKER: ICD-10-CM

## 2024-10-17 DIAGNOSIS — E78.00 PURE HYPERCHOLESTEROLEMIA: ICD-10-CM

## 2024-10-17 DIAGNOSIS — I21.4 NSTEMI (NON-ST ELEVATED MYOCARDIAL INFARCTION) (HCC): ICD-10-CM

## 2024-10-17 LAB
ALBUMIN SERPL-MCNC: 3.8 G/DL (ref 3.5–5.2)
ALP SERPL-CCNC: 82 U/L (ref 40–129)
ALT SERPL-CCNC: 20 U/L (ref 5–41)
ANION GAP SERPL CALCULATED.3IONS-SCNC: 10 MMOL/L (ref 9–17)
AST SERPL-CCNC: 20 U/L
BASOPHILS # BLD: 0.03 K/UL (ref 0–0.2)
BASOPHILS NFR BLD: 1 % (ref 0–2)
BILIRUB SERPL-MCNC: 0.4 MG/DL (ref 0.3–1.2)
BUN SERPL-MCNC: 12 MG/DL (ref 8–23)
BUN/CREAT SERPL: 15 (ref 9–20)
CALCIUM SERPL-MCNC: 9 MG/DL (ref 8.6–10.4)
CHLORIDE SERPL-SCNC: 103 MMOL/L (ref 98–107)
CO2 SERPL-SCNC: 27 MMOL/L (ref 20–31)
CREAT SERPL-MCNC: 0.8 MG/DL (ref 0.7–1.2)
EOSINOPHIL # BLD: 0.1 K/UL (ref 0–0.4)
EOSINOPHILS RELATIVE PERCENT: 2 % (ref 0–5)
ERYTHROCYTE [DISTWIDTH] IN BLOOD BY AUTOMATED COUNT: 14.8 % (ref 12.1–15.2)
GFR, ESTIMATED: 88 ML/MIN/1.73M2
GLUCOSE SERPL-MCNC: 174 MG/DL (ref 70–99)
HCT VFR BLD AUTO: 40.2 % (ref 41–53)
HGB BLD-MCNC: 13.2 G/DL (ref 13.5–17.5)
IMM GRANULOCYTES # BLD AUTO: 0.01 K/UL (ref 0–0.3)
IMM GRANULOCYTES NFR BLD: 0 % (ref 0–5)
LYMPHOCYTES NFR BLD: 0.72 K/UL (ref 1–4.8)
LYMPHOCYTES RELATIVE PERCENT: 16 % (ref 13–44)
MAGNESIUM SERPL-MCNC: 2.2 MG/DL (ref 1.6–2.6)
MCH RBC QN AUTO: 31.7 PG (ref 26–34)
MCHC RBC AUTO-ENTMCNC: 32.8 G/DL (ref 31–37)
MCV RBC AUTO: 96.4 FL (ref 80–100)
MONOCYTES NFR BLD: 0.47 K/UL (ref 0–1)
MONOCYTES NFR BLD: 10 % (ref 5–9)
NEUTROPHILS NFR BLD: 71 % (ref 39–75)
NEUTS SEG NFR BLD: 3.33 K/UL (ref 2.1–6.5)
PLATELET # BLD AUTO: 188 K/UL (ref 140–450)
PMV BLD AUTO: 9.4 FL (ref 6–12)
POTASSIUM SERPL-SCNC: 4.3 MMOL/L (ref 3.7–5.3)
PROT SERPL-MCNC: 6.8 G/DL (ref 6.4–8.3)
RBC # BLD AUTO: 4.17 M/UL (ref 4.5–5.9)
SODIUM SERPL-SCNC: 140 MMOL/L (ref 135–144)
TSH SERPL DL<=0.05 MIU/L-ACNC: 2.1 UIU/ML (ref 0.3–5)
WBC OTHER # BLD: 4.7 K/UL (ref 3.5–11)

## 2024-10-17 PROCEDURE — 36415 COLL VENOUS BLD VENIPUNCTURE: CPT

## 2024-10-17 PROCEDURE — 80053 COMPREHEN METABOLIC PANEL: CPT

## 2024-10-17 PROCEDURE — 85025 COMPLETE CBC W/AUTO DIFF WBC: CPT

## 2024-10-17 PROCEDURE — 83735 ASSAY OF MAGNESIUM: CPT

## 2024-10-17 PROCEDURE — 84443 ASSAY THYROID STIM HORMONE: CPT

## 2024-10-18 NOTE — PROGRESS NOTES
Armando Restrepo M.D.  [facility]  [facility]  [address] [id number] [phone]        October 15, 2024        Amanda Villegas MD  [address] [id number]     RE:  ARTUR GONZALEZ  :  1940    Dear Dr. Villegas:    CHIEF COMPLAINT:    Sick sinus syndrome, status post [employer] Phyllis MRI compatible pacemaker placed on 2021, with him pacing 90% of the time.  Severe coronary artery disease.  Severe arthritis of his right knee.    HISTORY OF PRESENT ILLNESS:  I had the pleasure of seeing Mr. Gonzalez in the office on October 15, 2024.  He is very pleasant 84-year-old gentleman who has a history of severe CAD with 4 vessel bypass surgery at [facility] in 1993.  In , a catheterization showed 3 out of the 4 bypass grafts occluded with only a patent left internal mammary to the LAD.    This prompted repeat surgery in  with a right internal mammary to the right coronary, vein graft to the circumflex, and a vein graft to the diagonal.  Again, his LIMA was patent to the LAD.    On 2010, I did a catheterization showed patent LIMA to LAD, patent right internal mammary to the right coronary, patent vein graft to the OM.    His last catheterization was at [facility] by Dr. Chaves on 2017, which showed occluded vein graft to the OM with an occluded vein graft to the diagonal.  His left internal mammary artery to the LAD and right internal mammary to the right coronary was patent.  His circumflex was occluded.  Medical therapy recommended.  His EF was 50%.    He had sick sinus syndrome, which was discovered after a syncopal episode in 2021.  Event recorder showed atrial fibrillation with heart rates in the 140s with also heart rates in the 30s along with 6-second pauses.  Therefore, I placed a Medtronic Ennis MRI compatible pacemaker on 2021.    He has severe peripheral

## 2024-10-28 ENCOUNTER — HOSPITAL ENCOUNTER (OUTPATIENT)
Age: 84
Discharge: HOME | End: 2024-10-30
Attending: INTERNAL MEDICINE
Payer: COMMERCIAL

## 2024-10-28 DIAGNOSIS — I48.91 ATRIAL FIBRILLATION, UNSPECIFIED TYPE (HCC): ICD-10-CM

## 2024-10-28 DIAGNOSIS — R06.02 SHORTNESS OF BREATH: ICD-10-CM

## 2024-10-28 LAB
ECHO AO ASC DIAM: 2.7 CM
ECHO AO ROOT DIAM: 3.5 CM
ECHO AV CUSP MM: 2.1 CM
ECHO AV PEAK GRADIENT: 4 MMHG
ECHO AV PEAK VELOCITY: 1 M/S
ECHO EST RA PRESSURE: 8 MMHG
ECHO LA DIAMETER: 5.1 CM
ECHO LA TO AORTIC ROOT RATIO: 1.46
ECHO LA VOL A-L A2C: 106 ML (ref 18–58)
ECHO LA VOL A-L A4C: 114 ML (ref 18–58)
ECHO LA VOL MOD A2C: 102 ML (ref 18–58)
ECHO LA VOL MOD A4C: 107 ML (ref 18–58)
ECHO LA VOLUME AREA LENGTH: 114 ML
ECHO LV E' LATERAL VELOCITY: 10 CM/S
ECHO LV E' SEPTAL VELOCITY: 8.9 CM/S
ECHO LV EDV A2C: 57 ML
ECHO LV EDV A4C: 68 ML
ECHO LV EDV BP: 63 ML (ref 67–155)
ECHO LV EJECTION FRACTION A2C: 68 %
ECHO LV EJECTION FRACTION A4C: 77 %
ECHO LV EJECTION FRACTION BIPLANE: 72 % (ref 55–100)
ECHO LV ESV A2C: 18 ML
ECHO LV ESV A4C: 15 ML
ECHO LV ESV BP: 17 ML (ref 22–58)
ECHO LV INTERNAL DIMENSION DIASTOLIC MMODE: 5.4 CM (ref 4.2–5.9)
ECHO LV INTERNAL DIMENSION SYSTOLIC MMODE: 3.7 CM
ECHO LV IVSD MMODE: 1.4 CM (ref 0.6–1)
ECHO LV IVSS MMODE: 1.6 CM
ECHO LV POSTERIOR WALL DIASTOLIC MMODE: 1.1 CM (ref 0.6–1)
ECHO LV POSTERIOR WALL SYSTOLIC MMODE: 2 CM
ECHO LVOT AREA: 3.8 CM2
ECHO LVOT DIAM: 2.2 CM
ECHO MV A VELOCITY: 0.18 M/S
ECHO MV E DECELERATION TIME (DT): 182 MS
ECHO MV E VELOCITY: 1.04 M/S
ECHO MV E/A RATIO: 5.78
ECHO MV E/E' LATERAL: 10.4
ECHO MV E/E' RATIO (AVERAGED): 11.04
ECHO MV E/E' SEPTAL: 11.69
ECHO PV MAX VELOCITY: 1 M/S
ECHO PV PEAK GRADIENT: 4 MMHG
ECHO RIGHT VENTRICULAR SYSTOLIC PRESSURE (RVSP): 37 MMHG
ECHO RVOT AREA: 10.2 CM2
ECHO RVOT DIAMETER: 3.6 CM
ECHO TV REGURGITANT MAX VELOCITY: 2.71 M/S
ECHO TV REGURGITANT PEAK GRADIENT: 30 MMHG

## 2024-10-28 PROCEDURE — 93306 TTE W/DOPPLER COMPLETE: CPT

## 2024-11-07 RX ORDER — PROPRANOLOL HYDROCHLORIDE 80 MG/1
80 TABLET ORAL 2 TIMES DAILY
Qty: 60 TABLET | Refills: 11 | Status: SHIPPED | OUTPATIENT
Start: 2024-11-07

## 2024-12-17 ENCOUNTER — OFFICE VISIT (OUTPATIENT)
Dept: FAMILY MEDICINE CLINIC | Age: 84
End: 2024-12-17
Payer: COMMERCIAL

## 2024-12-17 VITALS
DIASTOLIC BLOOD PRESSURE: 72 MMHG | HEART RATE: 74 BPM | WEIGHT: 198 LBS | BODY MASS INDEX: 26.12 KG/M2 | SYSTOLIC BLOOD PRESSURE: 118 MMHG | OXYGEN SATURATION: 95 %

## 2024-12-17 DIAGNOSIS — J43.8 OTHER EMPHYSEMA (HCC): ICD-10-CM

## 2024-12-17 DIAGNOSIS — I25.10 ATHEROSCLEROSIS OF NATIVE CORONARY ARTERY OF NATIVE HEART WITHOUT ANGINA PECTORIS: ICD-10-CM

## 2024-12-17 DIAGNOSIS — E11.9 TYPE 2 DIABETES MELLITUS WITHOUT COMPLICATION, WITHOUT LONG-TERM CURRENT USE OF INSULIN (HCC): Primary | ICD-10-CM

## 2024-12-17 DIAGNOSIS — E78.00 PURE HYPERCHOLESTEROLEMIA: ICD-10-CM

## 2024-12-17 LAB — HBA1C MFR BLD: 6.7 %

## 2024-12-17 PROCEDURE — 1123F ACP DISCUSS/DSCN MKR DOCD: CPT | Performed by: FAMILY MEDICINE

## 2024-12-17 PROCEDURE — 83036 HEMOGLOBIN GLYCOSYLATED A1C: CPT | Performed by: FAMILY MEDICINE

## 2024-12-17 PROCEDURE — 3046F HEMOGLOBIN A1C LEVEL >9.0%: CPT | Performed by: FAMILY MEDICINE

## 2024-12-17 PROCEDURE — 1160F RVW MEDS BY RX/DR IN RCRD: CPT | Performed by: FAMILY MEDICINE

## 2024-12-17 PROCEDURE — 99214 OFFICE O/P EST MOD 30 MIN: CPT | Performed by: FAMILY MEDICINE

## 2024-12-17 PROCEDURE — 1159F MED LIST DOCD IN RCRD: CPT | Performed by: FAMILY MEDICINE

## 2024-12-17 ASSESSMENT — ENCOUNTER SYMPTOMS
FREQUENT THROAT CLEARING: 0
TROUBLE SWALLOWING: 0
RHINORRHEA: 0
CHEST TIGHTNESS: 0
VOMITING: 0
SHORTNESS OF BREATH: 0
BLOOD IN STOOL: 0
COUGH: 0
DIFFICULTY BREATHING: 0
DIARRHEA: 0
EYE REDNESS: 0
WHEEZING: 0
ABDOMINAL PAIN: 0
EYE DISCHARGE: 0
NAUSEA: 0

## 2024-12-17 ASSESSMENT — COPD QUESTIONNAIRES: COPD: 1

## 2024-12-17 NOTE — PROGRESS NOTES
control planned discussed Healthy diet and regular exercise.    BP: 118/72. Blood pressure is normal. Treatment plan consists of No treatment change needed.        6/13/2024    10:43 AM 7/13/2023     2:28 PM 4/24/2023     9:40 AM 10/27/2021    10:01 AM 9/28/2021    10:40 AM 8/28/2020    11:21 AM 8/24/2020    11:33 AM   Fall Risk   Do you feel unsteady or are you worried about falling?  yes no yes       2 or more falls in past year? no no yes yes yes no no   Fall with injury in past year? no no no no no no no     The patient does not have a history of falls. I did not - not indicated , complete a risk assessment for falls. A plan of care for falls No Treatment plan indicated    No results found for: \"LDLDIRECT\" (goal LDL reduction with dx if diabetes is 50% LDL reduction)        6/13/2024    10:43 AM 3/12/2024    10:10 AM 7/13/2023     2:28 PM 4/24/2023     9:39 AM 9/28/2022     9:31 AM 10/27/2021    10:03 AM 2/17/2021    12:49 PM   PHQ Scores   PHQ2 Score 0 0 0 2 0 0 0   PHQ9 Score 0 0 0 2 0 0 0     Interpretation of Total Score Depression Severity: 1-4 = Minimal depression, 5-9 = Mild depression, 10-14 = Moderate depression, 15-19 = Moderately severe depression, 20-27 = Severe depression      Return in about 6 months (around 6/17/2025) for DM, Hyperlipidemia, COPD, CAD.      Electronically signed by Amanda Villegas MD on 12/17/2024 at 9:02 AM

## 2024-12-17 NOTE — PATIENT INSTRUCTIONS
SURVEY:    You may be receiving a survey from [facility] [employer] regarding your visit today.    Please complete the survey to enable us to provide the highest quality of care to you and your family.    If you cannot score us a very good (5 Stars) on any question, please call the office to discuss how we could have made your experience a very good one.    Thank you.    Clinical Care Team: MD Nirmal Rivas LPN              Triage: Rachele Gavin CMA              Clerical Team: Rachele Lennon

## 2025-01-23 RX ORDER — CILOSTAZOL 50 MG/1
50 TABLET ORAL 2 TIMES DAILY
Qty: 180 TABLET | Refills: 3 | Status: SHIPPED | OUTPATIENT
Start: 2025-01-23

## 2025-04-17 ENCOUNTER — OFFICE VISIT (OUTPATIENT)
Dept: CARDIOLOGY CLINIC | Age: 85
End: 2025-04-17
Payer: MEDICARE

## 2025-04-17 VITALS — HEART RATE: 84 BPM | DIASTOLIC BLOOD PRESSURE: 70 MMHG | SYSTOLIC BLOOD PRESSURE: 130 MMHG | OXYGEN SATURATION: 99 %

## 2025-04-17 DIAGNOSIS — Z95.0 PACEMAKER: ICD-10-CM

## 2025-04-17 DIAGNOSIS — E78.00 PURE HYPERCHOLESTEROLEMIA: ICD-10-CM

## 2025-04-17 DIAGNOSIS — E55.9 VITAMIN D DEFICIENCY: ICD-10-CM

## 2025-04-17 DIAGNOSIS — R06.02 SHORTNESS OF BREATH: Primary | ICD-10-CM

## 2025-04-17 DIAGNOSIS — I48.91 ATRIAL FIBRILLATION, UNSPECIFIED TYPE (HCC): ICD-10-CM

## 2025-04-17 PROCEDURE — 99214 OFFICE O/P EST MOD 30 MIN: CPT | Performed by: INTERNAL MEDICINE

## 2025-04-17 PROCEDURE — 1159F MED LIST DOCD IN RCRD: CPT | Performed by: INTERNAL MEDICINE

## 2025-04-17 PROCEDURE — 1123F ACP DISCUSS/DSCN MKR DOCD: CPT | Performed by: INTERNAL MEDICINE

## 2025-04-17 RX ORDER — PROPRANOLOL HYDROCHLORIDE 80 MG/1
80 TABLET ORAL 2 TIMES DAILY
Qty: 180 TABLET | Refills: 3 | Status: SHIPPED | OUTPATIENT
Start: 2025-04-17

## 2025-04-17 NOTE — PROGRESS NOTES
Ov Dr. Restrepo for 6 month f/u  Pt brought bottles of meds  List updated in view of bottles  Pacer checked by [employer]   No chest pain   No palpitations   No sob  Balance is off a lot - no falls  Bedside echo done    No changes    Follow up in 6 months

## 2025-04-25 ENCOUNTER — HOSPITAL ENCOUNTER (EMERGENCY)
Age: 85
Discharge: ANOTHER HOSPITAL | End: 2025-04-25
Attending: EMERGENCY MEDICINE
Payer: MEDICARE

## 2025-04-25 ENCOUNTER — APPOINTMENT (OUTPATIENT)
Dept: GENERAL RADIOLOGY | Age: 85
End: 2025-04-25
Payer: MEDICARE

## 2025-04-25 VITALS
TEMPERATURE: 98.6 F | DIASTOLIC BLOOD PRESSURE: 73 MMHG | BODY MASS INDEX: 27.09 KG/M2 | HEIGHT: 72 IN | OXYGEN SATURATION: 95 % | SYSTOLIC BLOOD PRESSURE: 131 MMHG | WEIGHT: 200 LBS | HEART RATE: 60 BPM | RESPIRATION RATE: 17 BRPM

## 2025-04-25 DIAGNOSIS — M54.2 NECK PAIN: ICD-10-CM
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
## 2025-04-25 DIAGNOSIS — S09.90XA INJURY OF HEAD, INITIAL ENCOUNTER: Primary | ICD-10-CM

## 2025-04-25 LAB
ALBUMIN SERPL-MCNC: 4 G/DL (ref 3.5–5.2)
ALBUMIN/GLOB SERPL: 1.4 {RATIO} (ref 1–2.5)
ALP SERPL-CCNC: 78 U/L (ref 40–129)
ALT SERPL-CCNC: 14 U/L (ref 5–41)
ANION GAP SERPL CALCULATED.3IONS-SCNC: 12 MMOL/L (ref 9–17)
AST SERPL-CCNC: 18 U/L
BASOPHILS # BLD: 0.01 K/UL (ref 0–0.2)
BASOPHILS NFR BLD: 0 % (ref 0–2)
BILIRUB SERPL-MCNC: 0.4 MG/DL (ref 0.3–1.2)
BUN SERPL-MCNC: 17 MG/DL (ref 8–23)
CALCIUM SERPL-MCNC: 9.3 MG/DL (ref 8.6–10.4)
CHLORIDE SERPL-SCNC: 105 MMOL/L (ref 98–107)
CO2 SERPL-SCNC: 24 MMOL/L (ref 20–31)
CREAT SERPL-MCNC: 0.9 MG/DL (ref 0.7–1.2)
EKG ATRIAL RATE: 153 BPM
EKG Q-T INTERVAL: 502 MS
EKG QRS DURATION: 158 MS
EKG QTC CALCULATION (BAZETT): 502 MS
EKG R AXIS: -83 DEGREES
EKG T AXIS: 72 DEGREES
EKG VENTRICULAR RATE: 60 BPM
EOSINOPHIL # BLD: 0.09 K/UL (ref 0–0.4)
EOSINOPHILS RELATIVE PERCENT: 2 % (ref 0–5)
ERYTHROCYTE [DISTWIDTH] IN BLOOD BY AUTOMATED COUNT: 15.5 % (ref 12.1–15.2)
GFR, ESTIMATED: 85 ML/MIN/1.73M2
GLUCOSE BLD-MCNC: 240 MG/DL (ref 65–99)
GLUCOSE SERPL-MCNC: 236 MG/DL (ref 70–99)
HCT VFR BLD AUTO: 39.6 % (ref 41–53)
HGB BLD-MCNC: 12.9 G/DL (ref 13.5–17.5)
IMM GRANULOCYTES # BLD AUTO: 0.03 K/UL (ref 0–0.3)
IMM GRANULOCYTES NFR BLD: 1 % (ref 0–5)
LYMPHOCYTES NFR BLD: 0.81 K/UL (ref 1–4.8)
LYMPHOCYTES RELATIVE PERCENT: 13 % (ref 13–44)
MCH RBC QN AUTO: 29.7 PG (ref 26–34)
MCHC RBC AUTO-ENTMCNC: 32.6 G/DL (ref 31–37)
MCV RBC AUTO: 91.2 FL (ref 80–100)
MONOCYTES NFR BLD: 0.57 K/UL (ref 0–1)
MONOCYTES NFR BLD: 9 % (ref 5–9)
NEUTROPHILS NFR BLD: 75 % (ref 39–75)
NEUTS SEG NFR BLD: 4.58 K/UL (ref 2.1–6.5)
PLATELET # BLD AUTO: 187 K/UL (ref 140–450)
PMV BLD AUTO: 9 FL (ref 6–12)
POTASSIUM SERPL-SCNC: 4.3 MMOL/L (ref 3.7–5.3)
PROT SERPL-MCNC: 6.8 G/DL (ref 6.4–8.3)
RBC # BLD AUTO: 4.34 M/UL (ref 4.5–5.9)
SODIUM SERPL-SCNC: 141 MMOL/L (ref 135–144)
TROPONIN I SERPL HS-MCNC: 15 NG/L (ref 0–22)
WBC OTHER # BLD: 6.1 K/UL (ref 3.5–11)

## 2025-04-25 PROCEDURE — 90471 IMMUNIZATION ADMIN: CPT | Performed by: EMERGENCY MEDICINE

## 2025-04-25 PROCEDURE — 72050 X-RAY EXAM NECK SPINE 4/5VWS: CPT

## 2025-04-25 PROCEDURE — 80053 COMPREHEN METABOLIC PANEL: CPT

## 2025-04-25 PROCEDURE — 71045 X-RAY EXAM CHEST 1 VIEW: CPT

## 2025-04-25 PROCEDURE — 85025 COMPLETE CBC W/AUTO DIFF WBC: CPT

## 2025-04-25 PROCEDURE — 99285 EMERGENCY DEPT VISIT HI MDM: CPT

## 2025-04-25 PROCEDURE — 82947 ASSAY GLUCOSE BLOOD QUANT: CPT

## 2025-04-25 PROCEDURE — 73030 X-RAY EXAM OF SHOULDER: CPT

## 2025-04-25 PROCEDURE — 90715 TDAP VACCINE 7 YRS/> IM: CPT | Performed by: EMERGENCY MEDICINE

## 2025-04-25 PROCEDURE — 96374 THER/PROPH/DIAG INJ IV PUSH: CPT

## 2025-04-25 PROCEDURE — 6360000002 HC RX W HCPCS: Performed by: EMERGENCY MEDICINE

## 2025-04-25 PROCEDURE — 84484 ASSAY OF TROPONIN QUANT: CPT

## 2025-04-25 RX ORDER — KETOROLAC TROMETHAMINE 15 MG/ML
15 INJECTION, SOLUTION INTRAMUSCULAR; INTRAVENOUS ONCE
Status: COMPLETED | OUTPATIENT
Start: 2025-04-25 | End: 2025-04-25

## 2025-04-25 RX ADMIN — KETOROLAC TROMETHAMINE 15 MG: 15 INJECTION, SOLUTION INTRAMUSCULAR; INTRAVENOUS at 10:22

## 2025-04-25 RX ADMIN — TETANUS TOXOID, REDUCED DIPHTHERIA TOXOID AND ACELLULAR PERTUSSIS VACCINE, ADSORBED 0.5 ML: 5; 2.5; 8; 8; 2.5 SUSPENSION INTRAMUSCULAR at 10:57

## 2025-04-25 ASSESSMENT — PAIN DESCRIPTION - ORIENTATION
ORIENTATION: RIGHT
ORIENTATION: RIGHT

## 2025-04-25 ASSESSMENT — PAIN SCALES - GENERAL
PAINLEVEL_OUTOF10: 10
PAINLEVEL_OUTOF10: 10
PAINLEVEL_OUTOF10: 9

## 2025-04-25 ASSESSMENT — PAIN DESCRIPTION - LOCATION
LOCATION: SHOULDER
LOCATION: SHOULDER

## 2025-04-25 ASSESSMENT — PAIN - FUNCTIONAL ASSESSMENT: PAIN_FUNCTIONAL_ASSESSMENT: 0-10

## 2025-04-25 ASSESSMENT — PAIN DESCRIPTION - PAIN TYPE: TYPE: ACUTE PAIN

## 2025-04-25 NOTE — ED NOTES
Pt. Returns from imaging.  No acute changes in condition noted.  See primary assessment.  Secondary assessment:  VIPP.  Vitals as charted.  Injuries:  Right shoulder MSP's remain intact.  Hematoma to scalp remains stable without visible worsening externally.  Pt. Free of neurological changes.  P:  Primary survey as charted.  P:  See pain scale.  Attending will be updated on pain and condition.    Multiple family members remain at bedside and are agreeable to transfer/plan of care.
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
## 2025-04-25 NOTE — ED NOTES
Multiple family members at bedside.  Comfort provided.  This nurse requested pain medication for pt.  Transfer arrangements in progress.
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
## 2025-04-27 ENCOUNTER — APPOINTMENT (OUTPATIENT)
Dept: GENERAL RADIOLOGY | Age: 85
End: 2025-04-27
Payer: MEDICARE

## 2025-04-27 ENCOUNTER — HOSPITAL ENCOUNTER (EMERGENCY)
Age: 85
Discharge: HOME | End: 2025-04-27
Attending: FAMILY MEDICINE
Payer: MEDICARE

## 2025-04-27 VITALS
SYSTOLIC BLOOD PRESSURE: 141 MMHG | WEIGHT: 200 LBS | TEMPERATURE: 97.4 F | HEART RATE: 60 BPM | RESPIRATION RATE: 18 BRPM | HEIGHT: 72 IN | OXYGEN SATURATION: 93 % | BODY MASS INDEX: 27.09 KG/M2 | DIASTOLIC BLOOD PRESSURE: 67 MMHG

## 2025-04-27 DIAGNOSIS — S43.102A CLOSED DISLOCATION OF LEFT ACROMIOCLAVICULAR JOINT, INITIAL ENCOUNTER: Primary | ICD-10-CM
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
## 2025-04-27 PROCEDURE — 99284 EMERGENCY DEPT VISIT MOD MDM: CPT

## 2025-04-27 PROCEDURE — 96372 THER/PROPH/DIAG INJ SC/IM: CPT

## 2025-04-27 PROCEDURE — 73110 X-RAY EXAM OF WRIST: CPT

## 2025-04-27 PROCEDURE — 73130 X-RAY EXAM OF HAND: CPT

## 2025-04-27 PROCEDURE — 73080 X-RAY EXAM OF ELBOW: CPT

## 2025-04-27 PROCEDURE — 73030 X-RAY EXAM OF SHOULDER: CPT

## 2025-04-27 PROCEDURE — 6360000002 HC RX W HCPCS: Performed by: FAMILY MEDICINE

## 2025-04-27 RX ORDER — KETOROLAC TROMETHAMINE 30 MG/ML
60 INJECTION, SOLUTION INTRAMUSCULAR; INTRAVENOUS ONCE
Status: COMPLETED | OUTPATIENT
Start: 2025-04-27 | End: 2025-04-27

## 2025-04-27 RX ORDER — OXYCODONE AND ACETAMINOPHEN 5; 325 MG/1; MG/1
1 TABLET ORAL EVERY 6 HOURS PRN
COMMUNITY
Start: 2025-04-25 | End: 2025-04-28

## 2025-04-27 RX ADMIN — KETOROLAC TROMETHAMINE 60 MG: 60 INJECTION, SOLUTION INTRAMUSCULAR at 09:50

## 2025-04-27 ASSESSMENT — LIFESTYLE VARIABLES
HOW OFTEN DO YOU HAVE A DRINK CONTAINING ALCOHOL: NEVER
HOW MANY STANDARD DRINKS CONTAINING ALCOHOL DO YOU HAVE ON A TYPICAL DAY: PATIENT DOES NOT DRINK

## 2025-04-27 ASSESSMENT — PAIN DESCRIPTION - ORIENTATION: ORIENTATION: LEFT

## 2025-04-27 ASSESSMENT — PAIN - FUNCTIONAL ASSESSMENT: PAIN_FUNCTIONAL_ASSESSMENT: PREVENTS OR INTERFERES SOME ACTIVE ACTIVITIES AND ADLS

## 2025-04-27 ASSESSMENT — PAIN SCALES - GENERAL
PAINLEVEL_OUTOF10: 10
PAINLEVEL_OUTOF10: 2

## 2025-04-27 ASSESSMENT — PAIN DESCRIPTION - DESCRIPTORS: DESCRIPTORS: SHARP

## 2025-04-27 ASSESSMENT — PAIN DESCRIPTION - LOCATION: LOCATION: ARM

## 2025-04-27 NOTE — ED PROVIDER NOTES
- No data to display    All other labs were within normal range or not returned as of this dictation.      MIPS    Not applicable      EMERGENCY DEPARTMENT COURSE and DIFFERENTIAL DIAGNOSIS/MDM:     Patient history and physical exam taken at bedside with daughter present, as patient would further describe pain throughout the rest of his left arm, get x-rays left shoulder elbow wrist and hand and reevaluate.    Radiology reports reviewed    Discussed with patient and patient's daughter radiology findings, noting a class III ECM injury, I did pull up a picture online and explained the meaning of this, patient has not been able to tolerate a sling well due to the pain in the contralateral arm, though this would be advised.  We discussed can apply ice over the area, Motrin chest however baseline pain, previous study had prescribed patient Percocet and we did discuss importance of being very careful with medication to does increase fall risk in the elderly.  Patient's daughter plans to contact Dr. Wilburn orthopedic surgery at [facility] tomorrow morning, I will make sure the images from this facility are pushed up to that facility for them to develop review as well    1)  Number and Complexity of Problems  Problem List This Visit: As above    Differential Diagnosis: Fracture dislocation separation    Diagnoses Considered but Do Not Suspect: N/A    Pertinent Comorbid Conditions: As above    2)  Data Reviewed  My EKG interpretation:  As above    Decision Rules/Scores utilized: N/A    Tests considered but not ordered and why: N/A    External Documents Reviewed: N/A    Imaging that is independently reviewed and interpreted by me as: As above    See more data below for the lab and radiology tests and orders.    3)  Treatment and Disposition    Patient repeat assessment:  As above    Disposition discussion with patient/family: Discharge    Case discussed with consulting clinician:  As above    Social determinants of health

## 2025-07-09 DIAGNOSIS — R26.2 UNABLE TO WALK 150 FEET: ICD-10-CM

## 2025-07-09 DIAGNOSIS — R06.02 SHORTNESS OF BREATH: Primary | ICD-10-CM

## 2025-07-09 RX ORDER — PROPRANOLOL HYDROCHLORIDE 80 MG/1
80 TABLET ORAL 2 TIMES DAILY
Qty: 180 TABLET | Refills: 3 | Status: SHIPPED | OUTPATIENT
Start: 2025-07-09

## 2025-07-24 ENCOUNTER — TELEPHONE (OUTPATIENT)
Dept: CARDIOLOGY CLINIC | Age: 85
End: 2025-07-24

## (undated) DEVICE — GOWN,AURORA,NONRNF,XL,30/CS: Brand: MEDLINE

## (undated) DEVICE — TIP SUCT FLNG TIP ON OFF CTRL YANK

## (undated) DEVICE — GLOVE SURG SZ 75 CRM LTX FREE POLYISOPRENE POLYMER BEAD ANTI

## (undated) DEVICE — Z INACTIVE USE 2660664 SOLUTION IRRIG 3000ML 0.9% SOD CHL USP UROMATIC PLAS CONT

## (undated) DEVICE — NEEDLE SPNL 25GA L3.5IN BLU HUB S STL REG WALL FIT STYL W/

## (undated) DEVICE — TOWEL,OR,DSP,ST,NATURAL,DLX,4/PK,20PK/CS: Brand: MEDLINE

## (undated) DEVICE — GLOVE ORANGE PI 7 1/2   MSG9075

## (undated) DEVICE — COVER US PRB W15XL61CM W/ GEL RUBBERBAND TAPE STRP FLD GEN

## (undated) DEVICE — GLOVE SURG SZ 75 L12IN FNGR THK79MIL GRN LTX FREE

## (undated) DEVICE — YANKAUER OPEN TIP WITH ON/OFF SWITCH: Brand: ARGYLE

## (undated) DEVICE — CURVED SHARP RF CANNULA, RADIOPAQUE MARKER: Brand: RADIOPAQUE RADIOFREQUENCY CANNULA

## (undated) DEVICE — INTRODUCER SHTH 7FR L13CM NDL 18GA GWIRE 0.035IN SYR VLV

## (undated) DEVICE — GLOVE SURG SZ 8 CRM LTX FREE POLYISOPRENE POLYMER BEAD ANTI

## (undated) DEVICE — SUTURE PERMA-HAND SZ 1 L30IN NONABSORBABLE BLK L36MM MH 1/2 K845H

## (undated) DEVICE — CANNULA NSL AD TUBE L7FT END TDL CO2 SAMP STD CONN DISP

## (undated) DEVICE — 35 ML SYRINGE LUER-LOCK TIP: Brand: MONOJECT

## (undated) DEVICE — NERVE BLOCK TRAY: Brand: MEDLINE INDUSTRIES, INC.

## (undated) DEVICE — SYRINGE, LUER LOCK, 10ML: Brand: MEDLINE

## (undated) DEVICE — NEEDLE HYPO 18GA L1IN PNK POLYPR HUB S STL REG BVL STR W/O

## (undated) DEVICE — SOLUTION IV 1000ML 0.9% SOD CHL PH 5 INJ USP VIAFLX PLAS

## (undated) DEVICE — Z DISCONTINUED BY MEDLINE USE 2711682 TRAY SKIN PREP DRY W/ PREM GLV

## (undated) DEVICE — DRESSING TRNSPAR W4XL4.8IN FLM SURESITE 123

## (undated) DEVICE — APPLICATOR MEDICATED 10.5 CC SOLUTION HI LT ORNG CHLORAPREP

## (undated) DEVICE — Device

## (undated) DEVICE — SHEET,DRAPE,53X77,STERILE: Brand: MEDLINE

## (undated) DEVICE — BANDAGE ADH W1XL3IN NAT FAB WVN FLX DURABLE N ADH PD SEAL

## (undated) DEVICE — Z DUP USE 2615778 ELECTRODE EKG AD LDWIRE L24IN W/ QUIK COMB CONN EDGE SYS

## (undated) DEVICE — SINGLE ACTION PUMPING SYSTEM

## (undated) DEVICE — ELECTRODE PT RET AD L9FT HI MOIST COND ADH HYDRGEL CORDED

## (undated) DEVICE — SUTURE VCRL SZ 2-0 L27IN ABSRB UD L26MM CT-2 1/2 CIR J269H

## (undated) DEVICE — STRIP,CLOSURE,WOUND,MEDI-STRIP,1/2X4: Brand: MEDLINE

## (undated) DEVICE — NEEDLE HYPO 25GA L1.5IN BLU POLYPR HUB S STL REG BVL STR

## (undated) DEVICE — SYRINGE MED 20ML STD CLR PLAS LUERLOCK TIP N CTRL DISP

## (undated) DEVICE — SPONGE GZ W4XL4IN CELOS POSTOP AVANT

## (undated) DEVICE — GUIDEWIRE VASC STR 3 CM 0.035 INX150 CM STD NIT ZIPWIRE

## (undated) DEVICE — GLOVE SURG SZ 85 CRM LTX FREE POLYISOPRENE POLYMER BEAD ANTI

## (undated) DEVICE — ADAPTER URO SCP UROLOK LL

## (undated) DEVICE — DEVICE VASC CLSR 24CM FEM OR RAD ART EXT MECH PRSS DSG POST

## (undated) DEVICE — SOLUTION IV IRRIG WATER 1000ML POUR BRL 2F7114

## (undated) DEVICE — TUBING, SUCTION, 1/4" X 10', STRAIGHT: Brand: MEDLINE

## (undated) DEVICE — DECANTER BAG 9": Brand: MEDLINE INDUSTRIES, INC.

## (undated) DEVICE — DRESSING TRNSPAR W4XL4.8IN W/ N ADH PD SURESITE-123 PLUSPD

## (undated) DEVICE — CUSTOM PACK: Brand: UNBRANDED

## (undated) DEVICE — GAUZE,SPONGE,4"X4",16PLY,XRAY,STRL,LF: Brand: MEDLINE

## (undated) DEVICE — STANDARD HYPODERMIC NEEDLE,POLYPROPYLENE HUB: Brand: MONOJECT

## (undated) DEVICE — SYRINGE 20ML LL S/C 50

## (undated) DEVICE — 3M™ STERI-STRIP™ COMPOUND BENZOIN TINCTURE 40 BAGS/CARTON 4 CARTONS/CASE C1544: Brand: 3M™ STERI-STRIP™

## (undated) DEVICE — 3M™ STERI-STRIP™ REINFORCED ADHESIVE SKIN CLOSURES, R1547, 1/2 IN X 4 IN (12 MM X 100 MM), 6 STRIPS/ENVELOPE: Brand: 3M™ STERI-STRIP™

## (undated) DEVICE — INTENDED FOR TISSUE SEPARATION, AND OTHER PROCEDURES THAT REQUIRE A SHARP SURGICAL BLADE TO PUNCTURE OR CUT.: Brand: BARD-PARKER ® CARBON RIB-BACK BLADES

## (undated) DEVICE — SUTURE VCRL SZ 4-0 L27IN ABSRB UD L19MM FS-2 3/8 CIR REV J422H

## (undated) DEVICE — 4-PORT MANIFOLD: Brand: NEPTUNE 2

## (undated) DEVICE — GOWN,AURORA,NON-REINFORCED,2XL: Brand: MEDLINE

## (undated) DEVICE — BLADE SURG NO15 C STL SHRP PREM

## (undated) DEVICE — SOLUTION IV 500ML 0.9% SOD CHL PH 5 INJ USP VIAFLX PLAS

## (undated) DEVICE — SOLUTION SURG PREP ANTIMICROBIAL 4 OZ SKIN WND EXIDINE